# Patient Record
Sex: MALE | Race: WHITE | NOT HISPANIC OR LATINO | URBAN - METROPOLITAN AREA
[De-identification: names, ages, dates, MRNs, and addresses within clinical notes are randomized per-mention and may not be internally consistent; named-entity substitution may affect disease eponyms.]

---

## 2020-12-01 ENCOUNTER — INPATIENT (INPATIENT)
Facility: HOSPITAL | Age: 70
LOS: 7 days | Discharge: HOME | DRG: 189 | End: 2020-12-09
Attending: STUDENT IN AN ORGANIZED HEALTH CARE EDUCATION/TRAINING PROGRAM | Admitting: INTERNAL MEDICINE
Payer: MEDICARE

## 2020-12-01 VITALS
OXYGEN SATURATION: 100 % | RESPIRATION RATE: 20 BRPM | TEMPERATURE: 98 F | WEIGHT: 160.06 LBS | HEIGHT: 70 IN | SYSTOLIC BLOOD PRESSURE: 165 MMHG | DIASTOLIC BLOOD PRESSURE: 91 MMHG | HEART RATE: 105 BPM

## 2020-12-01 DIAGNOSIS — K46.9 UNSPECIFIED ABDOMINAL HERNIA WITHOUT OBSTRUCTION OR GANGRENE: Chronic | ICD-10-CM

## 2020-12-01 DIAGNOSIS — J44.1 CHRONIC OBSTRUCTIVE PULMONARY DISEASE WITH (ACUTE) EXACERBATION: ICD-10-CM

## 2020-12-01 DIAGNOSIS — Z98.89 OTHER SPECIFIED POSTPROCEDURAL STATES: Chronic | ICD-10-CM

## 2020-12-01 LAB
ALBUMIN SERPL ELPH-MCNC: 3.3 G/DL — SIGNIFICANT CHANGE UP (ref 3.3–5.2)
ALP SERPL-CCNC: 143 U/L — HIGH (ref 40–120)
ALT FLD-CCNC: 12 U/L — SIGNIFICANT CHANGE UP
ANION GAP SERPL CALC-SCNC: 8 MMOL/L — SIGNIFICANT CHANGE UP (ref 5–17)
AST SERPL-CCNC: 15 U/L — SIGNIFICANT CHANGE UP
BASE EXCESS BLDA CALC-SCNC: 14.7 MMOL/L — HIGH (ref -2–2)
BASE EXCESS BLDV CALC-SCNC: 17.6 MMOL/L — HIGH (ref -2–2)
BASOPHILS # BLD AUTO: 0.06 K/UL — SIGNIFICANT CHANGE UP (ref 0–0.2)
BASOPHILS NFR BLD AUTO: 0.6 % — SIGNIFICANT CHANGE UP (ref 0–2)
BILIRUB SERPL-MCNC: 0.3 MG/DL — LOW (ref 0.4–2)
BLOOD GAS COMMENTS ARTERIAL: SIGNIFICANT CHANGE UP
BUN SERPL-MCNC: 8 MG/DL — SIGNIFICANT CHANGE UP (ref 8–20)
CA-I SERPL-SCNC: 1.19 MMOL/L — SIGNIFICANT CHANGE UP (ref 1.15–1.33)
CALCIUM SERPL-MCNC: 9.2 MG/DL — SIGNIFICANT CHANGE UP (ref 8.6–10.2)
CHLORIDE BLDV-SCNC: 91 MMOL/L — LOW (ref 98–107)
CHLORIDE SERPL-SCNC: 93 MMOL/L — LOW (ref 98–107)
CO2 SERPL-SCNC: 43 MMOL/L — HIGH (ref 22–29)
CREAT SERPL-MCNC: 0.55 MG/DL — SIGNIFICANT CHANGE UP (ref 0.5–1.3)
EOSINOPHIL # BLD AUTO: 0.27 K/UL — SIGNIFICANT CHANGE UP (ref 0–0.5)
EOSINOPHIL NFR BLD AUTO: 2.7 % — SIGNIFICANT CHANGE UP (ref 0–6)
GAS PNL BLDA: SIGNIFICANT CHANGE UP
GAS PNL BLDV: 145 MMOL/L — SIGNIFICANT CHANGE UP (ref 135–145)
GAS PNL BLDV: SIGNIFICANT CHANGE UP
GAS PNL BLDV: SIGNIFICANT CHANGE UP
GLUCOSE BLDV-MCNC: 120 MG/DL — HIGH (ref 70–99)
GLUCOSE SERPL-MCNC: 123 MG/DL — HIGH (ref 70–99)
HCO3 BLDA-SCNC: 38 MMOL/L — HIGH (ref 20–26)
HCO3 BLDV-SCNC: 42 MMOL/L — HIGH (ref 21–29)
HCT VFR BLD CALC: 44.1 % — SIGNIFICANT CHANGE UP (ref 39–50)
HCT VFR BLDA CALC: 42 — SIGNIFICANT CHANGE UP (ref 39–50)
HGB BLD CALC-MCNC: 13.6 G/DL — SIGNIFICANT CHANGE UP (ref 13–17)
HGB BLD-MCNC: 13.2 G/DL — SIGNIFICANT CHANGE UP (ref 13–17)
HOROWITZ INDEX BLDA+IHG-RTO: 0.4 — SIGNIFICANT CHANGE UP
IMM GRANULOCYTES NFR BLD AUTO: 0.7 % — SIGNIFICANT CHANGE UP (ref 0–1.5)
INR BLD: 1.43 RATIO — HIGH (ref 0.88–1.16)
LACTATE BLDV-MCNC: 0.4 MMOL/L — LOW (ref 0.5–2)
LYMPHOCYTES # BLD AUTO: 1.33 K/UL — SIGNIFICANT CHANGE UP (ref 1–3.3)
LYMPHOCYTES # BLD AUTO: 13.2 % — SIGNIFICANT CHANGE UP (ref 13–44)
MCHC RBC-ENTMCNC: 27 PG — SIGNIFICANT CHANGE UP (ref 27–34)
MCHC RBC-ENTMCNC: 29.9 GM/DL — LOW (ref 32–36)
MCV RBC AUTO: 90.4 FL — SIGNIFICANT CHANGE UP (ref 80–100)
MONOCYTES # BLD AUTO: 1.17 K/UL — HIGH (ref 0–0.9)
MONOCYTES NFR BLD AUTO: 11.6 % — SIGNIFICANT CHANGE UP (ref 2–14)
NEUTROPHILS # BLD AUTO: 7.17 K/UL — SIGNIFICANT CHANGE UP (ref 1.8–7.4)
NEUTROPHILS NFR BLD AUTO: 71.2 % — SIGNIFICANT CHANGE UP (ref 43–77)
OTHER CELLS CSF MANUAL: 17 ML/DL — LOW (ref 18–22)
PCO2 BLDA: 96 MMHG — CRITICAL HIGH (ref 35–45)
PCO2 BLDV: 92 MMHG — HIGH (ref 35–50)
PH BLDA: 7.29 — LOW (ref 7.35–7.45)
PH BLDV: 7.24 — LOW (ref 7.32–7.43)
PLATELET # BLD AUTO: 307 K/UL — SIGNIFICANT CHANGE UP (ref 150–400)
PO2 BLDA: 68 MMHG — LOW (ref 83–108)
PO2 BLDV: 69 MMHG — HIGH (ref 25–45)
POTASSIUM BLDV-SCNC: 4.3 MMOL/L — SIGNIFICANT CHANGE UP (ref 3.4–4.5)
POTASSIUM SERPL-MCNC: 4.5 MMOL/L — SIGNIFICANT CHANGE UP (ref 3.5–5.3)
POTASSIUM SERPL-SCNC: 4.5 MMOL/L — SIGNIFICANT CHANGE UP (ref 3.5–5.3)
PROT SERPL-MCNC: 6.8 G/DL — SIGNIFICANT CHANGE UP (ref 6.6–8.7)
PROTHROM AB SERPL-ACNC: 16.3 SEC — HIGH (ref 10.6–13.6)
RBC # BLD: 4.88 M/UL — SIGNIFICANT CHANGE UP (ref 4.2–5.8)
RBC # FLD: 13.4 % — SIGNIFICANT CHANGE UP (ref 10.3–14.5)
SAO2 % BLDA: 93 % — LOW (ref 95–99)
SAO2 % BLDV: 93 % — SIGNIFICANT CHANGE UP
SARS-COV-2 RNA SPEC QL NAA+PROBE: SIGNIFICANT CHANGE UP
SODIUM SERPL-SCNC: 144 MMOL/L — SIGNIFICANT CHANGE UP (ref 135–145)
TROPONIN T SERPL-MCNC: <0.01 NG/ML — SIGNIFICANT CHANGE UP (ref 0–0.06)
WBC # BLD: 10.07 K/UL — SIGNIFICANT CHANGE UP (ref 3.8–10.5)
WBC # FLD AUTO: 10.07 K/UL — SIGNIFICANT CHANGE UP (ref 3.8–10.5)

## 2020-12-01 PROCEDURE — 99291 CRITICAL CARE FIRST HOUR: CPT

## 2020-12-01 PROCEDURE — 93306 TTE W/DOPPLER COMPLETE: CPT | Mod: 26

## 2020-12-01 PROCEDURE — 93010 ELECTROCARDIOGRAM REPORT: CPT

## 2020-12-01 PROCEDURE — 71045 X-RAY EXAM CHEST 1 VIEW: CPT | Mod: 26

## 2020-12-01 RX ORDER — CEFTRIAXONE 500 MG/1
1000 INJECTION, POWDER, FOR SOLUTION INTRAMUSCULAR; INTRAVENOUS EVERY 24 HOURS
Refills: 0 | Status: DISCONTINUED | OUTPATIENT
Start: 2020-12-01 | End: 2020-12-04

## 2020-12-01 RX ORDER — INFLUENZA VIRUS VACCINE 15; 15; 15; 15 UG/.5ML; UG/.5ML; UG/.5ML; UG/.5ML
0.5 SUSPENSION INTRAMUSCULAR ONCE
Refills: 0 | Status: DISCONTINUED | OUTPATIENT
Start: 2020-12-01 | End: 2020-12-09

## 2020-12-01 RX ORDER — AZITHROMYCIN 500 MG/1
500 TABLET, FILM COATED ORAL EVERY 24 HOURS
Refills: 0 | Status: DISCONTINUED | OUTPATIENT
Start: 2020-12-02 | End: 2020-12-04

## 2020-12-01 RX ORDER — CHLORHEXIDINE GLUCONATE 213 G/1000ML
1 SOLUTION TOPICAL
Refills: 0 | Status: DISCONTINUED | OUTPATIENT
Start: 2020-12-01 | End: 2020-12-02

## 2020-12-01 RX ORDER — DILTIAZEM HCL 120 MG
60 CAPSULE, EXT RELEASE 24 HR ORAL THREE TIMES A DAY
Refills: 0 | Status: DISCONTINUED | OUTPATIENT
Start: 2020-12-01 | End: 2020-12-05

## 2020-12-01 RX ORDER — AZITHROMYCIN 500 MG/1
TABLET, FILM COATED ORAL
Refills: 0 | Status: DISCONTINUED | OUTPATIENT
Start: 2020-12-01 | End: 2020-12-04

## 2020-12-01 RX ORDER — BUDESONIDE AND FORMOTEROL FUMARATE DIHYDRATE 160; 4.5 UG/1; UG/1
2 AEROSOL RESPIRATORY (INHALATION)
Refills: 0 | Status: DISCONTINUED | OUTPATIENT
Start: 2020-12-01 | End: 2020-12-09

## 2020-12-01 RX ORDER — AZITHROMYCIN 500 MG/1
500 TABLET, FILM COATED ORAL ONCE
Refills: 0 | Status: COMPLETED | OUTPATIENT
Start: 2020-12-01 | End: 2020-12-01

## 2020-12-01 RX ORDER — ENOXAPARIN SODIUM 100 MG/ML
40 INJECTION SUBCUTANEOUS DAILY
Refills: 0 | Status: DISCONTINUED | OUTPATIENT
Start: 2020-12-01 | End: 2020-12-02

## 2020-12-01 RX ORDER — CEFTRIAXONE 500 MG/1
1000 INJECTION, POWDER, FOR SOLUTION INTRAMUSCULAR; INTRAVENOUS ONCE
Refills: 0 | Status: COMPLETED | OUTPATIENT
Start: 2020-12-01 | End: 2020-12-01

## 2020-12-01 RX ORDER — ALBUTEROL 90 UG/1
1 AEROSOL, METERED ORAL EVERY 4 HOURS
Refills: 0 | Status: DISCONTINUED | OUTPATIENT
Start: 2020-12-01 | End: 2020-12-02

## 2020-12-01 RX ORDER — LISINOPRIL 2.5 MG/1
40 TABLET ORAL DAILY
Refills: 0 | Status: DISCONTINUED | OUTPATIENT
Start: 2020-12-01 | End: 2020-12-02

## 2020-12-01 RX ORDER — ALBUTEROL 90 UG/1
1 AEROSOL, METERED ORAL ONCE
Refills: 0 | Status: COMPLETED | OUTPATIENT
Start: 2020-12-01 | End: 2020-12-01

## 2020-12-01 RX ORDER — LEVETIRACETAM 250 MG/1
500 TABLET, FILM COATED ORAL
Refills: 0 | Status: DISCONTINUED | OUTPATIENT
Start: 2020-12-01 | End: 2020-12-09

## 2020-12-01 RX ADMIN — ALBUTEROL 1 PUFF(S): 90 AEROSOL, METERED ORAL at 12:00

## 2020-12-01 RX ADMIN — ENOXAPARIN SODIUM 40 MILLIGRAM(S): 100 INJECTION SUBCUTANEOUS at 21:53

## 2020-12-01 RX ADMIN — AZITHROMYCIN 255 MILLIGRAM(S): 500 TABLET, FILM COATED ORAL at 16:20

## 2020-12-01 RX ADMIN — Medication 60 MILLIGRAM(S): at 21:50

## 2020-12-01 RX ADMIN — Medication 40 MILLIGRAM(S): at 21:50

## 2020-12-01 RX ADMIN — CEFTRIAXONE 1000 MILLIGRAM(S): 500 INJECTION, POWDER, FOR SOLUTION INTRAMUSCULAR; INTRAVENOUS at 14:30

## 2020-12-01 RX ADMIN — Medication 125 MILLIGRAM(S): at 12:00

## 2020-12-01 RX ADMIN — CEFTRIAXONE 100 MILLIGRAM(S): 500 INJECTION, POWDER, FOR SOLUTION INTRAMUSCULAR; INTRAVENOUS at 21:50

## 2020-12-01 RX ADMIN — CEFTRIAXONE 100 MILLIGRAM(S): 500 INJECTION, POWDER, FOR SOLUTION INTRAMUSCULAR; INTRAVENOUS at 13:56

## 2020-12-01 NOTE — H&P ADULT - NSICDXPASTMEDICALHX_GEN_ALL_CORE_FT
PAST MEDICAL HISTORY:  Afib     COPD (chronic obstructive pulmonary disease)     Depression, unspecified depression type     Diabetes     Hyperlipidemia     Hypertension     Seizure

## 2020-12-01 NOTE — ED PROVIDER NOTE - ATTENDING CONTRIBUTION TO CARE
The patient seen and examined    COPD exacerbation    I, Dread Benitez, performed the initial face to face bedside interview with this patient regarding history of present illness, review of symptoms and relevant past medical, social and family history.  I completed an independent physical examination.  I was the initial provider who evaluated this patient. I have signed out the follow up of any pending tests (i.e. labs, radiological studies) to the resident.  I have communicated the patient’s plan of care and disposition with the resident.

## 2020-12-01 NOTE — ED ADULT TRIAGE NOTE - CHIEF COMPLAINT QUOTE
"my copd is acting up" "crystal been isolating for months" c/o difficulty breathing not improving with home neb tx. Hx COPD and emphysema. Arrives a&ox3, RR20 with 6L/min via NRB. Per EMS pt 88% on RA PTA. Denies cp denies covid contacts denies fevers/chills

## 2020-12-01 NOTE — ED PROVIDER NOTE - OBJECTIVE STATEMENT
Pt is a 69 y/o M w/PMHx DM, COPD with hx acute respiratory failure requiring intubation, depression, afib on coumadin, HTN, seizures, HLD presents c/o shortness of breath.  Pt states that last night he started to feel worsening shortness of breath, is normally on O2 at home, but is unsure how much.  Pt denies fever, headache, chest pain, abdominal pain, nausea, vomiting, constipation, diarrhea.

## 2020-12-01 NOTE — ED PROVIDER NOTE - CLINICAL SUMMARY MEDICAL DECISION MAKING FREE TEXT BOX
Pt is a 69 y/o M c/o shortness of breath, likely COPD exacerbation, will get labs, cxr, vbg, solumedrol, nebs

## 2020-12-01 NOTE — H&P ADULT - ASSESSMENT
70M w/ PMHx DM, HTN, COPD on home O2, AF on coumadin, seizures was brought in by family for worsening SOB    Acute on chronic hypercapnic respiratory failure    Patient seen and examined     Neuro: Mentation at baseline. Avoid sedation   Cardiovascular: Aim for MAP target 65. Continue rate control meds and AC w/ coumadin. Check TTE/ EKG  Resp/Chest: Start IV steroids, empiric ABx and nebs. Check COVID. Maintain SpO2 > 88%. Continue BiPAP. Check ABG and pulmonary consult  GI/Nutrition: Diet: Keep NPO for now IV PPI. Bowel regimen as needed  ID: Empiric Abx and check blood/sputum cultures. UA and COVID. Trend LA  Renal: Avoid nephrotoxic agents. Strict I&O  Endocrinology: Check A1c and lipid panel. Maintain Blood sugar < 180. ISS as per protocol. Hold metformin  Haem/Oncology:  DVT ppx w/ Coumadin  Code status: Full  Line/tubes/ drains: None    Critical Care time: 35 minutes assessing presenting problems of acute illness that poses high probability of life threatening deterioration or end organ damage/dysfunction.  Medical decision making including Initiating plan of care, reviewing data, reviewing radiology, discussing with multidisciplinary team, non inclusive of procedures

## 2020-12-01 NOTE — H&P ADULT - NSHPPHYSICALEXAM_GEN_ALL_CORE
General: No acute distress.    Neuro: Awake and moving all extremities  HEENT: Pupils equal, reactive to light, Moist oral mucosa  PULM: Decreased air entry bilaterally  CVS: Regular rhythm and controlled rate, no murmurs, rubs, or gallops  ABD: Soft, nondistended, nontender, normoactive bowel sounds, no CVA tenderness  EXT: No b/l LE edema, nontender with pedal pulse palpable   SKIN: Warm and well perfused, no acute rashes

## 2020-12-01 NOTE — ED PROVIDER NOTE - NS ED ROS FT
CONSTITUTIONAL: No fevers, no chills  Eyes: No vision changes  Cardiovascular: No Chest pain  Respiratory: +SOB  Gastrointestinal: No n/v/c/d, no abd pain  Genitourinary: no dysuria, no hematuria  SKIN: no rashes.  MSK: no weakness, no myalgias, no arthralgias  NEURO: no headache, no weakness, no numbness  PSYCHIATRIC: no SI/HI

## 2020-12-01 NOTE — H&P ADULT - ATTENDING COMMENTS
Patient seen by Telehospitalist via video conference on camera (assigned by Dr Riley), difficult to understand on BIPAP, states he has been worsening with his SOB since his steroid finished three days ago, was admitted a month ago with same problem.

## 2020-12-01 NOTE — ED PROVIDER NOTE - PHYSICAL EXAMINATION
General: well appearing, interactive, well nourished, NAD  HEENT: pupils equal and reactive, normal external ears bilaterally   Cardiac: RRR, no MRG appreciated  Resp: belly breathing, tachypneic, lungs clear to auscultation bilaterally, symmetric chest wall rise  Abd: soft, nontender, nondistended,   : no CVA tenderness  Neuro: Moving all extremities  Skin:  normal color for race

## 2020-12-01 NOTE — ED ADULT NURSE NOTE - PMH
Afib    COPD (chronic obstructive pulmonary disease)    Depression, unspecified depression type    Diabetes    Hyperlipidemia    Hypertension    Seizure

## 2020-12-01 NOTE — H&P ADULT - NSHPREVIEWOFSYSTEMS_GEN_ALL_CORE
CONSTITUTIONAL: No fever, chills, or fatigue  EYES: No eye pain, visual disturbances, or discharge  ENMT:  No difficulty hearing, tinnitus, vertigo; No sinus or throat pain  NECK: No pain or stiffness  RESPIRATORY: No cough, wheezing, chills or hemoptysis; + shortness of breath  CARDIOVASCULAR: No palpitations, dizziness, or leg swelling. +ve CP  GASTROINTESTINAL: No abdominal or epigastric pain. No nausea, vomiting, or hematemesis; No diarrhea or constipation. No melena or hematochezia.  GENITOURINARY: No dysuria, frequency, hematuria, or incontinence  NEUROLOGICAL: No headaches, memory loss, loss of strength, numbness, or tremors  SKIN: No itching, burning, rashes, or lesions   MUSCULOSKELETAL: No joint pain or swelling; No muscle, back, or extremity pain  PSYCHIATRIC: No depression, anxiety, mood swings, or difficulty sleeping

## 2020-12-01 NOTE — CONSULT NOTE ADULT - SUBJECTIVE AND OBJECTIVE BOX
Patient is a 70y old  Male who presents with a chief complaint of     BRIEF HOSPITAL COURSE:     70M w/ PMHx DM, HTN, COPD on home O2, AF on coumadin, seizures was brought in by family for worsening SOB. Pt appears quite comfortable w/ no use of accessory muscles. Admits to have mild SOB for the past few days and occasional chest discomfort. No fever, cough, N/V/D  or pain abdomen. His sister in law was diagnosed w/ COVID recently but he has not had contact w/ her in a while. He is non complaint on home BiPAP and recently completed a steroid taper. ICU consulted for new BIPAP    PAST MEDICAL & SURGICAL HISTORY:  Seizure    Depression, unspecified depression type    Diabetes    Hypertension    Hyperlipidemia    Afib    COPD (chronic obstructive pulmonary disease)    Abdominal hernia    H/O carotid endarterectomy      Review of Systems:  CONSTITUTIONAL: No fever, chills, or fatigue  EYES: No eye pain, visual disturbances, or discharge  ENMT:  No difficulty hearing, tinnitus, vertigo; No sinus or throat pain  NECK: No pain or stiffness  RESPIRATORY: No cough, wheezing, chills or hemoptysis; + shortness of breath  CARDIOVASCULAR: No palpitations, dizziness, or leg swelling. +ve CP  GASTROINTESTINAL: No abdominal or epigastric pain. No nausea, vomiting, or hematemesis; No diarrhea or constipation. No melena or hematochezia.  GENITOURINARY: No dysuria, frequency, hematuria, or incontinence  NEUROLOGICAL: No headaches, memory loss, loss of strength, numbness, or tremors  SKIN: No itching, burning, rashes, or lesions   MUSCULOSKELETAL: No joint pain or swelling; No muscle, back, or extremity pain  PSYCHIATRIC: No depression, anxiety, mood swings, or difficulty sleeping      Physical Examination:    ICU Vital Signs Last 24 Hrs  T(C): 36.8 (01 Dec 2020 10:51), Max: 36.8 (01 Dec 2020 10:51)  T(F): 98.3 (01 Dec 2020 10:51), Max: 98.3 (01 Dec 2020 10:51)  HR: 96 (01 Dec 2020 13:23) (96 - 105)  BP: 165/91 (01 Dec 2020 10:51) (165/91 - 165/91)  BP(mean): --  ABP: --  ABP(mean): --  RR: 24 (01 Dec 2020 11:07) (20 - 24)  SpO2: 93% (01 Dec 2020 13:23) (88% - 100%)    General: No acute distress.    Neuro: Awake and moving all extremities  HEENT: Pupils equal, reactive to light, Moist oral mucosa  PULM: Decreased air entry bilaterally  CVS: Regular rhythm and controlled rate, no murmurs, rubs, or gallops  ABD: Soft, nondistended, nontender, normoactive bowel sounds, no CVA tenderness  EXT: No b/l LE edema, nontender with pedal pulse palpable   SKIN: Warm and well perfused, no acute rashes       Medications:                                  I&O's Detail        RADIOLOGY/ Microbiology/ Labs: reviewed

## 2020-12-01 NOTE — ED ADULT NURSE NOTE - OBJECTIVE STATEMENT
69 y/o m a&ox3 hx. of COPD comes to ED c/o worsening SOB. pt. has home bipap that he does not really use. pt. belly breathing.

## 2020-12-01 NOTE — H&P ADULT - HISTORY OF PRESENT ILLNESS
70M w/ PMHx DM, HTN, COPD on home O2, AF on coumadin, seizures was brought in by family for worsening SOB. Pt appears quite comfortable w/ no use of accessory muscles. Admits to have mild SOB for the past few days and occasional chest discomfort. No fever, cough, N/V/D  or pain abdomen. His sister in law was diagnosed w/ COVID recently but he has not had contact w/ her in a while. He is non complaint on home BiPAP and recently completed a steroid taper. ICU consulted for new BIPAP

## 2020-12-02 LAB
A1C WITH ESTIMATED AVERAGE GLUCOSE RESULT: 8.7 % — HIGH (ref 4–5.6)
ALBUMIN SERPL ELPH-MCNC: 3.2 G/DL — LOW (ref 3.3–5.2)
ALP SERPL-CCNC: 128 U/L — HIGH (ref 40–120)
ALT FLD-CCNC: 11 U/L — SIGNIFICANT CHANGE UP
ANION GAP SERPL CALC-SCNC: 6 MMOL/L — SIGNIFICANT CHANGE UP (ref 5–17)
ANION GAP SERPL CALC-SCNC: 6 MMOL/L — SIGNIFICANT CHANGE UP (ref 5–17)
APTT BLD: 36.7 SEC — HIGH (ref 27.5–35.5)
AST SERPL-CCNC: 12 U/L — SIGNIFICANT CHANGE UP
BASE EXCESS BLDA CALC-SCNC: 18 MMOL/L — HIGH (ref -3–3)
BILIRUB SERPL-MCNC: 0.2 MG/DL — LOW (ref 0.4–2)
BLOOD GAS COMMENTS ARTERIAL: SIGNIFICANT CHANGE UP
BUN SERPL-MCNC: 22 MG/DL — HIGH (ref 8–20)
BUN SERPL-MCNC: 33 MG/DL — HIGH (ref 8–20)
CALCIUM SERPL-MCNC: 8.9 MG/DL — SIGNIFICANT CHANGE UP (ref 8.6–10.2)
CALCIUM SERPL-MCNC: 9.4 MG/DL — SIGNIFICANT CHANGE UP (ref 8.6–10.2)
CHLORIDE SERPL-SCNC: 89 MMOL/L — LOW (ref 98–107)
CHLORIDE SERPL-SCNC: 90 MMOL/L — LOW (ref 98–107)
CHOLEST SERPL-MCNC: 129 MG/DL — SIGNIFICANT CHANGE UP
CO2 SERPL-SCNC: 42 MMOL/L — HIGH (ref 22–29)
CO2 SERPL-SCNC: 43 MMOL/L — HIGH (ref 22–29)
CREAT SERPL-MCNC: 0.78 MG/DL — SIGNIFICANT CHANGE UP (ref 0.5–1.3)
CREAT SERPL-MCNC: 0.94 MG/DL — SIGNIFICANT CHANGE UP (ref 0.5–1.3)
ESTIMATED AVERAGE GLUCOSE: 203 MG/DL — HIGH (ref 68–114)
GAS PNL BLDA: SIGNIFICANT CHANGE UP
GLUCOSE BLDC GLUCOMTR-MCNC: 146 MG/DL — HIGH (ref 70–99)
GLUCOSE BLDC GLUCOMTR-MCNC: 166 MG/DL — HIGH (ref 70–99)
GLUCOSE BLDC GLUCOMTR-MCNC: 168 MG/DL — HIGH (ref 70–99)
GLUCOSE BLDC GLUCOMTR-MCNC: 199 MG/DL — HIGH (ref 70–99)
GLUCOSE BLDC GLUCOMTR-MCNC: 224 MG/DL — HIGH (ref 70–99)
GLUCOSE BLDC GLUCOMTR-MCNC: 233 MG/DL — HIGH (ref 70–99)
GLUCOSE BLDC GLUCOMTR-MCNC: 265 MG/DL — HIGH (ref 70–99)
GLUCOSE BLDC GLUCOMTR-MCNC: 280 MG/DL — HIGH (ref 70–99)
GLUCOSE BLDC GLUCOMTR-MCNC: 290 MG/DL — HIGH (ref 70–99)
GLUCOSE BLDC GLUCOMTR-MCNC: 320 MG/DL — HIGH (ref 70–99)
GLUCOSE BLDC GLUCOMTR-MCNC: 366 MG/DL — HIGH (ref 70–99)
GLUCOSE BLDC GLUCOMTR-MCNC: 372 MG/DL — HIGH (ref 70–99)
GLUCOSE BLDC GLUCOMTR-MCNC: 422 MG/DL — HIGH (ref 70–99)
GLUCOSE BLDC GLUCOMTR-MCNC: 440 MG/DL — HIGH (ref 70–99)
GLUCOSE SERPL-MCNC: 170 MG/DL — HIGH (ref 70–99)
GLUCOSE SERPL-MCNC: 452 MG/DL — HIGH (ref 70–99)
HCO3 BLDA-SCNC: 42 MMOL/L — HIGH (ref 20–26)
HCT VFR BLD CALC: 37.1 % — LOW (ref 39–50)
HCV AB S/CO SERPL IA: 0.06 S/CO — SIGNIFICANT CHANGE UP (ref 0–0.99)
HCV AB SERPL-IMP: SIGNIFICANT CHANGE UP
HDLC SERPL-MCNC: 46 MG/DL — SIGNIFICANT CHANGE UP
HGB BLD-MCNC: 11.5 G/DL — LOW (ref 13–17)
HOROWITZ INDEX BLDA+IHG-RTO: SIGNIFICANT CHANGE UP
INR BLD: 1.58 RATIO — HIGH (ref 0.88–1.16)
LACTATE SERPL-SCNC: 0.9 MMOL/L — SIGNIFICANT CHANGE UP (ref 0.5–2)
LIPID PNL WITH DIRECT LDL SERPL: 68 MG/DL — SIGNIFICANT CHANGE UP
MAGNESIUM SERPL-MCNC: 1.8 MG/DL — SIGNIFICANT CHANGE UP (ref 1.6–2.6)
MAGNESIUM SERPL-MCNC: 1.8 MG/DL — SIGNIFICANT CHANGE UP (ref 1.8–2.6)
MCHC RBC-ENTMCNC: 27.4 PG — SIGNIFICANT CHANGE UP (ref 27–34)
MCHC RBC-ENTMCNC: 31 GM/DL — LOW (ref 32–36)
MCV RBC AUTO: 88.3 FL — SIGNIFICANT CHANGE UP (ref 80–100)
NON HDL CHOLESTEROL: 83 MG/DL — SIGNIFICANT CHANGE UP
PCO2 BLDA: 94 MMHG — CRITICAL HIGH (ref 35–45)
PH BLDA: 7.32 — LOW (ref 7.35–7.45)
PHOSPHATE SERPL-MCNC: 2.6 MG/DL — SIGNIFICANT CHANGE UP (ref 2.4–4.7)
PHOSPHATE SERPL-MCNC: 3.3 MG/DL — SIGNIFICANT CHANGE UP (ref 2.4–4.7)
PLATELET # BLD AUTO: 293 K/UL — SIGNIFICANT CHANGE UP (ref 150–400)
PO2 BLDA: 76 MMHG — LOW (ref 83–108)
POTASSIUM SERPL-MCNC: 5.2 MMOL/L — SIGNIFICANT CHANGE UP (ref 3.5–5.3)
POTASSIUM SERPL-MCNC: 5.6 MMOL/L — HIGH (ref 3.5–5.3)
POTASSIUM SERPL-SCNC: 5.2 MMOL/L — SIGNIFICANT CHANGE UP (ref 3.5–5.3)
POTASSIUM SERPL-SCNC: 5.6 MMOL/L — HIGH (ref 3.5–5.3)
PROT SERPL-MCNC: 6.4 G/DL — LOW (ref 6.6–8.7)
PROTHROM AB SERPL-ACNC: 17.9 SEC — HIGH (ref 10.6–13.6)
RBC # BLD: 4.2 M/UL — SIGNIFICANT CHANGE UP (ref 4.2–5.8)
RBC # FLD: 13.3 % — SIGNIFICANT CHANGE UP (ref 10.3–14.5)
SAO2 % BLDA: 96 % — SIGNIFICANT CHANGE UP (ref 95–99)
SARS-COV-2 IGG SERPL QL IA: NEGATIVE — SIGNIFICANT CHANGE UP
SARS-COV-2 IGM SERPL IA-ACNC: <0.1 INDEX — SIGNIFICANT CHANGE UP
SODIUM SERPL-SCNC: 137 MMOL/L — SIGNIFICANT CHANGE UP (ref 135–145)
SODIUM SERPL-SCNC: 138 MMOL/L — SIGNIFICANT CHANGE UP (ref 135–145)
TRIGL SERPL-MCNC: 75 MG/DL — SIGNIFICANT CHANGE UP
TROPONIN T SERPL-MCNC: <0.01 NG/ML — SIGNIFICANT CHANGE UP (ref 0–0.06)
TSH SERPL-MCNC: 0.38 UIU/ML — SIGNIFICANT CHANGE UP (ref 0.27–4.2)
WBC # BLD: 5.97 K/UL — SIGNIFICANT CHANGE UP (ref 3.8–10.5)
WBC # FLD AUTO: 5.97 K/UL — SIGNIFICANT CHANGE UP (ref 3.8–10.5)

## 2020-12-02 PROCEDURE — 99233 SBSQ HOSP IP/OBS HIGH 50: CPT

## 2020-12-02 PROCEDURE — 99232 SBSQ HOSP IP/OBS MODERATE 35: CPT

## 2020-12-02 RX ORDER — INSULIN GLARGINE 100 [IU]/ML
25 INJECTION, SOLUTION SUBCUTANEOUS EVERY MORNING
Refills: 0 | Status: DISCONTINUED | OUTPATIENT
Start: 2020-12-03 | End: 2020-12-09

## 2020-12-02 RX ORDER — DEXTROSE 50 % IN WATER 50 %
25 SYRINGE (ML) INTRAVENOUS ONCE
Refills: 0 | Status: DISCONTINUED | OUTPATIENT
Start: 2020-12-02 | End: 2020-12-02

## 2020-12-02 RX ORDER — LACTOBACILLUS ACIDOPHILUS 100MM CELL
1 CAPSULE ORAL
Refills: 0 | Status: DISCONTINUED | OUTPATIENT
Start: 2020-12-02 | End: 2020-12-09

## 2020-12-02 RX ORDER — INSULIN GLARGINE 100 [IU]/ML
25 INJECTION, SOLUTION SUBCUTANEOUS AT BEDTIME
Refills: 0 | Status: DISCONTINUED | OUTPATIENT
Start: 2020-12-02 | End: 2020-12-08

## 2020-12-02 RX ORDER — WARFARIN SODIUM 2.5 MG/1
6 TABLET ORAL ONCE
Refills: 0 | Status: COMPLETED | OUTPATIENT
Start: 2020-12-02 | End: 2020-12-02

## 2020-12-02 RX ORDER — DEXTROSE 50 % IN WATER 50 %
15 SYRINGE (ML) INTRAVENOUS ONCE
Refills: 0 | Status: DISCONTINUED | OUTPATIENT
Start: 2020-12-02 | End: 2020-12-09

## 2020-12-02 RX ORDER — ATORVASTATIN CALCIUM 80 MG/1
10 TABLET, FILM COATED ORAL AT BEDTIME
Refills: 0 | Status: DISCONTINUED | OUTPATIENT
Start: 2020-12-02 | End: 2020-12-09

## 2020-12-02 RX ORDER — CITALOPRAM 10 MG/1
40 TABLET, FILM COATED ORAL DAILY
Refills: 0 | Status: DISCONTINUED | OUTPATIENT
Start: 2020-12-02 | End: 2020-12-09

## 2020-12-02 RX ORDER — DEXTROSE 50 % IN WATER 50 %
25 SYRINGE (ML) INTRAVENOUS ONCE
Refills: 0 | Status: DISCONTINUED | OUTPATIENT
Start: 2020-12-02 | End: 2020-12-09

## 2020-12-02 RX ORDER — ALBUTEROL 90 UG/1
2.5 AEROSOL, METERED ORAL EVERY 6 HOURS
Refills: 0 | Status: DISCONTINUED | OUTPATIENT
Start: 2020-12-02 | End: 2020-12-04

## 2020-12-02 RX ORDER — LISINOPRIL 2.5 MG/1
40 TABLET ORAL DAILY
Refills: 0 | Status: DISCONTINUED | OUTPATIENT
Start: 2020-12-02 | End: 2020-12-09

## 2020-12-02 RX ORDER — DEXTROSE 50 % IN WATER 50 %
12.5 SYRINGE (ML) INTRAVENOUS ONCE
Refills: 0 | Status: DISCONTINUED | OUTPATIENT
Start: 2020-12-02 | End: 2020-12-09

## 2020-12-02 RX ORDER — MAGNESIUM OXIDE 400 MG ORAL TABLET 241.3 MG
400 TABLET ORAL ONCE
Refills: 0 | Status: COMPLETED | OUTPATIENT
Start: 2020-12-02 | End: 2020-12-02

## 2020-12-02 RX ORDER — SODIUM CHLORIDE 9 MG/ML
1000 INJECTION, SOLUTION INTRAVENOUS
Refills: 0 | Status: DISCONTINUED | OUTPATIENT
Start: 2020-12-02 | End: 2020-12-09

## 2020-12-02 RX ORDER — MAGNESIUM SULFATE 500 MG/ML
2 VIAL (ML) INJECTION ONCE
Refills: 0 | Status: DISCONTINUED | OUTPATIENT
Start: 2020-12-02 | End: 2020-12-02

## 2020-12-02 RX ORDER — INSULIN LISPRO 100/ML
VIAL (ML) SUBCUTANEOUS
Refills: 0 | Status: DISCONTINUED | OUTPATIENT
Start: 2020-12-02 | End: 2020-12-09

## 2020-12-02 RX ORDER — ENOXAPARIN SODIUM 100 MG/ML
30 INJECTION SUBCUTANEOUS ONCE
Refills: 0 | Status: COMPLETED | OUTPATIENT
Start: 2020-12-02 | End: 2020-12-02

## 2020-12-02 RX ORDER — INSULIN HUMAN 100 [IU]/ML
5 INJECTION, SOLUTION SUBCUTANEOUS
Qty: 50 | Refills: 0 | Status: DISCONTINUED | OUTPATIENT
Start: 2020-12-02 | End: 2020-12-02

## 2020-12-02 RX ORDER — GLUCAGON INJECTION, SOLUTION 0.5 MG/.1ML
1 INJECTION, SOLUTION SUBCUTANEOUS ONCE
Refills: 0 | Status: DISCONTINUED | OUTPATIENT
Start: 2020-12-02 | End: 2020-12-09

## 2020-12-02 RX ADMIN — Medication 60 MILLIGRAM(S): at 06:07

## 2020-12-02 RX ADMIN — AZITHROMYCIN 255 MILLIGRAM(S): 500 TABLET, FILM COATED ORAL at 13:38

## 2020-12-02 RX ADMIN — Medication 40 MILLIGRAM(S): at 06:07

## 2020-12-02 RX ADMIN — INSULIN HUMAN 5 UNIT(S)/HR: 100 INJECTION, SOLUTION SUBCUTANEOUS at 19:52

## 2020-12-02 RX ADMIN — CITALOPRAM 40 MILLIGRAM(S): 10 TABLET, FILM COATED ORAL at 22:00

## 2020-12-02 RX ADMIN — Medication 60 MILLIGRAM(S): at 13:38

## 2020-12-02 RX ADMIN — ALBUTEROL 2.5 MILLIGRAM(S): 90 AEROSOL, METERED ORAL at 20:05

## 2020-12-02 RX ADMIN — BUDESONIDE AND FORMOTEROL FUMARATE DIHYDRATE 2 PUFF(S): 160; 4.5 AEROSOL RESPIRATORY (INHALATION) at 20:26

## 2020-12-02 RX ADMIN — Medication 40 MILLIGRAM(S): at 22:00

## 2020-12-02 RX ADMIN — LEVETIRACETAM 500 MILLIGRAM(S): 250 TABLET, FILM COATED ORAL at 06:08

## 2020-12-02 RX ADMIN — Medication 60 MILLIGRAM(S): at 21:21

## 2020-12-02 RX ADMIN — CHLORHEXIDINE GLUCONATE 1 APPLICATION(S): 213 SOLUTION TOPICAL at 06:09

## 2020-12-02 RX ADMIN — INSULIN GLARGINE 25 UNIT(S): 100 INJECTION, SOLUTION SUBCUTANEOUS at 20:20

## 2020-12-02 RX ADMIN — MAGNESIUM OXIDE 400 MG ORAL TABLET 400 MILLIGRAM(S): 241.3 TABLET ORAL at 17:27

## 2020-12-02 RX ADMIN — Medication 40 MILLIGRAM(S): at 13:38

## 2020-12-02 RX ADMIN — ALBUTEROL 2.5 MILLIGRAM(S): 90 AEROSOL, METERED ORAL at 15:25

## 2020-12-02 RX ADMIN — WARFARIN SODIUM 6 MILLIGRAM(S): 2.5 TABLET ORAL at 22:39

## 2020-12-02 RX ADMIN — CEFTRIAXONE 100 MILLIGRAM(S): 500 INJECTION, POWDER, FOR SOLUTION INTRAMUSCULAR; INTRAVENOUS at 21:20

## 2020-12-02 RX ADMIN — ATORVASTATIN CALCIUM 10 MILLIGRAM(S): 80 TABLET, FILM COATED ORAL at 22:00

## 2020-12-02 RX ADMIN — ENOXAPARIN SODIUM 40 MILLIGRAM(S): 100 INJECTION SUBCUTANEOUS at 13:38

## 2020-12-02 RX ADMIN — ENOXAPARIN SODIUM 30 MILLIGRAM(S): 100 INJECTION SUBCUTANEOUS at 22:38

## 2020-12-02 RX ADMIN — LISINOPRIL 40 MILLIGRAM(S): 2.5 TABLET ORAL at 06:08

## 2020-12-02 RX ADMIN — LEVETIRACETAM 500 MILLIGRAM(S): 250 TABLET, FILM COATED ORAL at 17:07

## 2020-12-02 NOTE — PROGRESS NOTE ADULT - SUBJECTIVE AND OBJECTIVE BOX
Patient is a 70y old  Male who presents with a chief complaint of SOB (01 Dec 2020 15:01)      BRIEF HOSPITAL COURSE: 70M w/ PMHx DM, HTN, COPD on home O2, AF on coumadin, seizures was brought in by family for worsening SOB. Pt appears quite comfortable w/ no use of accessory muscles. Admits to having mild SOB for the past few days and occasional chest discomfort. No fever, cough, N/V/D  or pain abdomen. His sister in law was diagnosed w/ COVID recently but he has not had contact w/ her in a while. He is non complaint on home BiPAP and recently completed a steroid taper. MICU consulted for new BIPAP and admitted for acute on chronic hypercapnic respiratory failure.      Events last 24 hours: No acute events. Remained on BiPAP overnight. This AM hyperglycemic in the 400s. Started on insulin gtt.      PAST MEDICAL & SURGICAL HISTORY:  Seizure  Depression, unspecified depression type  Diabetes  Hypertension  Hyperlipidemia  Afib  COPD (chronic obstructive pulmonary disease)  Abdominal hernia  H/O carotid endarterectomy        Medications:  azithromycin  IVPB 500 milliGRAM(s) IV Intermittent every 24 hours  azithromycin  IVPB      cefTRIAXone   IVPB 1000 milliGRAM(s) IV Intermittent every 24 hours  diltiazem    Tablet 60 milliGRAM(s) Oral three times a day  lisinopril 40 milliGRAM(s) Oral daily  ALBUTerol    0.083% 2.5 milliGRAM(s) Nebulizer every 6 hours  budesonide 160 MICROgram(s)/formoterol 4.5 MICROgram(s) Inhaler 2 Puff(s) Inhalation two times a day  levETIRAcetam 500 milliGRAM(s) Oral two times a day  enoxaparin Injectable 40 milliGRAM(s) SubCutaneous daily  insulin regular Infusion 5 Unit(s)/Hr IV Continuous <Continuous>  methylPREDNISolone sodium succinate Injectable 40 milliGRAM(s) IV Push every 8 hours  influenza   Vaccine 0.5 milliLiter(s) IntraMuscular once  chlorhexidine 4% Liquid 1 Application(s) Topical <User Schedule>            ICU Vital Signs Last 24 Hrs  T(C): 36.4 (02 Dec 2020 04:25), Max: 36.8 (01 Dec 2020 10:51)  T(F): 97.6 (02 Dec 2020 04:25), Max: 98.3 (01 Dec 2020 10:51)  HR: 71 (02 Dec 2020 08:29) (70 - 105)  BP: 127/66 (02 Dec 2020 08:00) (100/44 - 165/91)  BP(mean): 85 (02 Dec 2020 08:00) (60 - 93)  RR: 21 (02 Dec 2020 08:00) (16 - 29)  SpO2: 98% (02 Dec 2020 08:29) (88% - 100%)      ABG - ( 02 Dec 2020 04:38 )  pH, Arterial: 7.32  pH, Blood: x     /  pCO2: 94    /  pO2: 76    / HCO3: 42    / Base Excess: 18.0  /  SaO2: 96                  I&O's Detail    01 Dec 2020 07:01  -  02 Dec 2020 07:00  --------------------------------------------------------  IN:    IV PiggyBack: 50 mL  Total IN: 50 mL    OUT:    Voided (mL): 500 mL  Total OUT: 500 mL    Total NET: -450 mL            LABS:                        11.5   5.97  )-----------( 293      ( 02 Dec 2020 04:55 )             37.1     12-02    137  |  89<L>  |  22.0<H>  ----------------------------<  452<H>  5.6<H>   |  43.0<H>  |  0.78    Ca    8.9      02 Dec 2020 04:55  Phos  3.3     12-02  Mg     1.8     12-02    TPro  6.4<L>  /  Alb  3.2<L>  /  TBili  0.2<L>  /  DBili  x   /  AST  12  /  ALT  11  /  AlkPhos  128<H>  12-02      CARDIAC MARKERS ( 02 Dec 2020 04:55 )  x     / <0.01 ng/mL / x     / x     / x      CARDIAC MARKERS ( 01 Dec 2020 11:56 )  x     / <0.01 ng/mL / x     / x     / x          CAPILLARY BLOOD GLUCOSE      POCT Blood Glucose.: 440 mg/dL (02 Dec 2020 06:26)    PT/INR - ( 01 Dec 2020 15:33 )   PT: 16.3 sec;   INR: 1.43 ratio             CULTURES:      Physical Examination:    General: No acute distress.      HEENT: Normocephalic. Atraumatic. Pupils equal, reactive to light. EOMi. Symmetric.    PULM: Unlabored respirations. No accessory muscle use. Clear to auscultation bilaterally, no significant sputum production.    NECK: Supple, no lymphadenopathy, trachea midline    CVS: Regular rate and rhythm, no murmurs, rubs, or gallops    GI: Soft, nondistended, nontender, normoactive bowel sounds, no masses    EXT: No edema, nontender    SKIN: Warm and well perfused, no rashes noted.    NEURO: Alert, oriented, interactive, nonfocal           RADIOLOGY:   < from: Xray Chest 1 View-PORTABLE IMMEDIATE (12.01.20 @ 11:32) >  INTERPRETATION:  AP chest on December 1, 2020 at 11:17 AM. Patient is short of breath.    Heart is magnified by technique.    The lung fields and pleural surfaces are unremarkable.    Chest is similar to May 23, 2016.    IMPRESSION: No acute finding or change.    < end of copied text >      < from: TTE Echo Complete w/ Contrast w/ Doppler (12.01.20 @ 21:12) >  Summary:   1. Technically difficult study. Measurements may not be accurate due to off axis views.   2. Endocardial visualization was enhanced with intravenous echo contrast.   3. Normal global left ventricular systolic function.   4. Left ventricular ejection fraction, by visual estimation, is 65 to 70%.   5. Moderately increased LV wall thickness.   6. Spectral Doppler shows impaired relaxation pattern of left ventricular myocardial filling (Grade I diastolic dysfunction).   7. Mild mitral annular calcification.   8. Mild thickening of the anterior and posterior mitral valve leaflets.   9. Trace mitral valve regurgitation.    MD Jamila Electronically signed on 12/2/2020 at 8:13:55 AM    < end of copied text >        CRITICAL CARE TIME SPENT: 30 minutes assessing presenting problems of acute illness that poses high probability of life threatening deterioration or end organ damage/dysfunction.  Medical decision making including Initiating plan of care, reviewing data, reviewing radiology, discussing with multidisciplinary team, non inclusive of procedures   Patient is a 70y old  Male who presents with a chief complaint of SOB (01 Dec 2020 15:01)      BRIEF HOSPITAL COURSE:   70M w/ PMHx DM, HTN, COPD on home O2 noncompliant with home BIPAP., AF on coumadin, seizures was brought in by family for worsening SOB. Admitted to MICU with hypercapnic respiratory failure in setting of presumed COPD exacerbation.      Events last 24 hours:   No acute events. Remained on BiPAP overnight. This AM hyperglycemic in the 400s. Started on insulin gtt.      PAST MEDICAL & SURGICAL HISTORY:  Seizure  Depression, unspecified depression type  Diabetes  Hypertension  Hyperlipidemia  Afib  COPD (chronic obstructive pulmonary disease)  Abdominal hernia  H/O carotid endarterectomy      Medications:  azithromycin  IVPB 500 milliGRAM(s) IV Intermittent every 24 hours  azithromycin  IVPB      cefTRIAXone   IVPB 1000 milliGRAM(s) IV Intermittent every 24 hours  diltiazem    Tablet 60 milliGRAM(s) Oral three times a day  lisinopril 40 milliGRAM(s) Oral daily  ALBUTerol    0.083% 2.5 milliGRAM(s) Nebulizer every 6 hours  budesonide 160 MICROgram(s)/formoterol 4.5 MICROgram(s) Inhaler 2 Puff(s) Inhalation two times a day  levETIRAcetam 500 milliGRAM(s) Oral two times a day  enoxaparin Injectable 40 milliGRAM(s) SubCutaneous daily  insulin regular Infusion 5 Unit(s)/Hr IV Continuous <Continuous>  methylPREDNISolone sodium succinate Injectable 40 milliGRAM(s) IV Push every 8 hours  influenza   Vaccine 0.5 milliLiter(s) IntraMuscular once  chlorhexidine 4% Liquid 1 Application(s) Topical <User Schedule>      ICU Vital Signs Last 24 Hrs  T(C): 36.4 (02 Dec 2020 04:25), Max: 36.8 (01 Dec 2020 10:51)  T(F): 97.6 (02 Dec 2020 04:25), Max: 98.3 (01 Dec 2020 10:51)  HR: 71 (02 Dec 2020 08:29) (70 - 105)  BP: 127/66 (02 Dec 2020 08:00) (100/44 - 165/91)  BP(mean): 85 (02 Dec 2020 08:00) (60 - 93)  RR: 21 (02 Dec 2020 08:00) (16 - 29)  SpO2: 98% (02 Dec 2020 08:29) (88% - 100%)      ABG - ( 02 Dec 2020 04:38 )  pH, Arterial: 7.32  pH, Blood: x     /  pCO2: 94    /  pO2: 76    / HCO3: 42    / Base Excess: 18.0  /  SaO2: 96          I&O's Detail    01 Dec 2020 07:01  -  02 Dec 2020 07:00  --------------------------------------------------------  IN:    IV PiggyBack: 50 mL  Total IN: 50 mL    OUT:    Voided (mL): 500 mL  Total OUT: 500 mL  Total NET: -450 mL      LABS:                        11.5   5.97  )-----------( 293      ( 02 Dec 2020 04:55 )             37.1     12-02    137  |  89<L>  |  22.0<H>  ----------------------------<  452<H>  5.6<H>   |  43.0<H>  |  0.78    Ca    8.9      02 Dec 2020 04:55  Phos  3.3     12-02  Mg     1.8     12-02    TPro  6.4<L>  /  Alb  3.2<L>  /  TBili  0.2<L>  /  DBili  x   /  AST  12  /  ALT  11  /  AlkPhos  128<H>  12-02      CARDIAC MARKERS ( 02 Dec 2020 04:55 )  x     / <0.01 ng/mL / x     / x     / x      CARDIAC MARKERS ( 01 Dec 2020 11:56 )  x     / <0.01 ng/mL / x     / x     / x          CAPILLARY BLOOD GLUCOSE  POCT Blood Glucose.: 440 mg/dL (02 Dec 2020 06:26)      PT/INR - ( 01 Dec 2020 15:33 )   PT: 16.3 sec;   INR: 1.43 ratio        CULTURES:  Pending      Physical Examination:    General: No acute distress.      HEENT: Normocephalic. Atraumatic. Pupils equal, reactive to light. EOMi. Symmetric.    PULM: Unlabored respirations. No accessory muscle use. Clear to auscultation bilaterally, no significant sputum production.    NECK: Supple, no lymphadenopathy, trachea midline    CVS: Regular rate and rhythm, no murmurs, rubs, or gallops    GI: Soft, nondistended, nontender, normoactive bowel sounds, no masses    EXT: No edema, nontender    SKIN: Warm and well perfused, no rashes noted.    NEURO: Alert, oriented, interactive, nonfocal      RADIOLOGY:   < from: Xray Chest 1 View-PORTABLE IMMEDIATE (12.01.20 @ 11:32) >  INTERPRETATION:  AP chest on December 1, 2020 at 11:17 AM. Patient is short of breath.    Heart is magnified by technique.    The lung fields and pleural surfaces are unremarkable.    Chest is similar to May 23, 2016.    IMPRESSION: No acute finding or change.    < end of copied text >      < from: TTE Echo Complete w/ Contrast w/ Doppler (12.01.20 @ 21:12) >  Summary:   1. Technically difficult study. Measurements may not be accurate due to off axis views.   2. Endocardial visualization was enhanced with intravenous echo contrast.   3. Normal global left ventricular systolic function.   4. Left ventricular ejection fraction, by visual estimation, is 65 to 70%.   5. Moderately increased LV wall thickness.   6. Spectral Doppler shows impaired relaxation pattern of left ventricular myocardial filling (Grade I diastolic dysfunction).   7. Mild mitral annular calcification.   8. Mild thickening of the anterior and posterior mitral valve leaflets.   9. Trace mitral valve regurgitation.    MD Jamila Electronically signed on 12/2/2020 at 8:13:55 AM    < end of copied text >       Patient is a 70y old  Male who presents with a chief complaint of SOB (01 Dec 2020 15:01)      BRIEF HOSPITAL COURSE:   70M w/ PMHx DM, HTN, COPD on home O2 noncompliant with home BIPAP., AF on coumadin, seizures was brought in by family for worsening SOB. Admitted to MICU with hypercapnic respiratory failure in setting of presumed COPD exacerbation.      Events last 24 hours:   No acute events. Remained on BiPAP overnight. This AM hyperglycemic in the 400s. Started on insulin gtt.      PAST MEDICAL & SURGICAL HISTORY:  Seizure  Depression, unspecified depression type  Diabetes  Hypertension  Hyperlipidemia  Afib  COPD (chronic obstructive pulmonary disease)  Abdominal hernia  H/O carotid endarterectomy      Medications:  azithromycin  IVPB 500 milliGRAM(s) IV Intermittent every 24 hours  azithromycin  IVPB      cefTRIAXone   IVPB 1000 milliGRAM(s) IV Intermittent every 24 hours  diltiazem    Tablet 60 milliGRAM(s) Oral three times a day  lisinopril 40 milliGRAM(s) Oral daily  ALBUTerol    0.083% 2.5 milliGRAM(s) Nebulizer every 6 hours  budesonide 160 MICROgram(s)/formoterol 4.5 MICROgram(s) Inhaler 2 Puff(s) Inhalation two times a day  levETIRAcetam 500 milliGRAM(s) Oral two times a day  enoxaparin Injectable 40 milliGRAM(s) SubCutaneous daily  insulin regular Infusion 5 Unit(s)/Hr IV Continuous <Continuous>  methylPREDNISolone sodium succinate Injectable 40 milliGRAM(s) IV Push every 8 hours  influenza   Vaccine 0.5 milliLiter(s) IntraMuscular once  chlorhexidine 4% Liquid 1 Application(s) Topical <User Schedule>      ICU Vital Signs Last 24 Hrs  T(C): 36.4 (02 Dec 2020 04:25), Max: 36.8 (01 Dec 2020 10:51)  T(F): 97.6 (02 Dec 2020 04:25), Max: 98.3 (01 Dec 2020 10:51)  HR: 71 (02 Dec 2020 08:29) (70 - 105)  BP: 127/66 (02 Dec 2020 08:00) (100/44 - 165/91)  BP(mean): 85 (02 Dec 2020 08:00) (60 - 93)  RR: 21 (02 Dec 2020 08:00) (16 - 29)  SpO2: 98% (02 Dec 2020 08:29) (88% - 100%)      ABG - ( 02 Dec 2020 04:38 )  pH, Arterial: 7.32  pH, Blood: x     /  pCO2: 94    /  pO2: 76    / HCO3: 42    / Base Excess: 18.0  /  SaO2: 96          I&O's Detail    01 Dec 2020 07:01  -  02 Dec 2020 07:00  --------------------------------------------------------  IN:    IV PiggyBack: 50 mL  Total IN: 50 mL    OUT:    Voided (mL): 500 mL  Total OUT: 500 mL  Total NET: -450 mL      LABS:                        11.5   5.97  )-----------( 293      ( 02 Dec 2020 04:55 )             37.1     12-02    137  |  89<L>  |  22.0<H>  ----------------------------<  452<H>  5.6<H>   |  43.0<H>  |  0.78    Ca    8.9      02 Dec 2020 04:55  Phos  3.3     12-02  Mg     1.8     12-02    TPro  6.4<L>  /  Alb  3.2<L>  /  TBili  0.2<L>  /  DBili  x   /  AST  12  /  ALT  11  /  AlkPhos  128<H>  12-02      CARDIAC MARKERS ( 02 Dec 2020 04:55 )  x     / <0.01 ng/mL / x     / x     / x      CARDIAC MARKERS ( 01 Dec 2020 11:56 )  x     / <0.01 ng/mL / x     / x     / x          CAPILLARY BLOOD GLUCOSE  POCT Blood Glucose.: 440 mg/dL (02 Dec 2020 06:26)      PT/INR - ( 01 Dec 2020 15:33 )   PT: 16.3 sec;   INR: 1.43 ratio        CULTURES:  Pending      Physical Examination:    General: No acute distress.      HEENT: Normocephalic. Atraumatic. Pupils equal, reactive to light. EOMi. Symmetric BIPAP in place.     PULM: Unlabored respirations. No accessory muscle use. Clear to auscultation bilaterally, no significant sputum production.    NECK: Supple, no lymphadenopathy, trachea midline    CVS: Regular rate and rhythm, no murmurs, rubs, or gallops    GI: Soft, nondistended, nontender, normoactive bowel sounds, no masses    EXT: No edema, nontender    SKIN: Warm and well perfused, no rashes noted.    NEURO: Alert, oriented, interactive, nonfocal      RADIOLOGY:   < from: Xray Chest 1 View-PORTABLE IMMEDIATE (12.01.20 @ 11:32) >  INTERPRETATION:  AP chest on December 1, 2020 at 11:17 AM. Patient is short of breath.    Heart is magnified by technique.    The lung fields and pleural surfaces are unremarkable.    Chest is similar to May 23, 2016.    IMPRESSION: No acute finding or change.    < end of copied text >      < from: TTE Echo Complete w/ Contrast w/ Doppler (12.01.20 @ 21:12) >  Summary:   1. Technically difficult study. Measurements may not be accurate due to off axis views.   2. Endocardial visualization was enhanced with intravenous echo contrast.   3. Normal global left ventricular systolic function.   4. Left ventricular ejection fraction, by visual estimation, is 65 to 70%.   5. Moderately increased LV wall thickness.   6. Spectral Doppler shows impaired relaxation pattern of left ventricular myocardial filling (Grade I diastolic dysfunction).   7. Mild mitral annular calcification.   8. Mild thickening of the anterior and posterior mitral valve leaflets.   9. Trace mitral valve regurgitation.    MD Jamila Electronically signed on 12/2/2020 at 8:13:55 AM    < end of copied text >

## 2020-12-02 NOTE — PROGRESS NOTE ADULT - ASSESSMENT
70M w/ PMHx DM, HTN, COPD on home O2, AF on coumadin, seizures was brought in by family for worsening SOB and intermittent mild chest discomfort for the past few days. Admits to non-compliance with BiPAP at home. MICU consulted for new BiPAP and admitted for acute on chronic hypercapnic respiratory failure.      Plan:  Neuro: A&Ox3. Avoiding sedation. Continue keppra for hx seizures.  Cardio: Hemodynamically stable in NSR with MAPs in 80s. Continue rate control.   Respiratory: On BiPAP 15/5 40%. Continue bronchodilators and steroids. SpO2 goal >88%.  GI:   : Monitor UOP and electrolytes.  HEME: DVT ppx. Holding coumadin.  ENDO:  this AM. Started on Insulin gtt 5U. Check FS q  ID: Afebrile. WBC WNL. On empiric Azithromycin and Ceftriaxone.    Patient can possibly be downgraded today after glucose is controlled. 70M w/ PMHx DM, HTN, COPD on home O2, AF on coumadin, seizures was brought in by family for worsening SOB and intermittent mild chest discomfort for the past few days. Admits to non-compliance with BiPAP at home. MICU consulted for new BiPAP and admitted for acute on chronic hypercapnic respiratory failure.      Neuro: A&Ox3. Avoiding sedation. Continue keppra for hx seizures.  Cardio: Hemodynamically stable in NSR. HTN/AFib on cardizem.  Respiratory: On BiPAP 15/5 40%. Continue bronchodilators and steroids. SpO2 goal >88%.  GI: NPO until off insulin gtt.   RENAL: Monitor UOP and electrolytes.  HEME: DVT ppx. Holding coumadin.  ENDO:  this AM. Started on Insulin gtt 5U. Check FS q1hr  ID: Afebrile. WBC WNL. On empiric Azithromycin and Ceftriaxone.  DISPO: Full code.  Daughter will be in this afternoon and will bring his home medications.     70M w/ PMHx DM, HTN, COPD on home O2, AF on coumadin, seizures was brought in by family for worsening SOB and intermittent mild chest discomfort for the past few days. Admits to non-compliance with BiPAP at home. MICU consulted for new BiPAP and admitted for acute on chronic hypercapnic respiratory failure.    Neuro: A&Ox3. Avoiding sedation. Continue keppra for hx seizures.  Cardio: Hemodynamically stable in NSR. HTN/AFib on cardizem.  Respiratory: Hypercapnic respiratory failure, chronic co2 retainer. On BiPAP 15/5 40%. Continue bronchodilators and steroids. SpO2 goal >88%.   GI: NPO until off insulin gtt.   RENAL: Monitor UOP and electrolytes.  HEME: DVT ppx. Holding coumadin.  ENDO:  this AM. Started on Insulin gtt 5U. Check FS q1hr  ID: Afebrile. WBC WNL. On Azithromycin and Ceftriaxone for COPD exacerbation.   DISPO: Full code.  Daughter will be in this afternoon and will bring his home medications.

## 2020-12-02 NOTE — PROGRESS NOTE ADULT - SUBJECTIVE AND OBJECTIVE BOX
69 y/o male with h/o copd, htn, dm, afib on coumadin, seizure disorder was admitted yesterday to icu for acute on chronic hypercapnic , hypoxic respirator failure and copd exacerbation, Pt. stated that he did not use his bipap for 2 days before he got sob and came to the ED. now feels better , stated that breathing is better and  if he can go home tomorrow. no fever, no cp, no n/v /d. no abd. pain. pt's compliance to medical treatment appears questionable.     P/E  Vital Signs Last 24 Hrs  T(C): 36.9 (02 Dec 2020 19:44), Max: 36.9 (02 Dec 2020 19:44)  T(F): 98.4 (02 Dec 2020 19:44), Max: 98.4 (02 Dec 2020 19:44)  HR: 95 (02 Dec 2020 21:00) (70 - 108)  BP: 113/68 (02 Dec 2020 21:00) (100/44 - 139/105)  BP(mean): 80 (02 Dec 2020 21:00) (60 - 116)  RR: 23 (02 Dec 2020 21:00) (13 - 29)  SpO2: 97% (02 Dec 2020 21:00) (81% - 100%)    General: Well developed. well nourished. not in distress  HEENT: AT, NC. PERRL. intact EOM. no throat erythema or exudate.   Neck: supple. no JVD. no palpable lymph nodes  Chest: CTA bilaterally  Heart: normal S1,S2. RRR. no heart murmur.  Abdomen: soft. non-tender. non-distended. + BS. no hernia or palpable masses.  Genital: not examined  Ext: no C/C/E. no calf tenderness.  Neuro: AAO x3. no focal weakness. no speech disorder  Skin: no rash    MEDICATIONS  (STANDING):  ALBUTerol    0.083% 2.5 milliGRAM(s) Nebulizer every 6 hours  atorvastatin 10 milliGRAM(s) Oral at bedtime  azithromycin  IVPB      azithromycin  IVPB 500 milliGRAM(s) IV Intermittent every 24 hours  budesonide 160 MICROgram(s)/formoterol 4.5 MICROgram(s) Inhaler 2 Puff(s) Inhalation two times a day  cefTRIAXone   IVPB 1000 milliGRAM(s) IV Intermittent every 24 hours  chlorhexidine 4% Liquid 1 Application(s) Topical <User Schedule>  citalopram 40 milliGRAM(s) Oral daily  dextrose 40% Gel 15 Gram(s) Oral once  dextrose 5%. 1000 milliLiter(s) (50 mL/Hr) IV Continuous <Continuous>  dextrose 5%. 1000 milliLiter(s) (100 mL/Hr) IV Continuous <Continuous>  dextrose 50% Injectable 25 Gram(s) IV Push once  dextrose 50% Injectable 12.5 Gram(s) IV Push once  diltiazem    Tablet 60 milliGRAM(s) Oral three times a day  enoxaparin Injectable 40 milliGRAM(s) SubCutaneous daily  glucagon  Injectable 1 milliGRAM(s) IntraMuscular once  influenza   Vaccine 0.5 milliLiter(s) IntraMuscular once  insulin glargine Injectable (LANTUS) 25 Unit(s) SubCutaneous at bedtime  insulin lispro (ADMELOG) corrective regimen sliding scale   SubCutaneous Before meals and at bedtime  levETIRAcetam 500 milliGRAM(s) Oral two times a day  lisinopril 40 milliGRAM(s) Oral daily  methylPREDNISolone sodium succinate Injectable 40 milliGRAM(s) IV Push every 12 hours    MEDICATIONS  (PRN):    LABS :                            11.5   5.97  )-----------( 293      ( 02 Dec 2020 04:55 )             37.1     12-02    138  |  90<L>  |  33.0<H>  ----------------------------<  170<H>  5.2   |  42.0<H>  |  0.94    Ca    9.4      02 Dec 2020 16:25  Phos  2.6     12-02  Mg     1.8     12-02    TPro  6.4<L>  /  Alb  3.2<L>  /  TBili  0.2<L>  /  DBili  x   /  AST  12  /  ALT  11  /  AlkPhos  128<H>  12-02    CARDIAC MARKERS ( 02 Dec 2020 04:55 )  x     / <0.01 ng/mL / x     / x     / x      CARDIAC MARKERS ( 01 Dec 2020 11:56 )  x     / <0.01 ng/mL / x     / x     / x              PT/INR - ( 02 Dec 2020 21:47 )   PT: 17.9 sec;   INR: 1.58 ratio         PTT - ( 02 Dec 2020 21:47 )  PTT:36.7 sec    A/P     In summary 69 y/o male with h/o copd, htn, dm, afib on coumadin, seizure disorder was admitted yesterday to icu for acute on chronic hypercapnic , hypoxic respirator failure and copd exacerbation, clinically improving.    - Acute on chronic respiratory failure with hypercapnia and hypoxia, off the bipap now.     - Copd exacerbation., possible bronchitis, will keep on iv solumedrol 40 q 12 hrs for now as still air entry is still diminished, but not in acute resp. distress. will request for pulmonology f/u.     - DM type 2 , without long term insulin use with hyperglycemia. on lantus and humalog scale.    - Essential htn, on lisinopril and cardizem. bp controlled.    - H/o afib, as per pt. uses couamdin at home  4mg daily, compliance ? based on INR will give coumadin, if subtherapeutic will need lovenox bridge.     69 y/o male with h/o copd, htn, dm, afib on coumadin, seizure disorder was admitted yesterday to icu for acute on chronic hypercapnic , hypoxic respirator failure and copd exacerbation, Pt. stated that he did not use his bipap for 2 days before he got sob and came to the ED. now feels better , stated that breathing is better and  if he can go home tomorrow. no fever, no cp, no n/v /d. no abd. pain. pt's compliance to medical treatment appears questionable.     P/E  Vital Signs Last 24 Hrs  T(C): 36.9 (02 Dec 2020 19:44), Max: 36.9 (02 Dec 2020 19:44)  T(F): 98.4 (02 Dec 2020 19:44), Max: 98.4 (02 Dec 2020 19:44)  HR: 95 (02 Dec 2020 21:00) (70 - 108)  BP: 113/68 (02 Dec 2020 21:00) (100/44 - 139/105)  BP(mean): 80 (02 Dec 2020 21:00) (60 - 116)  RR: 23 (02 Dec 2020 21:00) (13 - 29)  SpO2: 97% (02 Dec 2020 21:00) (81% - 100%)    General: Obese male lying in bed not in distress  HEENT: AT, NC. PERRL. intact EOM. no throat erythema or exudate.   Neck: supple. no JVD.   Chest: air entry diminished bilaterally. no inter costal retractions.  Heart: S1,S2. RRR. no heart murmur. no sig. edema of lower ext.   Abdomen: soft. non-tender. obese, + BS.   Ext: no calf tenderness. ROM of all ext. intact. distal pulses intact.  Neuro: AAO x3. no focal weakness. no speech disorder, Cns ii to xii intact. motor 5/5, reflexes 2 +, sensory intact.   Skin: no rash noted, no pallor, no diaphoresis.   Psych : mood ok, no si/hi.     MEDICATIONS  (STANDING):  ALBUTerol    0.083% 2.5 milliGRAM(s) Nebulizer every 6 hours  atorvastatin 10 milliGRAM(s) Oral at bedtime  azithromycin  IVPB      azithromycin  IVPB 500 milliGRAM(s) IV Intermittent every 24 hours  budesonide 160 MICROgram(s)/formoterol 4.5 MICROgram(s) Inhaler 2 Puff(s) Inhalation two times a day  cefTRIAXone   IVPB 1000 milliGRAM(s) IV Intermittent every 24 hours  chlorhexidine 4% Liquid 1 Application(s) Topical <User Schedule>  citalopram 40 milliGRAM(s) Oral daily  dextrose 40% Gel 15 Gram(s) Oral once  dextrose 5%. 1000 milliLiter(s) (50 mL/Hr) IV Continuous <Continuous>  dextrose 5%. 1000 milliLiter(s) (100 mL/Hr) IV Continuous <Continuous>  dextrose 50% Injectable 25 Gram(s) IV Push once  dextrose 50% Injectable 12.5 Gram(s) IV Push once  diltiazem    Tablet 60 milliGRAM(s) Oral three times a day  enoxaparin Injectable 40 milliGRAM(s) SubCutaneous daily  glucagon  Injectable 1 milliGRAM(s) IntraMuscular once  influenza   Vaccine 0.5 milliLiter(s) IntraMuscular once  insulin glargine Injectable (LANTUS) 25 Unit(s) SubCutaneous at bedtime  insulin lispro (ADMELOG) corrective regimen sliding scale   SubCutaneous Before meals and at bedtime  levETIRAcetam 500 milliGRAM(s) Oral two times a day  lisinopril 40 milliGRAM(s) Oral daily  methylPREDNISolone sodium succinate Injectable 40 milliGRAM(s) IV Push every 12 hours    MEDICATIONS  (PRN):    LABS :                            11.5   5.97  )-----------( 293      ( 02 Dec 2020 04:55 )             37.1     12-02    138  |  90<L>  |  33.0<H>  ----------------------------<  170<H>  5.2   |  42.0<H>  |  0.94    Ca    9.4      02 Dec 2020 16:25  Phos  2.6     12-02  Mg     1.8     12-02    TPro  6.4<L>  /  Alb  3.2<L>  /  TBili  0.2<L>  /  DBili  x   /  AST  12  /  ALT  11  /  AlkPhos  128<H>  12-02    CARDIAC MARKERS ( 02 Dec 2020 04:55 )  x     / <0.01 ng/mL / x     / x     / x      CARDIAC MARKERS ( 01 Dec 2020 11:56 )  x     / <0.01 ng/mL / x     / x     / x              PT/INR - ( 02 Dec 2020 21:47 )   PT: 17.9 sec;   INR: 1.58 ratio         PTT - ( 02 Dec 2020 21:47 )  PTT:36.7 sec    A/P     In summary 69 y/o male with h/o copd, htn, dm, afib on coumadin, seizure disorder was admitted yesterday to icu for acute on chronic hypercapnic , hypoxic respirator failure and copd exacerbation, clinically improving.    - Acute on chronic respiratory failure with hypercapnia and hypoxia, off the bipap now.     - Copd exacerbation., possible bronchitis, will keep on iv solumedrol 40 q 12 hrs for now as still air entry is still diminished, but not in acute resp. distress. will request for pulmonology f/u.     - DM type 2 , without long term insulin use with hyperglycemia. on lantus and humalog scale.    - Essential htn, on lisinopril and cardizem. bp controlled.    - H/o afib, as per pt. uses couamdin at home  4mg daily, compliance ? based on INR will give coumadin, if subtherapeutic will need lovenox bridge.-    - Compliance to medical therapy ?    - obesity, will benefit from loosing wt.    69 y/o male with h/o copd, htn, dm, afib on coumadin, seizure disorder was admitted yesterday to icu for acute on chronic hypercapnic , hypoxic respirator failure and copd exacerbation, Pt. stated that he did not use his bipap for 2 days before he got sob and came to the ED. now feels better , stated that breathing is better and  if he can go home tomorrow. no fever, no cp, no n/v /d. no abd. pain. pt's compliance to medical treatment appears questionable.     P/E  Vital Signs Last 24 Hrs  T(C): 36.9 (02 Dec 2020 19:44), Max: 36.9 (02 Dec 2020 19:44)  T(F): 98.4 (02 Dec 2020 19:44), Max: 98.4 (02 Dec 2020 19:44)  HR: 95 (02 Dec 2020 21:00) (70 - 108)  BP: 113/68 (02 Dec 2020 21:00) (100/44 - 139/105)  BP(mean): 80 (02 Dec 2020 21:00) (60 - 116)  RR: 23 (02 Dec 2020 21:00) (13 - 29)  SpO2: 97% (02 Dec 2020 21:00) (81% - 100%)    General: Obese male lying in bed not in distress  HEENT: AT, NC. PERRL. intact EOM. no throat erythema or exudate.   Neck: supple. no JVD.   Chest: air entry diminished bilaterally. no inter costal retractions.  Heart: S1,S2. RRR. no heart murmur. no sig. edema of lower ext.   Abdomen: soft. non-tender. obese, + BS.   Ext: no calf tenderness. ROM of all ext. intact. distal pulses intact.  Neuro: AAO x3. no focal weakness. no speech disorder, Cns ii to xii intact. motor 5/5, reflexes 2 +, sensory intact.   Skin: no rash noted, no pallor, no diaphoresis.   Psych : mood ok, no si/hi.     MEDICATIONS  (STANDING):  ALBUTerol    0.083% 2.5 milliGRAM(s) Nebulizer every 6 hours  atorvastatin 10 milliGRAM(s) Oral at bedtime  azithromycin  IVPB      azithromycin  IVPB 500 milliGRAM(s) IV Intermittent every 24 hours  budesonide 160 MICROgram(s)/formoterol 4.5 MICROgram(s) Inhaler 2 Puff(s) Inhalation two times a day  cefTRIAXone   IVPB 1000 milliGRAM(s) IV Intermittent every 24 hours  chlorhexidine 4% Liquid 1 Application(s) Topical <User Schedule>  citalopram 40 milliGRAM(s) Oral daily  dextrose 40% Gel 15 Gram(s) Oral once  dextrose 5%. 1000 milliLiter(s) (50 mL/Hr) IV Continuous <Continuous>  dextrose 5%. 1000 milliLiter(s) (100 mL/Hr) IV Continuous <Continuous>  dextrose 50% Injectable 25 Gram(s) IV Push once  dextrose 50% Injectable 12.5 Gram(s) IV Push once  diltiazem    Tablet 60 milliGRAM(s) Oral three times a day  enoxaparin Injectable 40 milliGRAM(s) SubCutaneous daily  glucagon  Injectable 1 milliGRAM(s) IntraMuscular once  influenza   Vaccine 0.5 milliLiter(s) IntraMuscular once  insulin glargine Injectable (LANTUS) 25 Unit(s) SubCutaneous at bedtime  insulin lispro (ADMELOG) corrective regimen sliding scale   SubCutaneous Before meals and at bedtime  levETIRAcetam 500 milliGRAM(s) Oral two times a day  lisinopril 40 milliGRAM(s) Oral daily  methylPREDNISolone sodium succinate Injectable 40 milliGRAM(s) IV Push every 12 hours    MEDICATIONS  (PRN):    LABS :                            11.5   5.97  )-----------( 293      ( 02 Dec 2020 04:55 )             37.1     12-02    138  |  90<L>  |  33.0<H>  ----------------------------<  170<H>  5.2   |  42.0<H>  |  0.94    Ca    9.4      02 Dec 2020 16:25  Phos  2.6     12-02  Mg     1.8     12-02    TPro  6.4<L>  /  Alb  3.2<L>  /  TBili  0.2<L>  /  DBili  x   /  AST  12  /  ALT  11  /  AlkPhos  128<H>  12-02    CARDIAC MARKERS ( 02 Dec 2020 04:55 )  x     / <0.01 ng/mL / x     / x     / x      CARDIAC MARKERS ( 01 Dec 2020 11:56 )  x     / <0.01 ng/mL / x     / x     / x              PT/INR - ( 02 Dec 2020 21:47 )   PT: 17.9 sec;   INR: 1.58 ratio         PTT - ( 02 Dec 2020 21:47 )  PTT:36.7 sec    A/P     In summary 69 y/o male with h/o copd, htn, dm, afib on coumadin, seizure disorder was admitted yesterday to icu for acute on chronic hypercapnic , hypoxic respirator failure and copd exacerbation, clinically improving.    - Acute on chronic respiratory failure with hypercapnia and hypoxia, off the bipap now.     - Copd exacerbation., possible bronchitis, will keep on iv solumedrol 40 q 12 hrs for now as still air entry is still diminished, but not in acute resp. distress. will request for pulmonology f/u.     - DM type 2 , without long term insulin use with hyperglycemia. on lantus and humalog scale.    - Essential htn, on lisinopril and cardizem. bp controlled.    - H/o afib, as per pt. uses couamdin at home  4mg daily, compliance ? based on INR will give coumadin, if subtherapeutic will need lovenox bridge. Pt's INR came back 1.58, pt. got 40 mg lovenox subcut before, will give additional 30 mg of lovenox to make 70 mg of lovenox ,   1 mg /kg dose and will give coumadin 6 mg po x tonight,  will request day team to follow am INR and coumadin dose as per INR and also need for lovenox based on INR in am.     - Compliance to medical therapy ?    - obesity, will benefit from loosing wt.    71 y/o male with h/o copd, htn, dm, afib on coumadin, seizure disorder was admitted yesterday to icu for acute on chronic hypercapnic , hypoxic respirator failure and copd exacerbation, Pt. stated that he did not use his bipap for 2 days before he got sob and came to the ED. now feels better , stated that breathing is better and  if he can go home tomorrow. no fever, no cp, no n/v /d. no abd. pain. pt's compliance to medical treatment appears questionable.     P/E  Vital Signs Last 24 Hrs  T(C): 36.9 (02 Dec 2020 19:44), Max: 36.9 (02 Dec 2020 19:44)  T(F): 98.4 (02 Dec 2020 19:44), Max: 98.4 (02 Dec 2020 19:44)  HR: 95 (02 Dec 2020 21:00) (70 - 108)  BP: 113/68 (02 Dec 2020 21:00) (100/44 - 139/105)  BP(mean): 80 (02 Dec 2020 21:00) (60 - 116)  RR: 23 (02 Dec 2020 21:00) (13 - 29)  SpO2: 97% (02 Dec 2020 21:00) (81% - 100%)    General: Obese male lying in bed not in distress  HEENT: AT, NC. PERRL. intact EOM. no throat erythema or exudate.   Neck: supple. no JVD.   Chest: air entry diminished bilaterally. no inter costal retractions.  Heart: S1,S2. RRR. no heart murmur. no sig. edema of lower ext.   Abdomen: soft. non-tender. obese, + BS.   Ext: no calf tenderness. ROM of all ext. intact. distal pulses intact.  Neuro: AAO x3. no focal weakness. no speech disorder, Cns ii to xii intact. motor 5/5, reflexes 2 +, sensory intact.   Skin: no rash noted, no pallor, no diaphoresis.   Psych : mood ok, no si/hi.     MEDICATIONS  (STANDING):  ALBUTerol    0.083% 2.5 milliGRAM(s) Nebulizer every 6 hours  atorvastatin 10 milliGRAM(s) Oral at bedtime  azithromycin  IVPB      azithromycin  IVPB 500 milliGRAM(s) IV Intermittent every 24 hours  budesonide 160 MICROgram(s)/formoterol 4.5 MICROgram(s) Inhaler 2 Puff(s) Inhalation two times a day  cefTRIAXone   IVPB 1000 milliGRAM(s) IV Intermittent every 24 hours  chlorhexidine 4% Liquid 1 Application(s) Topical <User Schedule>  citalopram 40 milliGRAM(s) Oral daily  dextrose 40% Gel 15 Gram(s) Oral once  dextrose 5%. 1000 milliLiter(s) (50 mL/Hr) IV Continuous <Continuous>  dextrose 5%. 1000 milliLiter(s) (100 mL/Hr) IV Continuous <Continuous>  dextrose 50% Injectable 25 Gram(s) IV Push once  dextrose 50% Injectable 12.5 Gram(s) IV Push once  diltiazem    Tablet 60 milliGRAM(s) Oral three times a day  enoxaparin Injectable 40 milliGRAM(s) SubCutaneous daily  glucagon  Injectable 1 milliGRAM(s) IntraMuscular once  influenza   Vaccine 0.5 milliLiter(s) IntraMuscular once  insulin glargine Injectable (LANTUS) 25 Unit(s) SubCutaneous at bedtime  insulin lispro (ADMELOG) corrective regimen sliding scale   SubCutaneous Before meals and at bedtime  levETIRAcetam 500 milliGRAM(s) Oral two times a day  lisinopril 40 milliGRAM(s) Oral daily  methylPREDNISolone sodium succinate Injectable 40 milliGRAM(s) IV Push every 12 hours    MEDICATIONS  (PRN):    LABS :                            11.5   5.97  )-----------( 293      ( 02 Dec 2020 04:55 )             37.1     12-02    138  |  90<L>  |  33.0<H>  ----------------------------<  170<H>  5.2   |  42.0<H>  |  0.94    Ca    9.4      02 Dec 2020 16:25  Phos  2.6     12-02  Mg     1.8     12-02    TPro  6.4<L>  /  Alb  3.2<L>  /  TBili  0.2<L>  /  DBili  x   /  AST  12  /  ALT  11  /  AlkPhos  128<H>  12-02    CARDIAC MARKERS ( 02 Dec 2020 04:55 )  x     / <0.01 ng/mL / x     / x     / x      CARDIAC MARKERS ( 01 Dec 2020 11:56 )  x     / <0.01 ng/mL / x     / x     / x              PT/INR - ( 02 Dec 2020 21:47 )   PT: 17.9 sec;   INR: 1.58 ratio         PTT - ( 02 Dec 2020 21:47 )  PTT:36.7 sec    A/P     In summary 71 y/o male with h/o copd, htn, dm, afib on coumadin, seizure disorder was admitted yesterday to icu for acute on chronic hypercapnic , hypoxic respirator failure and copd exacerbation, clinically improving.    - Acute on chronic respiratory failure with hypercapnia and hypoxia, off the bipap now.     - Copd exacerbation., possible bronchitis, will keep on iv solumedrol 40 q 12 hrs for now as still air entry is still diminished, but not in acute resp. distress. will request for pulmonology f/u.     - DM type 2 , without long term insulin use with hyperglycemia. on lantus and humalog scale.    - Essential htn, on lisinopril and cardizem. bp controlled.    - H/o afib, as per pt. uses couamdin at home  4mg daily, compliance ? based on INR will give coumadin, if subtherapeutic will need lovenox bridge. Pt's INR came back 1.58, pt. got 40 mg lovenox subcut before, will give additional 30 mg of lovenox to make 70 mg of lovenox ,   1 mg /kg dose and will give coumadin 6 mg po x tonight,  will request day team to follow am INR and coumadin dose as per INR and also need for lovenox based on INR in am.     - seizure disorder, on keppra 500 mg bid , seizure precautions.     - Compliance to medical therapy ?    - obesity, will benefit from loosing wt.    71 y/o male with h/o copd, htn, dm, afib on coumadin, seizure disorder was admitted yesterday to icu for acute on chronic hypercapnic , hypoxic respirator failure and copd exacerbation, Pt. stated that he did not use his bipap for 2 days before he got sob and came to the ED. now feels better , stated that breathing is better and  if he can go home tomorrow. no fever, no cp, no n/v /d. no abd. pain. pt's compliance to medical treatment appears questionable.     P/E  Vital Signs Last 24 Hrs  T(C): 36.9 (02 Dec 2020 19:44), Max: 36.9 (02 Dec 2020 19:44)  T(F): 98.4 (02 Dec 2020 19:44), Max: 98.4 (02 Dec 2020 19:44)  HR: 95 (02 Dec 2020 21:00) (70 - 108)  BP: 113/68 (02 Dec 2020 21:00) (100/44 - 139/105)  BP(mean): 80 (02 Dec 2020 21:00) (60 - 116)  RR: 23 (02 Dec 2020 21:00) (13 - 29)  SpO2: 97% (02 Dec 2020 21:00) (81% - 100%)    General: Obese male lying in bed not in distress  HEENT: AT, NC. PERRL. intact EOM. no throat erythema or exudate.   Neck: supple. no JVD.   Chest: air entry diminished bilaterally. no inter costal retractions.  Heart: S1,S2. RRR. no heart murmur. no sig. edema of lower ext.   Abdomen: soft. non-tender. obese, + BS.   Ext: no calf tenderness. ROM of all ext. intact. distal pulses intact.  Neuro: AAO x3. no focal weakness. no speech disorder, Cns ii to xii intact. motor 5/5, reflexes 2 +, sensory intact.   Skin: no rash noted, no pallor, no diaphoresis.   Psych : mood ok, no si/hi.     MEDICATIONS  (STANDING):  ALBUTerol    0.083% 2.5 milliGRAM(s) Nebulizer every 6 hours  atorvastatin 10 milliGRAM(s) Oral at bedtime  azithromycin  IVPB      azithromycin  IVPB 500 milliGRAM(s) IV Intermittent every 24 hours  budesonide 160 MICROgram(s)/formoterol 4.5 MICROgram(s) Inhaler 2 Puff(s) Inhalation two times a day  cefTRIAXone   IVPB 1000 milliGRAM(s) IV Intermittent every 24 hours  chlorhexidine 4% Liquid 1 Application(s) Topical <User Schedule>  citalopram 40 milliGRAM(s) Oral daily  dextrose 40% Gel 15 Gram(s) Oral once  dextrose 5%. 1000 milliLiter(s) (50 mL/Hr) IV Continuous <Continuous>  dextrose 5%. 1000 milliLiter(s) (100 mL/Hr) IV Continuous <Continuous>  dextrose 50% Injectable 25 Gram(s) IV Push once  dextrose 50% Injectable 12.5 Gram(s) IV Push once  diltiazem    Tablet 60 milliGRAM(s) Oral three times a day  enoxaparin Injectable 40 milliGRAM(s) SubCutaneous daily  glucagon  Injectable 1 milliGRAM(s) IntraMuscular once  influenza   Vaccine 0.5 milliLiter(s) IntraMuscular once  insulin glargine Injectable (LANTUS) 25 Unit(s) SubCutaneous at bedtime  insulin lispro (ADMELOG) corrective regimen sliding scale   SubCutaneous Before meals and at bedtime  levETIRAcetam 500 milliGRAM(s) Oral two times a day  lisinopril 40 milliGRAM(s) Oral daily  methylPREDNISolone sodium succinate Injectable 40 milliGRAM(s) IV Push every 12 hours    MEDICATIONS  (PRN):    LABS :                            11.5   5.97  )-----------( 293      ( 02 Dec 2020 04:55 )             37.1     12-02    138  |  90<L>  |  33.0<H>  ----------------------------<  170<H>  5.2   |  42.0<H>  |  0.94    Ca    9.4      02 Dec 2020 16:25  Phos  2.6     12-02  Mg     1.8     12-02    TPro  6.4<L>  /  Alb  3.2<L>  /  TBili  0.2<L>  /  DBili  x   /  AST  12  /  ALT  11  /  AlkPhos  128<H>  12-02    CARDIAC MARKERS ( 02 Dec 2020 04:55 )  x     / <0.01 ng/mL / x     / x     / x      CARDIAC MARKERS ( 01 Dec 2020 11:56 )  x     / <0.01 ng/mL / x     / x     / x              PT/INR - ( 02 Dec 2020 21:47 )   PT: 17.9 sec;   INR: 1.58 ratio         PTT - ( 02 Dec 2020 21:47 )  PTT:36.7 sec    A/P     In summary 71 y/o male with h/o copd, htn, dm, afib on coumadin, seizure disorder was admitted yesterday to icu for acute on chronic hypercapnic , hypoxic respirator failure and copd exacerbation, clinically improving.    - Acute on chronic respiratory failure with hypercapnia and hypoxia, nocturnal and prn bipap. will request for pulmonology f/u.     - Copd exacerbation., possible bronchitis, empiric antibiotics, will keep on iv solumedrol 40 q 12 hrs for now as  air entry is still diminished, but not in acute resp. distress.     - DM type 2 , without long term insulin use with hyperglycemia. on lantus and humalog scale.    - Essential htn, on lisinopril and cardizem. bp controlled.    - H/o afib, as per pt. uses couamdin at home  4mg daily, compliance ? based on INR will give coumadin, if subtherapeutic will need lovenox bridge. Pt's INR came back 1.58, pt. got 40 mg lovenox subcut before, will give additional 30 mg of lovenox to make 70 mg of lovenox ,   1 mg /kg dose and will give coumadin 6 mg po x tonight,  will request day team to follow am INR and coumadin dose as per INR and also need for lovenox based on INR in am.     - seizure disorder, on keppra 500 mg bid , seizure precautions.     - Compliance to medical therapy ?    - obesity, will benefit from loosing wt.

## 2020-12-03 LAB
ANION GAP SERPL CALC-SCNC: 10 MMOL/L — SIGNIFICANT CHANGE UP (ref 5–17)
APTT BLD: 31.3 SEC — SIGNIFICANT CHANGE UP (ref 27.5–35.5)
BASE EXCESS BLDA CALC-SCNC: 18.3 MMOL/L — HIGH (ref -2–2)
BLOOD GAS COMMENTS ARTERIAL: SIGNIFICANT CHANGE UP
BUN SERPL-MCNC: 41 MG/DL — HIGH (ref 8–20)
CALCIUM SERPL-MCNC: 8.8 MG/DL — SIGNIFICANT CHANGE UP (ref 8.6–10.2)
CHLORIDE SERPL-SCNC: 88 MMOL/L — LOW (ref 98–107)
CO2 SERPL-SCNC: 40 MMOL/L — HIGH (ref 22–29)
CREAT SERPL-MCNC: 0.76 MG/DL — SIGNIFICANT CHANGE UP (ref 0.5–1.3)
GAS PNL BLDA: SIGNIFICANT CHANGE UP
GLUCOSE BLDC GLUCOMTR-MCNC: 272 MG/DL — HIGH (ref 70–99)
GLUCOSE BLDC GLUCOMTR-MCNC: 331 MG/DL — HIGH (ref 70–99)
GLUCOSE BLDC GLUCOMTR-MCNC: 400 MG/DL — HIGH (ref 70–99)
GLUCOSE BLDC GLUCOMTR-MCNC: 486 MG/DL — CRITICAL HIGH (ref 70–99)
GLUCOSE SERPL-MCNC: 329 MG/DL — HIGH (ref 70–99)
HCO3 BLDA-SCNC: 42 MMOL/L — HIGH (ref 20–26)
HCT VFR BLD CALC: 35.4 % — LOW (ref 39–50)
HGB BLD-MCNC: 11.5 G/DL — LOW (ref 13–17)
HOROWITZ INDEX BLDA+IHG-RTO: SIGNIFICANT CHANGE UP
INR BLD: 1.41 RATIO — HIGH (ref 0.88–1.16)
MAGNESIUM SERPL-MCNC: 2.1 MG/DL — SIGNIFICANT CHANGE UP (ref 1.6–2.6)
MCHC RBC-ENTMCNC: 28.3 PG — SIGNIFICANT CHANGE UP (ref 27–34)
MCHC RBC-ENTMCNC: 32.5 GM/DL — SIGNIFICANT CHANGE UP (ref 32–36)
MCV RBC AUTO: 87.2 FL — SIGNIFICANT CHANGE UP (ref 80–100)
PCO2 BLDA: 70 MMHG — CRITICAL HIGH (ref 35–45)
PH BLDA: 7.42 — SIGNIFICANT CHANGE UP (ref 7.35–7.45)
PHOSPHATE SERPL-MCNC: 1.9 MG/DL — LOW (ref 2.4–4.7)
PLATELET # BLD AUTO: 320 K/UL — SIGNIFICANT CHANGE UP (ref 150–400)
PO2 BLDA: 76 MMHG — LOW (ref 83–108)
POTASSIUM SERPL-MCNC: 4.8 MMOL/L — SIGNIFICANT CHANGE UP (ref 3.5–5.3)
POTASSIUM SERPL-SCNC: 4.8 MMOL/L — SIGNIFICANT CHANGE UP (ref 3.5–5.3)
PROTHROM AB SERPL-ACNC: 16.1 SEC — HIGH (ref 10.6–13.6)
RBC # BLD: 4.06 M/UL — LOW (ref 4.2–5.8)
RBC # FLD: 13.6 % — SIGNIFICANT CHANGE UP (ref 10.3–14.5)
SAO2 % BLDA: 96 % — SIGNIFICANT CHANGE UP (ref 95–99)
SODIUM SERPL-SCNC: 138 MMOL/L — SIGNIFICANT CHANGE UP (ref 135–145)
WBC # BLD: 19.57 K/UL — HIGH (ref 3.8–10.5)
WBC # FLD AUTO: 19.57 K/UL — HIGH (ref 3.8–10.5)

## 2020-12-03 PROCEDURE — 99232 SBSQ HOSP IP/OBS MODERATE 35: CPT

## 2020-12-03 PROCEDURE — 99223 1ST HOSP IP/OBS HIGH 75: CPT

## 2020-12-03 PROCEDURE — 93010 ELECTROCARDIOGRAM REPORT: CPT

## 2020-12-03 RX ORDER — SODIUM CHLORIDE 9 MG/ML
1000 INJECTION INTRAMUSCULAR; INTRAVENOUS; SUBCUTANEOUS ONCE
Refills: 0 | Status: COMPLETED | OUTPATIENT
Start: 2020-12-03 | End: 2020-12-03

## 2020-12-03 RX ORDER — WARFARIN SODIUM 2.5 MG/1
4 TABLET ORAL ONCE
Refills: 0 | Status: COMPLETED | OUTPATIENT
Start: 2020-12-03 | End: 2020-12-03

## 2020-12-03 RX ORDER — DILTIAZEM HCL 120 MG
10 CAPSULE, EXT RELEASE 24 HR ORAL ONCE
Refills: 0 | Status: COMPLETED | OUTPATIENT
Start: 2020-12-03 | End: 2020-12-03

## 2020-12-03 RX ORDER — INSULIN LISPRO 100/ML
5 VIAL (ML) SUBCUTANEOUS
Refills: 0 | Status: DISCONTINUED | OUTPATIENT
Start: 2020-12-03 | End: 2020-12-09

## 2020-12-03 RX ADMIN — Medication 40 MILLIGRAM(S): at 07:20

## 2020-12-03 RX ADMIN — Medication 60 MILLIGRAM(S): at 12:32

## 2020-12-03 RX ADMIN — Medication 6: at 21:58

## 2020-12-03 RX ADMIN — Medication 1 TABLET(S): at 08:42

## 2020-12-03 RX ADMIN — Medication 10 MILLIGRAM(S): at 23:35

## 2020-12-03 RX ADMIN — ALBUTEROL 2.5 MILLIGRAM(S): 90 AEROSOL, METERED ORAL at 15:37

## 2020-12-03 RX ADMIN — INSULIN GLARGINE 25 UNIT(S): 100 INJECTION, SOLUTION SUBCUTANEOUS at 21:58

## 2020-12-03 RX ADMIN — CEFTRIAXONE 100 MILLIGRAM(S): 500 INJECTION, POWDER, FOR SOLUTION INTRAMUSCULAR; INTRAVENOUS at 21:58

## 2020-12-03 RX ADMIN — ALBUTEROL 2.5 MILLIGRAM(S): 90 AEROSOL, METERED ORAL at 20:43

## 2020-12-03 RX ADMIN — Medication 12: at 12:29

## 2020-12-03 RX ADMIN — LEVETIRACETAM 500 MILLIGRAM(S): 250 TABLET, FILM COATED ORAL at 07:20

## 2020-12-03 RX ADMIN — ATORVASTATIN CALCIUM 10 MILLIGRAM(S): 80 TABLET, FILM COATED ORAL at 21:57

## 2020-12-03 RX ADMIN — AZITHROMYCIN 255 MILLIGRAM(S): 500 TABLET, FILM COATED ORAL at 16:15

## 2020-12-03 RX ADMIN — INSULIN GLARGINE 25 UNIT(S): 100 INJECTION, SOLUTION SUBCUTANEOUS at 08:42

## 2020-12-03 RX ADMIN — Medication 40 MILLIGRAM(S): at 17:04

## 2020-12-03 RX ADMIN — LEVETIRACETAM 500 MILLIGRAM(S): 250 TABLET, FILM COATED ORAL at 17:01

## 2020-12-03 RX ADMIN — ALBUTEROL 2.5 MILLIGRAM(S): 90 AEROSOL, METERED ORAL at 09:01

## 2020-12-03 RX ADMIN — Medication 60 MILLIGRAM(S): at 21:58

## 2020-12-03 RX ADMIN — WARFARIN SODIUM 4 MILLIGRAM(S): 2.5 TABLET ORAL at 21:59

## 2020-12-03 RX ADMIN — BUDESONIDE AND FORMOTEROL FUMARATE DIHYDRATE 2 PUFF(S): 160; 4.5 AEROSOL RESPIRATORY (INHALATION) at 09:03

## 2020-12-03 RX ADMIN — Medication 1 TABLET(S): at 17:01

## 2020-12-03 RX ADMIN — BUDESONIDE AND FORMOTEROL FUMARATE DIHYDRATE 2 PUFF(S): 160; 4.5 AEROSOL RESPIRATORY (INHALATION) at 20:45

## 2020-12-03 RX ADMIN — CITALOPRAM 40 MILLIGRAM(S): 10 TABLET, FILM COATED ORAL at 08:48

## 2020-12-03 RX ADMIN — Medication 5 UNIT(S): at 16:58

## 2020-12-03 RX ADMIN — LISINOPRIL 40 MILLIGRAM(S): 2.5 TABLET ORAL at 07:20

## 2020-12-03 RX ADMIN — Medication 10: at 16:58

## 2020-12-03 RX ADMIN — Medication 8: at 08:44

## 2020-12-03 RX ADMIN — Medication 60 MILLIGRAM(S): at 07:20

## 2020-12-03 RX ADMIN — ALBUTEROL 2.5 MILLIGRAM(S): 90 AEROSOL, METERED ORAL at 03:13

## 2020-12-03 NOTE — PROGRESS NOTE ADULT - SUBJECTIVE AND OBJECTIVE BOX
Chief complaint: Shortness of breath    Patient seen and examined at bedside. No acute overnight events reported. The patient states he is doing well and has no current complaints. Denies headache, fever, chills, cough, chest pain, nausea, vomiting, abdominal pain, diarrhea or constipation.     Vital Signs Last 24 Hrs  T(F): 97.7 (03 Dec 2020 09:55), Max: 98.4 (02 Dec 2020 19:44)  HR: 94 (03 Dec 2020 09:55) (75 - 108)  BP: 118/70 (03 Dec 2020 09:55) (106/67 - 139/105)  RR: 15 (03 Dec 2020 09:55) (13 - 27)  SpO2: 95% (03 Dec 2020 09:55) (63% - 100%)    Physical Exam:  Constitutional: alert and oriented, in no acute distress   Neck: Soft and supple  Respiratory: Clear to auscultation bilaterally, no wheezes or crackles  Cardiovascular: Regular rate and rhythm no murmurs, gallops, rubs  Gastrointestinal: Soft, non-tender to palpation, +bs  Vascular: 2+ peripheral pulses  Neurological: A/O x 3, no focal neurological deficits  Musculoskeletal: No lower extremity edema bilaterally    Labs:                        11.5   19.57 )-----------( 320      ( 03 Dec 2020 06:38 )             35.4   12-03    138  |  88<L>  |  41.0<H>  ----------------------------<  329<H>  4.8   |  40.0<H>  |  0.76    Ca    8.8      03 Dec 2020 06:38  Phos  1.9     12-03  Mg     2.1     12-03    TPro  6.4<L>  /  Alb  3.2<L>  /  TBili  0.2<L>  /  DBili  x   /  AST  12  /  ALT  11  /  AlkPhos  128<H>  12-02

## 2020-12-03 NOTE — CONSULT NOTE ADULT - SUBJECTIVE AND OBJECTIVE BOX
PULMONARY CONSULT NOTE      CLAUDIA MABRYN-[id number]    Patient is a 70y old  Male who presents with a chief complaint of SOB (03 Dec 2020 11:07)      HISTORY OF PRESENT ILLNESS: 70M w/ PMHx DM, HTN, COPD on home O2, AF on coumadin, seizures was brought in by family for worsening SOB. Pt appears quite comfortable w/ no use of accessory muscles. Admits to have mild SOB for the past few days and occasional chest discomfort. No fever, cough, N/V/D  or pain abdomen. His sister in law was diagnosed w/ COVID recently but he has not had contact w/ her in a while. He is non complaint on home BiPAP and recently completed a steroid taper. Was seen and followed by ICU team on BPAP. He is awake and alert. Says he feels well.    [facility]; he is not sure if he has seen a pulmonologist.       MEDICATIONS  (STANDING):  ALBUTerol    0.083% 2.5 milliGRAM(s) Nebulizer every 6 hours  atorvastatin 10 milliGRAM(s) Oral at bedtime  azithromycin  IVPB      azithromycin  IVPB 500 milliGRAM(s) IV Intermittent every 24 hours  budesonide 160 MICROgram(s)/formoterol 4.5 MICROgram(s) Inhaler 2 Puff(s) Inhalation two times a day  cefTRIAXone   IVPB 1000 milliGRAM(s) IV Intermittent every 24 hours  citalopram 40 milliGRAM(s) Oral daily  dextrose 40% Gel 15 Gram(s) Oral once  dextrose 5%. 1000 milliLiter(s) (50 mL/Hr) IV Continuous <Continuous>  dextrose 5%. 1000 milliLiter(s) (100 mL/Hr) IV Continuous <Continuous>  dextrose 50% Injectable 25 Gram(s) IV Push once  dextrose 50% Injectable 12.5 Gram(s) IV Push once  diltiazem    Tablet 60 milliGRAM(s) Oral three times a day  glucagon  Injectable 1 milliGRAM(s) IntraMuscular once  influenza   Vaccine 0.5 milliLiter(s) IntraMuscular once  insulin glargine Injectable (LANTUS) 25 Unit(s) SubCutaneous every morning  insulin glargine Injectable (LANTUS) 25 Unit(s) SubCutaneous at bedtime  insulin lispro (ADMELOG) corrective regimen sliding scale   SubCutaneous Before meals and at bedtime  lactobacillus acidophilus 1 Tablet(s) Oral two times a day with meals  levETIRAcetam 500 milliGRAM(s) Oral two times a day  lisinopril 40 milliGRAM(s) Oral daily  methylPREDNISolone sodium succinate Injectable 40 milliGRAM(s) IV Push every 12 hours  warfarin 4 milliGRAM(s) Oral once      MEDICATIONS  (PRN):      Allergies    No Known Allergies    Intolerances        PAST MEDICAL & SURGICAL HISTORY:  Seizure    Depression, unspecified depression type    Diabetes    Hypertension    Hyperlipidemia    Afib    COPD (chronic obstructive pulmonary disease)    Abdominal hernia    H/O carotid endarterectomy        FAMILY HISTORY:  No pertinent family history in first degree relatives        SOCIAL HISTORY  Smoking History: quit 12/2019    REVIEW OF SYSTEMS:    CONSTITUTIONAL:  No fevers, chills, sweats    HEENT:  Eyes:  No diplopia or blurred vision. ENT:  No earache, sore throat or runny nose.    CARDIOVASCULAR:  No pressure, squeezing, tightness, or heaviness about the chest; no palpitations.    RESPIRATORY: per HPI      GASTROINTESTINAL:  No abdominal pain, nausea, vomiting or diarrhea.    GENITOURINARY:  No dysuria, frequency or urgency.    NEUROLOGIC:  No paresthesias, fasciculations, seizures or weakness.    PSYCHIATRIC:  No disorder of thought or mood.    Vital Signs Last 24 Hrs  T(C): 36.5 (03 Dec 2020 09:55), Max: 36.9 (02 Dec 2020 19:44)  T(F): 97.7 (03 Dec 2020 09:55), Max: 98.4 (02 Dec 2020 19:44)  HR: 94 (03 Dec 2020 09:55) (81 - 108)  BP: 118/70 (03 Dec 2020 09:55) (106/67 - 139/105)  BP(mean): 79 (02 Dec 2020 22:00) (77 - 116)  RR: 15 (03 Dec 2020 09:55) (13 - 27)  SpO2: 95% (03 Dec 2020 09:55) (63% - 100%)    PHYSICAL EXAMINATION:    GENERAL: The patient is in no apparent distress.     HEENT: Head is normocephalic and atraumatic. . Mucous membranes are moist.     NECK: Supple.     LUNGS: Clear to auscultation without wheezing, rales, or rhonchi. Respirations unlabored    HEART: Regular rate and rhythm      ABDOMEN: Soft, nontender, and nondistended.      EXTREMITIES: Without any cyanosis, clubbing, rash, lesions or edema.    NEUROLOGIC: Grossly intact.      LABS:                        11.5   19.57 )-----------( 320      ( 03 Dec 2020 06:38 )             35.4     12-03    138  |  88<L>  |  41.0<H>  ----------------------------<  329<H>  4.8   |  40.0<H>  |  0.76    Ca    8.8      03 Dec 2020 06:38  Phos  1.9     12-03  Mg     2.1     12-03    TPro  6.4<L>  /  Alb  3.2<L>  /  TBili  0.2<L>  /  DBili  x   /  AST  12  /  ALT  11  /  AlkPhos  128<H>  12-02    PT/INR - ( 03 Dec 2020 06:38 )   PT: 16.1 sec;   INR: 1.41 ratio         PTT - ( 03 Dec 2020 06:38 )  PTT:31.3 sec    ABG - ( 03 Dec 2020 05:31 )  pH, Arterial: 7.42  pH, Blood: x     /  pCO2: 70    /  pO2: 76    / HCO3: 42    / Base Excess: 18.3  /  SaO2: 96             CARDIAC MARKERS ( 02 Dec 2020 04:55 )  x     / <0.01 ng/mL / x     / x     / x               MICROBIOLOGY:  COVID-19 PCR: NotDetec: Testing is performed using polymerase chain reaction (PCR) or  transcription mediated amplification (TMA). This COVID-19 (SARS-CoV-2)  nucleic acid amplification test was validated by [facility] and is  in use under the FDA Emergency Use Authorization (EUA) for clinical labs  CLIA-certified to perform high complexity testing. Test results should be  correlated with clinical presentation, patient history, and epidemiology. (12.01.20 @ 13:13)        RADIOLOGY & ADDITIONAL STUDIES:  < from: Xray Chest 1 View-PORTABLE IMMEDIATE (12.01.20 @ 11:32) >   EXAM:  XR CHEST PORTABLE IMMED 1V                          PROCEDURE DATE:  12/01/2020          INTERPRETATION:  AP chest on December 1, 2020 at 11:17 AM. Patient is short of breath.    Heart is magnified by technique.    The lung fields and pleural surfaces are unremarkable.    Chest is similar to May 23, 2016.    IMPRESSION: No acute finding or change.            OLMAN PUCKETT MD; Attending Radiologist  This document has been electronically signed. Dec  1 2020 11:35AM    < end of copied text >

## 2020-12-03 NOTE — PROGRESS NOTE ADULT - ASSESSMENT
69 y/o male with h/o copd, htn, dm, afib on coumadin, seizure disorder was admitted yesterday to icu for acute on chronic hypercapnic , hypoxic respirator failure and copd exacerbation, clinically improving.    Acute on chronic respiratory failure with hypercapnia and hypoxia   - c/w Azithromycin   - c/w Ceftriaxone  - c/w IV Solumedrol 40mg q12h  - c/w albuterol PRN  - c/w Symbicort  - Bipap PRN  - 4L nasal cannula    DM  - FS with KENYA  - c/w Lantus 25 units nightly    HTN  - c/w Lisinopril 40mg daily  - c/w Cardizem 60mg daily    Afib  - compliance is questionable  - reports using Coumadin 4mg daily   - INR today 1.41    Seizure Disorder  - c/w Keppra 500mg BID  - seizure precautions    DVT ppx  - Coumadin

## 2020-12-03 NOTE — CONSULT NOTE ADULT - ASSESSMENT
-COPD with A/E  -Chronic hypoxic resp failure  -Chronic hypercarbic resp failure  -Diastolic CHF  -Afib; on a/c at home but questionable compliance    RECC:  Steroids. Nebs. Abx. O2; titrate. Nocturnal and prn BPAP. Cardiology f/u. Continue anticoagulation; on warfarin. Should f/u in our office after d/c.

## 2020-12-03 NOTE — CHART NOTE - NSCHARTNOTEFT_GEN_A_CORE
Called by bedside nurse co pt confuse and sat 91% on BiPAP                                                                  REVIEW OF SYMPTOMS:   Cardiovascular:  No chest pain,  No dyspnea,  No fatigue, No syncope,  No palpitations, No dizziness,   Respiratory:  No Dyspnea, No cough.  Neurological: No headache, no dizziness, no slurred speech.  Psychiatric: + agitation, no anxiety.    ALL OTHER REVIEW OF SYSTEMS ARE NEGATIVE.    Vital Signs Last 24 Hrs  T(C): 36.6 (03 Dec 2020 05:44), Max: 36.9 (02 Dec 2020 19:44)  T(F): 97.9 (03 Dec 2020 05:44), Max: 98.4 (02 Dec 2020 19:44)  HR: 83 (03 Dec 2020 05:44) (71 - 108)  BP: 117/70 (03 Dec 2020 05:44) (106/67 - 139/105)  BP(mean): 79 (02 Dec 2020 22:00) (77 - 116)  RR: 18 (03 Dec 2020 05:44) (13 - 27)  SpO2: 95% (03 Dec 2020 05:44) (63% - 100%)    PHYSICAL EXAM:  Constitutional: Comfortable . No acute distress.   HEENT: Atraumatic and normcephalic , neck is supple . no JVD.  PEERL   CNS: A&O x3. No focal neurologic deficits.   Respiratory: CTAB. No wheezing, rhonchi or crackles   Cardiovascular: S1S2 RRR. No murmuror rubs  Gastrointestinal: Soft non-tender and non distended . +Bowel sounds.   Extremities: No pitting  edema x 4 extremities  Psychiatric: + agitation.       A&P  71 y/o male was admitted to icu for acute on chronic hypercapnic , hypoxic respirator failure and copd exacerbation,     Pt stated he was upset because he wants to go home    Dx: desaturation on monitor    Plan    Bipap was removed and venturi mask was placed at 50%. Deep breathing exercise were performed   Pt saturation increase sat  to 97%  Pt asymptomatic. More calm  Hemodynamically stable   Continue Monitor. Nurse aware

## 2020-12-04 LAB
ANION GAP SERPL CALC-SCNC: 11 MMOL/L — SIGNIFICANT CHANGE UP (ref 5–17)
BUN SERPL-MCNC: 41 MG/DL — HIGH (ref 8–20)
CALCIUM SERPL-MCNC: 8.7 MG/DL — SIGNIFICANT CHANGE UP (ref 8.6–10.2)
CHLORIDE SERPL-SCNC: 90 MMOL/L — LOW (ref 98–107)
CO2 SERPL-SCNC: 40 MMOL/L — HIGH (ref 22–29)
CREAT SERPL-MCNC: 0.77 MG/DL — SIGNIFICANT CHANGE UP (ref 0.5–1.3)
GLUCOSE BLDC GLUCOMTR-MCNC: 322 MG/DL — HIGH (ref 70–99)
GLUCOSE BLDC GLUCOMTR-MCNC: 335 MG/DL — HIGH (ref 70–99)
GLUCOSE BLDC GLUCOMTR-MCNC: 355 MG/DL — HIGH (ref 70–99)
GLUCOSE BLDC GLUCOMTR-MCNC: 387 MG/DL — HIGH (ref 70–99)
GLUCOSE SERPL-MCNC: 377 MG/DL — HIGH (ref 70–99)
HCT VFR BLD CALC: 38.7 % — LOW (ref 39–50)
HGB BLD-MCNC: 11.9 G/DL — LOW (ref 13–17)
INR BLD: 1.58 RATIO — HIGH (ref 0.88–1.16)
MCHC RBC-ENTMCNC: 27.2 PG — SIGNIFICANT CHANGE UP (ref 27–34)
MCHC RBC-ENTMCNC: 30.7 GM/DL — LOW (ref 32–36)
MCV RBC AUTO: 88.4 FL — SIGNIFICANT CHANGE UP (ref 80–100)
PLATELET # BLD AUTO: 319 K/UL — SIGNIFICANT CHANGE UP (ref 150–400)
POTASSIUM SERPL-MCNC: 5 MMOL/L — SIGNIFICANT CHANGE UP (ref 3.5–5.3)
POTASSIUM SERPL-SCNC: 5 MMOL/L — SIGNIFICANT CHANGE UP (ref 3.5–5.3)
PROTHROM AB SERPL-ACNC: 17.9 SEC — HIGH (ref 10.6–13.6)
RBC # BLD: 4.38 M/UL — SIGNIFICANT CHANGE UP (ref 4.2–5.8)
RBC # FLD: 13.8 % — SIGNIFICANT CHANGE UP (ref 10.3–14.5)
SODIUM SERPL-SCNC: 140 MMOL/L — SIGNIFICANT CHANGE UP (ref 135–145)
WBC # BLD: 18.77 K/UL — HIGH (ref 3.8–10.5)
WBC # FLD AUTO: 18.77 K/UL — HIGH (ref 3.8–10.5)

## 2020-12-04 PROCEDURE — 99233 SBSQ HOSP IP/OBS HIGH 50: CPT

## 2020-12-04 PROCEDURE — 99232 SBSQ HOSP IP/OBS MODERATE 35: CPT

## 2020-12-04 RX ORDER — DILTIAZEM HCL 120 MG
10 CAPSULE, EXT RELEASE 24 HR ORAL ONCE
Refills: 0 | Status: COMPLETED | OUTPATIENT
Start: 2020-12-04 | End: 2020-12-04

## 2020-12-04 RX ORDER — LEVALBUTEROL 1.25 MG/.5ML
0.63 SOLUTION, CONCENTRATE RESPIRATORY (INHALATION) EVERY 6 HOURS
Refills: 0 | Status: DISCONTINUED | OUTPATIENT
Start: 2020-12-04 | End: 2020-12-09

## 2020-12-04 RX ORDER — IPRATROPIUM BROMIDE 0.2 MG/ML
500 SOLUTION, NON-ORAL INHALATION EVERY 6 HOURS
Refills: 0 | Status: DISCONTINUED | OUTPATIENT
Start: 2020-12-04 | End: 2020-12-09

## 2020-12-04 RX ORDER — METOPROLOL TARTRATE 50 MG
5 TABLET ORAL ONCE
Refills: 0 | Status: COMPLETED | OUTPATIENT
Start: 2020-12-04 | End: 2020-12-04

## 2020-12-04 RX ORDER — WARFARIN SODIUM 2.5 MG/1
4 TABLET ORAL ONCE
Refills: 0 | Status: COMPLETED | OUTPATIENT
Start: 2020-12-04 | End: 2020-12-04

## 2020-12-04 RX ORDER — AZITHROMYCIN 500 MG/1
500 TABLET, FILM COATED ORAL DAILY
Refills: 0 | Status: DISCONTINUED | OUTPATIENT
Start: 2020-12-04 | End: 2020-12-06

## 2020-12-04 RX ORDER — DILTIAZEM HCL 120 MG
10 CAPSULE, EXT RELEASE 24 HR ORAL EVERY 4 HOURS
Refills: 0 | Status: DISCONTINUED | OUTPATIENT
Start: 2020-12-04 | End: 2020-12-08

## 2020-12-04 RX ADMIN — Medication 60 MILLIGRAM(S): at 13:55

## 2020-12-04 RX ADMIN — Medication 5 MILLIGRAM(S): at 17:53

## 2020-12-04 RX ADMIN — ALBUTEROL 2.5 MILLIGRAM(S): 90 AEROSOL, METERED ORAL at 03:45

## 2020-12-04 RX ADMIN — LEVALBUTEROL 0.63 MILLIGRAM(S): 1.25 SOLUTION, CONCENTRATE RESPIRATORY (INHALATION) at 14:22

## 2020-12-04 RX ADMIN — INSULIN GLARGINE 25 UNIT(S): 100 INJECTION, SOLUTION SUBCUTANEOUS at 21:24

## 2020-12-04 RX ADMIN — Medication 10 MILLIGRAM(S): at 11:53

## 2020-12-04 RX ADMIN — AZITHROMYCIN 500 MILLIGRAM(S): 500 TABLET, FILM COATED ORAL at 12:23

## 2020-12-04 RX ADMIN — Medication 10 MILLIGRAM(S): at 22:00

## 2020-12-04 RX ADMIN — WARFARIN SODIUM 4 MILLIGRAM(S): 2.5 TABLET ORAL at 21:24

## 2020-12-04 RX ADMIN — Medication 5 UNIT(S): at 12:23

## 2020-12-04 RX ADMIN — LEVALBUTEROL 0.63 MILLIGRAM(S): 1.25 SOLUTION, CONCENTRATE RESPIRATORY (INHALATION) at 19:43

## 2020-12-04 RX ADMIN — CITALOPRAM 40 MILLIGRAM(S): 10 TABLET, FILM COATED ORAL at 12:23

## 2020-12-04 RX ADMIN — Medication 1 TABLET(S): at 17:53

## 2020-12-04 RX ADMIN — Medication 10: at 12:22

## 2020-12-04 RX ADMIN — BUDESONIDE AND FORMOTEROL FUMARATE DIHYDRATE 2 PUFF(S): 160; 4.5 AEROSOL RESPIRATORY (INHALATION) at 19:47

## 2020-12-04 RX ADMIN — ALBUTEROL 2.5 MILLIGRAM(S): 90 AEROSOL, METERED ORAL at 08:15

## 2020-12-04 RX ADMIN — ATORVASTATIN CALCIUM 10 MILLIGRAM(S): 80 TABLET, FILM COATED ORAL at 21:24

## 2020-12-04 RX ADMIN — Medication 40 MILLIGRAM(S): at 06:41

## 2020-12-04 RX ADMIN — LISINOPRIL 40 MILLIGRAM(S): 2.5 TABLET ORAL at 06:41

## 2020-12-04 RX ADMIN — Medication 10: at 21:24

## 2020-12-04 RX ADMIN — Medication 5 UNIT(S): at 17:54

## 2020-12-04 RX ADMIN — Medication 60 MILLIGRAM(S): at 21:24

## 2020-12-04 RX ADMIN — Medication 60 MILLIGRAM(S): at 06:41

## 2020-12-04 RX ADMIN — Medication 10 MILLIGRAM(S): at 09:18

## 2020-12-04 RX ADMIN — Medication 500 MICROGRAM(S): at 19:43

## 2020-12-04 RX ADMIN — Medication 1 TABLET(S): at 08:27

## 2020-12-04 RX ADMIN — INSULIN GLARGINE 25 UNIT(S): 100 INJECTION, SOLUTION SUBCUTANEOUS at 08:27

## 2020-12-04 RX ADMIN — LEVETIRACETAM 500 MILLIGRAM(S): 250 TABLET, FILM COATED ORAL at 06:41

## 2020-12-04 RX ADMIN — Medication 8: at 08:25

## 2020-12-04 RX ADMIN — BUDESONIDE AND FORMOTEROL FUMARATE DIHYDRATE 2 PUFF(S): 160; 4.5 AEROSOL RESPIRATORY (INHALATION) at 08:16

## 2020-12-04 RX ADMIN — Medication 5 UNIT(S): at 08:25

## 2020-12-04 RX ADMIN — Medication 500 MICROGRAM(S): at 14:22

## 2020-12-04 RX ADMIN — Medication 8: at 17:53

## 2020-12-04 RX ADMIN — Medication 40 MILLIGRAM(S): at 17:53

## 2020-12-04 RX ADMIN — LEVETIRACETAM 500 MILLIGRAM(S): 250 TABLET, FILM COATED ORAL at 17:53

## 2020-12-04 NOTE — PROGRESS NOTE ADULT - ASSESSMENT
-AECOPD - improved  -Chronic hypoxic resp failure  -Chronic hypercarbic resp failure  -Diastolic CHF  -Afib; on a/c at home but questionable compliance    Recommendations:  Wean to Prednisone 60mg QDay (from 40BID) starting tomorrow. Nebs. Azithromycin. O2; titrate. Nocturnal and PRN BPAP. Patient would benefit from Trilogy at home given refractory hypercapneic respiratory failure in the setting of COPD. Cardiology f/u. Continue anticoagulation; on warfarin. OOBTC, ambulate. Should f/u in our office after D/C within 1-2 weeks of discharge.     Moreno Cantu M.D.  Pulmonary & Critical Care Medicine  [facility]  Pulmonary Medicine at [place]  [address]  [place] [address]  T: [phone]  F: [phone]

## 2020-12-04 NOTE — PROGRESS NOTE ADULT - SUBJECTIVE AND OBJECTIVE BOX
PULMONARY PROGRESS NOTE      CLAUDIA MABRY  MRN-[id number]    Patient is a 70y old  Male who presents with a chief complaint of SOB (04 Dec 2020 11:31)        INTERVAL HPI/OVERNIGHT EVENTS:  Pt seen at the bedside. He reports feeling better overall but about the same since yesterday.     MEDICATIONS  (STANDING):  atorvastatin 10 milliGRAM(s) Oral at bedtime  azithromycin   Tablet 500 milliGRAM(s) Oral daily  budesonide 160 MICROgram(s)/formoterol 4.5 MICROgram(s) Inhaler 2 Puff(s) Inhalation two times a day  citalopram 40 milliGRAM(s) Oral daily  dextrose 40% Gel 15 Gram(s) Oral once  dextrose 5%. 1000 milliLiter(s) (50 mL/Hr) IV Continuous <Continuous>  dextrose 5%. 1000 milliLiter(s) (100 mL/Hr) IV Continuous <Continuous>  dextrose 50% Injectable 25 Gram(s) IV Push once  dextrose 50% Injectable 12.5 Gram(s) IV Push once  diltiazem    Tablet 60 milliGRAM(s) Oral three times a day  glucagon  Injectable 1 milliGRAM(s) IntraMuscular once  influenza   Vaccine 0.5 milliLiter(s) IntraMuscular once  insulin glargine Injectable (LANTUS) 25 Unit(s) SubCutaneous every morning  insulin glargine Injectable (LANTUS) 25 Unit(s) SubCutaneous at bedtime  insulin lispro (ADMELOG) corrective regimen sliding scale   SubCutaneous Before meals and at bedtime  insulin lispro Injectable (ADMELOG) 5 Unit(s) SubCutaneous three times a day before meals  ipratropium    for Nebulization 500 MICROGram(s) Nebulizer every 6 hours  lactobacillus acidophilus 1 Tablet(s) Oral two times a day with meals  levalbuterol Inhalation 0.63 milliGRAM(s) Inhalation every 6 hours  levETIRAcetam 500 milliGRAM(s) Oral two times a day  lisinopril 40 milliGRAM(s) Oral daily  metoprolol tartrate Injectable 5 milliGRAM(s) IV Push once  predniSONE   Tablet 40 milliGRAM(s) Oral two times a day  warfarin 4 milliGRAM(s) Oral once    MEDICATIONS  (PRN):  diltiazem Injectable 10 milliGRAM(s) IV Push every 4 hours PRN heart rate > 140    Allergies    No Known Allergies    Intolerances      PAST MEDICAL & SURGICAL HISTORY:  Seizure    Depression, unspecified depression type    Diabetes    Hypertension    Hyperlipidemia    Afib    COPD (chronic obstructive pulmonary disease)    Abdominal hernia    H/O carotid endarterectomy          REVIEW OF SYSTEMS:  Negative except as noted in HPI      Vital Signs Last 24 Hrs  T(C): 37.3 (04 Dec 2020 09:00), Max: 37.3 (04 Dec 2020 09:00)  T(F): 99.2 (04 Dec 2020 09:00), Max: 99.2 (04 Dec 2020 09:00)  HR: 150 (04 Dec 2020 09:00) (87 - 150)  BP: 114/83 (04 Dec 2020 09:00) (108/72 - 149/75)  BP(mean): --  RR: 20 (04 Dec 2020 09:00) (17 - 22)  SpO2: 96% (04 Dec 2020 09:00) (77% - 99%)      PHYSICAL EXAMINATION:  GEN: In no apparent distress, sitting at side of bed  HEENT: NC/AT  NECK: Supple  CV: +S1, S2  RESPIRATORY: Diminished breath sounds but now wheezing noted. Non-labored breathing.  ABDOMEN: Soft, non-tender  EXTREMITIES: Chronic extremity skin changes  NEURO: Grossly non-focal      LABS:                        11.9   18.77 )-----------( 319      ( 04 Dec 2020 08:11 )             38.7     12-04    140  |  90<L>  |  41.0<H>  ----------------------------<  377<H>  5.0   |  40.0<H>  |  0.77    Ca    8.7      04 Dec 2020 08:11  Phos  1.9     12-03  Mg     2.1     12-03      PT/INR - ( 04 Dec 2020 08:11 )   PT: 17.9 sec;   INR: 1.58 ratio         PTT - ( 03 Dec 2020 06:38 )  PTT:31.3 sec    ABG - ( 03 Dec 2020 05:31 )  pH, Arterial: 7.42  pH, Blood: x     /  pCO2: 70    /  pO2: 76    / HCO3: 42    / Base Excess: 18.3  /  SaO2: 96                              MICROBIOLOGY:  COVID-19 PCR . (12.01.20 @ 13:13)    COVID-19 PCR: NotDetec: Testing is performed using polymerase chain reaction (PCR) or  transcription mediated amplification (TMA). This COVID-19 (SARS-CoV-2)  nucleic acid amplification test was validated by [employer] and is  in use under the FDA Emergency Use Authorization (EUA) for clinical labs  CLIA-certified to perform high complexity testing. Test results should be  correlated with clinical presentation, patient history, and epidemiology.        RADIOLOGY & ADDITIONAL STUDIES:  < from: Xray Chest 1 View-PORTABLE IMMEDIATE (12.01.20 @ 11:32) >  INTERPRETATION:  AP chest on December 1, 2020 at 11:17 AM. Patient is short of breath.    Heart is magnified by technique.    The lung fields and pleural surfaces are unremarkable.    Chest is similar to May 23, 2016.    IMPRESSION: No acute finding or change.    < end of copied text >      ECHO:  < from: TTE Echo Complete w/ Contrast w/ Doppler (12.01.20 @ 21:12) >  Summary:   1. Technically difficult study. Measurements may not be accurate due to off axis views.   2. Endocardial visualization was enhanced with intravenous echo contrast.   3. Normal global left ventricular systolic function.   4. Left ventricular ejection fraction, by visual estimation, is 65 to 70%.   5. Moderately increased LV wall thickness.   6. Spectral Doppler shows impaired relaxation pattern of left ventricular myocardial filling (Grade I diastolic dysfunction).   7. Mild mitral annular calcification.   8. Mild thickening of the anterior and posterior mitral valve leaflets.   9. Trace mitral valve regurgitation.    < end of copied text >

## 2020-12-04 NOTE — PROGRESS NOTE ADULT - ASSESSMENT
71 y/o male with h/o copd, htn, dm, afib on coumadin, seizure disorder was admitted yesterday to icu for acute on chronic hypercapnic , hypoxic respirator failure and copd exacerbation, clinically improving.    Acute on chronic respiratory failure with hypercapnia and hypoxia   - c/w Azithromycin 500mg daily  - d/c Ceftriaxone  - d/c Solumedrol  - start Prednisone 40mg BID  - c/w albuterol PRN  - c/w Symbicort  - Bipap nightly  - 4L nasal cannula    DM  - FS with KENYA  - c/w Lantus 25 units nightly  - Lispro 5 units TID with meals    HTN  - c/w Lisinopril 40mg daily  - c/w Cardizem 60mg daily    Afib  - compliance is questionable  - reports using Coumadin 4mg daily   - INR today 1.58    Seizure Disorder  - c/w Keppra 500mg BID  - seizure precautions    DVT ppx  - Coumadin    Dispo: 1-2 days

## 2020-12-04 NOTE — PROGRESS NOTE ADULT - SUBJECTIVE AND OBJECTIVE BOX
Called to evaluate for elevated rapid A-fib rates upto 150's.  Patient A and O x 3, skin warm and dry, rr 16 regular, bp 114/78, no pain or distress noted.  Patient states "this happened last time when in the hospital from the treatment.  D.C albuterol and started on Xopenex and Atrovent neb treatments.  Also gave Apresoline 10mg IVP earlier with good results  D.W with MD Brandon.   Denies chest pain, palpitations sob, cough, or any other discomfort    Rapid afib  Continue with po cardizem  Add IVP cardizem for heart rate > 140 q 4 hours  D.C albuterol and started on Xopenx  D.W with Aleksandr MASON and will continue with PRN treatments due to albuterol useage, if rapid afib continues will call a cardiac consult if needed.  Continue tele  RN instructed to call MD with continued elevated hr after IV cardizem

## 2020-12-04 NOTE — PROGRESS NOTE ADULT - SUBJECTIVE AND OBJECTIVE BOX
Chief complaint: SOB    Patient seen and examined at bedside. No acute overnight events reported. The patient states he is doing well and would like to go home. Denies headache, fever, chills, cough, chest pain, nausea, vomiting, abdominal pain, diarrhea or constipation.     Vital Signs Last 24 Hrs  T(F): 99.2 (04 Dec 2020 09:00), Max: 99.2 (04 Dec 2020 09:00)  HR: 150 (04 Dec 2020 09:00) (87 - 150)  BP: 114/83 (04 Dec 2020 09:00) (108/72 - 149/75)  RR: 20 (04 Dec 2020 09:00) (17 - 22)  SpO2: 96% (04 Dec 2020 09:00) (77% - 99%)    Physical Exam:  Constitutional: alert and oriented, in no acute distress, saturating well on nasal cannula  Neck: Soft and supple  Respiratory: Good air entry bilaterally  Cardiovascular: Regular rate and rhythm no murmurs, gallops, rubs  Gastrointestinal: Soft, non-tender to palpation, +bs  Vascular: 2+ peripheral pulses  Neurological: A/O x 3, no focal neurological deficits  Musculoskeletal: No lower extremity edema bilaterally    Labs:                        11.9   18.77 )-----------( 319      ( 04 Dec 2020 08:11 )             38.7   12-04    140  |  90<L>  |  41.0<H>  ----------------------------<  377<H>  5.0   |  40.0<H>  |  0.77    Ca    8.7      04 Dec 2020 08:11  Phos  1.9     12-03  Mg     2.1     12-03

## 2020-12-05 LAB
GLUCOSE BLDC GLUCOMTR-MCNC: 175 MG/DL — HIGH (ref 70–99)
GLUCOSE BLDC GLUCOMTR-MCNC: 199 MG/DL — HIGH (ref 70–99)
GLUCOSE BLDC GLUCOMTR-MCNC: 222 MG/DL — HIGH (ref 70–99)
GLUCOSE BLDC GLUCOMTR-MCNC: 408 MG/DL — HIGH (ref 70–99)
INR BLD: 1.67 RATIO — HIGH (ref 0.88–1.16)
PROTHROM AB SERPL-ACNC: 18.9 SEC — HIGH (ref 10.6–13.6)

## 2020-12-05 PROCEDURE — 99232 SBSQ HOSP IP/OBS MODERATE 35: CPT

## 2020-12-05 PROCEDURE — 99233 SBSQ HOSP IP/OBS HIGH 50: CPT

## 2020-12-05 PROCEDURE — 71045 X-RAY EXAM CHEST 1 VIEW: CPT | Mod: 26

## 2020-12-05 RX ORDER — DILTIAZEM HCL 120 MG
30 CAPSULE, EXT RELEASE 24 HR ORAL ONCE
Refills: 0 | Status: COMPLETED | OUTPATIENT
Start: 2020-12-05 | End: 2020-12-05

## 2020-12-05 RX ORDER — DILTIAZEM HCL 120 MG
60 CAPSULE, EXT RELEASE 24 HR ORAL THREE TIMES A DAY
Refills: 0 | Status: DISCONTINUED | OUTPATIENT
Start: 2020-12-05 | End: 2020-12-05

## 2020-12-05 RX ORDER — DIGOXIN 250 MCG
0.12 TABLET ORAL DAILY
Refills: 0 | Status: DISCONTINUED | OUTPATIENT
Start: 2020-12-07 | End: 2020-12-07

## 2020-12-05 RX ORDER — DIGOXIN 250 MCG
0.25 TABLET ORAL EVERY 6 HOURS
Refills: 0 | Status: COMPLETED | OUTPATIENT
Start: 2020-12-05 | End: 2020-12-06

## 2020-12-05 RX ORDER — METOPROLOL TARTRATE 50 MG
5 TABLET ORAL ONCE
Refills: 0 | Status: COMPLETED | OUTPATIENT
Start: 2020-12-05 | End: 2020-12-05

## 2020-12-05 RX ORDER — DILTIAZEM HCL 120 MG
5 CAPSULE, EXT RELEASE 24 HR ORAL ONCE
Refills: 0 | Status: COMPLETED | OUTPATIENT
Start: 2020-12-05 | End: 2020-12-05

## 2020-12-05 RX ORDER — DILTIAZEM HCL 120 MG
90 CAPSULE, EXT RELEASE 24 HR ORAL THREE TIMES A DAY
Refills: 0 | Status: DISCONTINUED | OUTPATIENT
Start: 2020-12-05 | End: 2020-12-05

## 2020-12-05 RX ORDER — DILTIAZEM HCL 120 MG
90 CAPSULE, EXT RELEASE 24 HR ORAL THREE TIMES A DAY
Refills: 0 | Status: DISCONTINUED | OUTPATIENT
Start: 2020-12-05 | End: 2020-12-06

## 2020-12-05 RX ORDER — DILTIAZEM HCL 120 MG
30 CAPSULE, EXT RELEASE 24 HR ORAL ONCE
Refills: 0 | Status: DISCONTINUED | OUTPATIENT
Start: 2020-12-05 | End: 2020-12-05

## 2020-12-05 RX ADMIN — BUDESONIDE AND FORMOTEROL FUMARATE DIHYDRATE 2 PUFF(S): 160; 4.5 AEROSOL RESPIRATORY (INHALATION) at 10:14

## 2020-12-05 RX ADMIN — INSULIN GLARGINE 25 UNIT(S): 100 INJECTION, SOLUTION SUBCUTANEOUS at 08:38

## 2020-12-05 RX ADMIN — Medication 5 UNIT(S): at 17:47

## 2020-12-05 RX ADMIN — Medication 500 MICROGRAM(S): at 10:14

## 2020-12-05 RX ADMIN — Medication 12: at 21:44

## 2020-12-05 RX ADMIN — LISINOPRIL 40 MILLIGRAM(S): 2.5 TABLET ORAL at 05:28

## 2020-12-05 RX ADMIN — CITALOPRAM 40 MILLIGRAM(S): 10 TABLET, FILM COATED ORAL at 11:15

## 2020-12-05 RX ADMIN — AZITHROMYCIN 500 MILLIGRAM(S): 500 TABLET, FILM COATED ORAL at 11:15

## 2020-12-05 RX ADMIN — Medication 5 MILLIGRAM(S): at 21:58

## 2020-12-05 RX ADMIN — Medication 10 MILLIGRAM(S): at 17:34

## 2020-12-05 RX ADMIN — Medication 90 MILLIGRAM(S): at 13:01

## 2020-12-05 RX ADMIN — Medication 500 MICROGRAM(S): at 15:39

## 2020-12-05 RX ADMIN — Medication 1 TABLET(S): at 17:48

## 2020-12-05 RX ADMIN — LEVALBUTEROL 0.63 MILLIGRAM(S): 1.25 SOLUTION, CONCENTRATE RESPIRATORY (INHALATION) at 21:01

## 2020-12-05 RX ADMIN — Medication 5 UNIT(S): at 13:01

## 2020-12-05 RX ADMIN — LEVALBUTEROL 0.63 MILLIGRAM(S): 1.25 SOLUTION, CONCENTRATE RESPIRATORY (INHALATION) at 10:14

## 2020-12-05 RX ADMIN — ATORVASTATIN CALCIUM 10 MILLIGRAM(S): 80 TABLET, FILM COATED ORAL at 21:44

## 2020-12-05 RX ADMIN — Medication 10 MILLIGRAM(S): at 11:36

## 2020-12-05 RX ADMIN — Medication 40 MILLIGRAM(S): at 05:28

## 2020-12-05 RX ADMIN — LEVETIRACETAM 500 MILLIGRAM(S): 250 TABLET, FILM COATED ORAL at 17:48

## 2020-12-05 RX ADMIN — Medication 500 MICROGRAM(S): at 03:10

## 2020-12-05 RX ADMIN — LEVALBUTEROL 0.63 MILLIGRAM(S): 1.25 SOLUTION, CONCENTRATE RESPIRATORY (INHALATION) at 15:39

## 2020-12-05 RX ADMIN — Medication 2: at 08:37

## 2020-12-05 RX ADMIN — Medication 5 UNIT(S): at 08:38

## 2020-12-05 RX ADMIN — Medication 0.25 MILLIGRAM(S): at 15:33

## 2020-12-05 RX ADMIN — Medication 5 MILLIGRAM(S): at 09:11

## 2020-12-05 RX ADMIN — Medication 1 TABLET(S): at 08:38

## 2020-12-05 RX ADMIN — INSULIN GLARGINE 25 UNIT(S): 100 INJECTION, SOLUTION SUBCUTANEOUS at 21:44

## 2020-12-05 RX ADMIN — Medication 4: at 17:47

## 2020-12-05 RX ADMIN — Medication 90 MILLIGRAM(S): at 21:44

## 2020-12-05 RX ADMIN — Medication 10 MILLIGRAM(S): at 06:03

## 2020-12-05 RX ADMIN — LEVETIRACETAM 500 MILLIGRAM(S): 250 TABLET, FILM COATED ORAL at 05:28

## 2020-12-05 RX ADMIN — Medication 60 MILLIGRAM(S): at 05:28

## 2020-12-05 RX ADMIN — BUDESONIDE AND FORMOTEROL FUMARATE DIHYDRATE 2 PUFF(S): 160; 4.5 AEROSOL RESPIRATORY (INHALATION) at 21:06

## 2020-12-05 RX ADMIN — Medication 2: at 13:01

## 2020-12-05 RX ADMIN — Medication 30 MILLIGRAM(S): at 19:00

## 2020-12-05 RX ADMIN — LEVALBUTEROL 0.63 MILLIGRAM(S): 1.25 SOLUTION, CONCENTRATE RESPIRATORY (INHALATION) at 03:10

## 2020-12-05 RX ADMIN — Medication 500 MICROGRAM(S): at 21:01

## 2020-12-05 NOTE — PROGRESS NOTE ADULT - SUBJECTIVE AND OBJECTIVE BOX
Called to evaluate for elevated heart rate, seen by EP today.  Patient at rest heart rate hovers around 110's afib, but increases up to 150's when oob.  Patient asymptotic but blood pressure decreased to 70's.  Denies chest pain, fever, sob, cough, pain palpitations fatigue, dizziness or any discomfort.     Rapid a-fib  put back to bed, recently increased Cardizem  d/w with MD Santizo and will add dig load for now  Continue to increase Cardizem for elevated heart rates.  dig load ordered for continued elevated heart rate and dig level on Monday  continue telemetry

## 2020-12-05 NOTE — PROGRESS NOTE ADULT - SUBJECTIVE AND OBJECTIVE BOX
Chief complaint: Shortness of breath    Patient seen and examined at bedside. Overnight patient remained in Afib with RVR after recieving Cardizem PRN pushes. The patient states he is doing well, denies any complaints. Denies headache, fever, chills, cough, chest pain, shortness of breath, nausea, vomiting, abdominal pain, diarrhea or constipation.     Vital Signs Last 24 Hrs  T(F): 97.6 (05 Dec 2020 05:19), Max: 98.5 (04 Dec 2020 20:55)  HR: 155 (05 Dec 2020 08:55) (102 - 155)  BP: 110/76 (05 Dec 2020 08:47) (100/65 - 135/70)  RR: 18 (05 Dec 2020 05:19) (18 - 20)  SpO2: 100% (05 Dec 2020 05:19) (93% - 100%)    Physical Exam:  Constitutional: alert and oriented, in no acute distress   Neck: Soft and supple, No LAD, No JVD  Respiratory: Clear to auscultation bilaterally, no wheezes or crackles  Cardiovascular: Irregular rhythm, tachycardic, no murmurs  Gastrointestinal: Soft, non-tender to palpation, +bs  Vascular: 2+ peripheral pulses  Neurological: A/O x 3, no focal neurological deficits  Musculoskeletal: no lower extremity edema bilaterally    Labs:                        11.9   18.77 )-----------( 319      ( 04 Dec 2020 08:11 )             38.7   12-04    140  |  90<L>  |  41.0<H>  ----------------------------<  377<H>  5.0   |  40.0<H>  |  0.77    Ca    8.7      04 Dec 2020 08:11

## 2020-12-05 NOTE — CONSULT NOTE ADULT - SUBJECTIVE AND OBJECTIVE BOX
[place] [facility], THE HEART CENTER                                   [address] [place] [address]                                                      PHONE: [phone]                                                         FAX: [phone]  [email or web address]  ---------------------------------------------------------------------------------------------------------------------------------    Reason for Consult: AF/RVR    HPI:  CLAUDIA MABRY is an 70y Male h/o COPD, HTN, DM, permanent AF on coumadin, seizure d/o admitted initially to ICU 2 days ago with acute on chronic hypercapnic / hypoxic respiratory failure and COPD exacerbation clinically improving but with poorly rate controlled AF. He is not followed by a cardiologist as an outpatient. He denies palpitations or SOB at rest. He does have SILVEIRA.    PAST MEDICAL & SURGICAL HISTORY:  Seizure  Depression, unspecified depression type  Diabetes  Hypertension  Hyperlipidemia  Afib  COPD (chronic obstructive pulmonary disease)  Abdominal hernia  H/O carotid endarterectomy        No Known Allergies      MEDICATIONS  (STANDING):  atorvastatin 10 milliGRAM(s) Oral at bedtime  azithromycin   Tablet 500 milliGRAM(s) Oral daily  budesonide 160 MICROgram(s)/formoterol 4.5 MICROgram(s) Inhaler 2 Puff(s) Inhalation two times a day  citalopram 40 milliGRAM(s) Oral daily  dextrose 40% Gel 15 Gram(s) Oral once  dextrose 5%. 1000 milliLiter(s) (50 mL/Hr) IV Continuous <Continuous>  dextrose 5%. 1000 milliLiter(s) (100 mL/Hr) IV Continuous <Continuous>  dextrose 50% Injectable 25 Gram(s) IV Push once  dextrose 50% Injectable 12.5 Gram(s) IV Push once  diltiazem    Tablet 60 milliGRAM(s) Oral three times a day  glucagon  Injectable 1 milliGRAM(s) IntraMuscular once  influenza   Vaccine 0.5 milliLiter(s) IntraMuscular once  insulin glargine Injectable (LANTUS) 25 Unit(s) SubCutaneous every morning  insulin glargine Injectable (LANTUS) 25 Unit(s) SubCutaneous at bedtime  insulin lispro (ADMELOG) corrective regimen sliding scale   SubCutaneous Before meals and at bedtime  insulin lispro Injectable (ADMELOG) 5 Unit(s) SubCutaneous three times a day before meals  ipratropium    for Nebulization 500 MICROGram(s) Nebulizer every 6 hours  lactobacillus acidophilus 1 Tablet(s) Oral two times a day with meals  levalbuterol Inhalation 0.63 milliGRAM(s) Inhalation every 6 hours  levETIRAcetam 500 milliGRAM(s) Oral two times a day  lisinopril 40 milliGRAM(s) Oral daily  predniSONE   Tablet 60 milliGRAM(s) Oral daily    MEDICATIONS  (PRN):  diltiazem Injectable 10 milliGRAM(s) IV Push every 4 hours PRN heart rate > 140      Social History:  Cigarettes:      no              Alchohol:      no           Illicit Drug Abuse:  no    ROS: Negative other than as mentioned in HPI.    Vital Signs Last 24 Hrs  T(C): 36.4 (05 Dec 2020 05:19), Max: 36.9 (04 Dec 2020 20:55)  T(F): 97.6 (05 Dec 2020 05:19), Max: 98.5 (04 Dec 2020 20:55)  HR: 155 (05 Dec 2020 08:55) (102 - 155)  BP: 110/76 (05 Dec 2020 08:47) (100/65 - 135/70)  BP(mean): --  RR: 18 (05 Dec 2020 05:19) (18 - 20)  SpO2: 100% (05 Dec 2020 05:19) (93% - 100%)  ICU Vital Signs Last 24 Hrs  CLAUDIA MABRY  I&O's Detail    04 Dec 2020 07:01  -  05 Dec 2020 07:00  --------------------------------------------------------  IN:    Oral Fluid: 820 mL  Total IN: 820 mL    OUT:    Voided (mL): 1250 mL  Total OUT: 1250 mL    Total NET: -430 mL        I&O's Summary    04 Dec 2020 07:01  -  05 Dec 2020 07:00  --------------------------------------------------------  IN: 820 mL / OUT: 1250 mL / NET: -430 mL      Drug Dosing Weight  CLAUDIA MABRY      PHYSICAL EXAM:  General: NAD.  HEENT: Head; normocephalic.  Eyes: Pupils reactive.  Neck: Supple.  CARDIOVASCULAR: tachy, irregular.  LUNGS: Normal breath sounds bilaterally.  ABDOMEN: Soft.  EXTREMITIES: No clubbing, cyanosis or edema.   SKIN: warm.  NEURO: Alert/oriented x 3.    PSYCH: normal affect.        LABS:                        11.9   18.77 )-----------( 319      ( 04 Dec 2020 08:11 )             38.7     12-04    140  |  90<L>  |  41.0<H>  ----------------------------<  377<H>  5.0   |  40.0<H>  |  0.77    Ca    8.7      04 Dec 2020 08:11      CLAUDIA MABRY      PT/INR - ( 05 Dec 2020 05:49 )   PT: 18.9 sec;   INR: 1.67 ratio         RADIOLOGY & ADDITIONAL STUDIES:    INTERPRETATION OF TELEMETRY (personally reviewed): Permanent AF with HR's >100 and up to 150's.    ECHO:   1. Technically difficult study. Measurements may not be accurate due to off axis views.   2. Endocardial visualization was enhanced with intravenous echo contrast.   3. Normal global left ventricular systolic function.   4. Left ventricular ejection fraction, by visual estimation, is 65 to 70%.   5. Moderately increased LV wall thickness.   6. Spectral Doppler shows impaired relaxation pattern of left ventricular myocardial filling (Grade I diastolic dysfunction).   7. Mild mitral annular calcification.   8. Mild thickening of the anterior and posterior mitral valve leaflets.   9. Trace mitral valve regurgitation.    MD Jamila Electronically signed on 12/2/2020 at 8:13:55 AM      Assessment and Plan:  70y Male h/o COPD, HTN, DM, permanent AF on coumadin, seizure d/o admitted initially to ICU 2 days ago with acute on chronic hypercapnic / hypoxic respiratory failure and COPD exacerbation clinically improving but with poorly rate controlled AF. He is not followed by a cardiologist as an outpatient. He denies palpitations or SOB at rest. He does have SILVEIRA. His poorly rate controlled AF may contribute to his sx's. Albuterol nebs changed to Xopenex / atrovent.    1. Optimize rate control of AF. Increase diltiazem to 90mg TID. Transition to long acting CCB once rate controlled achieved. May need to add additional rate control agent such as digoxin pending above. Avoid Bblocker due to RAD.  2. Goal INR 2-3 for stroke risk reduction. CHADS VASC 3.    3. Discussed with Dr. Brandon.

## 2020-12-05 NOTE — PROGRESS NOTE ADULT - ASSESSMENT
69 y/o male with h/o copd, htn, dm, afib on coumadin, seizure disorder was admitted yesterday to icu for acute on chronic hypercapnic , hypoxic respirator failure and copd exacerbation, clinically improving.    Acute on chronic respiratory failure with hypercapnia and hypoxia   - c/w Azithromycin 500mg daily  - c/w Prednisone 60mg daily  - c/w albuterol PRN  - c/w Symbicort  - Bipap nightly  - 4L nasal cannula    DM  - FS with KENYA  - c/w Lantus 25 units nightly  - Lispro 5 units TID with meals    HTN  - c/w Lisinopril 40mg daily  - c/w Cardizem 60mg TID    Afib with RVR  - compliance is questionable  - reports using Coumadin 4mg daily   - INR today 1.67  - Cardiology consulted, recommendations appreciated    Seizure Disorder  - c/w Keppra 500mg BID  - seizure precautions    DVT ppx  - Coumadin

## 2020-12-05 NOTE — PROGRESS NOTE ADULT - ASSESSMENT
-AECOPD - improved  -Chronic hypoxic resp failure  -Chronic hypercarbic resp failure  -Diastolic CHF  -Afib; on a/c at home but questionable compliance    Recommendations:  Prednisone 40mg and taper by 10mg every 3 days. Nebs. Azithromycin. O2; titrate. Nocturnal and PRN BPAP. Patient would benefit from Trilogy at home given refractory hypercapneic respiratory failure in the setting of COPD. Cardiology f/u. Is on warfarin. OOBTC, ambulate. Should f/u in our office after D/C within 1-2 weeks of discharge.     Dispo pending control of AFib. Stable from a pulmonary standpoint.    D/w Dr. Nava Brandon, hospitalist    Moreno Cantu M.D.  Pulmonary & Critical Care Medicine  [facility]  Pulmonary Medicine at [place]  [address]  [place] [address]  T: [phone]  F: [phone]

## 2020-12-05 NOTE — PROGRESS NOTE ADULT - SUBJECTIVE AND OBJECTIVE BOX
PULMONARY PROGRESS NOTE      CLAUDIA MABRY  MRN-[id number]    Patient is a 70y old  Male who presents with a chief complaint of SOB (05 Dec 2020 10:20)        INTERVAL HPI/OVERNIGHT EVENTS:  Pt seen and examined at the bedside.    MEDICATIONS  (STANDING):  atorvastatin 10 milliGRAM(s) Oral at bedtime  azithromycin   Tablet 500 milliGRAM(s) Oral daily  budesonide 160 MICROgram(s)/formoterol 4.5 MICROgram(s) Inhaler 2 Puff(s) Inhalation two times a day  citalopram 40 milliGRAM(s) Oral daily  dextrose 40% Gel 15 Gram(s) Oral once  dextrose 5%. 1000 milliLiter(s) (50 mL/Hr) IV Continuous <Continuous>  dextrose 5%. 1000 milliLiter(s) (100 mL/Hr) IV Continuous <Continuous>  dextrose 50% Injectable 25 Gram(s) IV Push once  dextrose 50% Injectable 12.5 Gram(s) IV Push once  diltiazem    Tablet 90 milliGRAM(s) Oral three times a day  glucagon  Injectable 1 milliGRAM(s) IntraMuscular once  influenza   Vaccine 0.5 milliLiter(s) IntraMuscular once  insulin glargine Injectable (LANTUS) 25 Unit(s) SubCutaneous every morning  insulin glargine Injectable (LANTUS) 25 Unit(s) SubCutaneous at bedtime  insulin lispro (ADMELOG) corrective regimen sliding scale   SubCutaneous Before meals and at bedtime  insulin lispro Injectable (ADMELOG) 5 Unit(s) SubCutaneous three times a day before meals  ipratropium    for Nebulization 500 MICROGram(s) Nebulizer every 6 hours  lactobacillus acidophilus 1 Tablet(s) Oral two times a day with meals  levalbuterol Inhalation 0.63 milliGRAM(s) Inhalation every 6 hours  levETIRAcetam 500 milliGRAM(s) Oral two times a day  lisinopril 40 milliGRAM(s) Oral daily  predniSONE   Tablet 60 milliGRAM(s) Oral daily    MEDICATIONS  (PRN):  diltiazem Injectable 10 milliGRAM(s) IV Push every 4 hours PRN heart rate > 140    Allergies    No Known Allergies    Intolerances      PAST MEDICAL & SURGICAL HISTORY:  Seizure    Depression, unspecified depression type    Diabetes    Hypertension    Hyperlipidemia    Afib    COPD (chronic obstructive pulmonary disease)    Abdominal hernia    H/O carotid endarterectomy          REVIEW OF SYSTEMS:  Negative except as noted in HPI      Vital Signs Last 24 Hrs  T(C): 36.4 (05 Dec 2020 05:19), Max: 36.9 (04 Dec 2020 20:55)  T(F): 97.6 (05 Dec 2020 05:19), Max: 98.5 (04 Dec 2020 20:55)  HR: 133 (05 Dec 2020 11:50) (102 - 162)  BP: 97/61 (05 Dec 2020 11:50) (83/56 - 135/70)  BP(mean): --  RR: 18 (05 Dec 2020 05:19) (18 - 20)  SpO2: 100% (05 Dec 2020 10:22) (93% - 100%)      PHYSICAL EXAMINATION:  GEN: In no apparent distress  HEENT: NC/AT  NECK: Supple, non-tender  CV: +S1, S2  RESPIRATORY: Diminished breath sounds, good inspiratory effort, non-labored breathing  ABDOMEN: Soft, NT/ND  EXTREMITIES: No rashes, lesions, or pitting edema noted  NEURO: Grossly non-focal  PSYCH: Normal affect      LABS:                        11.9   18.77 )-----------( 319      ( 04 Dec 2020 08:11 )             38.7     12-04    140  |  90<L>  |  41.0<H>  ----------------------------<  377<H>  5.0   |  40.0<H>  |  0.77    Ca    8.7      04 Dec 2020 08:11      PT/INR - ( 05 Dec 2020 05:49 )   PT: 18.9 sec;   INR: 1.67 ratio                             MICROBIOLOGY:  COVID-19 PCR . (12.01.20 @ 13:13)    COVID-19 PCR: NotDetec: Testing is performed using polymerase chain reaction (PCR) or  transcription mediated amplification (TMA). This COVID-19 (SARS-CoV-2)  nucleic acid amplification test was validated by [employer] and is  in use under the FDA Emergency Use Authorization (EUA) for clinical labs  CLIA-certified to perform high complexity testing. Test results should be  correlated with clinical presentation, patient history, and epidemiology.        RADIOLOGY & ADDITIONAL STUDIES:  < from: Xray Chest 1 View-PORTABLE IMMEDIATE (12.01.20 @ 11:32) >  INTERPRETATION:  AP chest on December 1, 2020 at 11:17 AM. Patient is short of breath.    Heart is magnified by technique.    The lung fields and pleural surfaces are unremarkable.    Chest is similar to May 23, 2016.    IMPRESSION: No acute finding or change.    < end of copied text >      ECHO:  < from: TTE Echo Complete w/ Contrast w/ Doppler (12.01.20 @ 21:12) >  Summary:   1. Technically difficult study. Measurements may not be accurate due to off axis views.   2. Endocardial visualization was enhanced with intravenous echo contrast.   3. Normal global left ventricular systolic function.   4. Left ventricular ejection fraction, by visual estimation, is 65 to 70%.   5. Moderately increased LV wall thickness.   6. Spectral Doppler shows impaired relaxation pattern of left ventricular myocardial filling (Grade I diastolic dysfunction).   7. Mild mitral annular calcification.   8. Mild thickening of the anterior and posterior mitral valve leaflets.   9. Trace mitral valve regurgitation.    < end of copied text >

## 2020-12-06 LAB
GLUCOSE BLDC GLUCOMTR-MCNC: 120 MG/DL — HIGH (ref 70–99)
GLUCOSE BLDC GLUCOMTR-MCNC: 159 MG/DL — HIGH (ref 70–99)
GLUCOSE BLDC GLUCOMTR-MCNC: 231 MG/DL — HIGH (ref 70–99)
GLUCOSE BLDC GLUCOMTR-MCNC: 284 MG/DL — HIGH (ref 70–99)
INR BLD: 1.7 RATIO — HIGH (ref 0.88–1.16)
PROTHROM AB SERPL-ACNC: 19.2 SEC — HIGH (ref 10.6–13.6)

## 2020-12-06 PROCEDURE — 99232 SBSQ HOSP IP/OBS MODERATE 35: CPT

## 2020-12-06 RX ORDER — WARFARIN SODIUM 2.5 MG/1
7.5 TABLET ORAL ONCE
Refills: 0 | Status: COMPLETED | OUTPATIENT
Start: 2020-12-06 | End: 2020-12-06

## 2020-12-06 RX ORDER — DILTIAZEM HCL 120 MG
120 CAPSULE, EXT RELEASE 24 HR ORAL THREE TIMES A DAY
Refills: 0 | Status: DISCONTINUED | OUTPATIENT
Start: 2020-12-06 | End: 2020-12-08

## 2020-12-06 RX ADMIN — LEVALBUTEROL 0.63 MILLIGRAM(S): 1.25 SOLUTION, CONCENTRATE RESPIRATORY (INHALATION) at 02:36

## 2020-12-06 RX ADMIN — Medication 40 MILLIGRAM(S): at 06:20

## 2020-12-06 RX ADMIN — Medication 120 MILLIGRAM(S): at 12:48

## 2020-12-06 RX ADMIN — LEVALBUTEROL 0.63 MILLIGRAM(S): 1.25 SOLUTION, CONCENTRATE RESPIRATORY (INHALATION) at 21:12

## 2020-12-06 RX ADMIN — AZITHROMYCIN 500 MILLIGRAM(S): 500 TABLET, FILM COATED ORAL at 08:19

## 2020-12-06 RX ADMIN — Medication 500 MICROGRAM(S): at 09:10

## 2020-12-06 RX ADMIN — INSULIN GLARGINE 25 UNIT(S): 100 INJECTION, SOLUTION SUBCUTANEOUS at 21:52

## 2020-12-06 RX ADMIN — ATORVASTATIN CALCIUM 10 MILLIGRAM(S): 80 TABLET, FILM COATED ORAL at 21:40

## 2020-12-06 RX ADMIN — Medication 90 MILLIGRAM(S): at 06:21

## 2020-12-06 RX ADMIN — Medication 500 MICROGRAM(S): at 15:31

## 2020-12-06 RX ADMIN — Medication 4: at 21:52

## 2020-12-06 RX ADMIN — INSULIN GLARGINE 25 UNIT(S): 100 INJECTION, SOLUTION SUBCUTANEOUS at 08:20

## 2020-12-06 RX ADMIN — LEVETIRACETAM 500 MILLIGRAM(S): 250 TABLET, FILM COATED ORAL at 17:12

## 2020-12-06 RX ADMIN — LISINOPRIL 40 MILLIGRAM(S): 2.5 TABLET ORAL at 06:21

## 2020-12-06 RX ADMIN — Medication 0.25 MILLIGRAM(S): at 01:32

## 2020-12-06 RX ADMIN — LEVALBUTEROL 0.63 MILLIGRAM(S): 1.25 SOLUTION, CONCENTRATE RESPIRATORY (INHALATION) at 15:31

## 2020-12-06 RX ADMIN — Medication 10 MILLIGRAM(S): at 13:11

## 2020-12-06 RX ADMIN — LEVALBUTEROL 0.63 MILLIGRAM(S): 1.25 SOLUTION, CONCENTRATE RESPIRATORY (INHALATION) at 09:11

## 2020-12-06 RX ADMIN — Medication 500 MICROGRAM(S): at 21:12

## 2020-12-06 RX ADMIN — Medication 6: at 17:12

## 2020-12-06 RX ADMIN — BUDESONIDE AND FORMOTEROL FUMARATE DIHYDRATE 2 PUFF(S): 160; 4.5 AEROSOL RESPIRATORY (INHALATION) at 21:15

## 2020-12-06 RX ADMIN — Medication 1 TABLET(S): at 17:12

## 2020-12-06 RX ADMIN — LEVETIRACETAM 500 MILLIGRAM(S): 250 TABLET, FILM COATED ORAL at 06:21

## 2020-12-06 RX ADMIN — Medication 120 MILLIGRAM(S): at 20:52

## 2020-12-06 RX ADMIN — Medication 5 UNIT(S): at 08:20

## 2020-12-06 RX ADMIN — BUDESONIDE AND FORMOTEROL FUMARATE DIHYDRATE 2 PUFF(S): 160; 4.5 AEROSOL RESPIRATORY (INHALATION) at 09:10

## 2020-12-06 RX ADMIN — Medication 0.25 MILLIGRAM(S): at 06:20

## 2020-12-06 RX ADMIN — Medication 5 UNIT(S): at 12:07

## 2020-12-06 RX ADMIN — Medication 2: at 12:06

## 2020-12-06 RX ADMIN — WARFARIN SODIUM 7.5 MILLIGRAM(S): 2.5 TABLET ORAL at 21:40

## 2020-12-06 RX ADMIN — CITALOPRAM 40 MILLIGRAM(S): 10 TABLET, FILM COATED ORAL at 08:19

## 2020-12-06 RX ADMIN — Medication 1 TABLET(S): at 08:19

## 2020-12-06 RX ADMIN — Medication 500 MICROGRAM(S): at 02:36

## 2020-12-06 RX ADMIN — Medication 5 UNIT(S): at 17:12

## 2020-12-06 NOTE — DIETITIAN INITIAL EVALUATION ADULT. - OTHER INFO
71 y/o male with h/o copd, htn, dm, afib on coumadin, seizure disorder was admitted yesterday to icu for acute on chronic hypercapnic , hypoxic respirator failure and copd exacerbation, clinically improving.    Pt found asleep, eating well without issue per RN

## 2020-12-06 NOTE — PROGRESS NOTE ADULT - SUBJECTIVE AND OBJECTIVE BOX
[place] [facility] - [facility], THE HEART CENTER                                   [address] [place] [address]                                                      PHONE: [phone]                                                         FAX: [phone]  [email or web address]  ---------------------------------------------------------------------------------------------------------------------------------    Overnight events/patient complaints: Comfortable, no palpitations.  Mild SILVEIRA.      No Known Allergies    MEDICATIONS  (STANDING):  atorvastatin 10 milliGRAM(s) Oral at bedtime  azithromycin   Tablet 500 milliGRAM(s) Oral daily  budesonide 160 MICROgram(s)/formoterol 4.5 MICROgram(s) Inhaler 2 Puff(s) Inhalation two times a day  citalopram 40 milliGRAM(s) Oral daily  dextrose 40% Gel 15 Gram(s) Oral once  dextrose 5%. 1000 milliLiter(s) (50 mL/Hr) IV Continuous <Continuous>  dextrose 5%. 1000 milliLiter(s) (100 mL/Hr) IV Continuous <Continuous>  dextrose 50% Injectable 25 Gram(s) IV Push once  dextrose 50% Injectable 12.5 Gram(s) IV Push once  diltiazem    Tablet 90 milliGRAM(s) Oral three times a day  glucagon  Injectable 1 milliGRAM(s) IntraMuscular once  influenza   Vaccine 0.5 milliLiter(s) IntraMuscular once  insulin glargine Injectable (LANTUS) 25 Unit(s) SubCutaneous every morning  insulin glargine Injectable (LANTUS) 25 Unit(s) SubCutaneous at bedtime  insulin lispro (ADMELOG) corrective regimen sliding scale   SubCutaneous Before meals and at bedtime  insulin lispro Injectable (ADMELOG) 5 Unit(s) SubCutaneous three times a day before meals  ipratropium    for Nebulization 500 MICROGram(s) Nebulizer every 6 hours  lactobacillus acidophilus 1 Tablet(s) Oral two times a day with meals  levalbuterol Inhalation 0.63 milliGRAM(s) Inhalation every 6 hours  levETIRAcetam 500 milliGRAM(s) Oral two times a day  lisinopril 40 milliGRAM(s) Oral daily  predniSONE   Tablet 40 milliGRAM(s) Oral daily    MEDICATIONS  (PRN):  diltiazem Injectable 10 milliGRAM(s) IV Push every 4 hours PRN heart rate > 140      Vital Signs Last 24 Hrs  T(C): 36.3 (06 Dec 2020 06:19), Max: 37.1 (05 Dec 2020 16:27)  T(F): 97.4 (06 Dec 2020 06:19), Max: 98.7 (05 Dec 2020 16:27)  HR: 110 (06 Dec 2020 06:19) (93 - 162)  BP: 123/68 (06 Dec 2020 06:19) (83/56 - 150/95)  BP(mean): --  RR: 18 (06 Dec 2020 06:19) (18 - 24)  SpO2: 100% (06 Dec 2020 06:19) (93% - 100%)  ICU Vital Signs Last 24 Hrs  CLAUDIA MABRY  I&O's Detail    05 Dec 2020 07:01  -  06 Dec 2020 07:00  --------------------------------------------------------  IN:  Total IN: 0 mL    OUT:    Voided (mL): 800 mL  Total OUT: 800 mL    Total NET: -800 mL        I&O's Summary    05 Dec 2020 07:01  -  06 Dec 2020 07:00  --------------------------------------------------------  IN: 0 mL / OUT: 800 mL / NET: -800 mL      Drug Dosing Weight  CLAUDIA MABRY      PHYSICAL EXAM:  General: NAD.  HEENT: Head; normocephalic.  Eyes: Pupils reactive.  Neck: Supple.  CARDIOVASCULAR: Mild tachy, irregular.  LUNGS: Normal breath sounds bilaterally.  ABDOMEN: Soft.  EXTREMITIES: No clubbing, cyanosis or edema.   SKIN: warm.  NEURO: Alert/oriented x 3.    PSYCH: normal affect.  LABS:                        11.9   18.77 )-----------( 319      ( 04 Dec 2020 08:11 )             38.7     12-04    140  |  90<L>  |  41.0<H>  ----------------------------<  377<H>  5.0   |  40.0<H>  |  0.77    Ca    8.7      04 Dec 2020 08:11      CLAUDIA MABRY      PT/INR - ( 06 Dec 2020 06:24 )   PT: 19.2 sec;   INR: 1.70 ratio               RADIOLOGY & ADDITIONAL STUDIES:    INTERPRETATION OF TELEMETRY (personally reviewed): AF, slightly improved rates. .    ECHO:   1. Technically difficult study. Measurements may not be accurate due to off axis views.   2. Endocardial visualization was enhanced with intravenous echo contrast.   3. Normal global left ventricular systolic function.   4. Left ventricular ejection fraction, by visual estimation, is 65 to 70%.   5. Moderately increased LV wall thickness.   6. Spectral Doppler shows impaired relaxation pattern of left ventricular myocardial filling (Grade I diastolic dysfunction).   7. Mild mitral annular calcification.   8. Mild thickening of the anterior and posterior mitral valve leaflets.   9. Trace mitral valve regurgitation.    MD Jamila Electronically signed on 12/2/2020 at 8:13:55 AM      Assessment and Plan:  70y Male h/o COPD, HTN, DM, permanent AF on coumadin, seizure d/o admitted initially to ICU with acute on chronic hypercapnic / hypoxic respiratory failure and COPD exacerbation clinically improving but with poorly rate controlled AF. He is not followed by a cardiologist as an outpatient. He denies palpitations or SOB at rest at this time. He does have SILVEIRA. His poorly rate controlled AF may contribute to his sx's. Albuterol nebs was changed to Xopenex / atrovent    1. Continue to optimize rate control of AF. Increase diltiazem to 120mg TID. Eventually transition to long acting CCB once rate controlled achieved. Digoxin was also added yesterday. Avoid Bblocker due to RAD.  2. Goal INR 2-3 for stroke risk reduction. CHADS VASC 3. Adjust coumadin dosing.

## 2020-12-06 NOTE — PROGRESS NOTE ADULT - SUBJECTIVE AND OBJECTIVE BOX
[facility] Division of Hospital Medicine    Chief Complaint:  Patient is a 70y old  Male who presents with a chief complaint of SOB (06 Dec 2020 07:01)      SUBJECTIVE / OVERNIGHT EVENTS:  Patient was seen and examined at bedside. Patient has been tachycardiac overnight. Patient states to be feeling better in regards to his SOB. No additional complaints provided   Patient denies chest pain, SOB, abd pain, N/V, fever, chills, dysuria or any other complaints. All remainder ROS negative.     MEDICATIONS  (STANDING):  atorvastatin 10 milliGRAM(s) Oral at bedtime  azithromycin   Tablet 500 milliGRAM(s) Oral daily  budesonide 160 MICROgram(s)/formoterol 4.5 MICROgram(s) Inhaler 2 Puff(s) Inhalation two times a day  citalopram 40 milliGRAM(s) Oral daily  dextrose 40% Gel 15 Gram(s) Oral once  dextrose 5%. 1000 milliLiter(s) (50 mL/Hr) IV Continuous <Continuous>  dextrose 5%. 1000 milliLiter(s) (100 mL/Hr) IV Continuous <Continuous>  dextrose 50% Injectable 25 Gram(s) IV Push once  dextrose 50% Injectable 12.5 Gram(s) IV Push once  diltiazem    Tablet 120 milliGRAM(s) Oral three times a day  glucagon  Injectable 1 milliGRAM(s) IntraMuscular once  influenza   Vaccine 0.5 milliLiter(s) IntraMuscular once  insulin glargine Injectable (LANTUS) 25 Unit(s) SubCutaneous every morning  insulin glargine Injectable (LANTUS) 25 Unit(s) SubCutaneous at bedtime  insulin lispro (ADMELOG) corrective regimen sliding scale   SubCutaneous Before meals and at bedtime  insulin lispro Injectable (ADMELOG) 5 Unit(s) SubCutaneous three times a day before meals  ipratropium    for Nebulization 500 MICROGram(s) Nebulizer every 6 hours  lactobacillus acidophilus 1 Tablet(s) Oral two times a day with meals  levalbuterol Inhalation 0.63 milliGRAM(s) Inhalation every 6 hours  levETIRAcetam 500 milliGRAM(s) Oral two times a day  lisinopril 40 milliGRAM(s) Oral daily  predniSONE   Tablet 40 milliGRAM(s) Oral daily    MEDICATIONS  (PRN):  diltiazem Injectable 10 milliGRAM(s) IV Push every 4 hours PRN heart rate > 140        I&O's Summary    05 Dec 2020 07:01  -  06 Dec 2020 07:00  --------------------------------------------------------  IN: 0 mL / OUT: 800 mL / NET: -800 mL    06 Dec 2020 07:01  -  06 Dec 2020 14:18  --------------------------------------------------------  IN: 0 mL / OUT: 250 mL / NET: -250 mL        PHYSICAL EXAM:  Vital Signs Last 24 Hrs  T(C): 36.3 (06 Dec 2020 06:19), Max: 37.1 (05 Dec 2020 16:27)  T(F): 97.4 (06 Dec 2020 06:19), Max: 98.7 (05 Dec 2020 16:27)  HR: 141 (06 Dec 2020 13:11) (93 - 155)  BP: 104/66 (06 Dec 2020 13:11) (93/65 - 150/95)  BP(mean): --  RR: 18 (06 Dec 2020 06:19) (18 - 24)  SpO2: 93% (06 Dec 2020 10:24) (93% - 100%)      Constitutional: NAD, on NC  ENT: MMM, no thrush, Supple, No JVD  Lungs: Decreased air entry b/l   Cardio: Irregular tachycardiac   Abdomen: Soft, Nontender, Nondistended; Bowel sounds present  Extremities: No calf tenderness, No pitting edema  Musculoskeletal:   No clubbing or cyanosis of digits; no joint swelling or tenderness to palpation  Psych: A+O to person, place, and time; affect appropriate  Neuro: CN 2-12 are intact and symmetric; no gross sensory deficits;   Skin: No rashes; no palpable lesions    LABS:          PT/INR - ( 06 Dec 2020 06:24 )   PT: 19.2 sec;   INR: 1.70 ratio                   CAPILLARY BLOOD GLUCOSE      POCT Blood Glucose.: 159 mg/dL (06 Dec 2020 11:50)  POCT Blood Glucose.: 120 mg/dL (06 Dec 2020 08:18)  POCT Blood Glucose.: 408 mg/dL (05 Dec 2020 21:03)  POCT Blood Glucose.: 222 mg/dL (05 Dec 2020 17:34)        RADIOLOGY REVIEWED

## 2020-12-06 NOTE — DIETITIAN INITIAL EVALUATION ADULT. - PERTINENT MEDS FT
MEDICATIONS  (STANDING):  atorvastatin 10 milliGRAM(s) Oral at bedtime  budesonide 160 MICROgram(s)/formoterol 4.5 MICROgram(s) Inhaler 2 Puff(s) Inhalation two times a day  citalopram 40 milliGRAM(s) Oral daily  dextrose 40% Gel 15 Gram(s) Oral once  dextrose 5%. 1000 milliLiter(s) (50 mL/Hr) IV Continuous <Continuous>  dextrose 5%. 1000 milliLiter(s) (100 mL/Hr) IV Continuous <Continuous>  dextrose 50% Injectable 25 Gram(s) IV Push once  dextrose 50% Injectable 12.5 Gram(s) IV Push once  diltiazem    Tablet 120 milliGRAM(s) Oral three times a day  glucagon  Injectable 1 milliGRAM(s) IntraMuscular once  influenza   Vaccine 0.5 milliLiter(s) IntraMuscular once  insulin glargine Injectable (LANTUS) 25 Unit(s) SubCutaneous every morning  insulin glargine Injectable (LANTUS) 25 Unit(s) SubCutaneous at bedtime  insulin lispro (ADMELOG) corrective regimen sliding scale   SubCutaneous Before meals and at bedtime  insulin lispro Injectable (ADMELOG) 5 Unit(s) SubCutaneous three times a day before meals  ipratropium    for Nebulization 500 MICROGram(s) Nebulizer every 6 hours  lactobacillus acidophilus 1 Tablet(s) Oral two times a day with meals  levalbuterol Inhalation 0.63 milliGRAM(s) Inhalation every 6 hours  levETIRAcetam 500 milliGRAM(s) Oral two times a day  lisinopril 40 milliGRAM(s) Oral daily  predniSONE   Tablet 40 milliGRAM(s) Oral daily  warfarin 7.5 milliGRAM(s) Oral once    MEDICATIONS  (PRN):  diltiazem Injectable 10 milliGRAM(s) IV Push every 4 hours PRN heart rate > 140

## 2020-12-06 NOTE — PROGRESS NOTE ADULT - ASSESSMENT
69 y/o male with h/o copd, htn, dm, afib on coumadin, seizure disorder was admitted yesterday to icu for acute on chronic hypercapnic , hypoxic respirator failure and copd exacerbation, clinically improving.    Acute on chronic respiratory failure with hypercapnia and hypoxia   - Completed azithromycin   - Tapered Prednisone down to 40 daily x 3 days then taper down by 10 every 3 days  - c/w albuterol PRN  - c/w Symbicort  - Bipap nightly  - 4L nasal cannula    DM  - FS with KENYA  - c/w Lantus 25 units BID  - Lispro 5 units TID with meals    HTN  - c/w Lisinopril 40mg daily  - c/w Cardizem 60mg TID    Afib with RVR  - compliance is questionable  - reports Coumadin 7.5 mg on Sunday, Monday, Wednesday and Thursday. Takes Coumadin 3.75mg on Tuesday, Friday and Saturday  - Rate remains uncontrolled  - Started on digoxin   - Cardizem titration as per cardiology   - Cardiology consulted, recommendations appreciated    Seizure Disorder  - c/w Keppra 500mg BID  - seizure precautions    DVT ppx  - Coumadin    Dispo: Anticipate DC in 24 to 48 hours if HR can be controlled. 71 y/o male with h/o copd, htn, dm, afib on coumadin, seizure disorder was admitted yesterday to icu for acute on chronic hypercapnic , hypoxic respirator failure and copd exacerbation, clinically improving.    Acute on chronic respiratory failure with hypercapnia and hypoxia   - Completed azithromycin   - Tapered Prednisone down to 40 daily x 3 days then taper down by 10 every 3 days  - c/w albuterol PRN  - c/w Symbicort  - Bipap nightly  - 4L nasal cannula    DM  - FS with KENYA  - c/w Lantus 25 units BID  - Lispro 5 units TID with meals    HTN  - c/w Lisinopril 40mg daily  - c/w Cardizem 60mg TID    Afib with RVR  - compliance is questionable  - reports Coumadin 7.5 mg on Sunday, Monday, Wednesday and Thursday. Takes Coumadin 3.75mg on Tuesday, Friday and Saturday  - Rate remains uncontrolled  - Started on digoxin   - Cardizem titration as per cardiology   - Cardiology consulted, recommendations appreciated    Seizure Disorder  - c/w Keppra 500mg BID  - seizure precautions    DVT ppx  - Coumadin    Discussed case with patient's daughter Chanelle [phone]  Dispo: Anticipate DC in 24 to 48 hours if HR can be controlled.

## 2020-12-07 LAB
ALBUMIN SERPL ELPH-MCNC: 2.8 G/DL — LOW (ref 3.3–5.2)
ALP SERPL-CCNC: 83 U/L — SIGNIFICANT CHANGE UP (ref 40–120)
ALT FLD-CCNC: 27 U/L — SIGNIFICANT CHANGE UP
ANION GAP SERPL CALC-SCNC: 8 MMOL/L — SIGNIFICANT CHANGE UP (ref 5–17)
APTT BLD: 28.1 SEC — SIGNIFICANT CHANGE UP (ref 27.5–35.5)
AST SERPL-CCNC: 22 U/L — SIGNIFICANT CHANGE UP
BASOPHILS # BLD AUTO: 0.03 K/UL — SIGNIFICANT CHANGE UP (ref 0–0.2)
BASOPHILS NFR BLD AUTO: 0.2 % — SIGNIFICANT CHANGE UP (ref 0–2)
BILIRUB SERPL-MCNC: <0.2 MG/DL — LOW (ref 0.4–2)
BUN SERPL-MCNC: 39 MG/DL — HIGH (ref 8–20)
CALCIUM SERPL-MCNC: 8.8 MG/DL — SIGNIFICANT CHANGE UP (ref 8.6–10.2)
CHLORIDE SERPL-SCNC: 95 MMOL/L — LOW (ref 98–107)
CO2 SERPL-SCNC: 39 MMOL/L — HIGH (ref 22–29)
CREAT SERPL-MCNC: 0.71 MG/DL — SIGNIFICANT CHANGE UP (ref 0.5–1.3)
EOSINOPHIL # BLD AUTO: 0.18 K/UL — SIGNIFICANT CHANGE UP (ref 0–0.5)
EOSINOPHIL NFR BLD AUTO: 1.4 % — SIGNIFICANT CHANGE UP (ref 0–6)
GLUCOSE BLDC GLUCOMTR-MCNC: 189 MG/DL — HIGH (ref 70–99)
GLUCOSE BLDC GLUCOMTR-MCNC: 206 MG/DL — HIGH (ref 70–99)
GLUCOSE BLDC GLUCOMTR-MCNC: 242 MG/DL — HIGH (ref 70–99)
GLUCOSE BLDC GLUCOMTR-MCNC: 274 MG/DL — HIGH (ref 70–99)
GLUCOSE SERPL-MCNC: 247 MG/DL — HIGH (ref 70–99)
HCT VFR BLD CALC: 38.3 % — LOW (ref 39–50)
HGB BLD-MCNC: 11.8 G/DL — LOW (ref 13–17)
IMM GRANULOCYTES NFR BLD AUTO: 1 % — SIGNIFICANT CHANGE UP (ref 0–1.5)
INR BLD: 1.27 RATIO — HIGH (ref 0.88–1.16)
LYMPHOCYTES # BLD AUTO: 1.19 K/UL — SIGNIFICANT CHANGE UP (ref 1–3.3)
LYMPHOCYTES # BLD AUTO: 9.3 % — LOW (ref 13–44)
MAGNESIUM SERPL-MCNC: 2.2 MG/DL — SIGNIFICANT CHANGE UP (ref 1.6–2.6)
MCHC RBC-ENTMCNC: 27.6 PG — SIGNIFICANT CHANGE UP (ref 27–34)
MCHC RBC-ENTMCNC: 30.8 GM/DL — LOW (ref 32–36)
MCV RBC AUTO: 89.5 FL — SIGNIFICANT CHANGE UP (ref 80–100)
MONOCYTES # BLD AUTO: 1.1 K/UL — HIGH (ref 0–0.9)
MONOCYTES NFR BLD AUTO: 8.6 % — SIGNIFICANT CHANGE UP (ref 2–14)
NEUTROPHILS # BLD AUTO: 10.16 K/UL — HIGH (ref 1.8–7.4)
NEUTROPHILS NFR BLD AUTO: 79.5 % — HIGH (ref 43–77)
PHOSPHATE SERPL-MCNC: 3.4 MG/DL — SIGNIFICANT CHANGE UP (ref 2.4–4.7)
PLATELET # BLD AUTO: 245 K/UL — SIGNIFICANT CHANGE UP (ref 150–400)
POTASSIUM SERPL-MCNC: 4.7 MMOL/L — SIGNIFICANT CHANGE UP (ref 3.5–5.3)
POTASSIUM SERPL-SCNC: 4.7 MMOL/L — SIGNIFICANT CHANGE UP (ref 3.5–5.3)
PROT SERPL-MCNC: 5.3 G/DL — LOW (ref 6.6–8.7)
PROTHROM AB SERPL-ACNC: 14.6 SEC — HIGH (ref 10.6–13.6)
RBC # BLD: 4.28 M/UL — SIGNIFICANT CHANGE UP (ref 4.2–5.8)
RBC # FLD: 13.9 % — SIGNIFICANT CHANGE UP (ref 10.3–14.5)
SODIUM SERPL-SCNC: 141 MMOL/L — SIGNIFICANT CHANGE UP (ref 135–145)
WBC # BLD: 12.79 K/UL — HIGH (ref 3.8–10.5)
WBC # FLD AUTO: 12.79 K/UL — HIGH (ref 3.8–10.5)

## 2020-12-07 PROCEDURE — 99232 SBSQ HOSP IP/OBS MODERATE 35: CPT

## 2020-12-07 RX ORDER — WARFARIN SODIUM 2.5 MG/1
7.5 TABLET ORAL ONCE
Refills: 0 | Status: COMPLETED | OUTPATIENT
Start: 2020-12-07 | End: 2020-12-07

## 2020-12-07 RX ORDER — DIGOXIN 250 MCG
0.25 TABLET ORAL DAILY
Refills: 0 | Status: DISCONTINUED | OUTPATIENT
Start: 2020-12-07 | End: 2020-12-08

## 2020-12-07 RX ADMIN — Medication 120 MILLIGRAM(S): at 12:53

## 2020-12-07 RX ADMIN — Medication 6: at 21:33

## 2020-12-07 RX ADMIN — LEVALBUTEROL 0.63 MILLIGRAM(S): 1.25 SOLUTION, CONCENTRATE RESPIRATORY (INHALATION) at 02:56

## 2020-12-07 RX ADMIN — BUDESONIDE AND FORMOTEROL FUMARATE DIHYDRATE 2 PUFF(S): 160; 4.5 AEROSOL RESPIRATORY (INHALATION) at 09:26

## 2020-12-07 RX ADMIN — Medication 500 MICROGRAM(S): at 15:30

## 2020-12-07 RX ADMIN — Medication 120 MILLIGRAM(S): at 04:33

## 2020-12-07 RX ADMIN — Medication 500 MICROGRAM(S): at 21:18

## 2020-12-07 RX ADMIN — Medication 120 MILLIGRAM(S): at 21:01

## 2020-12-07 RX ADMIN — INSULIN GLARGINE 25 UNIT(S): 100 INJECTION, SOLUTION SUBCUTANEOUS at 12:19

## 2020-12-07 RX ADMIN — LEVALBUTEROL 0.63 MILLIGRAM(S): 1.25 SOLUTION, CONCENTRATE RESPIRATORY (INHALATION) at 09:26

## 2020-12-07 RX ADMIN — Medication 4: at 17:59

## 2020-12-07 RX ADMIN — Medication 40 MILLIGRAM(S): at 04:33

## 2020-12-07 RX ADMIN — Medication 5 UNIT(S): at 08:36

## 2020-12-07 RX ADMIN — Medication 500 MICROGRAM(S): at 02:56

## 2020-12-07 RX ADMIN — LEVETIRACETAM 500 MILLIGRAM(S): 250 TABLET, FILM COATED ORAL at 17:59

## 2020-12-07 RX ADMIN — Medication 4: at 12:20

## 2020-12-07 RX ADMIN — Medication 1 TABLET(S): at 17:59

## 2020-12-07 RX ADMIN — Medication 500 MICROGRAM(S): at 09:26

## 2020-12-07 RX ADMIN — Medication 0.12 MILLIGRAM(S): at 04:33

## 2020-12-07 RX ADMIN — WARFARIN SODIUM 7.5 MILLIGRAM(S): 2.5 TABLET ORAL at 21:01

## 2020-12-07 RX ADMIN — LEVETIRACETAM 500 MILLIGRAM(S): 250 TABLET, FILM COATED ORAL at 04:36

## 2020-12-07 RX ADMIN — Medication 5 UNIT(S): at 12:20

## 2020-12-07 RX ADMIN — INSULIN GLARGINE 25 UNIT(S): 100 INJECTION, SOLUTION SUBCUTANEOUS at 21:33

## 2020-12-07 RX ADMIN — Medication 0.25 MILLIGRAM(S): at 13:55

## 2020-12-07 RX ADMIN — Medication 1 TABLET(S): at 12:20

## 2020-12-07 RX ADMIN — CITALOPRAM 40 MILLIGRAM(S): 10 TABLET, FILM COATED ORAL at 12:21

## 2020-12-07 RX ADMIN — Medication 5 UNIT(S): at 18:00

## 2020-12-07 RX ADMIN — LEVALBUTEROL 0.63 MILLIGRAM(S): 1.25 SOLUTION, CONCENTRATE RESPIRATORY (INHALATION) at 15:30

## 2020-12-07 RX ADMIN — ATORVASTATIN CALCIUM 10 MILLIGRAM(S): 80 TABLET, FILM COATED ORAL at 21:01

## 2020-12-07 RX ADMIN — LISINOPRIL 40 MILLIGRAM(S): 2.5 TABLET ORAL at 04:33

## 2020-12-07 RX ADMIN — LEVALBUTEROL 0.63 MILLIGRAM(S): 1.25 SOLUTION, CONCENTRATE RESPIRATORY (INHALATION) at 21:18

## 2020-12-07 RX ADMIN — Medication 2: at 08:36

## 2020-12-07 RX ADMIN — BUDESONIDE AND FORMOTEROL FUMARATE DIHYDRATE 2 PUFF(S): 160; 4.5 AEROSOL RESPIRATORY (INHALATION) at 21:18

## 2020-12-07 NOTE — PROGRESS NOTE ADULT - SUBJECTIVE AND OBJECTIVE BOX
[facility] Division of Hospital Medicine    Chief Complaint:  Patient is a 70y old  Male who presents with a chief complaint of SOB (07 Dec 2020 09:49)      SUBJECTIVE / OVERNIGHT EVENTS:  Patient was seen and examined at bedside. Patient reports that he feels well. Denies any palpitations or chest pain.   Patient denies chest pain, SOB, abd pain, N/V, fever, chills, dysuria or any other complaints. All remainder ROS negative.     MEDICATIONS  (STANDING):  atorvastatin 10 milliGRAM(s) Oral at bedtime  budesonide 160 MICROgram(s)/formoterol 4.5 MICROgram(s) Inhaler 2 Puff(s) Inhalation two times a day  citalopram 40 milliGRAM(s) Oral daily  dextrose 40% Gel 15 Gram(s) Oral once  dextrose 5%. 1000 milliLiter(s) (50 mL/Hr) IV Continuous <Continuous>  dextrose 5%. 1000 milliLiter(s) (100 mL/Hr) IV Continuous <Continuous>  dextrose 50% Injectable 25 Gram(s) IV Push once  dextrose 50% Injectable 12.5 Gram(s) IV Push once  digoxin     Tablet 0.25 milliGRAM(s) Oral daily  diltiazem    Tablet 120 milliGRAM(s) Oral three times a day  glucagon  Injectable 1 milliGRAM(s) IntraMuscular once  influenza   Vaccine 0.5 milliLiter(s) IntraMuscular once  insulin glargine Injectable (LANTUS) 25 Unit(s) SubCutaneous every morning  insulin glargine Injectable (LANTUS) 25 Unit(s) SubCutaneous at bedtime  insulin lispro (ADMELOG) corrective regimen sliding scale   SubCutaneous Before meals and at bedtime  insulin lispro Injectable (ADMELOG) 5 Unit(s) SubCutaneous three times a day before meals  ipratropium    for Nebulization 500 MICROGram(s) Nebulizer every 6 hours  lactobacillus acidophilus 1 Tablet(s) Oral two times a day with meals  levalbuterol Inhalation 0.63 milliGRAM(s) Inhalation every 6 hours  levETIRAcetam 500 milliGRAM(s) Oral two times a day  lisinopril 40 milliGRAM(s) Oral daily  predniSONE   Tablet 40 milliGRAM(s) Oral daily  warfarin 7.5 milliGRAM(s) Oral once    MEDICATIONS  (PRN):  diltiazem Injectable 10 milliGRAM(s) IV Push every 4 hours PRN heart rate > 140        I&O's Summary    06 Dec 2020 07:01  -  07 Dec 2020 07:00  --------------------------------------------------------  IN: 0 mL / OUT: 1075 mL / NET: -1075 mL    07 Dec 2020 07:01  -  07 Dec 2020 13:26  --------------------------------------------------------  IN: 260 mL / OUT: 300 mL / NET: -40 mL        PHYSICAL EXAM:  Vital Signs Last 24 Hrs  T(C): 36.8 (07 Dec 2020 08:20), Max: 36.8 (07 Dec 2020 08:20)  T(F): 98.3 (07 Dec 2020 08:20), Max: 98.3 (07 Dec 2020 08:20)  HR: 120 (07 Dec 2020 09:30) (97 - 154)  BP: 121/67 (07 Dec 2020 08:20) (110/65 - 131/74)  BP(mean): --  RR: 18 (07 Dec 2020 08:20) (18 - 20)  SpO2: 92% (07 Dec 2020 09:30) (92% - 100%)      Constitutional: NAD, well-developed, well-groomed, On NC  ENT: MMM, no thrush, Supple, No JVD  Lungs: Clear to auscultation bilaterally, good air entry, non-labored breathing  Cardio: Tachycardia, irregular  Abdomen: Soft, Nontender, Nondistended; Bowel sounds present  Extremities: No calf tenderness, No pitting edema  Musculoskeletal:   No clubbing or cyanosis of digits; no joint swelling or tenderness to palpation  Psych: A+O to person, place, and time; affect appropriate  Neuro: CN 2-12 are intact and symmetric; no gross sensory deficits;   Skin: No rashes; no palpable lesions    LABS:                        11.8   12.79 )-----------( 245      ( 07 Dec 2020 07:35 )             38.3     12-07    141  |  95<L>  |  39.0<H>  ----------------------------<  247<H>  4.7   |  39.0<H>  |  0.71    Ca    8.8      07 Dec 2020 07:35  Phos  3.4     12-07  Mg     2.2     12-07    TPro  5.3<L>  /  Alb  2.8<L>  /  TBili  <0.2<L>  /  DBili  x   /  AST  22  /  ALT  27  /  AlkPhos  83  12-07    PT/INR - ( 07 Dec 2020 09:53 )   PT: 14.6 sec;   INR: 1.27 ratio         PTT - ( 07 Dec 2020 09:53 )  PTT:28.1 sec          CAPILLARY BLOOD GLUCOSE      POCT Blood Glucose.: 206 mg/dL (07 Dec 2020 12:08)  POCT Blood Glucose.: 189 mg/dL (07 Dec 2020 08:24)  POCT Blood Glucose.: 231 mg/dL (06 Dec 2020 21:47)  POCT Blood Glucose.: 284 mg/dL (06 Dec 2020 17:09)        RADIOLOGY REVIEWED

## 2020-12-07 NOTE — PROGRESS NOTE ADULT - SUBJECTIVE AND OBJECTIVE BOX
[facility]                                   [address] [place] [address]                                                      PHONE: [phone]                                                         FAX: [phone]  [email or web address]  ---------------------------------------------------------------------------------------------------------------------------------    Overnight events/patient complaints: patient seen at bedside. He feels SOB this morning.       No Known Allergies    MEDICATIONS  (STANDING):  atorvastatin 10 milliGRAM(s) Oral at bedtime  budesonide 160 MICROgram(s)/formoterol 4.5 MICROgram(s) Inhaler 2 Puff(s) Inhalation two times a day  citalopram 40 milliGRAM(s) Oral daily  dextrose 40% Gel 15 Gram(s) Oral once  dextrose 5%. 1000 milliLiter(s) (50 mL/Hr) IV Continuous <Continuous>  dextrose 5%. 1000 milliLiter(s) (100 mL/Hr) IV Continuous <Continuous>  dextrose 50% Injectable 12.5 Gram(s) IV Push once  dextrose 50% Injectable 25 Gram(s) IV Push once  digoxin     Tablet 0.125 milliGRAM(s) Oral daily  diltiazem    Tablet 120 milliGRAM(s) Oral three times a day  glucagon  Injectable 1 milliGRAM(s) IntraMuscular once  influenza   Vaccine 0.5 milliLiter(s) IntraMuscular once  insulin glargine Injectable (LANTUS) 25 Unit(s) SubCutaneous every morning  insulin glargine Injectable (LANTUS) 25 Unit(s) SubCutaneous at bedtime  insulin lispro (ADMELOG) corrective regimen sliding scale   SubCutaneous Before meals and at bedtime  insulin lispro Injectable (ADMELOG) 5 Unit(s) SubCutaneous three times a day before meals  ipratropium    for Nebulization 500 MICROGram(s) Nebulizer every 6 hours  lactobacillus acidophilus 1 Tablet(s) Oral two times a day with meals  levalbuterol Inhalation 0.63 milliGRAM(s) Inhalation every 6 hours  levETIRAcetam 500 milliGRAM(s) Oral two times a day  lisinopril 40 milliGRAM(s) Oral daily  predniSONE   Tablet 40 milliGRAM(s) Oral daily    MEDICATIONS  (PRN):  diltiazem Injectable 10 milliGRAM(s) IV Push every 4 hours PRN heart rate > 140      Vital Signs Last 24 Hrs  T(C): 36.8 (07 Dec 2020 08:20), Max: 36.8 (07 Dec 2020 08:20)  T(F): 98.3 (07 Dec 2020 08:20), Max: 98.3 (07 Dec 2020 08:20)  HR: 120 (07 Dec 2020 09:30) (97 - 154)  BP: 121/67 (07 Dec 2020 08:20) (93/65 - 131/74)  BP(mean): --  RR: 18 (07 Dec 2020 08:20) (18 - 20)  SpO2: 92% (07 Dec 2020 09:30) (92% - 100%)  ICU Vital Signs Last 24 Hrs  CLAUDIA MABRY  I&O's Detail    06 Dec 2020 07:01  -  07 Dec 2020 07:00  --------------------------------------------------------  IN:  Total IN: 0 mL    OUT:    Voided (mL): 1075 mL  Total OUT: 1075 mL    Total NET: -1075 mL        Drug Dosing Weight  CLAUDIA MABRY      PHYSICAL EXAM:  General: Appears well developed, well nourished alert and cooperative.  HEENT: Head; normocephalic, atraumatic.  Eyes: Pupils reactive, cornea wnl.  Neck: Supple, no nodes adenopathy, no NVD or carotid bruit or thyromegaly.  CARDIOVASCULAR: irregularly irregular, Normal S1 and S2, No murmur, rub, gallop or lift.   LUNGS: reduced breath sounds b/l   ABDOMEN: Soft, nontender without mass or organomegaly. bowel sounds normoactive.  EXTREMITIES: No clubbing, cyanosis or edema. Distal pulses wnl.   SKIN: warm and dry with normal turgor.  NEURO: Alert/oriented x 3/normal motor exam. No pathologic reflexes.    PSYCH: normal affect.        LABS:                        11.8   12.79 )-----------( 245      ( 07 Dec 2020 07:35 )             38.3     12-07    141  |  95<L>  |  39.0<H>  ----------------------------<  247<H>  4.7   |  39.0<H>  |  0.71    Ca    8.8      07 Dec 2020 07:35  Phos  3.4     12-07  Mg     2.2     12-07    TPro  5.3<L>  /  Alb  2.8<L>  /  TBili  <0.2<L>  /  DBili  x   /  AST  22  /  ALT  27  /  AlkPhos  83  12-07    CLAUDIA MABRY    INTERPRETATION OF TELEMETRY (personally reviewed): atrial fibrillation with elevated -120 bpm      ASSESSMENT AND PLAN:  70y Male h/o COPD, HTN, DM, permanent AF on coumadin, seizure d/o admitted initially to ICU with acute on chronic hypercapnic / hypoxic respiratory failure and COPD exacerbation clinically improving but with poorly rate controlled AF. He is not followed by a cardiologist as an outpatient. He denies palpitations or SOB at rest at this time. He does have SILVEIRA. HR remains poorly controlled.     1. Continue to optimize rate control of AF. Continue diltiazem to 120mg TID. Eventually transition to long acting CCB once rate controlled achieved.   2. Increase Digoxin to 250 mcg daily  3. Goal INR 2-3 for stroke risk reduction. CHADS VASC 3. Adjust coumadin dosing.    Will follow closely with you.

## 2020-12-07 NOTE — PROGRESS NOTE ADULT - ASSESSMENT
71 y/o male with h/o copd, htn, dm, afib on coumadin, seizure disorder was admitted yesterday to icu for acute on chronic hypercapnic , hypoxic respirator failure and copd exacerbation, clinically improving.    Acute on chronic respiratory failure with hypercapnia and hypoxia   - Completed azithromycin   - Tapered Prednisone down to 40 daily x 3 days then taper down by 10 every 3 days. Titrate to 30 for 12/9  - c/w albuterol PRN  - c/w Symbicort  - Bipap nightly  - 4L nasal cannula    DM  - FS with KENYA  - c/w Lantus 25 units BID  - Lispro 5 units TID with meals    HTN  - c/w Lisinopril 40mg daily  - c/w Cardizem 60mg TID    Afib with RVR  - compliance is questionable  - reports Coumadin 7.5 mg on Sunday, Monday, Wednesday and Thursday. Takes Coumadin 3.75mg on Tuesday, Friday and Saturday  - Rate remains uncontrolled  - Started on digoxin, Increased today  - Cardizem titration as per cardiology   - Cardiology consulted, recommendations appreciated    Seizure Disorder  - c/w Keppra 500mg BID  - seizure precautions    DVT ppx  - Coumadin    Discussed case with patient's daughter Chanelle [phone] on 12/7/2020  Dispo: Anticipate DC in 24 to 48 hours if HR can be controlled.

## 2020-12-08 LAB
ALBUMIN SERPL ELPH-MCNC: 2.9 G/DL — LOW (ref 3.3–5.2)
ALP SERPL-CCNC: 78 U/L — SIGNIFICANT CHANGE UP (ref 40–120)
ALT FLD-CCNC: 28 U/L — SIGNIFICANT CHANGE UP
ANION GAP SERPL CALC-SCNC: 6 MMOL/L — SIGNIFICANT CHANGE UP (ref 5–17)
APTT BLD: 29.2 SEC — SIGNIFICANT CHANGE UP (ref 27.5–35.5)
AST SERPL-CCNC: 18 U/L — SIGNIFICANT CHANGE UP
BASOPHILS # BLD AUTO: 0.03 K/UL — SIGNIFICANT CHANGE UP (ref 0–0.2)
BASOPHILS NFR BLD AUTO: 0.2 % — SIGNIFICANT CHANGE UP (ref 0–2)
BILIRUB SERPL-MCNC: 0.2 MG/DL — LOW (ref 0.4–2)
BUN SERPL-MCNC: 31 MG/DL — HIGH (ref 8–20)
CALCIUM SERPL-MCNC: 8.7 MG/DL — SIGNIFICANT CHANGE UP (ref 8.6–10.2)
CHLORIDE SERPL-SCNC: 95 MMOL/L — LOW (ref 98–107)
CO2 SERPL-SCNC: 44 MMOL/L — HIGH (ref 22–29)
CREAT SERPL-MCNC: 0.7 MG/DL — SIGNIFICANT CHANGE UP (ref 0.5–1.3)
DIGOXIN SERPL-MCNC: 3.3 NG/ML — CRITICAL HIGH (ref 0.8–2)
EOSINOPHIL # BLD AUTO: 0.28 K/UL — SIGNIFICANT CHANGE UP (ref 0–0.5)
EOSINOPHIL NFR BLD AUTO: 2.3 % — SIGNIFICANT CHANGE UP (ref 0–6)
GLUCOSE BLDC GLUCOMTR-MCNC: 122 MG/DL — HIGH (ref 70–99)
GLUCOSE BLDC GLUCOMTR-MCNC: 269 MG/DL — HIGH (ref 70–99)
GLUCOSE BLDC GLUCOMTR-MCNC: 307 MG/DL — HIGH (ref 70–99)
GLUCOSE BLDC GLUCOMTR-MCNC: 65 MG/DL — LOW (ref 70–99)
GLUCOSE BLDC GLUCOMTR-MCNC: 74 MG/DL — SIGNIFICANT CHANGE UP (ref 70–99)
GLUCOSE SERPL-MCNC: 100 MG/DL — HIGH (ref 70–99)
HCT VFR BLD CALC: 37.6 % — LOW (ref 39–50)
HGB BLD-MCNC: 11.4 G/DL — LOW (ref 13–17)
IMM GRANULOCYTES NFR BLD AUTO: 0.8 % — SIGNIFICANT CHANGE UP (ref 0–1.5)
INR BLD: 1.49 RATIO — HIGH (ref 0.88–1.16)
LYMPHOCYTES # BLD AUTO: 1.86 K/UL — SIGNIFICANT CHANGE UP (ref 1–3.3)
LYMPHOCYTES # BLD AUTO: 15.4 % — SIGNIFICANT CHANGE UP (ref 13–44)
MAGNESIUM SERPL-MCNC: 2.1 MG/DL — SIGNIFICANT CHANGE UP (ref 1.8–2.6)
MCHC RBC-ENTMCNC: 27.2 PG — SIGNIFICANT CHANGE UP (ref 27–34)
MCHC RBC-ENTMCNC: 30.3 GM/DL — LOW (ref 32–36)
MCV RBC AUTO: 89.7 FL — SIGNIFICANT CHANGE UP (ref 80–100)
MONOCYTES # BLD AUTO: 1.26 K/UL — HIGH (ref 0–0.9)
MONOCYTES NFR BLD AUTO: 10.5 % — SIGNIFICANT CHANGE UP (ref 2–14)
NEUTROPHILS # BLD AUTO: 8.51 K/UL — HIGH (ref 1.8–7.4)
NEUTROPHILS NFR BLD AUTO: 70.8 % — SIGNIFICANT CHANGE UP (ref 43–77)
PHOSPHATE SERPL-MCNC: 3 MG/DL — SIGNIFICANT CHANGE UP (ref 2.4–4.7)
PLATELET # BLD AUTO: 250 K/UL — SIGNIFICANT CHANGE UP (ref 150–400)
POTASSIUM SERPL-MCNC: 4.4 MMOL/L — SIGNIFICANT CHANGE UP (ref 3.5–5.3)
POTASSIUM SERPL-SCNC: 4.4 MMOL/L — SIGNIFICANT CHANGE UP (ref 3.5–5.3)
PROT SERPL-MCNC: 5.3 G/DL — LOW (ref 6.6–8.7)
PROTHROM AB SERPL-ACNC: 17 SEC — HIGH (ref 10.6–13.6)
RBC # BLD: 4.19 M/UL — LOW (ref 4.2–5.8)
RBC # FLD: 14 % — SIGNIFICANT CHANGE UP (ref 10.3–14.5)
SODIUM SERPL-SCNC: 145 MMOL/L — SIGNIFICANT CHANGE UP (ref 135–145)
WBC # BLD: 12.04 K/UL — HIGH (ref 3.8–10.5)
WBC # FLD AUTO: 12.04 K/UL — HIGH (ref 3.8–10.5)

## 2020-12-08 PROCEDURE — 99232 SBSQ HOSP IP/OBS MODERATE 35: CPT

## 2020-12-08 RX ORDER — METOPROLOL TARTRATE 50 MG
5 TABLET ORAL ONCE
Refills: 0 | Status: COMPLETED | OUTPATIENT
Start: 2020-12-08 | End: 2020-12-08

## 2020-12-08 RX ORDER — DILTIAZEM HCL 120 MG
5 CAPSULE, EXT RELEASE 24 HR ORAL ONCE
Refills: 0 | Status: COMPLETED | OUTPATIENT
Start: 2020-12-08 | End: 2020-12-08

## 2020-12-08 RX ORDER — WARFARIN SODIUM 2.5 MG/1
7.5 TABLET ORAL ONCE
Refills: 0 | Status: COMPLETED | OUTPATIENT
Start: 2020-12-08 | End: 2020-12-08

## 2020-12-08 RX ORDER — DIGOXIN 250 MCG
0.12 TABLET ORAL DAILY
Refills: 0 | Status: DISCONTINUED | OUTPATIENT
Start: 2020-12-09 | End: 2020-12-09

## 2020-12-08 RX ORDER — METOPROLOL TARTRATE 50 MG
10 TABLET ORAL ONCE
Refills: 0 | Status: DISCONTINUED | OUTPATIENT
Start: 2020-12-08 | End: 2020-12-08

## 2020-12-08 RX ORDER — DILTIAZEM HCL 120 MG
480 CAPSULE, EXT RELEASE 24 HR ORAL DAILY
Refills: 0 | Status: DISCONTINUED | OUTPATIENT
Start: 2020-12-08 | End: 2020-12-09

## 2020-12-08 RX ORDER — SODIUM CHLORIDE 9 MG/ML
250 INJECTION INTRAMUSCULAR; INTRAVENOUS; SUBCUTANEOUS ONCE
Refills: 0 | Status: COMPLETED | OUTPATIENT
Start: 2020-12-08 | End: 2020-12-08

## 2020-12-08 RX ORDER — INSULIN GLARGINE 100 [IU]/ML
20 INJECTION, SOLUTION SUBCUTANEOUS AT BEDTIME
Refills: 0 | Status: DISCONTINUED | OUTPATIENT
Start: 2020-12-08 | End: 2020-12-09

## 2020-12-08 RX ORDER — METOPROLOL TARTRATE 50 MG
25 TABLET ORAL ONCE
Refills: 0 | Status: COMPLETED | OUTPATIENT
Start: 2020-12-08 | End: 2020-12-08

## 2020-12-08 RX ADMIN — WARFARIN SODIUM 7.5 MILLIGRAM(S): 2.5 TABLET ORAL at 21:49

## 2020-12-08 RX ADMIN — Medication 5 MILLIGRAM(S): at 16:10

## 2020-12-08 RX ADMIN — BUDESONIDE AND FORMOTEROL FUMARATE DIHYDRATE 2 PUFF(S): 160; 4.5 AEROSOL RESPIRATORY (INHALATION) at 20:47

## 2020-12-08 RX ADMIN — LEVALBUTEROL 0.63 MILLIGRAM(S): 1.25 SOLUTION, CONCENTRATE RESPIRATORY (INHALATION) at 09:09

## 2020-12-08 RX ADMIN — LEVALBUTEROL 0.63 MILLIGRAM(S): 1.25 SOLUTION, CONCENTRATE RESPIRATORY (INHALATION) at 20:47

## 2020-12-08 RX ADMIN — Medication 40 MILLIGRAM(S): at 05:04

## 2020-12-08 RX ADMIN — Medication 500 MICROGRAM(S): at 02:29

## 2020-12-08 RX ADMIN — LISINOPRIL 40 MILLIGRAM(S): 2.5 TABLET ORAL at 05:04

## 2020-12-08 RX ADMIN — Medication 5 UNIT(S): at 12:02

## 2020-12-08 RX ADMIN — CITALOPRAM 40 MILLIGRAM(S): 10 TABLET, FILM COATED ORAL at 17:22

## 2020-12-08 RX ADMIN — SODIUM CHLORIDE 1000 MILLILITER(S): 9 INJECTION INTRAMUSCULAR; INTRAVENOUS; SUBCUTANEOUS at 17:22

## 2020-12-08 RX ADMIN — Medication 120 MILLIGRAM(S): at 05:04

## 2020-12-08 RX ADMIN — Medication 1 TABLET(S): at 08:26

## 2020-12-08 RX ADMIN — LEVALBUTEROL 0.63 MILLIGRAM(S): 1.25 SOLUTION, CONCENTRATE RESPIRATORY (INHALATION) at 16:10

## 2020-12-08 RX ADMIN — Medication 5 UNIT(S): at 08:26

## 2020-12-08 RX ADMIN — ATORVASTATIN CALCIUM 10 MILLIGRAM(S): 80 TABLET, FILM COATED ORAL at 21:34

## 2020-12-08 RX ADMIN — BUDESONIDE AND FORMOTEROL FUMARATE DIHYDRATE 2 PUFF(S): 160; 4.5 AEROSOL RESPIRATORY (INHALATION) at 09:09

## 2020-12-08 RX ADMIN — Medication 6: at 17:22

## 2020-12-08 RX ADMIN — Medication 25 MILLIGRAM(S): at 18:20

## 2020-12-08 RX ADMIN — Medication 8: at 21:34

## 2020-12-08 RX ADMIN — LEVETIRACETAM 500 MILLIGRAM(S): 250 TABLET, FILM COATED ORAL at 17:22

## 2020-12-08 RX ADMIN — INSULIN GLARGINE 20 UNIT(S): 100 INJECTION, SOLUTION SUBCUTANEOUS at 21:34

## 2020-12-08 RX ADMIN — Medication 1 TABLET(S): at 17:22

## 2020-12-08 RX ADMIN — Medication 500 MICROGRAM(S): at 20:52

## 2020-12-08 RX ADMIN — INSULIN GLARGINE 25 UNIT(S): 100 INJECTION, SOLUTION SUBCUTANEOUS at 09:00

## 2020-12-08 RX ADMIN — Medication 5 UNIT(S): at 17:21

## 2020-12-08 RX ADMIN — LEVETIRACETAM 500 MILLIGRAM(S): 250 TABLET, FILM COATED ORAL at 05:04

## 2020-12-08 RX ADMIN — LEVALBUTEROL 0.63 MILLIGRAM(S): 1.25 SOLUTION, CONCENTRATE RESPIRATORY (INHALATION) at 02:29

## 2020-12-08 RX ADMIN — Medication 480 MILLIGRAM(S): at 15:16

## 2020-12-08 RX ADMIN — Medication 500 MICROGRAM(S): at 16:10

## 2020-12-08 RX ADMIN — Medication 0.25 MILLIGRAM(S): at 05:04

## 2020-12-08 NOTE — PROGRESS NOTE ADULT - ASSESSMENT
69 y/o male with h/o copd, htn, dm, afib on coumadin, seizure disorder was admitted yesterday to icu for acute on chronic hypercapnic , hypoxic respirator failure and copd exacerbation, clinically improving.    Acute on chronic respiratory failure with hypercapnia and hypoxia   - Completed azithromycin   - Tapered Prednisone down to 40 daily x 3 days then taper down by 10 every 3 days. Titrate to 30 for 12/9  - c/w albuterol PRN  - c/w Symbicort  - Bipap nightly  - 4L nasal cannula    DM  - FS with KENYA  - c/w Lantus 20 at night and 25 in AM  - Lispro 5 units TID with meals    HTN  - c/w Lisinopril 40mg daily  - c/w Cardizem 60mg TID    Afib with RVR  - compliance is questionable  - reports Coumadin 7.5 mg on Sunday, Monday, Wednesday and Thursday. Takes Coumadin 3.75mg on Tuesday, Friday and Saturday  - Rate remains uncontrolled  - Digoxin  - Cardizem titration as per cardiology   - Cardiology consulted, recommendations appreciated    Seizure Disorder  - c/w Keppra 500mg BID  - seizure precautions    DVT ppx  - Coumadin    Discussed case with patient's daughter Chanelle [phone] on 12/8/2020  Dispo: Anticipate DC in 24 to 48 hours if HR can be controlled.

## 2020-12-08 NOTE — PROGRESS NOTE ADULT - SUBJECTIVE AND OBJECTIVE BOX
[facility] Division of Hospital Medicine    Chief Complaint:  Patient is a 70y old  Male who presents with a chief complaint of SOB (08 Dec 2020 11:15)      SUBJECTIVE / OVERNIGHT EVENTS:  Patient was seen and examined at bedside. Patient states to be feeling well. But remains tachycardiac to 140s to 150s.   Patient denies chest pain, SOB, abd pain, N/V, fever, chills, dysuria or any other complaints. All remainder ROS negative.     MEDICATIONS  (STANDING):  atorvastatin 10 milliGRAM(s) Oral at bedtime  budesonide 160 MICROgram(s)/formoterol 4.5 MICROgram(s) Inhaler 2 Puff(s) Inhalation two times a day  citalopram 40 milliGRAM(s) Oral daily  dextrose 40% Gel 15 Gram(s) Oral once  dextrose 5%. 1000 milliLiter(s) (50 mL/Hr) IV Continuous <Continuous>  dextrose 5%. 1000 milliLiter(s) (100 mL/Hr) IV Continuous <Continuous>  dextrose 50% Injectable 25 Gram(s) IV Push once  dextrose 50% Injectable 12.5 Gram(s) IV Push once  diltiazem    milliGRAM(s) Oral daily  glucagon  Injectable 1 milliGRAM(s) IntraMuscular once  influenza   Vaccine 0.5 milliLiter(s) IntraMuscular once  insulin glargine Injectable (LANTUS) 25 Unit(s) SubCutaneous every morning  insulin glargine Injectable (LANTUS) 20 Unit(s) SubCutaneous at bedtime  insulin lispro (ADMELOG) corrective regimen sliding scale   SubCutaneous Before meals and at bedtime  insulin lispro Injectable (ADMELOG) 5 Unit(s) SubCutaneous three times a day before meals  ipratropium    for Nebulization 500 MICROGram(s) Nebulizer every 6 hours  lactobacillus acidophilus 1 Tablet(s) Oral two times a day with meals  levalbuterol Inhalation 0.63 milliGRAM(s) Inhalation every 6 hours  levETIRAcetam 500 milliGRAM(s) Oral two times a day  lisinopril 40 milliGRAM(s) Oral daily  predniSONE   Tablet 40 milliGRAM(s) Oral daily  sodium chloride 0.9% Bolus 250 milliLiter(s) IV Bolus once    MEDICATIONS  (PRN):        I&O's Summary    07 Dec 2020 07:01  -  08 Dec 2020 07:00  --------------------------------------------------------  IN: 670 mL / OUT: 850 mL / NET: -180 mL    08 Dec 2020 07:01  -  08 Dec 2020 14:41  --------------------------------------------------------  IN: 0 mL / OUT: 500 mL / NET: -500 mL        PHYSICAL EXAM:  Vital Signs Last 24 Hrs  T(C): 36.7 (08 Dec 2020 08:29), Max: 37.1 (07 Dec 2020 17:24)  T(F): 98 (08 Dec 2020 08:29), Max: 98.8 (07 Dec 2020 17:24)  HR: 124 (08 Dec 2020 11:21) (98 - 135)  BP: 89/57 (08 Dec 2020 11:21) (89/57 - 132/75)  BP(mean): --  RR: 18 (08 Dec 2020 08:29) (18 - 19)  SpO2: 99% (08 Dec 2020 09:11) (93% - 100%)      Constitutional: NAD, well-developed, well-groomed  ENT: MMM, no thrush, Supple, No JVD  Lungs: Clear to auscultation bilaterally, good air entry, non-labored breathing  Cardio: Tachycardiac  Abdomen: Soft, Nontender, Nondistended; Bowel sounds present  Extremities: No calf tenderness, No pitting edema  Musculoskeletal:   No clubbing or cyanosis of digits; no joint swelling or tenderness to palpation  Psych: A+O to person, place, and time; affect appropriate  Neuro: CN 2-12 are intact and symmetric; no gross sensory deficits;   Skin: No rashes; no palpable lesions    LABS:                        11.4   12.04 )-----------( 250      ( 08 Dec 2020 07:02 )             37.6     12-08    145  |  95<L>  |  31.0<H>  ----------------------------<  100<H>  4.4   |  44.0<H>  |  0.70    Ca    8.7      08 Dec 2020 07:02  Phos  3.0     12-08  Mg     2.1     12-08    TPro  5.3<L>  /  Alb  2.9<L>  /  TBili  0.2<L>  /  DBili  x   /  AST  18  /  ALT  28  /  AlkPhos  78  12-08    PT/INR - ( 08 Dec 2020 07:02 )   PT: 17.0 sec;   INR: 1.49 ratio         PTT - ( 08 Dec 2020 07:02 )  PTT:29.2 sec          CAPILLARY BLOOD GLUCOSE      POCT Blood Glucose.: 122 mg/dL (08 Dec 2020 11:30)  POCT Blood Glucose.: 74 mg/dL (08 Dec 2020 08:18)  POCT Blood Glucose.: 65 mg/dL (08 Dec 2020 08:17)  POCT Blood Glucose.: 274 mg/dL (07 Dec 2020 21:25)  POCT Blood Glucose.: 242 mg/dL (07 Dec 2020 17:18)        RADIOLOGY REVIEWED

## 2020-12-08 NOTE — PHYSICAL THERAPY INITIAL EVALUATION ADULT - PERTINENT HX OF CURRENT PROBLEM, REHAB EVAL
70y Male with past medical history significant for COPD, HTN, DM, permanent AF on coumadin, seizure disorder presents with hypercapnic/hypoxic respiratory failure with COPD exacerbation

## 2020-12-08 NOTE — PROGRESS NOTE ADULT - SUBJECTIVE AND OBJECTIVE BOX
[place] [facility]ARDIOVASCULAR - [facility], THE HEART CENTER                                   [address] [place] [address]                                                      PHONE: [phone]                                                         FAX: [phone]  [email or web address]  ---------------------------------------------------------------------------------------------------------------------------------    Overnight events/patient complaints:  Patient experiencing dyspnea with exertion.  No chest pain or palpitations.     PAST MEDICAL & SURGICAL HISTORY:  Seizure    Depression, unspecified depression type    Diabetes    Hypertension    Hyperlipidemia    Afib    COPD (chronic obstructive pulmonary disease)    Abdominal hernia    H/O carotid endarterectomy        No Known Allergies    MEDICATIONS  (STANDING):  atorvastatin 10 milliGRAM(s) Oral at bedtime  budesonide 160 MICROgram(s)/formoterol 4.5 MICROgram(s) Inhaler 2 Puff(s) Inhalation two times a day  citalopram 40 milliGRAM(s) Oral daily  dextrose 40% Gel 15 Gram(s) Oral once  dextrose 5%. 1000 milliLiter(s) (50 mL/Hr) IV Continuous <Continuous>  dextrose 5%. 1000 milliLiter(s) (100 mL/Hr) IV Continuous <Continuous>  dextrose 50% Injectable 25 Gram(s) IV Push once  dextrose 50% Injectable 12.5 Gram(s) IV Push once  diltiazem    Tablet 120 milliGRAM(s) Oral three times a day  glucagon  Injectable 1 milliGRAM(s) IntraMuscular once  influenza   Vaccine 0.5 milliLiter(s) IntraMuscular once  insulin glargine Injectable (LANTUS) 25 Unit(s) SubCutaneous every morning  insulin glargine Injectable (LANTUS) 25 Unit(s) SubCutaneous at bedtime  insulin lispro (ADMELOG) corrective regimen sliding scale   SubCutaneous Before meals and at bedtime  insulin lispro Injectable (ADMELOG) 5 Unit(s) SubCutaneous three times a day before meals  ipratropium    for Nebulization 500 MICROGram(s) Nebulizer every 6 hours  lactobacillus acidophilus 1 Tablet(s) Oral two times a day with meals  levalbuterol Inhalation 0.63 milliGRAM(s) Inhalation every 6 hours  levETIRAcetam 500 milliGRAM(s) Oral two times a day  lisinopril 40 milliGRAM(s) Oral daily  predniSONE   Tablet 40 milliGRAM(s) Oral daily    MEDICATIONS  (PRN):  diltiazem Injectable 10 milliGRAM(s) IV Push every 4 hours PRN heart rate > 140      Vital Signs Last 24 Hrs  T(C): 36.7 (08 Dec 2020 08:29), Max: 37.1 (07 Dec 2020 17:24)  T(F): 98 (08 Dec 2020 08:29), Max: 98.8 (07 Dec 2020 17:24)  HR: 102 (08 Dec 2020 09:11) (98 - 160)  BP: 117/67 (08 Dec 2020 08:29) (106/75 - 132/75)  BP(mean): 85 (07 Dec 2020 13:45) (85 - 85)  RR: 18 (08 Dec 2020 08:29) (18 - 19)  SpO2: 99% (08 Dec 2020 09:11) (93% - 100%)  ICU Vital Signs Last 24 Hrs  CLAUDIA MABRY  I&O's Detail    07 Dec 2020 07:01  -  08 Dec 2020 07:00  --------------------------------------------------------  IN:    Oral Fluid: 670 mL  Total IN: 670 mL    OUT:    Voided (mL): 850 mL  Total OUT: 850 mL    Total NET: -180 mL        I&O's Summary    07 Dec 2020 07:01  -  08 Dec 2020 07:00  --------------------------------------------------------  IN: 670 mL / OUT: 850 mL / NET: -180 mL      Drug Dosing Weight  CLAUDIA MABRY      PHYSICAL EXAM:  General: Appears well developed, well nourished alert and cooperative.  HEENT: Head; normocephalic, atraumatic.  Eyes: Pupils reactive, cornea wnl.  Neck: Supple, no nodes adenopathy, no NVD or carotid bruit or thyromegaly.  CARDIOVASCULAR: Normal S1 and S2, No murmur, rub, gallop or lift.  Irregular  LUNGS: No rales, rhonchi or wheeze. Faint heart sounds  ABDOMEN: Soft, nontender without mass or organomegaly. bowel sounds normoactive.  EXTREMITIES: No clubbing, cyanosis, +1 pitting edema b/l  SKIN: warm and dry with normal turgor.  NEURO: Alert/oriented x 3/normal motor exam. No pathologic reflexes.    PSYCH: normal affect.        LABS:                        11.4   12.04 )-----------( 250      ( 08 Dec 2020 07:02 )             37.6     12-08    145  |  95<L>  |  31.0<H>  ----------------------------<  100<H>  4.4   |  44.0<H>  |  0.70    Ca    8.7      08 Dec 2020 07:02  Phos  3.0     12-08  Mg     2.1     12-08    TPro  5.3<L>  /  Alb  2.9<L>  /  TBili  0.2<L>  /  DBili  x   /  AST  18  /  ALT  28  /  AlkPhos  78  12-08    CLAUDIA MABRY      PT/INR - ( 08 Dec 2020 07:02 )   PT: 17.0 sec;   INR: 1.49 ratio         PTT - ( 08 Dec 2020 07:02 )  PTT:29.2 sec      RADIOLOGY & ADDITIONAL STUDIES:    INTERPRETATION OF TELEMETRY (personally reviewed): afib with RVR    ECG:    ECHO: Summary:   1. Technically difficult study. Measurements may not be accurate due to off axis views.   2. Endocardial visualization was enhanced with intravenous echo contrast.   3. Normal global left ventricular systolic function.   4. Left ventricular ejection fraction, by visual estimation, is 65 to 70%.   5. Moderately increased LV wall thickness.   6. Spectral Doppler shows impaired relaxation pattern of left ventricular myocardial filling (Grade I diastolic dysfunction).   7. Mild mitral annular calcification.   8. Mild thickening of the anterior and posterior mitral valve leaflets.   9. Trace mitral valve regurgitation.    STRESS TEST:    CARDIAC CATHETERIZATION:    ASSESSMENT AND PLAN:  In summary, CLAUDIA MABRY is an 70y Male with past medical history significant for COPD, HTN, DM, permanent AF on coumadin, seizure disorder presents with hypercapnic/hypoxic respiratory failure with COPD exacerbation.    Afib with RVR- hold digoxin for one day and then restart at .125mg daily  -change diltizem to extended release from at 480mg daily (max dose)  -AC with coumdain.  Goal INR is 2-3  -can add low dose of toprol XL if HR uncontrolled.  Will hold off right now due to COPD  -if HR improved this afternoon, can consider sending home with INR checks and cardiology follow up    HLD- continue atorvastatin    HTN- continue lisinopril    Thank you for allowing [facility] to participate in the care of this patient.  Please feel free to call with any questions or concerns.

## 2020-12-08 NOTE — PHYSICAL THERAPY INITIAL EVALUATION ADULT - ADDITIONAL COMMENTS
Pt lives in a private home with his son, daughter in law, daughter and niece. 1 steps to enter with handrails, 1 steps inside with handrails. Pt was independent PTA with intermittent use of a rollator. Pt owns a rollator, and Home O2.

## 2020-12-08 NOTE — PROGRESS NOTE ADULT - ASSESSMENT
Assess    AECOPD - resolved  Chronic Hypoxia/ hypercarbia  Diastolic HF  Afib  Sz disorder  Moderate pre-test probability for ELISSA    Rec    Prednisone taper  LABA/ICS and LAMA  02 as needed  Doesn't clearly require OP Bipap or Trilogy pro  OP pulmonary and sleep FU

## 2020-12-08 NOTE — CHART NOTE - NSCHARTNOTEFT_GEN_A_CORE
called for digoxin level 3.3   patient already received digoxin this AM   discontinued digoxin   patient covered by [facility]  [facility]

## 2020-12-08 NOTE — PROGRESS NOTE ADULT - SUBJECTIVE AND OBJECTIVE BOX
PULMONARY PROGRESS NOTE      [id number]    Patient is a 70y old  Male who presents with a chief complaint of SOB (08 Dec 2020 09:49)     70y Male h/o COPD, HTN, DM, permanent AF on coumadin, seizure d/o admitted initially to ICU 2 days ago with acute on chronic hypercapnic / hypoxic respiratory failure and COPD exacerbation clinically improving but with poorly rate controlled AF. He is not followed by a cardiologist as an outpatient. He denies palpitations or SOB at rest. He does have SILVEIRA.      INTERVAL HPI/OVERNIGHT EVENTS:  Comfortable at bedside  Some H/O snoring     MEDICATIONS  (STANDING):  atorvastatin 10 milliGRAM(s) Oral at bedtime  budesonide 160 MICROgram(s)/formoterol 4.5 MICROgram(s) Inhaler 2 Puff(s) Inhalation two times a day  citalopram 40 milliGRAM(s) Oral daily  dextrose 40% Gel 15 Gram(s) Oral once  dextrose 5%. 1000 milliLiter(s) (50 mL/Hr) IV Continuous <Continuous>  dextrose 5%. 1000 milliLiter(s) (100 mL/Hr) IV Continuous <Continuous>  dextrose 50% Injectable 25 Gram(s) IV Push once  dextrose 50% Injectable 12.5 Gram(s) IV Push once  diltiazem    milliGRAM(s) Oral daily  glucagon  Injectable 1 milliGRAM(s) IntraMuscular once  influenza   Vaccine 0.5 milliLiter(s) IntraMuscular once  insulin glargine Injectable (LANTUS) 25 Unit(s) SubCutaneous every morning  insulin glargine Injectable (LANTUS) 25 Unit(s) SubCutaneous at bedtime  insulin lispro (ADMELOG) corrective regimen sliding scale   SubCutaneous Before meals and at bedtime  insulin lispro Injectable (ADMELOG) 5 Unit(s) SubCutaneous three times a day before meals  ipratropium    for Nebulization 500 MICROGram(s) Nebulizer every 6 hours  lactobacillus acidophilus 1 Tablet(s) Oral two times a day with meals  levalbuterol Inhalation 0.63 milliGRAM(s) Inhalation every 6 hours  levETIRAcetam 500 milliGRAM(s) Oral two times a day  lisinopril 40 milliGRAM(s) Oral daily  predniSONE   Tablet 40 milliGRAM(s) Oral daily      MEDICATIONS  (PRN):      Allergies    No Known Allergies    Intolerances        PAST MEDICAL & SURGICAL HISTORY:  Seizure    Depression, unspecified depression type    Diabetes    Hypertension    Hyperlipidemia    Afib    COPD (chronic obstructive pulmonary disease)    Abdominal hernia    H/O carotid endarterectomy        SOCIAL HISTORY  Smoking History:       REVIEW OF SYSTEMS:    CONSTITUTIONAL:  No distress    HEENT:  Eyes:  No diplopia or blurred vision. ENT:  No earache, sore throat or runny nose.    CARDIOVASCULAR:  No pressure, squeezing, tightness, heaviness or aching about the chest; no palpitations.    RESPIRATORY:  No cough, shortness of breath, PND or orthopnea. Mild SOBOE    GASTROINTESTINAL:  No nausea, vomiting or diarrhea.    GENITOURINARY:  No dysuria, frequency or urgency.    NEUROLOGIC:  No paresthesias, fasciculations, seizures or weakness.    PSYCHIATRIC:  No disorder of thought or mood.    Vital Signs Last 24 Hrs  T(C): 36.7 (08 Dec 2020 08:29), Max: 37.1 (07 Dec 2020 17:24)  T(F): 98 (08 Dec 2020 08:29), Max: 98.8 (07 Dec 2020 17:24)  HR: 102 (08 Dec 2020 09:11) (98 - 160)  BP: 117/67 (08 Dec 2020 08:29) (106/75 - 132/75)  BP(mean): 85 (07 Dec 2020 13:45) (85 - 85)  RR: 18 (08 Dec 2020 08:29) (18 - 19)  SpO2: 99% (08 Dec 2020 09:11) (93% - 100%)    PHYSICAL EXAMINATION:    GENERAL: The patient is awake and alert in no apparent distress.     HEENT: Head is normocephalic and atraumatic. Extraocular muscles are intact. Mucous membranes are moist.    NECK: Supple.    LUNGS: Clear to auscultation without wheezing, rales or rhonchi; respirations unlabored    HEART: Regular rate and rhythm without murmur.    ABDOMEN: Soft, nontender, and nondistended.      EXTREMITIES: Without any cyanosis, clubbing, rash, lesions or edema.    NEUROLOGIC: Grossly intact.    LABS:                        11.4   12.04 )-----------( 250      ( 08 Dec 2020 07:02 )             37.6     12-08    145  |  95<L>  |  31.0<H>  ----------------------------<  100<H>  4.4   |  44.0<H>  |  0.70    Ca    8.7      08 Dec 2020 07:02  Phos  3.0     12-08  Mg     2.1     12-08    TPro  5.3<L>  /  Alb  2.9<L>  /  TBili  0.2<L>  /  DBili  x   /  AST  18  /  ALT  28  /  AlkPhos  78  12-08    PT/INR - ( 08 Dec 2020 07:02 )   PT: 17.0 sec;   INR: 1.49 ratio         PTT - ( 08 Dec 2020 07:02 )  PTT:29.2 sec      RADIOLOGY & ADDITIONAL STUDIES:   EXAM:  XR CHEST PORTABLE URGENT 1V                          PROCEDURE DATE:  12/05/2020          INTERPRETATION:  Chest one view    HISTORY: COPD    COMPARISON STUDY: 5/23/2016    Frontal expiratory view of the chest shows the heart to be borderline in size. The lungs show mild basilar atelectasis and there is no evidence of pneumothorax nor pleural effusion.    IMPRESSION:  Mild atelectasis. Thank you for the courtesy of this referral.      RACHELLE COLORADO MD; Attending Interventional Radiologist  This document has been electronically signed. Dec  6 2020  9:05AM

## 2020-12-09 ENCOUNTER — TRANSCRIPTION ENCOUNTER (OUTPATIENT)
Age: 70
End: 2020-12-09

## 2020-12-09 VITALS
RESPIRATION RATE: 18 BRPM | SYSTOLIC BLOOD PRESSURE: 102 MMHG | HEART RATE: 88 BPM | DIASTOLIC BLOOD PRESSURE: 60 MMHG | TEMPERATURE: 98 F | OXYGEN SATURATION: 92 %

## 2020-12-09 LAB
ALBUMIN SERPL ELPH-MCNC: 2.9 G/DL — LOW (ref 3.3–5.2)
ALP SERPL-CCNC: 78 U/L — SIGNIFICANT CHANGE UP (ref 40–120)
ALT FLD-CCNC: 39 U/L — SIGNIFICANT CHANGE UP
ANION GAP SERPL CALC-SCNC: 5 MMOL/L — SIGNIFICANT CHANGE UP (ref 5–17)
APTT BLD: 30.9 SEC — SIGNIFICANT CHANGE UP (ref 27.5–35.5)
AST SERPL-CCNC: 32 U/L — SIGNIFICANT CHANGE UP
BASOPHILS # BLD AUTO: 0.04 K/UL — SIGNIFICANT CHANGE UP (ref 0–0.2)
BASOPHILS NFR BLD AUTO: 0.3 % — SIGNIFICANT CHANGE UP (ref 0–2)
BILIRUB SERPL-MCNC: 0.3 MG/DL — LOW (ref 0.4–2)
BUN SERPL-MCNC: 30 MG/DL — HIGH (ref 8–20)
CALCIUM SERPL-MCNC: 8.5 MG/DL — LOW (ref 8.6–10.2)
CHLORIDE SERPL-SCNC: 97 MMOL/L — LOW (ref 98–107)
CO2 SERPL-SCNC: 43 MMOL/L — HIGH (ref 22–29)
CREAT SERPL-MCNC: 0.69 MG/DL — SIGNIFICANT CHANGE UP (ref 0.5–1.3)
EOSINOPHIL # BLD AUTO: 0.26 K/UL — SIGNIFICANT CHANGE UP (ref 0–0.5)
EOSINOPHIL NFR BLD AUTO: 1.8 % — SIGNIFICANT CHANGE UP (ref 0–6)
GLUCOSE BLDC GLUCOMTR-MCNC: 106 MG/DL — HIGH (ref 70–99)
GLUCOSE BLDC GLUCOMTR-MCNC: 391 MG/DL — HIGH (ref 70–99)
GLUCOSE SERPL-MCNC: 99 MG/DL — SIGNIFICANT CHANGE UP (ref 70–99)
HCT VFR BLD CALC: 37.8 % — LOW (ref 39–50)
HGB BLD-MCNC: 11.4 G/DL — LOW (ref 13–17)
IMM GRANULOCYTES NFR BLD AUTO: 1.1 % — SIGNIFICANT CHANGE UP (ref 0–1.5)
INR BLD: 1.7 RATIO — HIGH (ref 0.88–1.16)
LYMPHOCYTES # BLD AUTO: 1.61 K/UL — SIGNIFICANT CHANGE UP (ref 1–3.3)
LYMPHOCYTES # BLD AUTO: 10.9 % — LOW (ref 13–44)
MCHC RBC-ENTMCNC: 27.4 PG — SIGNIFICANT CHANGE UP (ref 27–34)
MCHC RBC-ENTMCNC: 30.2 GM/DL — LOW (ref 32–36)
MCV RBC AUTO: 90.9 FL — SIGNIFICANT CHANGE UP (ref 80–100)
MONOCYTES # BLD AUTO: 1.27 K/UL — HIGH (ref 0–0.9)
MONOCYTES NFR BLD AUTO: 8.6 % — SIGNIFICANT CHANGE UP (ref 2–14)
NEUTROPHILS # BLD AUTO: 11.37 K/UL — HIGH (ref 1.8–7.4)
NEUTROPHILS NFR BLD AUTO: 77.3 % — HIGH (ref 43–77)
PLATELET # BLD AUTO: 238 K/UL — SIGNIFICANT CHANGE UP (ref 150–400)
POTASSIUM SERPL-MCNC: 4.5 MMOL/L — SIGNIFICANT CHANGE UP (ref 3.5–5.3)
POTASSIUM SERPL-SCNC: 4.5 MMOL/L — SIGNIFICANT CHANGE UP (ref 3.5–5.3)
PROT SERPL-MCNC: 5.3 G/DL — LOW (ref 6.6–8.7)
PROTHROM AB SERPL-ACNC: 19.2 SEC — HIGH (ref 10.6–13.6)
RBC # BLD: 4.16 M/UL — LOW (ref 4.2–5.8)
RBC # FLD: 14.1 % — SIGNIFICANT CHANGE UP (ref 10.3–14.5)
SODIUM SERPL-SCNC: 144 MMOL/L — SIGNIFICANT CHANGE UP (ref 135–145)
WBC # BLD: 14.71 K/UL — HIGH (ref 3.8–10.5)
WBC # FLD AUTO: 14.71 K/UL — HIGH (ref 3.8–10.5)

## 2020-12-09 PROCEDURE — 99239 HOSP IP/OBS DSCHRG MGMT >30: CPT

## 2020-12-09 RX ORDER — INSULIN LISPRO 100/ML
7 VIAL (ML) SUBCUTANEOUS
Qty: 0 | Refills: 0 | DISCHARGE
Start: 2020-12-09

## 2020-12-09 RX ORDER — METOPROLOL TARTRATE 50 MG
1 TABLET ORAL
Qty: 30 | Refills: 0
Start: 2020-12-09 | End: 2021-01-07

## 2020-12-09 RX ORDER — DIGOXIN 250 MCG
1 TABLET ORAL
Qty: 30 | Refills: 0
Start: 2020-12-09 | End: 2021-01-07

## 2020-12-09 RX ORDER — LISINOPRIL 2.5 MG/1
1 TABLET ORAL
Qty: 30 | Refills: 0
Start: 2020-12-09 | End: 2021-01-07

## 2020-12-09 RX ORDER — LISINOPRIL 2.5 MG/1
20 TABLET ORAL DAILY
Refills: 0 | Status: DISCONTINUED | OUTPATIENT
Start: 2020-12-10 | End: 2020-12-09

## 2020-12-09 RX ORDER — DILTIAZEM HCL 120 MG
2 CAPSULE, EXT RELEASE 24 HR ORAL
Qty: 60 | Refills: 0
Start: 2020-12-09 | End: 2021-01-07

## 2020-12-09 RX ORDER — INSULIN GLARGINE 100 [IU]/ML
20 INJECTION, SOLUTION SUBCUTANEOUS
Qty: 0 | Refills: 0 | DISCHARGE
Start: 2020-12-09

## 2020-12-09 RX ORDER — METOPROLOL TARTRATE 50 MG
25 TABLET ORAL DAILY
Refills: 0 | Status: DISCONTINUED | OUTPATIENT
Start: 2020-12-09 | End: 2020-12-09

## 2020-12-09 RX ORDER — INSULIN GLARGINE 100 [IU]/ML
25 INJECTION, SOLUTION SUBCUTANEOUS
Qty: 0 | Refills: 0 | DISCHARGE
Start: 2020-12-09

## 2020-12-09 RX ADMIN — CITALOPRAM 40 MILLIGRAM(S): 10 TABLET, FILM COATED ORAL at 11:22

## 2020-12-09 RX ADMIN — LEVALBUTEROL 0.63 MILLIGRAM(S): 1.25 SOLUTION, CONCENTRATE RESPIRATORY (INHALATION) at 15:08

## 2020-12-09 RX ADMIN — Medication 500 MICROGRAM(S): at 09:42

## 2020-12-09 RX ADMIN — Medication 500 MICROGRAM(S): at 15:08

## 2020-12-09 RX ADMIN — LEVALBUTEROL 0.63 MILLIGRAM(S): 1.25 SOLUTION, CONCENTRATE RESPIRATORY (INHALATION) at 03:46

## 2020-12-09 RX ADMIN — Medication 1 TABLET(S): at 08:53

## 2020-12-09 RX ADMIN — LEVALBUTEROL 0.63 MILLIGRAM(S): 1.25 SOLUTION, CONCENTRATE RESPIRATORY (INHALATION) at 09:42

## 2020-12-09 RX ADMIN — BUDESONIDE AND FORMOTEROL FUMARATE DIHYDRATE 2 PUFF(S): 160; 4.5 AEROSOL RESPIRATORY (INHALATION) at 09:42

## 2020-12-09 RX ADMIN — LISINOPRIL 40 MILLIGRAM(S): 2.5 TABLET ORAL at 05:17

## 2020-12-09 RX ADMIN — Medication 5 UNIT(S): at 12:27

## 2020-12-09 RX ADMIN — LEVETIRACETAM 500 MILLIGRAM(S): 250 TABLET, FILM COATED ORAL at 05:17

## 2020-12-09 RX ADMIN — Medication 480 MILLIGRAM(S): at 09:09

## 2020-12-09 RX ADMIN — Medication 30 MILLIGRAM(S): at 05:17

## 2020-12-09 RX ADMIN — Medication 500 MICROGRAM(S): at 03:47

## 2020-12-09 RX ADMIN — Medication 0.12 MILLIGRAM(S): at 05:17

## 2020-12-09 RX ADMIN — Medication 10: at 12:28

## 2020-12-09 RX ADMIN — Medication 25 MILLIGRAM(S): at 13:12

## 2020-12-09 NOTE — DISCHARGE NOTE PROVIDER - HOSPITAL COURSE
70M w/ PMHx DM, HTN, COPD on home O2, AF on coumadin, seizures was brought in by family for worsening SOB. Pt appears quite comfortable w/ no use of accessory muscles. Admits to have mild SOB for the past few days and occasional chest discomfort. No fever, cough, N/V/D  or pain abdomen. His sister in law was diagnosed w/ COVID recently but he has not had contact w/ her in a while. He is non complaint on home BiPAP and recently completed a steroid taper. ICU consulted for new BIPAP. Patient was admitted for acute on chronic hypercapnic , hypoxic respirator failure and copd exacerbation. Patient was started on steroids and started on a taper protocol. Rashmi was also found to be in a fib with rvr.  Rate was started to get controlled with addition of BB and digoxin. PT evaluated patient and recommended home. Patient is medically stable for discharge.     PHYSICAL EXAM:  Vital Signs Last 24 Hrs  T(F): 97.5 (09 Dec 2020 04:51), Max: 98.4 (08 Dec 2020 20:54)  HR: 99 (09 Dec 2020 13:03) (72 - 163)  BP: 102/62 (09 Dec 2020 13:03) (95/56 - 127/73)  RR: 18 (09 Dec 2020 13:03) (18 - 18)  SpO2: 90% (09 Dec 2020 13:03) (90% - 99%)    GENERAL: NAD, Resting in bed  Eyes: EOMI, PERRLA  ENMT: Conjunctiva and sclera clear; Moist mucous membranes, normal oropharynx, supple neck, No JVD, no lymphadenopathy, no thyroid nodules or enlargement  Cardiovascular: S1+S2 audible, regular rate & rhythm, no murmur, rubs or gallops.  Respiratory: Clear to auscultation bilaterally; No rales, rhonchi, wheezing, or rubs. Unlabored respirations on room air  GI: Bowel sounds present; Soft, Nontender, Nondistended. No hepatomegaly  Genitourinary: Deferred  Musculoskeletal: - 5/5 strength b/l upper and lower extremities, normal range of motion, no swollen or erythematous joints  Skin:  no breakdowns, ulcers or discharge, No rashes or lesions  Neurology: Alert & Oriented X3, non-focal and spontaneous movements of all extremities, CN 2 to 12 grossly intact   Psych: Appropriate mood and affect, calm, pleasant, Responds appropriately to questions    Acute on chronic respiratory failure with hypercapnia and hypoxia   - Completed azithromycin   - Tapered Prednisone down to 40 daily x 3 days then taper down by 10 every 3 days. Titrate to 30 for 12/9  - c/w albuterol PRN  - c/w Symbicort  - Bipap nightly  - 4L nasal cannula    DM  - FS with KENYA  - c/w Lantus 20 at night and 25 in AM  - Lispro 5 units TID with meals    HTN  Will decrease to Lisinopril 20mg daily  - c/w Cardizem 60mg TID    Afib with RVR  - compliance is questionable  - reports Coumadin 7.5 mg on Sunday, Monday, Wednesday and Thursday. Takes Coumadin 3.75mg on Tuesday, Friday and Saturday  - Rate remains uncontrolled  - Digoxin  - Cardizem titration as per cardiology   - Cardiology consulted, recommendations appreciated    Seizure Disorder  - c/w Keppra 500mg BID  - seizure precautions    DVT ppx  - Coumadin    Discussed case with patient's daughter Chanelle [phone] on 12/9/2020    Time spent on patients discharge 37 minutes

## 2020-12-09 NOTE — DISCHARGE NOTE PROVIDER - NSDCCPCAREPLAN_GEN_ALL_CORE_FT
PRINCIPAL DISCHARGE DIAGNOSIS  Diagnosis: Chronic obstructive pulmonary disease with acute exacerbation  Assessment and Plan of Treatment: Please take medications as prescribed. Please follow up with pulmonary upon discharge.      SECONDARY DISCHARGE DIAGNOSES  Diagnosis: Atrial fibrillation  Assessment and Plan of Treatment: Please take medications as prescribed. Please follow up with cardiology to monitor your INR and your digoxin level. Please take medications to control your heart rate.

## 2020-12-09 NOTE — DISCHARGE NOTE PROVIDER - NSDCMRMEDTOKEN_GEN_ALL_CORE_FT
albuterol 90 mcg/inh inhalation aerosol:  inhaled   budesonide-formoterol 160 mcg-4.5 mcg/inh inhalation aerosol: 2 puff(s) inhaled 2 times a day  Cardizem  mg/24 hours oral capsule, extended release: 2 cap(s) orally once a day  CeleXA 40 mg oral tablet: 1 tab(s) orally once a day  digoxin 125 mcg (0.125 mg) oral tablet: 1 tab(s) orally once a day   insulin glargine: 20 unit(s) subcutaneous once a day (at bedtime)  insulin glargine: 25 unit(s) subcutaneous once a day  insulin lispro 100 units/mL injectable solution: 7 unit(s) injectable 3 times a day (with meals)  Keppra 500 mg oral tablet: 1 tab(s) orally 2 times a day  lisinopril 20 mg oral tablet: 1 tab(s) orally once a day   metFORMIN 500 mg oral tablet: 1 tab(s) orally 2 times a day  metoprolol succinate 25 mg oral tablet, extended release: 1 tab(s) orally once a day  pravastatin 80 mg oral tablet: 1 tab(s) orally once a day (at bedtime)  predniSONE 10 mg oral tablet: 3 tab(s) orally once a day x 3 days  2 tab(s) orally once a day x 3 days  1 tab(s) orally once a day x 3 days  warfarin 5 mg oral tablet: 1 tab(s) orally once a day

## 2020-12-09 NOTE — DISCHARGE NOTE NURSING/CASE MANAGEMENT/SOCIAL WORK - PATIENT PORTAL LINK FT
You can access the FollowMyHealth Patient Portal offered by [facility] by registering at the following website: [email or web address]. By joining [employer]’s FollowMyHealth portal, you will also be able to view your health information using other applications (apps) compatible with our system.

## 2020-12-09 NOTE — ADVANCED PRACTICE NURSE CONSULT - RECOMMEDATIONS
after chart review pls consider decrease pm lantus to 18 units and  increase premeal admelog to 7 units while on steroids.

## 2020-12-09 NOTE — PROGRESS NOTE ADULT - SUBJECTIVE AND OBJECTIVE BOX
[place] CARDIOVASCULAR - [facility], THE HEART CENTER                                   [address] [place] [address]                                                      PHONE: [phone]                                                         FAX: [phone]  [email or web address]  ---------------------------------------------------------------------------------------------------------------------------------    Overnight events/patient complaints: patient seen at bedside. He denies chest pain or SOB above his baseline.       No Known Allergies    MEDICATIONS  (STANDING):  atorvastatin 10 milliGRAM(s) Oral at bedtime  budesonide 160 MICROgram(s)/formoterol 4.5 MICROgram(s) Inhaler 2 Puff(s) Inhalation two times a day  citalopram 40 milliGRAM(s) Oral daily  dextrose 40% Gel 15 Gram(s) Oral once  dextrose 5%. 1000 milliLiter(s) (50 mL/Hr) IV Continuous <Continuous>  dextrose 5%. 1000 milliLiter(s) (100 mL/Hr) IV Continuous <Continuous>  dextrose 50% Injectable 25 Gram(s) IV Push once  dextrose 50% Injectable 12.5 Gram(s) IV Push once  digoxin     Tablet 0.125 milliGRAM(s) Oral daily  diltiazem    milliGRAM(s) Oral daily  glucagon  Injectable 1 milliGRAM(s) IntraMuscular once  influenza   Vaccine 0.5 milliLiter(s) IntraMuscular once  insulin glargine Injectable (LANTUS) 25 Unit(s) SubCutaneous every morning  insulin glargine Injectable (LANTUS) 20 Unit(s) SubCutaneous at bedtime  insulin lispro (ADMELOG) corrective regimen sliding scale   SubCutaneous Before meals and at bedtime  insulin lispro Injectable (ADMELOG) 5 Unit(s) SubCutaneous three times a day before meals  ipratropium    for Nebulization 500 MICROGram(s) Nebulizer every 6 hours  lactobacillus acidophilus 1 Tablet(s) Oral two times a day with meals  levalbuterol Inhalation 0.63 milliGRAM(s) Inhalation every 6 hours  levETIRAcetam 500 milliGRAM(s) Oral two times a day  lisinopril 40 milliGRAM(s) Oral daily  predniSONE   Tablet 30 milliGRAM(s) Oral daily    MEDICATIONS  (PRN):      Vital Signs Last 24 Hrs  T(C): 36.4 (09 Dec 2020 04:51), Max: 36.9 (08 Dec 2020 20:54)  T(F): 97.5 (09 Dec 2020 04:51), Max: 98.4 (08 Dec 2020 20:54)  HR: 94 (09 Dec 2020 10:06) (72 - 163)  BP: 95/60 (09 Dec 2020 10:06) (89/57 - 127/73)  BP(mean): --  RR: 18 (09 Dec 2020 10:06) (18 - 18)  SpO2: 98% (09 Dec 2020 10:06) (94% - 99%)  ICU Vital Signs Last 24 Hrs  CLAUDIA MABRY  I&O's Detail    08 Dec 2020 07:01  -  09 Dec 2020 07:00  --------------------------------------------------------  IN:  Total IN: 0 mL    OUT:    Voided (mL): 1050 mL  Total OUT: 1050 mL    Total NET: -1050 mL      09 Dec 2020 07:01  -  09 Dec 2020 10:24  --------------------------------------------------------  IN:    Oral Fluid: 240 mL  Total IN: 240 mL    OUT:    Voided (mL): 300 mL  Total OUT: 300 mL    Total NET: -60 mL        Drug Dosing Weight  CLAUDIA MABRY      PHYSICAL EXAM:  General: Appears well developed, well nourished alert and cooperative.  HEENT: Head; normocephalic, atraumatic.  Eyes: Pupils reactive, cornea wnl.  Neck: Supple, no nodes adenopathy, no NVD or carotid bruit or thyromegaly.  CARDIOVASCULAR: Normal S1 and S2, No murmur, rub, gallop or lift.   LUNGS: No rales, rhonchi or wheeze. Normal breath sounds bilaterally.  ABDOMEN: Soft, nontender without mass or organomegaly. bowel sounds normoactive.  EXTREMITIES: No clubbing, cyanosis or edema. Distal pulses wnl.   SKIN: warm and dry with normal turgor.  NEURO: Alert/oriented x 3/normal motor exam. No pathologic reflexes.    PSYCH: normal affect.        LABS:                        11.4   14.71 )-----------( 238      ( 09 Dec 2020 07:44 )             37.8     12-09    144  |  97<L>  |  30.0<H>  ----------------------------<  99  4.5   |  43.0<H>  |  0.69    Ca    8.5<L>      09 Dec 2020 08:37  Phos  3.0     12-08  Mg     2.1     12-08    TPro  5.3<L>  /  Alb  2.9<L>  /  TBili  0.3<L>  /  DBili  x   /  AST  32  /  ALT  39  /  AlkPhos  78  12-09    CLAUDIA MABRY      PT/INR - ( 08 Dec 2020 07:02 )   PT: 17.0 sec;   INR: 1.49 ratio         PTT - ( 08 Dec 2020 07:02 )  PTT:29.2 sec      RADIOLOGY & ADDITIONAL STUDIES:    INTERPRETATION OF TELEMETRY (personally reviewed): atrial fibrillation at a controlled HR    ASSESSMENT AND PLAN:  70y Male h/o COPD, HTN, DM, permanent AF on coumadin, seizure d/o admitted initially to ICU with acute on chronic hypercapnic / hypoxic respiratory failure and COPD exacerbation clinically improving but with poorly rate controlled AF. He is not followed by a cardiologist as an outpatient. He denies palpitations or SOB at rest at this time. He does have SILVEIRA. HR has been labile but is controlled this morning.     Chronic Atrial Fibrillation, DM, HTN, COPD -- HR has been labile but is controlled overnight and this morning.  - continue Diltiazem 480 mg daily (max dose) and Digoxin 125 mcg daily -- low/normal BP does not allow for uptitration of AV ant blockers at this time  - continue Coumadin goal INR 2-3    Will follow closely with you.

## 2020-12-09 NOTE — DISCHARGE NOTE PROVIDER - PROVIDER TOKENS
PROVIDER:[TOKEN:[[id number]],FOLLOWUP:[1 week]],PROVIDER:[TOKEN:[[id number]],FOLLOWUP:[1 week]],FREE:[LAST:[PCP],PHONE:[(   )    -],FAX:[(   )    -],FOLLOWUP:[1-3 days]]

## 2020-12-10 ENCOUNTER — INPATIENT (INPATIENT)
Facility: HOSPITAL | Age: 70
LOS: 21 days | Discharge: HOME | DRG: 246 | End: 2021-01-01
Attending: HOSPITALIST | Admitting: INTERNAL MEDICINE
Payer: MEDICARE

## 2020-12-10 VITALS — OXYGEN SATURATION: 93 % | HEART RATE: 124 BPM | DIASTOLIC BLOOD PRESSURE: 109 MMHG | SYSTOLIC BLOOD PRESSURE: 155 MMHG

## 2020-12-10 DIAGNOSIS — Z98.89 OTHER SPECIFIED POSTPROCEDURAL STATES: Chronic | ICD-10-CM

## 2020-12-10 DIAGNOSIS — K46.9 UNSPECIFIED ABDOMINAL HERNIA WITHOUT OBSTRUCTION OR GANGRENE: Chronic | ICD-10-CM

## 2020-12-10 DIAGNOSIS — J96.21 ACUTE AND CHRONIC RESPIRATORY FAILURE WITH HYPOXIA: ICD-10-CM

## 2020-12-10 LAB
ALBUMIN SERPL ELPH-MCNC: 3.8 G/DL — SIGNIFICANT CHANGE UP (ref 3.3–5.2)
ALP SERPL-CCNC: 111 U/L — SIGNIFICANT CHANGE UP (ref 40–120)
ALT FLD-CCNC: 113 U/L — HIGH
AMPHET UR-MCNC: NEGATIVE — SIGNIFICANT CHANGE UP
ANION GAP SERPL CALC-SCNC: 10 MMOL/L — SIGNIFICANT CHANGE UP (ref 5–17)
ANION GAP SERPL CALC-SCNC: 6 MMOL/L — SIGNIFICANT CHANGE UP (ref 5–17)
APPEARANCE UR: CLEAR — SIGNIFICANT CHANGE UP
APTT BLD: 31.1 SEC — SIGNIFICANT CHANGE UP (ref 27.5–35.5)
AST SERPL-CCNC: 63 U/L — HIGH
B PERT DNA SPEC QL NAA+PROBE: SIGNIFICANT CHANGE UP
BACTERIA # UR AUTO: ABNORMAL
BARBITURATES UR SCN-MCNC: NEGATIVE — SIGNIFICANT CHANGE UP
BASE EXCESS BLDA CALC-SCNC: 12.1 MMOL/L — HIGH (ref -2–2)
BASE EXCESS BLDA CALC-SCNC: 13.7 MMOL/L — HIGH (ref -2–2)
BASE EXCESS BLDV CALC-SCNC: 14.2 MMOL/L — HIGH (ref -2–2)
BASOPHILS # BLD AUTO: 0.21 K/UL — HIGH (ref 0–0.2)
BASOPHILS NFR BLD AUTO: 0.9 % — SIGNIFICANT CHANGE UP (ref 0–2)
BENZODIAZ UR-MCNC: NEGATIVE — SIGNIFICANT CHANGE UP
BILIRUB SERPL-MCNC: 0.2 MG/DL — LOW (ref 0.4–2)
BILIRUB UR-MCNC: NEGATIVE — SIGNIFICANT CHANGE UP
BLOOD GAS COMMENTS ARTERIAL: SIGNIFICANT CHANGE UP
BLOOD GAS COMMENTS ARTERIAL: SIGNIFICANT CHANGE UP
BUN SERPL-MCNC: 42 MG/DL — HIGH (ref 8–20)
BUN SERPL-MCNC: 55 MG/DL — HIGH (ref 8–20)
C PNEUM DNA SPEC QL NAA+PROBE: SIGNIFICANT CHANGE UP
CA-I SERPL-SCNC: 1.19 MMOL/L — SIGNIFICANT CHANGE UP (ref 1.15–1.33)
CALCIUM SERPL-MCNC: 8.7 MG/DL — SIGNIFICANT CHANGE UP (ref 8.6–10.2)
CALCIUM SERPL-MCNC: 8.9 MG/DL — SIGNIFICANT CHANGE UP (ref 8.6–10.2)
CHLORIDE BLDV-SCNC: SIGNIFICANT CHANGE UP MMOL/L (ref 98–107)
CHLORIDE SERPL-SCNC: 93 MMOL/L — LOW (ref 98–107)
CHLORIDE SERPL-SCNC: 93 MMOL/L — LOW (ref 98–107)
CO2 SERPL-SCNC: 37 MMOL/L — HIGH (ref 22–29)
CO2 SERPL-SCNC: 45 MMOL/L — CRITICAL HIGH (ref 22–29)
COCAINE METAB.OTHER UR-MCNC: NEGATIVE — SIGNIFICANT CHANGE UP
COLOR SPEC: YELLOW — SIGNIFICANT CHANGE UP
CREAT SERPL-MCNC: 0.81 MG/DL — SIGNIFICANT CHANGE UP (ref 0.5–1.3)
CREAT SERPL-MCNC: 0.99 MG/DL — SIGNIFICANT CHANGE UP (ref 0.5–1.3)
DIFF PNL FLD: ABNORMAL
DIGOXIN SERPL-MCNC: 0.9 NG/ML — SIGNIFICANT CHANGE UP (ref 0.8–2)
EOSINOPHIL # BLD AUTO: 0.21 K/UL — SIGNIFICANT CHANGE UP (ref 0–0.5)
EOSINOPHIL NFR BLD AUTO: 0.9 % — SIGNIFICANT CHANGE UP (ref 0–6)
EPI CELLS # UR: SIGNIFICANT CHANGE UP
FLUAV H1 2009 PAND RNA SPEC QL NAA+PROBE: SIGNIFICANT CHANGE UP
FLUAV H1 RNA SPEC QL NAA+PROBE: SIGNIFICANT CHANGE UP
FLUAV H3 RNA SPEC QL NAA+PROBE: SIGNIFICANT CHANGE UP
FLUAV SUBTYP SPEC NAA+PROBE: SIGNIFICANT CHANGE UP
FLUBV RNA SPEC QL NAA+PROBE: SIGNIFICANT CHANGE UP
GAS PNL BLDA: SIGNIFICANT CHANGE UP
GAS PNL BLDA: SIGNIFICANT CHANGE UP
GAS PNL BLDV: 146 MMOL/L — HIGH (ref 135–145)
GAS PNL BLDV: SIGNIFICANT CHANGE UP
GAS PNL BLDV: SIGNIFICANT CHANGE UP
GLUCOSE BLDC GLUCOMTR-MCNC: 315 MG/DL — HIGH (ref 70–99)
GLUCOSE BLDC GLUCOMTR-MCNC: 412 MG/DL — HIGH (ref 70–99)
GLUCOSE BLDC GLUCOMTR-MCNC: 415 MG/DL — HIGH (ref 70–99)
GLUCOSE BLDV-MCNC: 234 MG/DL — HIGH (ref 70–99)
GLUCOSE SERPL-MCNC: 249 MG/DL — HIGH (ref 70–99)
GLUCOSE SERPL-MCNC: 452 MG/DL — HIGH (ref 70–99)
GLUCOSE UR QL: 50 MG/DL
HADV DNA SPEC QL NAA+PROBE: SIGNIFICANT CHANGE UP
HCO3 BLDA-SCNC: 36 MMOL/L — HIGH (ref 20–26)
HCO3 BLDA-SCNC: 38 MMOL/L — HIGH (ref 20–26)
HCO3 BLDV-SCNC: 38 MMOL/L — HIGH (ref 21–29)
HCOV PNL SPEC NAA+PROBE: SIGNIFICANT CHANGE UP
HCT VFR BLD CALC: 47 % — SIGNIFICANT CHANGE UP (ref 39–50)
HCT VFR BLDA CALC: 43 — SIGNIFICANT CHANGE UP (ref 39–50)
HGB BLD CALC-MCNC: 14.1 G/DL — SIGNIFICANT CHANGE UP (ref 13–17)
HGB BLD-MCNC: 13.5 G/DL — SIGNIFICANT CHANGE UP (ref 13–17)
HMPV RNA SPEC QL NAA+PROBE: SIGNIFICANT CHANGE UP
HOROWITZ INDEX BLDA+IHG-RTO: 100 — SIGNIFICANT CHANGE UP
HOROWITZ INDEX BLDA+IHG-RTO: 50 — SIGNIFICANT CHANGE UP
HPIV1 RNA SPEC QL NAA+PROBE: SIGNIFICANT CHANGE UP
HPIV2 RNA SPEC QL NAA+PROBE: SIGNIFICANT CHANGE UP
HPIV3 RNA SPEC QL NAA+PROBE: SIGNIFICANT CHANGE UP
HPIV4 RNA SPEC QL NAA+PROBE: SIGNIFICANT CHANGE UP
HYALINE CASTS # UR AUTO: ABNORMAL /LPF
INR BLD: 2.13 RATIO — HIGH (ref 0.88–1.16)
KETONES UR-MCNC: NEGATIVE — SIGNIFICANT CHANGE UP
LACTATE BLDV-MCNC: 0.4 MMOL/L — LOW (ref 0.5–2)
LACTATE SERPL-SCNC: 2.5 MMOL/L — HIGH (ref 0.5–2)
LEUKOCYTE ESTERASE UR-ACNC: NEGATIVE — SIGNIFICANT CHANGE UP
LYMPHOCYTES # BLD AUTO: 2.24 K/UL — SIGNIFICANT CHANGE UP (ref 1–3.3)
LYMPHOCYTES # BLD AUTO: 9.5 % — LOW (ref 13–44)
MANUAL SMEAR VERIFICATION: SIGNIFICANT CHANGE UP
MCHC RBC-ENTMCNC: 27.2 PG — SIGNIFICANT CHANGE UP (ref 27–34)
MCHC RBC-ENTMCNC: 28.7 GM/DL — LOW (ref 32–36)
MCV RBC AUTO: 94.8 FL — SIGNIFICANT CHANGE UP (ref 80–100)
METAMYELOCYTES # FLD: 4.3 % — HIGH (ref 0–0)
METHADONE UR-MCNC: NEGATIVE — SIGNIFICANT CHANGE UP
MONOCYTES # BLD AUTO: 1.84 K/UL — HIGH (ref 0–0.9)
MONOCYTES NFR BLD AUTO: 7.8 % — SIGNIFICANT CHANGE UP (ref 2–14)
NEUTROPHILS # BLD AUTO: 17.85 K/UL — HIGH (ref 1.8–7.4)
NEUTROPHILS NFR BLD AUTO: 74.8 % — SIGNIFICANT CHANGE UP (ref 43–77)
NEUTS BAND # BLD: 0.9 % — SIGNIFICANT CHANGE UP (ref 0–8)
NITRITE UR-MCNC: NEGATIVE — SIGNIFICANT CHANGE UP
NT-PROBNP SERPL-SCNC: 947 PG/ML — HIGH (ref 0–300)
OPIATES UR-MCNC: NEGATIVE — SIGNIFICANT CHANGE UP
OTHER CELLS CSF MANUAL: 19 ML/DL — SIGNIFICANT CHANGE UP (ref 18–22)
PCO2 BLDA: 74 MMHG — CRITICAL HIGH (ref 35–45)
PCO2 BLDA: 84 MMHG — CRITICAL HIGH (ref 35–45)
PCO2 BLDV: >108 MMHG — HIGH (ref 35–50)
PCP SPEC-MCNC: SIGNIFICANT CHANGE UP
PCP UR-MCNC: NEGATIVE — SIGNIFICANT CHANGE UP
PH BLDA: 7.32 — LOW (ref 7.35–7.45)
PH BLDA: 7.35 — SIGNIFICANT CHANGE UP (ref 7.35–7.45)
PH BLDV: 7.11 — CRITICAL LOW (ref 7.32–7.43)
PH UR: 6 — SIGNIFICANT CHANGE UP (ref 5–8)
PLAT MORPH BLD: NORMAL — SIGNIFICANT CHANGE UP
PLATELET # BLD AUTO: 339 K/UL — SIGNIFICANT CHANGE UP (ref 150–400)
PO2 BLDA: 144 MMHG — HIGH (ref 83–108)
PO2 BLDA: 236 MMHG — HIGH (ref 83–108)
PO2 BLDV: 172 MMHG — HIGH (ref 25–45)
POLYCHROMASIA BLD QL SMEAR: SLIGHT — SIGNIFICANT CHANGE UP
POTASSIUM BLDV-SCNC: 5.7 MMOL/L — HIGH (ref 3.4–4.5)
POTASSIUM SERPL-MCNC: 5.6 MMOL/L — HIGH (ref 3.5–5.3)
POTASSIUM SERPL-MCNC: 5.8 MMOL/L — HIGH (ref 3.5–5.3)
POTASSIUM SERPL-SCNC: 5.6 MMOL/L — HIGH (ref 3.5–5.3)
POTASSIUM SERPL-SCNC: 5.8 MMOL/L — HIGH (ref 3.5–5.3)
PROT SERPL-MCNC: 6.7 G/DL — SIGNIFICANT CHANGE UP (ref 6.6–8.7)
PROT UR-MCNC: 100 MG/DL
PROTHROM AB SERPL-ACNC: 23.8 SEC — HIGH (ref 10.6–13.6)
RAPID RVP RESULT: SIGNIFICANT CHANGE UP
RBC # BLD: 4.96 M/UL — SIGNIFICANT CHANGE UP (ref 4.2–5.8)
RBC # FLD: 14 % — SIGNIFICANT CHANGE UP (ref 10.3–14.5)
RBC BLD AUTO: ABNORMAL
RBC CASTS # UR COMP ASSIST: ABNORMAL /HPF (ref 0–4)
RV+EV RNA SPEC QL NAA+PROBE: SIGNIFICANT CHANGE UP
SAO2 % BLDA: 100 % — HIGH (ref 95–99)
SAO2 % BLDA: 99 % — SIGNIFICANT CHANGE UP (ref 95–99)
SAO2 % BLDV: 99 % — SIGNIFICANT CHANGE UP
SARS-COV-2 RNA SPEC QL NAA+PROBE: SIGNIFICANT CHANGE UP
SODIUM SERPL-SCNC: 140 MMOL/L — SIGNIFICANT CHANGE UP (ref 135–145)
SODIUM SERPL-SCNC: 144 MMOL/L — SIGNIFICANT CHANGE UP (ref 135–145)
SP GR SPEC: 1.02 — SIGNIFICANT CHANGE UP (ref 1.01–1.02)
STOMATOCYTES BLD QL SMEAR: SLIGHT — SIGNIFICANT CHANGE UP
THC UR QL: NEGATIVE — SIGNIFICANT CHANGE UP
TROPONIN T SERPL-MCNC: 0.03 NG/ML — SIGNIFICANT CHANGE UP (ref 0–0.06)
TROPONIN T SERPL-MCNC: <0.01 NG/ML — SIGNIFICANT CHANGE UP (ref 0–0.06)
UROBILINOGEN FLD QL: NEGATIVE MG/DL — SIGNIFICANT CHANGE UP
VARIANT LYMPHS # BLD: 0.9 % — SIGNIFICANT CHANGE UP (ref 0–6)
WBC # BLD: 23.58 K/UL — HIGH (ref 3.8–10.5)
WBC # FLD AUTO: 23.58 K/UL — HIGH (ref 3.8–10.5)
WBC UR QL: SIGNIFICANT CHANGE UP

## 2020-12-10 PROCEDURE — 71045 X-RAY EXAM CHEST 1 VIEW: CPT | Mod: 26

## 2020-12-10 PROCEDURE — 36556 INSERT NON-TUNNEL CV CATH: CPT

## 2020-12-10 PROCEDURE — 71045 X-RAY EXAM CHEST 1 VIEW: CPT | Mod: 26,77

## 2020-12-10 PROCEDURE — 31500 INSERT EMERGENCY AIRWAY: CPT

## 2020-12-10 PROCEDURE — 99291 CRITICAL CARE FIRST HOUR: CPT | Mod: 25

## 2020-12-10 PROCEDURE — 70450 CT HEAD/BRAIN W/O DYE: CPT | Mod: 26

## 2020-12-10 PROCEDURE — 99292 CRITICAL CARE ADDL 30 MIN: CPT | Mod: 25

## 2020-12-10 RX ORDER — DEXTROSE 50 % IN WATER 50 %
50 SYRINGE (ML) INTRAVENOUS ONCE
Refills: 0 | Status: COMPLETED | OUTPATIENT
Start: 2020-12-10 | End: 2020-12-10

## 2020-12-10 RX ORDER — MAGNESIUM SULFATE 500 MG/ML
2 VIAL (ML) INJECTION ONCE
Refills: 0 | Status: COMPLETED | OUTPATIENT
Start: 2020-12-10 | End: 2020-12-10

## 2020-12-10 RX ORDER — INSULIN HUMAN 100 [IU]/ML
10 INJECTION, SOLUTION SUBCUTANEOUS ONCE
Refills: 0 | Status: COMPLETED | OUTPATIENT
Start: 2020-12-10 | End: 2020-12-10

## 2020-12-10 RX ORDER — SODIUM CHLORIDE 9 MG/ML
1000 INJECTION, SOLUTION INTRAVENOUS ONCE
Refills: 0 | Status: COMPLETED | OUTPATIENT
Start: 2020-12-10 | End: 2020-12-10

## 2020-12-10 RX ORDER — SODIUM BICARBONATE 1 MEQ/ML
50 SYRINGE (ML) INTRAVENOUS ONCE
Refills: 0 | Status: COMPLETED | OUTPATIENT
Start: 2020-12-10 | End: 2020-12-10

## 2020-12-10 RX ORDER — SODIUM CHLORIDE 9 MG/ML
2300 INJECTION, SOLUTION INTRAVENOUS ONCE
Refills: 0 | Status: COMPLETED | OUTPATIENT
Start: 2020-12-10 | End: 2020-12-10

## 2020-12-10 RX ORDER — PIPERACILLIN AND TAZOBACTAM 4; .5 G/20ML; G/20ML
3.38 INJECTION, POWDER, LYOPHILIZED, FOR SOLUTION INTRAVENOUS EVERY 8 HOURS
Refills: 0 | Status: COMPLETED | OUTPATIENT
Start: 2020-12-10 | End: 2020-12-17

## 2020-12-10 RX ORDER — PIPERACILLIN AND TAZOBACTAM 4; .5 G/20ML; G/20ML
3.38 INJECTION, POWDER, LYOPHILIZED, FOR SOLUTION INTRAVENOUS ONCE
Refills: 0 | Status: COMPLETED | OUTPATIENT
Start: 2020-12-10 | End: 2020-12-10

## 2020-12-10 RX ORDER — CHLORHEXIDINE GLUCONATE 213 G/1000ML
15 SOLUTION TOPICAL EVERY 12 HOURS
Refills: 0 | Status: DISCONTINUED | OUTPATIENT
Start: 2020-12-10 | End: 2020-12-12

## 2020-12-10 RX ORDER — AMIODARONE HYDROCHLORIDE 400 MG/1
1 TABLET ORAL
Qty: 900 | Refills: 0 | Status: DISCONTINUED | OUTPATIENT
Start: 2020-12-10 | End: 2020-12-11

## 2020-12-10 RX ORDER — AMIODARONE HYDROCHLORIDE 400 MG/1
1 TABLET ORAL
Qty: 900 | Refills: 0 | Status: DISCONTINUED | OUTPATIENT
Start: 2020-12-10 | End: 2020-12-10

## 2020-12-10 RX ORDER — INSULIN GLARGINE 100 [IU]/ML
20 INJECTION, SOLUTION SUBCUTANEOUS AT BEDTIME
Refills: 0 | Status: DISCONTINUED | OUTPATIENT
Start: 2020-12-10 | End: 2020-12-11

## 2020-12-10 RX ORDER — ROCURONIUM BROMIDE 10 MG/ML
100 VIAL (ML) INTRAVENOUS ONCE
Refills: 0 | Status: COMPLETED | OUTPATIENT
Start: 2020-12-10 | End: 2020-12-10

## 2020-12-10 RX ORDER — SODIUM CHLORIDE 9 MG/ML
1000 INJECTION, SOLUTION INTRAVENOUS
Refills: 0 | Status: DISCONTINUED | OUTPATIENT
Start: 2020-12-10 | End: 2020-12-11

## 2020-12-10 RX ORDER — AMIODARONE HYDROCHLORIDE 400 MG/1
0.5 TABLET ORAL
Qty: 900 | Refills: 0 | Status: DISCONTINUED | OUTPATIENT
Start: 2020-12-10 | End: 2020-12-11

## 2020-12-10 RX ORDER — DEXTROSE 50 % IN WATER 50 %
15 SYRINGE (ML) INTRAVENOUS ONCE
Refills: 0 | Status: DISCONTINUED | OUTPATIENT
Start: 2020-12-10 | End: 2020-12-16

## 2020-12-10 RX ORDER — GLUCAGON INJECTION, SOLUTION 0.5 MG/.1ML
1 INJECTION, SOLUTION SUBCUTANEOUS ONCE
Refills: 0 | Status: DISCONTINUED | OUTPATIENT
Start: 2020-12-10 | End: 2021-01-01

## 2020-12-10 RX ORDER — PHENYLEPHRINE HYDROCHLORIDE 10 MG/ML
0.05 INJECTION INTRAVENOUS
Qty: 40 | Refills: 0 | Status: DISCONTINUED | OUTPATIENT
Start: 2020-12-10 | End: 2020-12-12

## 2020-12-10 RX ORDER — SODIUM CHLORIDE 9 MG/ML
1000 INJECTION, SOLUTION INTRAVENOUS
Refills: 0 | Status: DISCONTINUED | OUTPATIENT
Start: 2020-12-10 | End: 2020-12-12

## 2020-12-10 RX ORDER — DEXTROSE 50 % IN WATER 50 %
25 SYRINGE (ML) INTRAVENOUS ONCE
Refills: 0 | Status: DISCONTINUED | OUTPATIENT
Start: 2020-12-10 | End: 2021-01-01

## 2020-12-10 RX ORDER — PROPOFOL 10 MG/ML
20 INJECTION, EMULSION INTRAVENOUS
Qty: 1000 | Refills: 0 | Status: DISCONTINUED | OUTPATIENT
Start: 2020-12-10 | End: 2020-12-12

## 2020-12-10 RX ORDER — EPINEPHRINE 0.3 MG/.3ML
0.3 INJECTION INTRAMUSCULAR; SUBCUTANEOUS ONCE
Refills: 0 | Status: COMPLETED | OUTPATIENT
Start: 2020-12-10 | End: 2020-12-10

## 2020-12-10 RX ORDER — LEVETIRACETAM 250 MG/1
500 TABLET, FILM COATED ORAL EVERY 12 HOURS
Refills: 0 | Status: DISCONTINUED | OUTPATIENT
Start: 2020-12-10 | End: 2020-12-30

## 2020-12-10 RX ORDER — PANTOPRAZOLE SODIUM 20 MG/1
40 TABLET, DELAYED RELEASE ORAL DAILY
Refills: 0 | Status: DISCONTINUED | OUTPATIENT
Start: 2020-12-10 | End: 2020-12-11

## 2020-12-10 RX ORDER — NOREPINEPHRINE BITARTRATE/D5W 8 MG/250ML
0.05 PLASTIC BAG, INJECTION (ML) INTRAVENOUS
Qty: 8 | Refills: 0 | Status: DISCONTINUED | OUTPATIENT
Start: 2020-12-10 | End: 2020-12-10

## 2020-12-10 RX ORDER — VANCOMYCIN HCL 1 G
1000 VIAL (EA) INTRAVENOUS ONCE
Refills: 0 | Status: COMPLETED | OUTPATIENT
Start: 2020-12-10 | End: 2020-12-10

## 2020-12-10 RX ORDER — KETAMINE HYDROCHLORIDE 100 MG/ML
300 INJECTION INTRAMUSCULAR; INTRAVENOUS ONCE
Refills: 0 | Status: DISCONTINUED | OUTPATIENT
Start: 2020-12-10 | End: 2020-12-10

## 2020-12-10 RX ORDER — INSULIN LISPRO 100/ML
VIAL (ML) SUBCUTANEOUS EVERY 6 HOURS
Refills: 0 | Status: DISCONTINUED | OUTPATIENT
Start: 2020-12-10 | End: 2020-12-11

## 2020-12-10 RX ORDER — DEXTROSE 50 % IN WATER 50 %
12.5 SYRINGE (ML) INTRAVENOUS ONCE
Refills: 0 | Status: DISCONTINUED | OUTPATIENT
Start: 2020-12-10 | End: 2021-01-01

## 2020-12-10 RX ORDER — TETANUS TOXOID, REDUCED DIPHTHERIA TOXOID AND ACELLULAR PERTUSSIS VACCINE, ADSORBED 5; 2.5; 8; 8; 2.5 [IU]/.5ML; [IU]/.5ML; UG/.5ML; UG/.5ML; UG/.5ML
0.5 SUSPENSION INTRAMUSCULAR ONCE
Refills: 0 | Status: COMPLETED | OUTPATIENT
Start: 2020-12-10 | End: 2020-12-10

## 2020-12-10 RX ORDER — CHLORHEXIDINE GLUCONATE 213 G/1000ML
1 SOLUTION TOPICAL
Refills: 0 | Status: DISCONTINUED | OUTPATIENT
Start: 2020-12-10 | End: 2021-01-01

## 2020-12-10 RX ADMIN — INSULIN HUMAN 10 UNIT(S): 100 INJECTION, SOLUTION SUBCUTANEOUS at 19:18

## 2020-12-10 RX ADMIN — Medication 125 MILLIGRAM(S): at 12:00

## 2020-12-10 RX ADMIN — PIPERACILLIN AND TAZOBACTAM 25 GRAM(S): 4; .5 INJECTION, POWDER, LYOPHILIZED, FOR SOLUTION INTRAVENOUS at 22:02

## 2020-12-10 RX ADMIN — SODIUM CHLORIDE 75 MILLILITER(S): 9 INJECTION, SOLUTION INTRAVENOUS at 23:15

## 2020-12-10 RX ADMIN — Medication 8: at 19:18

## 2020-12-10 RX ADMIN — Medication 50 MILLILITER(S): at 19:18

## 2020-12-10 RX ADMIN — AMIODARONE HYDROCHLORIDE 33.3 MG/MIN: 400 TABLET ORAL at 17:39

## 2020-12-10 RX ADMIN — PROPOFOL 13.9 MICROGRAM(S)/KG/MIN: 10 INJECTION, EMULSION INTRAVENOUS at 13:12

## 2020-12-10 RX ADMIN — PROPOFOL 13.9 MICROGRAM(S)/KG/MIN: 10 INJECTION, EMULSION INTRAVENOUS at 22:01

## 2020-12-10 RX ADMIN — SODIUM CHLORIDE 2300 MILLILITER(S): 9 INJECTION, SOLUTION INTRAVENOUS at 12:00

## 2020-12-10 RX ADMIN — SODIUM CHLORIDE 1000 MILLILITER(S): 9 INJECTION, SOLUTION INTRAVENOUS at 22:22

## 2020-12-10 RX ADMIN — Medication 50 GRAM(S): at 12:00

## 2020-12-10 RX ADMIN — Medication 100 MILLIGRAM(S): at 12:05

## 2020-12-10 RX ADMIN — Medication 250 MILLIGRAM(S): at 17:14

## 2020-12-10 RX ADMIN — KETAMINE HYDROCHLORIDE 300 MILLIGRAM(S): 100 INJECTION INTRAMUSCULAR; INTRAVENOUS at 12:05

## 2020-12-10 RX ADMIN — PHENYLEPHRINE HYDROCHLORIDE 2.18 MICROGRAM(S)/KG/MIN: 10 INJECTION INTRAVENOUS at 18:26

## 2020-12-10 RX ADMIN — Medication 10.9 MICROGRAM(S)/KG/MIN: at 17:16

## 2020-12-10 RX ADMIN — EPINEPHRINE 0.3 MILLIGRAM(S): 0.3 INJECTION INTRAMUSCULAR; SUBCUTANEOUS at 12:00

## 2020-12-10 RX ADMIN — LEVETIRACETAM 420 MILLIGRAM(S): 250 TABLET, FILM COATED ORAL at 19:03

## 2020-12-10 RX ADMIN — Medication 50 MILLIEQUIVALENT(S): at 19:07

## 2020-12-10 RX ADMIN — TETANUS TOXOID, REDUCED DIPHTHERIA TOXOID AND ACELLULAR PERTUSSIS VACCINE, ADSORBED 0.5 MILLILITER(S): 5; 2.5; 8; 8; 2.5 SUSPENSION INTRAMUSCULAR at 13:08

## 2020-12-10 RX ADMIN — PIPERACILLIN AND TAZOBACTAM 200 GRAM(S): 4; .5 INJECTION, POWDER, LYOPHILIZED, FOR SOLUTION INTRAVENOUS at 13:08

## 2020-12-10 NOTE — ED PROVIDER NOTE - PHYSICAL EXAMINATION
Gen: agonally breathing, not responsive to pain or oral airway  Head: NCAT  HEENT: oral mucosa moist  Lung: agonal breathing, quiet chest  CV: tachy, irregular,   Abd: soft, grossly distended  MSK: No edema, no visible deformities  Neuro: not responding to pain, pupils sluggish  Skin: cool, clammy, skin tear left forearm

## 2020-12-10 NOTE — ED ADULT TRIAGE NOTE - CHIEF COMPLAINT QUOTE
Found unconscious barely responsive to pain, unknown covid status. Hypoxic on scene, chronic COPD on home O2. Brought to critical care, Dr. Ramirez at bedside.

## 2020-12-10 NOTE — H&P ADULT - NSHPPHYSICALEXAM_GEN_ALL_CORE
General: Intubated.  Eyes: PERRL, sluggish.  Lungs: Clear to ascultation b/l.  Cardiac: Tachycardic, +s1/s2, no murmurs appreciated.  Abd: Soft, nontender, nondistended.  Extremities: No lower extremity edema b/l.  Skin: Mottled.  Neuro: Off sedation, not moving extremities, unresponsive to painful stimuli.

## 2020-12-10 NOTE — H&P ADULT - ASSESSMENT
71 y/o M w/ a PMHx of COPD (noncompliant w/ CPAP), HTN, type 2 DM, afib (on Coumadin), and seizure disorder admitted w/:    1. Acute hypoxic/hypercapnic respiratory failure  2. Cardiac arrest  3. V tach  4. PNA  5. Hyperkalemia  6. Hyperglycemia    PLAN:  Neuro: Hypothermia protocol s/p cardiac arrest, however, currently auto-cooled (92 degrees). No sedation to assess mental status. Continue Keppra 500mg BID. CT head negative for acute intracranial event.  Cardiac: Actively titrating vasopressor to maintains MAP > 65. Will switch Levophed to Emre in setting of tachycardia. S/p PEA arrest --> v tach w/ a pulse, continue w/ amio at 1mg/min x 6 hours and then 0.5mg/min x 18 hrs. TTE on 12/1/20 w/ EF 65-70% and grade 1 diastolic dysfunction. Cardiology consulted.  Pulm: On full ventilatory support.  GI: NPO. Start tube feeds in AM.    Endo: Insulin s/s q6 hrs while NPO, Lantus 20u at bedtime. Goal FS < 180. F/u HgbA1C in AM.  Heme: Mechanical DVT prophylaxis w/ SCDs. Obtain     Dispo: Full code. Pt's son present at bedside in ED, updated on pt's current condition.    Case discussed w/ ICU attending, Dr. Rollins.    CRITICAL CARE TIME SPENT: 42 minutes 69 y/o M w/ a PMHx of COPD (noncompliant w/ CPAP), HTN, type 2 DM, afib (on Coumadin), and seizure disorder admitted w/:    1. Acute hypoxic/hypercapnic respiratory failure  2. Cardiac arrest  3. V tach  4. PNA  5. Hyperkalemia  6. Hyperglycemia    PLAN:  Neuro: Hypothermia protocol s/p cardiac arrest, however, currently auto-cooled (92 degrees). No sedation to assess mental status. Continue Keppra 500mg BID. CT head negative for acute intracranial event.  Cardiac: Actively titrating vasopressor to maintain MAP > 65. Will switch Levophed to Emre in setting of tachycardia. S/p PEA arrest --> v tach w/ a pulse, continue w/ amio at 1mg/min x 6 hours and then 0.5mg/min x 18 hrs. TTE on 12/1/20 w/ EF 65-70% and grade 1 diastolic dysfunction. Cardiology consulted.  Pulm: On full ventilatory support. Actively titrating FiO2 and PEEP to maintain SpO2 > 88%. Daily SBT/SAT as tolerated. Vent bundle in place. Chronic CO2 retainer, serum CO2 43 on discharge.   GI: NPO. Start tube feeds in AM.  Renal: Trend renal function. Monitor urine output. Hyperkalemic (K 5.8), treated w/ insulin, d50, and sodium bicarb. F/u repeat BMP.  ID: Leukocytosis, hypothermic. Continue to trend. CXR revealing bibasilar infiltrates. Treat empirically w/ Zosyn and Vanco. UA (-). F/u blood cultures, urine culture, sputum culture, and MRSA PCR.  Endo: Insulin s/s q6 hrs while NPO, Lantus 20u at bedtime. Goal FS < 180. F/u HgbA1C in AM.  Heme: Mechanical DVT prophylaxis w/ SCDs. On Coumadin, INR currently therapeutic. F/u repeat coags in AM and start heparin gtt per cardiology recs.    Dispo: Full code. Pt's son present at bedside in ED, updated on pt's current condition.    Case discussed w/ ICU attending, Dr. Rollins.    CRITICAL CARE TIME SPENT: 42 minutes

## 2020-12-10 NOTE — ED PROVIDER NOTE - PROGRESS NOTE DETAILS
patient switched to portable monitor, CO2 noed to drop to 15 on monitor, patient found to be pulseless, CPR initiated, patient given 2 rounds of Epi, HCO x1, CaCl x1, Mg x1, ROSC achieved, found in Vtach with pulse cardioverted at 100, 200x2, and 300 with conversion to sinus. BP hypertensive. continue propofol. EKG no STEMI. ICU and Cards consutled. Amio bolus and drip started. -Ashley DO patient found to be hypotensive, levophed started, propofol  held. a few beats of Vtach, CT head no ICH. central line placed. pending ICU consult -Ashley URBANO case discussed with family during code, patient is FULL CODE.

## 2020-12-10 NOTE — ED PROVIDER NOTE - CRITICAL CARE PROVIDED
additional history taking/conducted a detailed discussion of DNR status/telephone consultation with the patient's family/interpretation of diagnostic studies/consultation with other physicians/direct patient care (not related to procedure)/documentation

## 2020-12-10 NOTE — CONSULT NOTE ADULT - SUBJECTIVE AND OBJECTIVE BOX
[facility], THE HEART CENTER                                   [address] [place] [address]                                                      PHONE: [phone]                                                         FAX: [phone]  [email or web address]  ---------------------------------------------------------------------------------------------------------------------------------    HPI:  CLAUDIA MABRY is an 70y Male PMHX COPD, HTN, DM, permanent AF on coumadin, seizure d/o who was just  admitted  and discharged yesterday after being initially in ICU with acute on chronic hypercapnic / hypoxic respiratory failure and COPD exacerbation.   Pt had AF - that was poorly controlled during the hospitalization.  Pt now presents to [facility] ED with Cardiac Arrest. PT with reported VT.       ROS unable to be obtained due to clinical condition.     PAST MEDICAL & SURGICAL HISTORY:  Seizure    Depression, unspecified depression type    Diabetes    Hypertension    Hyperlipidemia    Afib    COPD (chronic obstructive pulmonary disease)    Abdominal hernia    H/O carotid endarterectomy        No Known Allergies      MEDICATIONS  (STANDING):  chlorhexidine 0.12% Liquid 15 milliLiter(s) Oral Mucosa every 12 hours  norepinephrine Infusion 0.05 MICROgram(s)/kG/Min (10.9 mL/Hr) IV Continuous <Continuous>  propofol Infusion 20 MICROgram(s)/kG/Min (13.9 mL/Hr) IV Continuous <Continuous>  vancomycin  IVPB 1000 milliGRAM(s) IV Intermittent once    MEDICATIONS  (PRN):      Family History: Pt denies hx of early cad, SCD, or congenital heart disease.      Social History:  Cigarettes:   former                 Alchohol:   no              Illicit Drug Abuse:  no    ROS:    Extensively Reviewed with pertinents as per HPI the remainder were negative.      Vital Signs Last 24 Hrs  T(C): 36.7 (09 Dec 2020 16:49), Max: 36.7 (09 Dec 2020 16:49)  T(F): 98 (09 Dec 2020 16:49), Max: 98 (09 Dec 2020 16:49)  HR: 120 (10 Dec 2020 14:31) (76 - 127)  BP: 113/88 (10 Dec 2020 14:31) (80/51 - 169/112)  BP(mean): 98 (10 Dec 2020 14:31) (61 - 108)  RR: 16 (10 Dec 2020 14:31) (16 - 18)  SpO2: 100% (10 Dec 2020 14:31) (92% - 100%)  ICU Vital Signs Last 24 Hrs  CLAUDIA MABRY  I&O's Detail    I&O's Summary    Drug Dosing Weight  CLAUDIA RAGHAVENDRA  Mode: AC/ CMV (Assist Control/ Continuous Mandatory Ventilation), RR (machine): 16, TV (machine): 480, FiO2: 50, PEEP: 5, MAP: 12, PIP: 45    PHYSICAL EXAM:  General: Appears well developed, well nourished alert and cooperative.  HEENT: Head; normocephalic, atraumatic.  Eyes: Pupils reactive, cornea wnl.  Neck: Supple, no nodes adenopathy, no NVD or carotid bruit or thyromegaly.  CARDIOVASCULAR: Normal S1 and S2, No murmur, rub, gallop or lift.   LUNGS: No rales, rhonchi or wheeze. Normal breath sounds bilaterally.  ABDOMEN: Soft, nontender without mass or organomegaly. bowel sounds normoactive.  EXTREMITIES: No clubbing, cyanosis or edema. Distal pulses wnl.   SKIN: warm and dry with normal turgor.  NEURO: Alert/oriented x 3/normal motor exam. No pathologic reflexes.    PSYCH: normal affect.        LABS:                        13.5   23.58 )-----------( 339      ( 10 Dec 2020 12:17 )             47.0     12-10    144  |  93<L>  |  42.0<H>  ----------------------------<  249<H>  5.8<H>   |  45.0<HH>  |  0.81    Ca    8.9      10 Dec 2020 12:17    TPro  6.7  /  Alb  3.8  /  TBili  0.2<L>  /  DBili  x   /  AST  63<H>  /  ALT  113<H>  /  AlkPhos  111  12-10    CLAUDIA MABRY  CARDIAC MARKERS ( 10 Dec 2020 12:17 )  x     / <0.01 ng/mL / x     / x     / x          PT/INR - ( 10 Dec 2020 12:17 )   PT: 23.8 sec;   INR: 2.13 ratio         PTT - ( 10 Dec 2020 12:17 )  PTT:31.1 sec      RADIOLOGY & ADDITIONAL STUDIES:    INTERPRETATION OF TELEMETRY (personally reviewed):    ECG: pending     ECHO:  12/2/2020  Summary:   1. Technically difficult study. Measurements may not be accurate due to off axis views.   2. Endocardial visualization was enhanced with intravenous echo contrast.   3. Normal global left ventricular systolic function.   4. Left ventricular ejection fraction, by visual estimation, is 65 to 70%.   5. Moderately increased LV wall thickness.   6. Spectral Doppler shows impaired relaxation pattern of left ventricular myocardial filling (Grade I diastolic dysfunction).   7. Mild mitral annular calcification.   8. Mild thickening of the anterior and posterior mitral valve leaflets.   9. Trace mitral valve regurgitation.    MD Jamila Electronically signed on 12/2/2020 at 8:13:55 AM  STRESS TEST:     Assessment and Plan:  In summary, CLAUDIA MABRY is an 70y Male with past medical history significant for     Cardiac Arrest -  -code chill  -eventual cardiac cath pending neuro status.   -ICU  -Amio gtt           Thank you for allowing [facility] to participate in the care of this patient.  Please feel free to call with any questions or concerns. [facility], THE HEART CENTER                                   [address] [place] [address]                                                      PHONE: [phone]                                                         FAX: [phone]  [email or web address]  ---------------------------------------------------------------------------------------------------------------------------------    HPI:  CLAUDIA MABRY is an 70y Male PMHX COPD, HTN, DM, permanent AF on coumadin, seizure d/o who was just  admitted  and discharged yesterday after being initially in ICU with acute on chronic hypercapnic / hypoxic respiratory failure and COPD exacerbation.   Pt had AF - that was poorly controlled during the hospitalization.  Pt now presents to [facility] ED  wiht leathargy, unresponsive, and agonal breathing.  He was found to be htn SBP>200 upon admission.  In the Er pt developed PEA, he subsequently went into VT requiring multiple shocks.      ROS unable to be obtained due to clinical condition.     PAST MEDICAL & SURGICAL HISTORY:  Seizure    Depression, unspecified depression type    Diabetes    Hypertension    Hyperlipidemia    Afib    COPD (chronic obstructive pulmonary disease)    Abdominal hernia    H/O carotid endarterectomy        No Known Allergies      MEDICATIONS  (STANDING):  chlorhexidine 0.12% Liquid 15 milliLiter(s) Oral Mucosa every 12 hours  norepinephrine Infusion 0.05 MICROgram(s)/kG/Min (10.9 mL/Hr) IV Continuous <Continuous>  propofol Infusion 20 MICROgram(s)/kG/Min (13.9 mL/Hr) IV Continuous <Continuous>  vancomycin  IVPB 1000 milliGRAM(s) IV Intermittent once    MEDICATIONS  (PRN):      Family History: Pt denies hx of early cad, SCD, or congenital heart disease.      Social History:  Cigarettes:   former                 Alchohol:   no              Illicit Drug Abuse:  no    ROS:    Extensively Reviewed with pertinents as per HPI the remainder were negative.      Vital Signs Last 24 Hrs  T(C): 36.7 (09 Dec 2020 16:49), Max: 36.7 (09 Dec 2020 16:49)  T(F): 98 (09 Dec 2020 16:49), Max: 98 (09 Dec 2020 16:49)  HR: 120 (10 Dec 2020 14:31) (76 - 127)  BP: 113/88 (10 Dec 2020 14:31) (80/51 - 169/112)  BP(mean): 98 (10 Dec 2020 14:31) (61 - 108)  RR: 16 (10 Dec 2020 14:31) (16 - 18)  SpO2: 100% (10 Dec 2020 14:31) (92% - 100%)  ICU Vital Signs Last 24 Hrs  CLAUDIA MABRY  I&O's Detail    I&O's Summary    Drug Dosing Weight  CLAUDIA MABRY  Mode: AC/ CMV (Assist Control/ Continuous Mandatory Ventilation), RR (machine): 16, TV (machine): 480, FiO2: 50, PEEP: 5, MAP: 12, PIP: 45    PHYSICAL EXAM:  General: Appears well developed, well nourished - sedate   HEENT: Head; normocephalic, atraumatic.  Eyes: Pupils reactive, cornea wnl.  Neck: Supple, no nodes adenopathy, no NVD or carotid bruit or thyromegaly.  CARDIOVASCULAR: Normal S1 and S2, No murmur, rub, gallop or lift.   LUNGS: No rales, rhonchi or wheeze. Normal breath sounds bilaterally.  ABDOMEN: Soft, nontender without mass or organomegaly. bowel sounds normoactive.  EXTREMITIES: No clubbing, cyanosis or edema. Distal pulses wnl.   SKIN: warm and dry with normal turgor.  NEURO: sedate   PSYCH: sedate         LABS:                        13.5   23.58 )-----------( 339      ( 10 Dec 2020 12:17 )             47.0     12-10    144  |  93<L>  |  42.0<H>  ----------------------------<  249<H>  5.8<H>   |  45.0<HH>  |  0.81    Ca    8.9      10 Dec 2020 12:17    TPro  6.7  /  Alb  3.8  /  TBili  0.2<L>  /  DBili  x   /  AST  63<H>  /  ALT  113<H>  /  AlkPhos  111  12-10    CLAUDIA MABRY  CARDIAC MARKERS ( 10 Dec 2020 12:17 )  x     / <0.01 ng/mL / x     / x     / x          PT/INR - ( 10 Dec 2020 12:17 )   PT: 23.8 sec;   INR: 2.13 ratio         PTT - ( 10 Dec 2020 12:17 )  PTT:31.1 sec      RADIOLOGY & ADDITIONAL STUDIES:    INTERPRETATION OF TELEMETRY (personally reviewed):    ECG: pending     ECHO:  12/2/2020  Summary:   1. Technically difficult study. Measurements may not be accurate due to off axis views.   2. Endocardial visualization was enhanced with intravenous echo contrast.   3. Normal global left ventricular systolic function.   4. Left ventricular ejection fraction, by visual estimation, is 65 to 70%.   5. Moderately increased LV wall thickness.   6. Spectral Doppler shows impaired relaxation pattern of left ventricular myocardial filling (Grade I diastolic dysfunction).   7. Mild mitral annular calcification.   8. Mild thickening of the anterior and posterior mitral valve leaflets.   9. Trace mitral valve regurgitation.    MD Jamila Electronically signed on 12/2/2020 at 8:13:55 AM  STRESS TEST:     Assessment and Plan:  In summary, CLAUDIA MABRY is an 70y Male PMHX COPD, HTN, DM, permanent AF on coumadin, seizure d/o who was just  admitted  and discharged yesterday after being initially in ICU with acute on chronic hypercapnic / hypoxic respiratory failure and COPD exacerbation.   Pt had AF - that was poorly controlled during the hospitalization.  Pt now presents to [facility] ED  wiht leathargy, unresponsive, and agonal breathing.  He was found to be htn SBP>200 upon admission.  In the Er pt developed PEA, he subsequently went into VT requiring multiple shocks.      Cardiac Arrest -  -code chill  -eventual cardiac cath pending neuro status.   -ICU  -Amio gtt   -vent as per ICU team   -hold coumadin for now    -start heparin gtt   -ct head - no ICH     Thank you for allowing [facility] to participate in the care of this patient.  Please feel free to call with any questions or concerns.       I spent35 min - critical care time coordinating care of the pt with the primary team and family.  Pt being admitted to micu for Cardiac arrest.  Pt required intubation, CPR, and shocking for his life threatening condition.

## 2020-12-10 NOTE — H&P ADULT - HISTORY OF PRESENT ILLNESS
69 y/o M w/ a PMHx of COPD (noncompliant w/ CPAP), HTN, type 2 DM, afib (on Coumadin), and seizure disorder who presented unresponsive, hypoxic (SpO2 70s), and agonally breathing. Pt was subsequently intubated. Shortly after intubation, pt suffered from a PEA arrest. Pt received epi x2, bicarb, calcium, and mag. ROSC was achieved, found to be in v tach w/ a pulse. Pt was cardioverted at 100J, 200J x2, and 300J w/ conversion to sinus rhythm. Amio bolus and infusion initiated. Initially      69 y/o M w/ a PMHx of COPD (noncompliant w/ CPAP), HTN, type 2 DM, afib (on Coumadin), and seizure disorder who presented unresponsive, hypoxic (SpO2 70s), and agonally breathing. Pt was subsequently intubated. Shortly after intubation, pt suffered from a PEA arrest. Pt received epi x2, bicarb, calcium, and mag. ROSC was achieved, found to be in v tach w/ a pulse. Pt was cardioverted at 100J, 200J x2, and 300J w/ conversion to sinus rhythm. Amio bolus and infusion initiated. Initially hypertensive, subsequently became hypotensive      69 y/o M w/ a PMHx of COPD (noncompliant w/ CPAP), HTN, type 2 DM, afib (on Coumadin), and seizure disorder who presented unresponsive, hypoxic (SpO2 70s), and agonally breathing. Pt was subsequently intubated. Shortly after intubation, pt suffered from a brief PEA arrest. Pt received epi x2, bicarb, calcium, and mag. ROSC was achieved, found to be in v tach w/ a pulse. Pt was cardioverted at 100J, 200J x2, and 300J w/ conversion to sinus rhythm. Amio bolus and infusion initiated. Initially hypertensive (SBP 200s), subsequently became hypotensive and Levophed infusion was started. Labs significant for leukocytosis (23K), hyperkalemia (K 5.8), uremia (BUN 45), and hyperglycemia. ABG revealing of a respiratory acidosis. CXR w/ small bibasilar infiltrates. S/p Zosyn and Vanco in ED. CT head negative for acute intracranial event. Admit to MICU for continuation of care.    Of note, pt was discharged from [facility] yesterday 12/9. Admitted to ICU w/ acute hypercapnic respiratory failure requiring BiPAP.     69 y/o M w/ a PMHx of COPD (noncompliant w/ CPAP), HTN, type 2 DM, afib (on Coumadin), and seizure disorder who presented unresponsive, hypoxic (SpO2 70s), and agonally breathing. Pt was subsequently intubated. Shortly after intubation, pt suffered from a brief PEA arrest. Pt received epi x2, bicarb, calcium, and mag. ROSC was achieved, found to be in v tach w/ a pulse. Pt was cardioverted at 100J, 200J x2, and 300J w/ conversion to sinus rhythm. Amio bolus and infusion initiated. Initially hypertensive (SBP 200s), subsequently became hypotensive and Levophed infusion was started. Labs significant for leukocytosis (23K), hyperkalemia (K 5.8), uremia (BUN 45), and hyperglycemia. ABG revealing of a respiratory acidosis. CXR w/ small bibasilar infiltrates. S/p Zosyn and Vanco in ED. CT head negative for acute intracranial event. Admit to MICU for continuation of care.    Of note, pt was admitted to ICU on 12/1 w/ acute hypercapnic respiratory failure requiring BiPAP. Discharged from [facility] yesterday 12/9.

## 2020-12-10 NOTE — ED PROVIDER NOTE - CLINICAL SUMMARY MEDICAL DECISION MAKING FREE TEXT BOX
patient w/ resp failure, intubaed for protection, quiet chest- given Epi IM, solumedrol, intubated, duoneb given via endotracheal tube, unable to give further with pending COVID status. patient stabilized after intubation- had breath stacking, disconnected from vent and decompressed manually. will get head CT as patient also hypertensive on arrival. ICU consult/admission. conact family

## 2020-12-10 NOTE — ED PROCEDURE NOTE - CPROC ED TRACHE INTUB DETAIL1
Patient connected to ventilator with settings as ordered./Patient was pre-oxygenated. An endotracheal tube (ETT) was placed through the vocal cords into the trachea.  ETT position was confirmed by auscultation of bilateral breath sounds to all lung fields. ETCO2 level was appropriate./During intubation, applied gentle pressure to the cricoid cartilage.

## 2020-12-10 NOTE — ED PROVIDER NOTE - CARE PLAN
Principal Discharge DX:	Acute on chronic respiratory failure with hypoxia and hypercapnia  Secondary Diagnosis:	Atrial fibrillation with rapid ventricular response  Secondary Diagnosis:	Metabolic encephalopathy  Secondary Diagnosis:	Cardiac arrest  Secondary Diagnosis:	Ventricular tachycardia

## 2020-12-10 NOTE — ED PROVIDER NOTE - OBJECTIVE STATEMENT
69yo M with HTN, A fib on coumadin, COPD on home O2 found unresponsive today agonal breathing and hypoxic in 70s. D/C yesterday from hospital for resp failure. being bagged by EMS.

## 2020-12-10 NOTE — ED ADULT NURSE NOTE - OBJECTIVE STATEMENT
pt BIBA unresponsive and agonal breathing pt intubated on arrival and after intubation pt went into PEA, pt given meds and cpr with rosc vtach with pulse pt shocked multiple times

## 2020-12-10 NOTE — ED PROVIDER NOTE - SECONDARY DIAGNOSIS.
Atrial fibrillation with rapid ventricular response Metabolic encephalopathy Cardiac arrest Ventricular tachycardia

## 2020-12-11 LAB
-  COAGULASE NEGATIVE STAPHYLOCOCCUS: SIGNIFICANT CHANGE UP
A1C WITH ESTIMATED AVERAGE GLUCOSE RESULT: 9.1 % — HIGH (ref 4–5.6)
ANION GAP SERPL CALC-SCNC: 8 MMOL/L — SIGNIFICANT CHANGE UP (ref 5–17)
ANION GAP SERPL CALC-SCNC: 9 MMOL/L — SIGNIFICANT CHANGE UP (ref 5–17)
APTT BLD: 31.1 SEC — SIGNIFICANT CHANGE UP (ref 27.5–35.5)
BASE EXCESS BLDA CALC-SCNC: 17.2 MMOL/L — HIGH (ref -2–2)
BLOOD GAS COMMENTS ARTERIAL: SIGNIFICANT CHANGE UP
BUN SERPL-MCNC: 50 MG/DL — HIGH (ref 8–20)
BUN SERPL-MCNC: 52 MG/DL — HIGH (ref 8–20)
CALCIUM SERPL-MCNC: 8.3 MG/DL — LOW (ref 8.6–10.2)
CALCIUM SERPL-MCNC: 8.4 MG/DL — LOW (ref 8.6–10.2)
CHLORIDE SERPL-SCNC: 92 MMOL/L — LOW (ref 98–107)
CHLORIDE SERPL-SCNC: 92 MMOL/L — LOW (ref 98–107)
CO2 SERPL-SCNC: 36 MMOL/L — HIGH (ref 22–29)
CO2 SERPL-SCNC: 37 MMOL/L — HIGH (ref 22–29)
CREAT SERPL-MCNC: 0.84 MG/DL — SIGNIFICANT CHANGE UP (ref 0.5–1.3)
CREAT SERPL-MCNC: 0.85 MG/DL — SIGNIFICANT CHANGE UP (ref 0.5–1.3)
CULTURE RESULTS: NO GROWTH — SIGNIFICANT CHANGE UP
ESTIMATED AVERAGE GLUCOSE: 214 MG/DL — HIGH (ref 68–114)
GAS PNL BLDA: SIGNIFICANT CHANGE UP
GLUCOSE BLDC GLUCOMTR-MCNC: 105 MG/DL — HIGH (ref 70–99)
GLUCOSE BLDC GLUCOMTR-MCNC: 125 MG/DL — HIGH (ref 70–99)
GLUCOSE BLDC GLUCOMTR-MCNC: 134 MG/DL — HIGH (ref 70–99)
GLUCOSE BLDC GLUCOMTR-MCNC: 136 MG/DL — HIGH (ref 70–99)
GLUCOSE BLDC GLUCOMTR-MCNC: 180 MG/DL — HIGH (ref 70–99)
GLUCOSE BLDC GLUCOMTR-MCNC: 191 MG/DL — HIGH (ref 70–99)
GLUCOSE BLDC GLUCOMTR-MCNC: 225 MG/DL — HIGH (ref 70–99)
GLUCOSE BLDC GLUCOMTR-MCNC: 229 MG/DL — HIGH (ref 70–99)
GLUCOSE BLDC GLUCOMTR-MCNC: 239 MG/DL — HIGH (ref 70–99)
GLUCOSE BLDC GLUCOMTR-MCNC: 266 MG/DL — HIGH (ref 70–99)
GLUCOSE BLDC GLUCOMTR-MCNC: 284 MG/DL — HIGH (ref 70–99)
GLUCOSE BLDC GLUCOMTR-MCNC: 344 MG/DL — HIGH (ref 70–99)
GLUCOSE BLDC GLUCOMTR-MCNC: 92 MG/DL — SIGNIFICANT CHANGE UP (ref 70–99)
GLUCOSE SERPL-MCNC: 234 MG/DL — HIGH (ref 70–99)
GLUCOSE SERPL-MCNC: 256 MG/DL — HIGH (ref 70–99)
GRAM STN FLD: SIGNIFICANT CHANGE UP
GRAM STN FLD: SIGNIFICANT CHANGE UP
HCO3 BLDA-SCNC: 41 MMOL/L — HIGH (ref 20–26)
HOROWITZ INDEX BLDA+IHG-RTO: 0.4 — SIGNIFICANT CHANGE UP
INR BLD: 2.98 RATIO — HIGH (ref 0.88–1.16)
MAGNESIUM SERPL-MCNC: 2.3 MG/DL — SIGNIFICANT CHANGE UP (ref 1.6–2.6)
MAGNESIUM SERPL-MCNC: 2.3 MG/DL — SIGNIFICANT CHANGE UP (ref 1.6–2.6)
METHOD TYPE: SIGNIFICANT CHANGE UP
MRSA PCR RESULT.: SIGNIFICANT CHANGE UP
ORGANISM # SPEC MICROSCOPIC CNT: SIGNIFICANT CHANGE UP
PCO2 BLDA: 52 MMHG — HIGH (ref 35–45)
PH BLDA: 7.52 — HIGH (ref 7.35–7.45)
PHOSPHATE SERPL-MCNC: 2.7 MG/DL — SIGNIFICANT CHANGE UP (ref 2.4–4.7)
PHOSPHATE SERPL-MCNC: 3.3 MG/DL — SIGNIFICANT CHANGE UP (ref 2.4–4.7)
PO2 BLDA: 133 MMHG — HIGH (ref 83–108)
POTASSIUM SERPL-MCNC: 4.6 MMOL/L — SIGNIFICANT CHANGE UP (ref 3.5–5.3)
POTASSIUM SERPL-MCNC: 4.7 MMOL/L — SIGNIFICANT CHANGE UP (ref 3.5–5.3)
POTASSIUM SERPL-SCNC: 4.6 MMOL/L — SIGNIFICANT CHANGE UP (ref 3.5–5.3)
POTASSIUM SERPL-SCNC: 4.7 MMOL/L — SIGNIFICANT CHANGE UP (ref 3.5–5.3)
PROCALCITONIN SERPL-MCNC: 0.89 NG/ML — HIGH (ref 0.02–0.1)
PROTHROM AB SERPL-ACNC: 32.8 SEC — HIGH (ref 10.6–13.6)
S AUREUS DNA NOSE QL NAA+PROBE: DETECTED
SAO2 % BLDA: 100 % — HIGH (ref 95–99)
SARS-COV-2 IGG SERPL QL IA: NEGATIVE — SIGNIFICANT CHANGE UP
SARS-COV-2 IGM SERPL IA-ACNC: 0.07 INDEX — SIGNIFICANT CHANGE UP
SODIUM SERPL-SCNC: 137 MMOL/L — SIGNIFICANT CHANGE UP (ref 135–145)
SODIUM SERPL-SCNC: 137 MMOL/L — SIGNIFICANT CHANGE UP (ref 135–145)
SPECIMEN SOURCE: SIGNIFICANT CHANGE UP

## 2020-12-11 PROCEDURE — 99291 CRITICAL CARE FIRST HOUR: CPT

## 2020-12-11 RX ORDER — INSULIN LISPRO 100/ML
VIAL (ML) SUBCUTANEOUS
Refills: 0 | Status: DISCONTINUED | OUTPATIENT
Start: 2020-12-11 | End: 2020-12-15

## 2020-12-11 RX ORDER — SODIUM CHLORIDE 9 MG/ML
1000 INJECTION, SOLUTION INTRAVENOUS
Refills: 0 | Status: DISCONTINUED | OUTPATIENT
Start: 2020-12-11 | End: 2020-12-12

## 2020-12-11 RX ORDER — IPRATROPIUM BROMIDE 0.2 MG/ML
1 SOLUTION, NON-ORAL INHALATION EVERY 6 HOURS
Refills: 0 | Status: DISCONTINUED | OUTPATIENT
Start: 2020-12-11 | End: 2020-12-11

## 2020-12-11 RX ORDER — HYDROCORTISONE 20 MG
50 TABLET ORAL EVERY 6 HOURS
Refills: 0 | Status: DISCONTINUED | OUTPATIENT
Start: 2020-12-11 | End: 2020-12-12

## 2020-12-11 RX ORDER — FAMOTIDINE 10 MG/ML
20 INJECTION INTRAVENOUS EVERY 12 HOURS
Refills: 0 | Status: DISCONTINUED | OUTPATIENT
Start: 2020-12-11 | End: 2020-12-12

## 2020-12-11 RX ORDER — ACETYLCYSTEINE 200 MG/ML
4 VIAL (ML) MISCELLANEOUS EVERY 6 HOURS
Refills: 0 | Status: COMPLETED | OUTPATIENT
Start: 2020-12-11 | End: 2020-12-13

## 2020-12-11 RX ORDER — VANCOMYCIN HCL 1 G
1250 VIAL (EA) INTRAVENOUS
Refills: 0 | Status: DISCONTINUED | OUTPATIENT
Start: 2020-12-11 | End: 2020-12-11

## 2020-12-11 RX ORDER — IPRATROPIUM/ALBUTEROL SULFATE 18-103MCG
3 AEROSOL WITH ADAPTER (GRAM) INHALATION EVERY 6 HOURS
Refills: 0 | Status: DISCONTINUED | OUTPATIENT
Start: 2020-12-11 | End: 2021-01-01

## 2020-12-11 RX ORDER — VANCOMYCIN HCL 1 G
1500 VIAL (EA) INTRAVENOUS
Refills: 0 | Status: DISCONTINUED | OUTPATIENT
Start: 2020-12-11 | End: 2020-12-13

## 2020-12-11 RX ORDER — AMIODARONE HYDROCHLORIDE 400 MG/1
0.5 TABLET ORAL
Qty: 900 | Refills: 0 | Status: DISCONTINUED | OUTPATIENT
Start: 2020-12-11 | End: 2020-12-12

## 2020-12-11 RX ORDER — ACETAMINOPHEN 500 MG
650 TABLET ORAL EVERY 6 HOURS
Refills: 0 | Status: DISCONTINUED | OUTPATIENT
Start: 2020-12-11 | End: 2020-12-12

## 2020-12-11 RX ADMIN — Medication 300 MILLIGRAM(S): at 23:08

## 2020-12-11 RX ADMIN — PROPOFOL 13.9 MICROGRAM(S)/KG/MIN: 10 INJECTION, EMULSION INTRAVENOUS at 06:41

## 2020-12-11 RX ADMIN — CHLORHEXIDINE GLUCONATE 15 MILLILITER(S): 213 SOLUTION TOPICAL at 05:40

## 2020-12-11 RX ADMIN — LEVETIRACETAM 420 MILLIGRAM(S): 250 TABLET, FILM COATED ORAL at 05:43

## 2020-12-11 RX ADMIN — PROPOFOL 13.9 MICROGRAM(S)/KG/MIN: 10 INJECTION, EMULSION INTRAVENOUS at 11:35

## 2020-12-11 RX ADMIN — Medication 1 PUFF(S): at 19:22

## 2020-12-11 RX ADMIN — Medication 300 MILLIGRAM(S): at 12:26

## 2020-12-11 RX ADMIN — Medication 50 MILLIGRAM(S): at 17:53

## 2020-12-11 RX ADMIN — Medication 4 MILLILITER(S): at 20:21

## 2020-12-11 RX ADMIN — Medication 3 MILLILITER(S): at 20:21

## 2020-12-11 RX ADMIN — Medication 5: at 18:20

## 2020-12-11 RX ADMIN — CHLORHEXIDINE GLUCONATE 1 APPLICATION(S): 213 SOLUTION TOPICAL at 05:40

## 2020-12-11 RX ADMIN — Medication 30 MILLIGRAM(S): at 11:34

## 2020-12-11 RX ADMIN — CHLORHEXIDINE GLUCONATE 15 MILLILITER(S): 213 SOLUTION TOPICAL at 17:57

## 2020-12-11 RX ADMIN — FAMOTIDINE 20 MILLIGRAM(S): 10 INJECTION INTRAVENOUS at 17:51

## 2020-12-11 RX ADMIN — Medication 1 PUFF(S): at 16:04

## 2020-12-11 RX ADMIN — Medication 50 MILLIGRAM(S): at 11:40

## 2020-12-11 RX ADMIN — LEVETIRACETAM 420 MILLIGRAM(S): 250 TABLET, FILM COATED ORAL at 18:08

## 2020-12-11 RX ADMIN — PIPERACILLIN AND TAZOBACTAM 25 GRAM(S): 4; .5 INJECTION, POWDER, LYOPHILIZED, FOR SOLUTION INTRAVENOUS at 14:45

## 2020-12-11 RX ADMIN — PIPERACILLIN AND TAZOBACTAM 25 GRAM(S): 4; .5 INJECTION, POWDER, LYOPHILIZED, FOR SOLUTION INTRAVENOUS at 22:47

## 2020-12-11 RX ADMIN — PIPERACILLIN AND TAZOBACTAM 25 GRAM(S): 4; .5 INJECTION, POWDER, LYOPHILIZED, FOR SOLUTION INTRAVENOUS at 05:40

## 2020-12-11 NOTE — PHARMACOTHERAPY INTERVENTION NOTE - NSPHARMCOMMASP
Pt ambulatory to bathroom for sample.  Given ice chips and NS for US ASP - Dose optimization/Non-Renal dose adjustment

## 2020-12-11 NOTE — PROGRESS NOTE ADULT - SUBJECTIVE AND OBJECTIVE BOX
Patient is a 70y old  Male who presents with a chief complaint of Acute hypoxic/hypercapnic respiratory failure, cardiac arrest (11 Dec 2020 10:02)      Events last 24 hours: ***    hypothermia protocol  -  amiodarone IV   -   some shivering -  buspirone added   off pressors     PAST MEDICAL & SURGICAL HISTORY:  Seizure    Depression, unspecified depression type    Diabetes    Hypertension    Hyperlipidemia    Afib    COPD (chronic obstructive pulmonary disease)    Abdominal hernia    H/O carotid endarterectomy          Medications:  piperacillin/tazobactam IVPB.. 3.375 Gram(s) IV Intermittent every 8 hours  vancomycin  IVPB 1500 milliGRAM(s) IV Intermittent <User Schedule>    aMIOdarone Infusion 0.5 mG/Min IV Continuous <Continuous>  phenylephrine    Infusion 0.05 MICROgram(s)/kG/Min IV Continuous <Continuous>    ipratropium 17 MICROgram(s) HFA Inhaler 1 Puff(s) Inhalation every 6 hours    acetaminophen    Suspension .. 650 milliGRAM(s) Oral every 6 hours PRN  busPIRone 30 milliGRAM(s) Oral every 8 hours PRN  levETIRAcetam  IVPB 500 milliGRAM(s) IV Intermittent every 12 hours  propofol Infusion 20 MICROgram(s)/kG/Min IV Continuous <Continuous>        famotidine Injectable 20 milliGRAM(s) IV Push every 12 hours      dextrose 40% Gel 15 Gram(s) Oral once  dextrose 50% Injectable 25 Gram(s) IV Push once  dextrose 50% Injectable 12.5 Gram(s) IV Push once  dextrose 50% Injectable 25 Gram(s) IV Push once  glucagon  Injectable 1 milliGRAM(s) IntraMuscular once  hydrocortisone sodium succinate Injectable 50 milliGRAM(s) IV Push every 6 hours  insulin regular Infusion 10 Unit(s)/Hr IV Continuous <Continuous>    dextrose 5%. 1000 milliLiter(s) IV Continuous <Continuous>  dextrose 5%. 1000 milliLiter(s) IV Continuous <Continuous>  lactated ringers. 1000 milliLiter(s) IV Continuous <Continuous>      chlorhexidine 0.12% Liquid 15 milliLiter(s) Oral Mucosa every 12 hours  chlorhexidine 2% Cloths 1 Application(s) Topical <User Schedule>        Mode: AC/ CMV (Assist Control/ Continuous Mandatory Ventilation)  RR (machine): 22  TV (machine): 480  FiO2: 40  PEEP: 5  MAP: 15  PIP: 39      ICU Vital Signs Last 24 Hrs  T(C): 36.7 (11 Dec 2020 11:59), Max: 37.2 (11 Dec 2020 03:03)  T(F): 98.1 (11 Dec 2020 11:59), Max: 99 (11 Dec 2020 03:03)  HR: 126 (11 Dec 2020 08:25) (90 - 133)  BP: 94/83 (11 Dec 2020 07:00) (80/51 - 145/87)  BP(mean): 89 (11 Dec 2020 07:00) (60 - 113)  ABP: --  ABP(mean): --  RR: 22 (11 Dec 2020 07:00) (16 - 22)  SpO2: 99% (11 Dec 2020 08:25) (95% - 100%)      ABG - ( 11 Dec 2020 04:39 )  pH, Arterial: 7.52  pH, Blood: x     /  pCO2: 52    /  pO2: 133   / HCO3: 41    / Base Excess: 17.2  /  SaO2: 100                 I&O's Detail    10 Dec 2020 07:01  -  11 Dec 2020 07:00  --------------------------------------------------------  IN:    Amiodarone: 136 mL    Insulin: 40 mL    Lactated Ringers: 375 mL    Lactated Ringers Bolus: 1000 mL    Phenylephrine: 76 mL    Propofol: 142 mL  Total IN: 1769 mL    OUT:    Indwelling Catheter - Urethral (mL): 440 mL  Total OUT: 440 mL    Total NET: 1329 mL            LABS:                        13.5   23.58 )-----------( 339      ( 10 Dec 2020 12:17 )             47.0     12-10    140  |  93<L>  |  55.0<H>  ----------------------------<  452<H>  5.6<H>   |  37.0<H>  |  0.99    Ca    8.7      10 Dec 2020 21:03    TPro  6.7  /  Alb  3.8  /  TBili  0.2<L>  /  DBili  x   /  AST  63<H>  /  ALT  113<H>  /  AlkPhos  111  12-10      CARDIAC MARKERS ( 10 Dec 2020 21:03 )  x     / 0.03 ng/mL / x     / x     / x      CARDIAC MARKERS ( 10 Dec 2020 12:17 )  x     / <0.01 ng/mL / x     / x     / x          CAPILLARY BLOOD GLUCOSE  180 (11 Dec 2020 07:00)      POCT Blood Glucose.: 134 mg/dL (11 Dec 2020 11:39)    PT/INR - ( 11 Dec 2020 05:05 )   PT: 32.8 sec;   INR: 2.98 ratio         PTT - ( 11 Dec 2020 05:05 )  PTT:31.1 sec  Urinalysis Basic - ( 10 Dec 2020 17:21 )    Color: Yellow / Appearance: Clear / S.025 / pH: x  Gluc: x / Ketone: Negative  / Bili: Negative / Urobili: Negative mg/dL   Blood: x / Protein: 100 mg/dL / Nitrite: Negative   Leuk Esterase: Negative / RBC: 3-5 /HPF / WBC 3-5   Sq Epi: x / Non Sq Epi: Occasional / Bacteria: Few      CULTURES:  Rapid RVP Result: NotDetec (12-10 @ 12:27)      Physical Examination:    General:    calm  and comfortable on   hypothermia protocol      HEENT:    sclera anicteric  pupils pinpoint    et and og     PULM: Clear to auscultation bilaterally,   no wheezing rales  rhonchi     NECK:   right ij     CVS: Regular rate and rhythm, no murmurs, rubs, or gallops    ABD: Soft, nondistended, nontender, normoactive bowel sounds, no masses    EXT:   mild edema     SKIN:   no rashes  cooling       NEURO:  sedated cannot assist   gu  clear urine  guerra   DEVICES:  og  et  right ij   guerra     RADIOLOGY: ***  69 y/o M w/ a PMHx of COPD (noncompliant w/ CPAP), HTN, type 2 DM, afib (on Coumadin), and seizure disorder who presented unresponsive, hypoxic (SpO2 70s), and agonally breathing. Pt was subsequently intubated. Shortly after intubation, pt suffered from a brief PEA arrest. Pt received epi x2, bicarb, calcium, and mag. ROSC was achieved, found to be in v tach w/ a pulse. Pt was cardioverted at 100J, 200J x2, and 300J w/ conversion to sinus rhythm. Amio bolus and infusion initiated. Initially hypertensive (SBP 200s), subsequently became hypotensive and Levophed infusion was started. Labs significant for leukocytosis (23K), hyperkalemia (K 5.8), uremia (BUN 45), and hyperglycemia. ABG revealing of a respiratory acidosis. CXR w/ small bibasilar infiltrates. S/p Zosyn and Vanco in ED. CT head negative for acute intracranial event.     1- respiratory  failure    hypoxic respiratory failure  with subsequent s/p   PEA arrest  2- copd  3- afib on coumadin  echo   ef 65-70% grade I  diastolic dysfunction    4- h/o seizures  5- htn   6- dm   7- aspiration likely     neurologic      hypothermia protocol    propofol      avoid shivering , keppra   resp  no wean until after protocol,  abg solu medrol   nebulizers   ID  zosyn  vanco  check level   cvs off pressors  monitor mean bp    --after IV amiodarone drip x 24 hours   change to po bid --  repeat echo and ekg   gi  no feeds during  protocol  pepcid  heme  inr  2-3        endocrine insulin drip off  f.s. glucose and coverage     Misc   updated son   David   answered all questions to apparent satisfaction     CRITICAL CARE TIME SPENT: * >35minutes CCM includin g coordination of care/ indisciplinary rounds /  review lab simaging and family discussions

## 2020-12-11 NOTE — PROGRESS NOTE ADULT - SUBJECTIVE AND OBJECTIVE BOX
[facility], THE [facility]                                   [address] [place] [address]                                                      PHONE: [phone]                                                         FAX: [phone]  [email or web address]  ---------------------------------------------------------------------------------------------------------------------------------    Overnight events/patient complaints:  Patient being cooled overnight for therapeutic hypothermia.  He remains sedated.  Pressors weaned off.  He is on an amiodarone drip.     PAST MEDICAL & SURGICAL HISTORY:  Seizure    Depression, unspecified depression type    Diabetes    Hypertension    Hyperlipidemia    Afib    COPD (chronic obstructive pulmonary disease)    Abdominal hernia    H/O carotid endarterectomy        No Known Allergies    MEDICATIONS  (STANDING):  aMIOdarone Infusion 0.5 mG/Min (16.7 mL/Hr) IV Continuous <Continuous>  chlorhexidine 0.12% Liquid 15 milliLiter(s) Oral Mucosa every 12 hours  chlorhexidine 2% Cloths 1 Application(s) Topical <User Schedule>  dextrose 40% Gel 15 Gram(s) Oral once  dextrose 5%. 1000 milliLiter(s) (50 mL/Hr) IV Continuous <Continuous>  dextrose 5%. 1000 milliLiter(s) (100 mL/Hr) IV Continuous <Continuous>  dextrose 50% Injectable 25 Gram(s) IV Push once  dextrose 50% Injectable 12.5 Gram(s) IV Push once  dextrose 50% Injectable 25 Gram(s) IV Push once  famotidine Injectable 20 milliGRAM(s) IV Push every 12 hours  glucagon  Injectable 1 milliGRAM(s) IntraMuscular once  insulin regular Infusion 10 Unit(s)/Hr (10 mL/Hr) IV Continuous <Continuous>  ipratropium 17 MICROgram(s) HFA Inhaler 1 Puff(s) Inhalation every 6 hours  lactated ringers. 1000 milliLiter(s) (75 mL/Hr) IV Continuous <Continuous>  levETIRAcetam  IVPB 500 milliGRAM(s) IV Intermittent every 12 hours  phenylephrine    Infusion 0.05 MICROgram(s)/kG/Min (2.18 mL/Hr) IV Continuous <Continuous>  piperacillin/tazobactam IVPB.. 3.375 Gram(s) IV Intermittent every 8 hours  propofol Infusion 20 MICROgram(s)/kG/Min (13.9 mL/Hr) IV Continuous <Continuous>    MEDICATIONS  (PRN):      Vital Signs Last 24 Hrs  T(C): 37.1 (11 Dec 2020 07:30), Max: 37.2 (11 Dec 2020 03:03)  T(F): 98.8 (11 Dec 2020 07:30), Max: 99 (11 Dec 2020 03:03)  HR: 126 (11 Dec 2020 08:25) (90 - 133)  BP: 94/83 (11 Dec 2020 07:00) (80/51 - 169/112)  BP(mean): 89 (11 Dec 2020 07:) (60 - 113)  RR: 22 (11 Dec 2020 07:00) (16 - 22)  SpO2: 99% (11 Dec 2020 08:25) (93% - 100%)  ICU Vital Signs Last 24 Hrs  CLAUDIA MABRY  I&O's Detail    10 Dec 2020 07:  -  11 Dec 2020 07:00  --------------------------------------------------------  IN:    Amiodarone: 136 mL    Insulin: 40 mL    Lactated Ringers: 375 mL    Lactated Ringers Bolus: 1000 mL    Phenylephrine: 76 mL    Propofol: 142 mL  Total IN: 1769 mL    OUT:    Indwelling Catheter - Urethral (mL): 440 mL  Total OUT: 440 mL    Total NET: 1329 mL        I&O's Summary    10 Dec 2020 07:01  -  11 Dec 2020 07:00  --------------------------------------------------------  IN: 1769 mL / OUT: 440 mL / NET: 1329 mL      Drug Dosing Weight  CLAUDIA MABRY  Mode: AC/ CMV (Assist Control/ Continuous Mandatory Ventilation), RR (machine): 22, TV (machine): 480, FiO2: 40, PEEP: 5, MAP: 15, PIP: 39    PHYSICAL EXAM:  General: Intubated and sedated on cooling blanket  HEENT: Head; normocephalic, atraumatic.  ET tube  Eyes: Pupils reactive, cornea wnl.  Neck: Supple, no nodes adenopathy, no NVD or carotid bruit or thyromegaly.  CARDIOVASCULAR: irregular S1 and S2, No murmur, rub, gallop or lift.   LUNGS: Faint breath sounds Mechanical breath sounds  ABDOMEN: Soft, nontender without mass or organomegaly.  EXTREMITIES: No clubbing, cyanosis or edema. Distal pulses wnl.  Cool extremities  SKIN: warm and dry with normal turgor.  NEURO: sedated  PSYCH: cannot be determined        LABS:                        13.5   23.58 )-----------( 339      ( 10 Dec 2020 12:17 )             47.0     12-10    140  |  93<L>  |  55.0<H>  ----------------------------<  452<H>  5.6<H>   |  37.0<H>  |  0.99    Ca    8.7      10 Dec 2020 21:03    TPro  6.7  /  Alb  3.8  /  TBili  0.2<L>  /  DBili  x   /  AST  63<H>  /  ALT  113<H>  /  AlkPhos  111  12-10    CLAUDIA MABRY  CARDIAC MARKERS ( 10 Dec 2020 21:03 )  x     / 0.03 ng/mL / x     / x     / x      CARDIAC MARKERS ( 10 Dec 2020 12:17 )  x     / <0.01 ng/mL / x     / x     / x          PT/INR - ( 11 Dec 2020 05:05 )   PT: 32.8 sec;   INR: 2.98 ratio         PTT - ( 11 Dec 2020 05:05 )  PTT:31.1 sec  Urinalysis Basic - ( 10 Dec 2020 17:21 )    Color: Yellow / Appearance: Clear / S.025 / pH: x  Gluc: x / Ketone: Negative  / Bili: Negative / Urobili: Negative mg/dL   Blood: x / Protein: 100 mg/dL / Nitrite: Negative   Leuk Esterase: Negative / RBC: 3-5 /HPF / WBC 3-5   Sq Epi: x / Non Sq Epi: Occasional / Bacteria: Few        RADIOLOGY & ADDITIONAL STUDIES:    INTERPRETATION OF TELEMETRY (personally reviewed): no events on telemetry, afib HR in 110s        ASSESSMENT AND PLAN:  In summary, CLAUDIA MABRY is an 70y Male with past medical history significant for COPD, HTN, DM, pAF on coumadin, seizure d/o with recent hospitalization for hypercapnic respiratory failure presents with htn emergency and subsequent PEA/VT arrest.    Cardiac arrest- patient presents unresponsive, hypoxic and hypertensive.  He had PEA arrest and was given epinephrine.  He was then cardioverted for VT and amiodarone was bolused with initiation of infusion.  He is being cooled for TH.    VT- on amiodarone drip.  After 24 hours of IV loading, would started on 400mg BID per OG tube for 7 days total.     Atrial fibrillation- perm afib.  Would continue coumadin with goal INR 2-3        Thank you for allowing [facility] to participate in the care of this patient.  Please feel free to call with any questions or concerns.     31 minutes spent in direct patient care in this critically ill patient

## 2020-12-12 LAB
ALBUMIN SERPL ELPH-MCNC: 2.7 G/DL — LOW (ref 3.3–5.2)
ALP SERPL-CCNC: 84 U/L — SIGNIFICANT CHANGE UP (ref 40–120)
ALT FLD-CCNC: 56 U/L — HIGH
ANION GAP SERPL CALC-SCNC: 3 MMOL/L — LOW (ref 5–17)
ANION GAP SERPL CALC-SCNC: 5 MMOL/L — SIGNIFICANT CHANGE UP (ref 5–17)
AST SERPL-CCNC: 26 U/L — SIGNIFICANT CHANGE UP
BILIRUB SERPL-MCNC: 0.5 MG/DL — SIGNIFICANT CHANGE UP (ref 0.4–2)
BUN SERPL-MCNC: 46 MG/DL — HIGH (ref 8–20)
BUN SERPL-MCNC: 47 MG/DL — HIGH (ref 8–20)
CALCIUM SERPL-MCNC: 8.5 MG/DL — LOW (ref 8.6–10.2)
CALCIUM SERPL-MCNC: 8.5 MG/DL — LOW (ref 8.6–10.2)
CHLORIDE SERPL-SCNC: 90 MMOL/L — LOW (ref 98–107)
CHLORIDE SERPL-SCNC: 92 MMOL/L — LOW (ref 98–107)
CO2 SERPL-SCNC: 38 MMOL/L — HIGH (ref 22–29)
CO2 SERPL-SCNC: 39 MMOL/L — HIGH (ref 22–29)
CREAT SERPL-MCNC: 0.83 MG/DL — SIGNIFICANT CHANGE UP (ref 0.5–1.3)
CREAT SERPL-MCNC: 0.9 MG/DL — SIGNIFICANT CHANGE UP (ref 0.5–1.3)
CULTURE RESULTS: SIGNIFICANT CHANGE UP
GAS PNL BLDA: SIGNIFICANT CHANGE UP
GAS PNL BLDA: SIGNIFICANT CHANGE UP
GLUCOSE BLDC GLUCOMTR-MCNC: 181 MG/DL — HIGH (ref 70–99)
GLUCOSE BLDC GLUCOMTR-MCNC: 191 MG/DL — HIGH (ref 70–99)
GLUCOSE BLDC GLUCOMTR-MCNC: 216 MG/DL — HIGH (ref 70–99)
GLUCOSE BLDC GLUCOMTR-MCNC: 301 MG/DL — HIGH (ref 70–99)
GLUCOSE BLDC GLUCOMTR-MCNC: 71 MG/DL — SIGNIFICANT CHANGE UP (ref 70–99)
GLUCOSE SERPL-MCNC: 249 MG/DL — HIGH (ref 70–99)
GLUCOSE SERPL-MCNC: 337 MG/DL — HIGH (ref 70–99)
HCT VFR BLD CALC: 38 % — LOW (ref 39–50)
HGB BLD-MCNC: 12.2 G/DL — LOW (ref 13–17)
INR BLD: 2.36 RATIO — HIGH (ref 0.88–1.16)
MAGNESIUM SERPL-MCNC: 2.3 MG/DL — SIGNIFICANT CHANGE UP (ref 1.6–2.6)
MAGNESIUM SERPL-MCNC: 2.4 MG/DL — SIGNIFICANT CHANGE UP (ref 1.6–2.6)
MCHC RBC-ENTMCNC: 27.2 PG — SIGNIFICANT CHANGE UP (ref 27–34)
MCHC RBC-ENTMCNC: 32.1 GM/DL — SIGNIFICANT CHANGE UP (ref 32–36)
MCV RBC AUTO: 84.6 FL — SIGNIFICANT CHANGE UP (ref 80–100)
ORGANISM # SPEC MICROSCOPIC CNT: SIGNIFICANT CHANGE UP
PHOSPHATE SERPL-MCNC: 4.7 MG/DL — SIGNIFICANT CHANGE UP (ref 2.4–4.7)
PHOSPHATE SERPL-MCNC: 5.4 MG/DL — HIGH (ref 2.4–4.7)
PLATELET # BLD AUTO: 231 K/UL — SIGNIFICANT CHANGE UP (ref 150–400)
POTASSIUM SERPL-MCNC: 4.1 MMOL/L — SIGNIFICANT CHANGE UP (ref 3.5–5.3)
POTASSIUM SERPL-MCNC: 4.2 MMOL/L — SIGNIFICANT CHANGE UP (ref 3.5–5.3)
POTASSIUM SERPL-SCNC: 4.1 MMOL/L — SIGNIFICANT CHANGE UP (ref 3.5–5.3)
POTASSIUM SERPL-SCNC: 4.2 MMOL/L — SIGNIFICANT CHANGE UP (ref 3.5–5.3)
PROT SERPL-MCNC: 5.5 G/DL — LOW (ref 6.6–8.7)
PROTHROM AB SERPL-ACNC: 26.3 SEC — HIGH (ref 10.6–13.6)
RBC # BLD: 4.49 M/UL — SIGNIFICANT CHANGE UP (ref 4.2–5.8)
RBC # FLD: 14.6 % — HIGH (ref 10.3–14.5)
SODIUM SERPL-SCNC: 133 MMOL/L — LOW (ref 135–145)
SODIUM SERPL-SCNC: 134 MMOL/L — LOW (ref 135–145)
VANCOMYCIN TROUGH SERPL-MCNC: 25.3 UG/ML — CRITICAL HIGH (ref 10–20)
WBC # BLD: 24.75 K/UL — HIGH (ref 3.8–10.5)
WBC # FLD AUTO: 24.75 K/UL — HIGH (ref 3.8–10.5)

## 2020-12-12 PROCEDURE — 93308 TTE F-UP OR LMTD: CPT | Mod: 26

## 2020-12-12 PROCEDURE — 99291 CRITICAL CARE FIRST HOUR: CPT

## 2020-12-12 RX ORDER — AMIODARONE HYDROCHLORIDE 400 MG/1
400 TABLET ORAL EVERY 12 HOURS
Refills: 0 | Status: COMPLETED | OUTPATIENT
Start: 2020-12-12 | End: 2020-12-18

## 2020-12-12 RX ORDER — METOPROLOL TARTRATE 50 MG
5 TABLET ORAL EVERY 6 HOURS
Refills: 0 | Status: DISCONTINUED | OUTPATIENT
Start: 2020-12-12 | End: 2020-12-13

## 2020-12-12 RX ORDER — INSULIN GLARGINE 100 [IU]/ML
10 INJECTION, SOLUTION SUBCUTANEOUS AT BEDTIME
Refills: 0 | Status: DISCONTINUED | OUTPATIENT
Start: 2020-12-12 | End: 2020-12-15

## 2020-12-12 RX ORDER — SODIUM CHLORIDE 9 MG/ML
500 INJECTION, SOLUTION INTRAVENOUS ONCE
Refills: 0 | Status: COMPLETED | OUTPATIENT
Start: 2020-12-12 | End: 2020-12-12

## 2020-12-12 RX ORDER — INSULIN LISPRO 100/ML
VIAL (ML) SUBCUTANEOUS EVERY 6 HOURS
Refills: 0 | Status: DISCONTINUED | OUTPATIENT
Start: 2020-12-12 | End: 2020-12-12

## 2020-12-12 RX ADMIN — CHLORHEXIDINE GLUCONATE 1 APPLICATION(S): 213 SOLUTION TOPICAL at 06:36

## 2020-12-12 RX ADMIN — Medication 50 MILLIGRAM(S): at 06:34

## 2020-12-12 RX ADMIN — FAMOTIDINE 20 MILLIGRAM(S): 10 INJECTION INTRAVENOUS at 06:34

## 2020-12-12 RX ADMIN — PIPERACILLIN AND TAZOBACTAM 25 GRAM(S): 4; .5 INJECTION, POWDER, LYOPHILIZED, FOR SOLUTION INTRAVENOUS at 22:48

## 2020-12-12 RX ADMIN — Medication 300 MILLIGRAM(S): at 11:52

## 2020-12-12 RX ADMIN — PROPOFOL 13.9 MICROGRAM(S)/KG/MIN: 10 INJECTION, EMULSION INTRAVENOUS at 01:28

## 2020-12-12 RX ADMIN — PIPERACILLIN AND TAZOBACTAM 25 GRAM(S): 4; .5 INJECTION, POWDER, LYOPHILIZED, FOR SOLUTION INTRAVENOUS at 06:35

## 2020-12-12 RX ADMIN — Medication 3 MILLILITER(S): at 03:07

## 2020-12-12 RX ADMIN — Medication 3 MILLILITER(S): at 16:15

## 2020-12-12 RX ADMIN — LEVETIRACETAM 420 MILLIGRAM(S): 250 TABLET, FILM COATED ORAL at 17:50

## 2020-12-12 RX ADMIN — LEVETIRACETAM 420 MILLIGRAM(S): 250 TABLET, FILM COATED ORAL at 06:35

## 2020-12-12 RX ADMIN — CHLORHEXIDINE GLUCONATE 15 MILLILITER(S): 213 SOLUTION TOPICAL at 06:36

## 2020-12-12 RX ADMIN — AMIODARONE HYDROCHLORIDE 400 MILLIGRAM(S): 400 TABLET ORAL at 22:49

## 2020-12-12 RX ADMIN — SODIUM CHLORIDE 500 MILLILITER(S): 9 INJECTION, SOLUTION INTRAVENOUS at 15:19

## 2020-12-12 RX ADMIN — Medication 3 MILLILITER(S): at 08:27

## 2020-12-12 RX ADMIN — Medication 4 MILLILITER(S): at 22:36

## 2020-12-12 RX ADMIN — Medication 5 MILLIGRAM(S): at 17:24

## 2020-12-12 RX ADMIN — Medication 3: at 12:29

## 2020-12-12 RX ADMIN — Medication 9: at 06:49

## 2020-12-12 RX ADMIN — Medication 4 MILLILITER(S): at 03:07

## 2020-12-12 RX ADMIN — PROPOFOL 13.9 MICROGRAM(S)/KG/MIN: 10 INJECTION, EMULSION INTRAVENOUS at 06:34

## 2020-12-12 RX ADMIN — INSULIN GLARGINE 10 UNIT(S): 100 INJECTION, SOLUTION SUBCUTANEOUS at 01:42

## 2020-12-12 RX ADMIN — Medication 50 MILLIGRAM(S): at 00:42

## 2020-12-12 RX ADMIN — PIPERACILLIN AND TAZOBACTAM 25 GRAM(S): 4; .5 INJECTION, POWDER, LYOPHILIZED, FOR SOLUTION INTRAVENOUS at 15:12

## 2020-12-12 RX ADMIN — Medication 3: at 15:18

## 2020-12-12 RX ADMIN — Medication 3 MILLILITER(S): at 22:36

## 2020-12-12 RX ADMIN — Medication 4 MILLILITER(S): at 16:15

## 2020-12-12 NOTE — PROGRESS NOTE ADULT - ASSESSMENT
HPI/INTERVAL EVENTS: no major events    EXAM:   intubated and sedated on propofol  PERRL  bilat air entry,  regular rhythm,   abd soft  +edema    RADIOLOGY REVIEWED:  cxr - small basilar infiltrates bilat     IMPRESSION/ASSESSMENT & PLAN:   70 male with hx of copd, HTN, DM, afib on warfarin, seizures, who presented 12/10 with hypoxemia, altered mentals status, agonal breathing, intubated in ED and developed a samson-intubation cardiac arrest (initially PEA then VT requiring cardioversion).    Cardiac arrest/Anoxic encephalopathy   Underwent theraputic hypothermia  Will convert amiodarone to PO in AM  If mental status improves patient will need [facility]    Shock  Now off pressors    Acute respiratory failure  on 30% fio2  unable to extubate due to encephalopathy    Aspiration pneumonia  On vancomycin and zosyn, cultures pending    DM  Off insulin drip.  Start lantus and lispro    Afib  - check INR daily  - amio for rate/rhythm control    Diet: start TF  DVT proph: warfarin

## 2020-12-12 NOTE — PROGRESS NOTE ADULT - SUBJECTIVE AND OBJECTIVE BOX
On Admission  12-10-20 (2d)  HPI:  69 y/o M w/ a PMHx of COPD (noncompliant w/ CPAP), HTN, type 2 DM, afib (on Coumadin), and seizure disorder who presented unresponsive, hypoxic (SpO2 70s), and agonally breathing. Pt was subsequently intubated. Shortly after intubation, pt suffered from a brief PEA arrest. Pt received epi x2, bicarb, calcium, and mag. ROSC was achieved, found to be in v tach w/ a pulse. Pt was cardioverted at 100J, 200J x2, and 300J w/ conversion to sinus rhythm. Amio bolus and infusion initiated. Initially hypertensive (SBP 200s), subsequently became hypotensive and Levophed infusion was started. Labs significant for leukocytosis (23K), hyperkalemia (K 5.8), uremia (BUN 45), and hyperglycemia. ABG revealing of a respiratory acidosis. CXR w/ small bibasilar infiltrates. S/p Zosyn and Vanco in ED. CT head negative for acute intracranial event. Admit to MICU for continuation of care.    Of note, pt was admitted to ICU on  w/ acute hypercapnic respiratory failure requiring BiPAP. Discharged from [facility] yesterday . (10 Dec 2020 16:52)    PAST MEDICAL & SURGICAL HISTORY:  Seizure    Depression, unspecified depression type    Diabetes    Hypertension    Hyperlipidemia    Afib    COPD (chronic obstructive pulmonary disease)    Abdominal hernia    H/O carotid endarterectomy        Antimicrobial:  piperacillin/tazobactam IVPB.. 3.375 Gram(s) IV Intermittent every 8 hours  vancomycin  IVPB 1500 milliGRAM(s) IV Intermittent <User Schedule>    Cardiovascular:  aMIOdarone Infusion 0.5 mG/Min IV Continuous <Continuous>  phenylephrine    Infusion 0.05 MICROgram(s)/kG/Min IV Continuous <Continuous>    Pulmonary:  acetylcysteine 20%  Inhalation 4 milliLiter(s) Inhalation every 6 hours  albuterol/ipratropium for Nebulization 3 milliLiter(s) Nebulizer every 6 hours    Hematalogic:    Other:  acetaminophen    Suspension .. 650 milliGRAM(s) Oral every 6 hours PRN  busPIRone 30 milliGRAM(s) Oral every 8 hours PRN  chlorhexidine 0.12% Liquid 15 milliLiter(s) Oral Mucosa every 12 hours  chlorhexidine 2% Cloths 1 Application(s) Topical <User Schedule>  dextrose 40% Gel 15 Gram(s) Oral once  dextrose 50% Injectable 25 Gram(s) IV Push once  dextrose 50% Injectable 12.5 Gram(s) IV Push once  dextrose 50% Injectable 25 Gram(s) IV Push once  famotidine Injectable 20 milliGRAM(s) IV Push every 12 hours  glucagon  Injectable 1 milliGRAM(s) IntraMuscular once  hydrocortisone sodium succinate Injectable 50 milliGRAM(s) IV Push every 6 hours  insulin lispro (ADMELOG) corrective regimen sliding scale   SubCutaneous three times a day before meals  insulin regular Infusion 10 Unit(s)/Hr IV Continuous <Continuous>  levETIRAcetam  IVPB 500 milliGRAM(s) IV Intermittent every 12 hours  propofol Infusion 20 MICROgram(s)/kG/Min IV Continuous <Continuous>      Drug Dosing Weight  Height (cm): 180.3 (10 Dec 2020 12:05)  Weight (kg): 108.6 (10 Dec 2020 20:00)  BMI (kg/m2): 33.4 (10 Dec 2020 20:00)  BSA (m2): 2.28 (10 Dec 2020 20:00)    T(C): 35.5 (20 @ 00:00), Max: 37.2 (20 @ 03:03)  HR: 120 (20 @ 00:00)  BP: 108/86 (20 @ 00:00)  BP(mean): 95 (20 @ 00:00)  ABP: --  ABP(mean): --  RR: 22 (20 @ 00:00)  SpO2: 99% (20 @ 00:00)    ABG - ( 11 Dec 2020 04:39 )  pH, Arterial: 7.52  pH, Blood: x     /  pCO2: 52    /  pO2: 133   / HCO3: 41    / Base Excess: 17.2  /  SaO2: 100                   12-10 @ 07:01  -   @ 07:00  --------------------------------------------------------  IN: 1769 mL / OUT: 440 mL / NET: 1329 mL        Mode: AC/ CMV (Assist Control/ Continuous Mandatory Ventilation)  RR (machine): 22  TV (machine): 480  FiO2: 30  PEEP: 5  MAP: 14  PIP: 32        LABS:  CBC Full  -  ( 10 Dec 2020 12:17 )  WBC Count : 23.58 K/uL  RBC Count : 4.96 M/uL  Hemoglobin : 13.5 g/dL  Hematocrit : 47.0 %  Platelet Count - Automated : 339 K/uL  Mean Cell Volume : 94.8 fl  Mean Cell Hemoglobin : 27.2 pg  Mean Cell Hemoglobin Concentration : 28.7 gm/dL  Auto Neutrophil # : 17.85 K/uL  Auto Lymphocyte # : 2.24 K/uL  Auto Monocyte # : 1.84 K/uL  Auto Eosinophil # : 0.21 K/uL  Auto Basophil # : 0.21 K/uL  Auto Neutrophil % : 74.8 %  Auto Lymphocyte % : 9.5 %  Auto Monocyte % : 7.8 %  Auto Eosinophil % : 0.9 %  Auto Basophil % : 0.9 %        137  |  92<L>  |  50.0<H>  ----------------------------<  234<H>  4.6   |  36.0<H>  |  0.84    Ca    8.3<L>      11 Dec 2020 20:20  Phos  3.3     12-11  Mg     2.3     12-    TPro  6.7  /  Alb  3.8  /  TBili  0.2<L>  /  DBili  x   /  AST  63<H>  /  ALT  113<H>  /  AlkPhos  111  12-10    PT/INR - ( 11 Dec 2020 05:05 )   PT: 32.8 sec;   INR: 2.98 ratio         PTT - ( 11 Dec 2020 05:05 )  PTT:31.1 sec  Urinalysis Basic - ( 10 Dec 2020 17:21 )    Color: Yellow / Appearance: Clear / S.025 / pH: x  Gluc: x / Ketone: Negative  / Bili: Negative / Urobili: Negative mg/dL   Blood: x / Protein: 100 mg/dL / Nitrite: Negative   Leuk Esterase: Negative / RBC: 3-5 /HPF / WBC 3-5   Sq Epi: x / Non Sq Epi: Occasional / Bacteria: Few      Culture Results:   No growth (12-10 @ 22:15)    ____________________________________________________________________________________________________

## 2020-12-12 NOTE — DIETITIAN INITIAL EVALUATION ADULT. - ENTERAL
initiate tube feeds as medically feasible; Glucerna 1.5 goal rate of 50 ml/hr (x20 hrs) at this time; provides 1000 ml, 1500 kcal, 83g protein, 759 ml free water, and 100% of RDIs for vitamins/minerals; additional free water per MD discretion.

## 2020-12-12 NOTE — DIETITIAN INITIAL EVALUATION ADULT. - PERTINENT MEDS FT
MEDICATIONS  (STANDING):  acetylcysteine 20%  Inhalation 4 milliLiter(s) Inhalation every 6 hours  albuterol/ipratropium for Nebulization 3 milliLiter(s) Nebulizer every 6 hours  aMIOdarone    Tablet 400 milliGRAM(s) Oral every 12 hours  aMIOdarone Infusion 0.5 mG/Min (16.7 mL/Hr) IV Continuous <Continuous>  chlorhexidine 0.12% Liquid 15 milliLiter(s) Oral Mucosa every 12 hours  chlorhexidine 2% Cloths 1 Application(s) Topical <User Schedule>  dextrose 40% Gel 15 Gram(s) Oral once  dextrose 50% Injectable 25 Gram(s) IV Push once  dextrose 50% Injectable 12.5 Gram(s) IV Push once  dextrose 50% Injectable 25 Gram(s) IV Push once  famotidine Injectable 20 milliGRAM(s) IV Push every 12 hours  glucagon  Injectable 1 milliGRAM(s) IntraMuscular once  hydrocortisone sodium succinate Injectable 50 milliGRAM(s) IV Push every 6 hours  insulin glargine Injectable (LANTUS) 10 Unit(s) SubCutaneous at bedtime  insulin lispro (ADMELOG) corrective regimen sliding scale   SubCutaneous three times a day before meals  levETIRAcetam  IVPB 500 milliGRAM(s) IV Intermittent every 12 hours  piperacillin/tazobactam IVPB.. 3.375 Gram(s) IV Intermittent every 8 hours  propofol Infusion 20 MICROgram(s)/kG/Min (13.9 mL/Hr) IV Continuous <Continuous>  vancomycin  IVPB 1500 milliGRAM(s) IV Intermittent <User Schedule>    MEDICATIONS  (PRN):  acetaminophen    Suspension .. 650 milliGRAM(s) Oral every 6 hours PRN Temp greater or equal to 38C (100.4F)  busPIRone 30 milliGRAM(s) Oral every 8 hours PRN shivering

## 2020-12-12 NOTE — PROGRESS NOTE ADULT - SUBJECTIVE AND OBJECTIVE BOX
[facility] - [facility], THE HEART CENTER                                   [address] [place] [address]                                                      PHONE: [phone]                                                         FAX: [phone]  [email or web address]  ---------------------------------------------------------------------------------------------------------------------------------    Overnight events/patient complaints:  Patient doing well overnight.  He was extubated this morning.     PAST MEDICAL & SURGICAL HISTORY:  Seizure    Depression, unspecified depression type    Diabetes    Hypertension    Hyperlipidemia    Afib    COPD (chronic obstructive pulmonary disease)    Abdominal hernia    H/O carotid endarterectomy        No Known Allergies    MEDICATIONS  (STANDING):  acetylcysteine 20%  Inhalation 4 milliLiter(s) Inhalation every 6 hours  albuterol/ipratropium for Nebulization 3 milliLiter(s) Nebulizer every 6 hours  aMIOdarone    Tablet 400 milliGRAM(s) Oral every 12 hours  aMIOdarone Infusion 0.5 mG/Min (16.7 mL/Hr) IV Continuous <Continuous>  chlorhexidine 2% Cloths 1 Application(s) Topical <User Schedule>  dextrose 40% Gel 15 Gram(s) Oral once  dextrose 50% Injectable 25 Gram(s) IV Push once  dextrose 50% Injectable 12.5 Gram(s) IV Push once  dextrose 50% Injectable 25 Gram(s) IV Push once  glucagon  Injectable 1 milliGRAM(s) IntraMuscular once  insulin glargine Injectable (LANTUS) 10 Unit(s) SubCutaneous at bedtime  insulin lispro (ADMELOG) corrective regimen sliding scale   SubCutaneous three times a day before meals  levETIRAcetam  IVPB 500 milliGRAM(s) IV Intermittent every 12 hours  multiple electrolytes Injection Type 1 Bolus 500 milliLiter(s) IV Bolus once  piperacillin/tazobactam IVPB.. 3.375 Gram(s) IV Intermittent every 8 hours  vancomycin  IVPB 1500 milliGRAM(s) IV Intermittent <User Schedule>    MEDICATIONS  (PRN):  acetaminophen    Suspension .. 650 milliGRAM(s) Oral every 6 hours PRN Temp greater or equal to 38C (100.4F)      Vital Signs Last 24 Hrs  T(C): 35.7 (12 Dec 2020 08:04), Max: 36.7 (11 Dec 2020 11:59)  T(F): 96.3 (12 Dec 2020 08:04), Max: 98.1 (11 Dec 2020 11:59)  HR: 135 (12 Dec 2020 10:35) (94 - 135)  BP: 165/105 (12 Dec 2020 10:35) (100/72 - 165/105)  BP(mean): 119 (12 Dec 2020 10:35) (80 - 128)  RR: 26 (12 Dec 2020 10:35) (14 - 26)  SpO2: 98% (12 Dec 2020 10:35) (90% - 100%)  ICU Vital Signs Last 24 Hrs  CLAUDIA MABRY  I&O's Detail    11 Dec 2020 07:01  -  12 Dec 2020 07:00  --------------------------------------------------------  IN:    Amiodarone: 48 mL    Amiodarone: 344.4 mL    Insulin: 5 mL    Lactated Ringers: 975 mL    Lactated Ringers: 375 mL    Propofol: 580.8 mL  Total IN: 2328.2 mL    OUT:    Indwelling Catheter - Urethral (mL): 1050 mL    Phenylephrine: 0 mL  Total OUT: 1050 mL    Total NET: 1278.2 mL      12 Dec 2020 07:01  -  12 Dec 2020 11:15  --------------------------------------------------------  IN:    Amiodarone: 83.5 mL    Propofol: 10 mL  Total IN: 93.5 mL    OUT:    Indwelling Catheter - Urethral (mL): 375 mL  Total OUT: 375 mL    Total NET: -281.5 mL        I&O's Summary    11 Dec 2020 07:  -  12 Dec 2020 07:00  --------------------------------------------------------  IN: 2328.2 mL / OUT: 1050 mL / NET: 1278.2 mL    12 Dec 2020 07:01  -  12 Dec 2020 11:15  --------------------------------------------------------  IN: 93.5 mL / OUT: 375 mL / NET: -281.5 mL      Drug Dosing Robert MABRY  Mode: CPAP with PS, FiO2: 30, PEEP: 5, PS: 10, MAP: 9    PHYSICAL EXAM:  General: Appears well developed, well nourished alert and cooperative.  HEENT: Head; normocephalic, atraumatic.  Eyes: Pupils reactive, cornea wnl.  Neck: Supple, no nodes adenopathy, no NVD or carotid bruit or thyromegaly.  CARDIOVASCULAR: irregular S1 and S2, No murmur, rub, gallop or lift.   LUNGS: Decreased breath sounds throughout  ABDOMEN: Soft, nontender without mass or organomegaly. bowel sounds normoactive.  EXTREMITIES: No clubbing, cyanosis or edema. Distal pulses wnl.   SKIN: warm and dry with normal turgor.  NEURO: Alert/oriented   PSYCH: normal affect.        LABS:                        12.2   24.75 )-----------( 231      ( 12 Dec 2020 05:29 )             38.0     12-12    134<L>  |  90<L>  |  46.0<H>  ----------------------------<  337<H>  4.2   |  39.0<H>  |  0.90    Ca    8.5<L>      12 Dec 2020 05:29  Phos  4.7     12-12  Mg     2.4     12-12    TPro  5.5<L>  /  Alb  2.7<L>  /  TBili  0.5  /  DBili  x   /  AST  26  /  ALT  56<H>  /  AlkPhos  84  12-12    CLAUDIA MABRY  CARDIAC MARKERS ( 10 Dec 2020 21:03 )  x     / 0.03 ng/mL / x     / x     / x      CARDIAC MARKERS ( 10 Dec 2020 12:17 )  x     / <0.01 ng/mL / x     / x     / x          PT/INR - ( 12 Dec 2020 05:29 )   PT: 26.3 sec;   INR: 2.36 ratio         PTT - ( 11 Dec 2020 05:05 )  PTT:31.1 sec  Urinalysis Basic - ( 10 Dec 2020 17:21 )    Color: Yellow / Appearance: Clear / S.025 / pH: x  Gluc: x / Ketone: Negative  / Bili: Negative / Urobili: Negative mg/dL   Blood: x / Protein: 100 mg/dL / Nitrite: Negative   Leuk Esterase: Negative / RBC: 3-5 /HPF / WBC 3-5   Sq Epi: x / Non Sq Epi: Occasional / Bacteria: Few        RADIOLOGY & ADDITIONAL STUDIES:    INTERPRETATION OF TELEMETRY (personally reviewed):    ECG:    ECHO:    STRESS TEST:    CARDIAC CATHETERIZATION:    ASSESSMENT AND PLAN:  In summary, CLAUDIA MABRY is an 70y Male with past medical history significant for COPD, HTN, DM, pAF on coumadin, seizure d/o with recent hospitalization for hypercapnic respiratory failure presents with htn emergency and subsequent PEA/VT arrest.    Cardiac arrest- patient presented unresponsive, hypoxic and hypertensive.  He had PEA arrest and was given epinephrine.  He was then cardioverted for VT and amiodarone was bolused with initiation of infusion.  Now, he is extubated on high flow.    VT- on amiodarone drip.  Switch amio to 400mg PO BID for 7 days.  Then, 200mg daily after.      Atrial fibrillation- perm afib.  Would continue coumadin with goal INR 2-3    Trops have been negative.  I believe that cardiac cath would be low yield since the nature of the arrest seems to be hypoxia from lung disease rather than primarily an arrhythmia such as VT or ischemia.  Will continue to follow patient.     Thank you for allowing [facility] to participate in the care of this patient.  Please feel free to call with any questions or concerns.

## 2020-12-12 NOTE — DIETITIAN INITIAL EVALUATION ADULT. - PERTINENT LABORATORY DATA
12-12 Na134 mmol/L<L> Glu 337 mg/dL<H> K+ 4.2 mmol/L Cr  0.90 mg/dL BUN 46.0 mg/dL<H> Phos 4.7 mg/dL Alb 2.7 g/dL<L> PAB n/a  HgA1c 9.1%

## 2020-12-12 NOTE — DIETITIAN INITIAL EVALUATION ADULT. - OTHER INFO
70 male with hx of copd, HTN, DM, afib on warfarin, seizures, who presented 12/10 with hypoxemia, altered mentals status, agonal breathing, intubated in ED and developed a samson-intubation cardiac arrest (initially PEA then VT requiring cardioversion). Pt remains intubated/sedated at this time, unable to obtain nutrition hx. Propofol running at 24.4 ml/hr (x24 hrs would provide ~644 kcal). Tube feeds ordered, however, not running at this time. Per documentation, if mental status improves pt will need LHC. RD to follow up.

## 2020-12-13 LAB
ANION GAP SERPL CALC-SCNC: 4 MMOL/L — LOW (ref 5–17)
APTT BLD: 29.5 SEC — SIGNIFICANT CHANGE UP (ref 27.5–35.5)
BASOPHILS # BLD AUTO: 0.04 K/UL — SIGNIFICANT CHANGE UP (ref 0–0.2)
BASOPHILS NFR BLD AUTO: 0.2 % — SIGNIFICANT CHANGE UP (ref 0–2)
BUN SERPL-MCNC: 30 MG/DL — HIGH (ref 8–20)
CALCIUM SERPL-MCNC: 8.4 MG/DL — LOW (ref 8.6–10.2)
CHLORIDE SERPL-SCNC: 97 MMOL/L — LOW (ref 98–107)
CO2 SERPL-SCNC: 41 MMOL/L — HIGH (ref 22–29)
CREAT SERPL-MCNC: 0.83 MG/DL — SIGNIFICANT CHANGE UP (ref 0.5–1.3)
CULTURE RESULTS: SIGNIFICANT CHANGE UP
EOSINOPHIL # BLD AUTO: 0.18 K/UL — SIGNIFICANT CHANGE UP (ref 0–0.5)
EOSINOPHIL NFR BLD AUTO: 0.8 % — SIGNIFICANT CHANGE UP (ref 0–6)
GLUCOSE BLDC GLUCOMTR-MCNC: 104 MG/DL — HIGH (ref 70–99)
GLUCOSE BLDC GLUCOMTR-MCNC: 107 MG/DL — HIGH (ref 70–99)
GLUCOSE BLDC GLUCOMTR-MCNC: 201 MG/DL — HIGH (ref 70–99)
GLUCOSE SERPL-MCNC: 85 MG/DL — SIGNIFICANT CHANGE UP (ref 70–99)
HCT VFR BLD CALC: 38.2 % — LOW (ref 39–50)
HGB BLD-MCNC: 11.9 G/DL — LOW (ref 13–17)
IMM GRANULOCYTES NFR BLD AUTO: 1 % — SIGNIFICANT CHANGE UP (ref 0–1.5)
LYMPHOCYTES # BLD AUTO: 2.28 K/UL — SIGNIFICANT CHANGE UP (ref 1–3.3)
LYMPHOCYTES # BLD AUTO: 9.7 % — LOW (ref 13–44)
MAGNESIUM SERPL-MCNC: 2.1 MG/DL — SIGNIFICANT CHANGE UP (ref 1.6–2.6)
MCHC RBC-ENTMCNC: 27.2 PG — SIGNIFICANT CHANGE UP (ref 27–34)
MCHC RBC-ENTMCNC: 31.2 GM/DL — LOW (ref 32–36)
MCV RBC AUTO: 87.2 FL — SIGNIFICANT CHANGE UP (ref 80–100)
MONOCYTES # BLD AUTO: 2.22 K/UL — HIGH (ref 0–0.9)
MONOCYTES NFR BLD AUTO: 9.4 % — SIGNIFICANT CHANGE UP (ref 2–14)
NEUTROPHILS # BLD AUTO: 18.6 K/UL — HIGH (ref 1.8–7.4)
NEUTROPHILS NFR BLD AUTO: 78.9 % — HIGH (ref 43–77)
PHOSPHATE SERPL-MCNC: 3.8 MG/DL — SIGNIFICANT CHANGE UP (ref 2.4–4.7)
PLATELET # BLD AUTO: 254 K/UL — SIGNIFICANT CHANGE UP (ref 150–400)
POTASSIUM SERPL-MCNC: 4.3 MMOL/L — SIGNIFICANT CHANGE UP (ref 3.5–5.3)
POTASSIUM SERPL-SCNC: 4.3 MMOL/L — SIGNIFICANT CHANGE UP (ref 3.5–5.3)
RBC # BLD: 4.38 M/UL — SIGNIFICANT CHANGE UP (ref 4.2–5.8)
RBC # FLD: 14.8 % — HIGH (ref 10.3–14.5)
SODIUM SERPL-SCNC: 142 MMOL/L — SIGNIFICANT CHANGE UP (ref 135–145)
SPECIMEN SOURCE: SIGNIFICANT CHANGE UP
WBC # BLD: 23.55 K/UL — HIGH (ref 3.8–10.5)
WBC # FLD AUTO: 23.55 K/UL — HIGH (ref 3.8–10.5)

## 2020-12-13 PROCEDURE — 99233 SBSQ HOSP IP/OBS HIGH 50: CPT

## 2020-12-13 RX ORDER — CITALOPRAM 10 MG/1
40 TABLET, FILM COATED ORAL DAILY
Refills: 0 | Status: DISCONTINUED | OUTPATIENT
Start: 2020-12-13 | End: 2021-01-01

## 2020-12-13 RX ORDER — DILTIAZEM HCL 120 MG
60 CAPSULE, EXT RELEASE 24 HR ORAL
Refills: 0 | Status: DISCONTINUED | OUTPATIENT
Start: 2020-12-13 | End: 2021-01-01

## 2020-12-13 RX ORDER — POLYETHYLENE GLYCOL 3350 17 G/17G
17 POWDER, FOR SOLUTION ORAL DAILY
Refills: 0 | Status: DISCONTINUED | OUTPATIENT
Start: 2020-12-13 | End: 2021-01-01

## 2020-12-13 RX ORDER — FAMOTIDINE 10 MG/ML
20 INJECTION INTRAVENOUS
Refills: 0 | Status: DISCONTINUED | OUTPATIENT
Start: 2020-12-13 | End: 2021-01-01

## 2020-12-13 RX ORDER — WARFARIN SODIUM 2.5 MG/1
5 TABLET ORAL ONCE
Refills: 0 | Status: DISCONTINUED | OUTPATIENT
Start: 2020-12-13 | End: 2020-12-14

## 2020-12-13 RX ORDER — METOPROLOL TARTRATE 50 MG
25 TABLET ORAL
Refills: 0 | Status: DISCONTINUED | OUTPATIENT
Start: 2020-12-13 | End: 2021-01-01

## 2020-12-13 RX ORDER — ATORVASTATIN CALCIUM 80 MG/1
40 TABLET, FILM COATED ORAL AT BEDTIME
Refills: 0 | Status: DISCONTINUED | OUTPATIENT
Start: 2020-12-13 | End: 2021-01-01

## 2020-12-13 RX ORDER — DIGOXIN 250 MCG
0.12 TABLET ORAL DAILY
Refills: 0 | Status: DISCONTINUED | OUTPATIENT
Start: 2020-12-13 | End: 2021-01-01

## 2020-12-13 RX ORDER — SACCHAROMYCES BOULARDII 250 MG
250 POWDER IN PACKET (EA) ORAL
Refills: 0 | Status: DISCONTINUED | OUTPATIENT
Start: 2020-12-13 | End: 2020-12-30

## 2020-12-13 RX ADMIN — Medication 3 MILLILITER(S): at 20:24

## 2020-12-13 RX ADMIN — PIPERACILLIN AND TAZOBACTAM 25 GRAM(S): 4; .5 INJECTION, POWDER, LYOPHILIZED, FOR SOLUTION INTRAVENOUS at 22:30

## 2020-12-13 RX ADMIN — Medication 5: at 15:25

## 2020-12-13 RX ADMIN — CITALOPRAM 40 MILLIGRAM(S): 10 TABLET, FILM COATED ORAL at 15:09

## 2020-12-13 RX ADMIN — Medication 25 MILLIGRAM(S): at 05:19

## 2020-12-13 RX ADMIN — Medication 60 MILLIGRAM(S): at 17:10

## 2020-12-13 RX ADMIN — Medication 3 MILLILITER(S): at 15:28

## 2020-12-13 RX ADMIN — Medication 60 MILLIGRAM(S): at 05:19

## 2020-12-13 RX ADMIN — PIPERACILLIN AND TAZOBACTAM 25 GRAM(S): 4; .5 INJECTION, POWDER, LYOPHILIZED, FOR SOLUTION INTRAVENOUS at 05:21

## 2020-12-13 RX ADMIN — Medication 60 MILLIGRAM(S): at 11:09

## 2020-12-13 RX ADMIN — Medication 4 MILLILITER(S): at 03:50

## 2020-12-13 RX ADMIN — ATORVASTATIN CALCIUM 40 MILLIGRAM(S): 80 TABLET, FILM COATED ORAL at 22:30

## 2020-12-13 RX ADMIN — LEVETIRACETAM 420 MILLIGRAM(S): 250 TABLET, FILM COATED ORAL at 17:09

## 2020-12-13 RX ADMIN — Medication 0.12 MILLIGRAM(S): at 05:19

## 2020-12-13 RX ADMIN — Medication 3 MILLILITER(S): at 08:25

## 2020-12-13 RX ADMIN — AMIODARONE HYDROCHLORIDE 400 MILLIGRAM(S): 400 TABLET ORAL at 09:43

## 2020-12-13 RX ADMIN — Medication 30 MILLILITER(S): at 11:07

## 2020-12-13 RX ADMIN — Medication 4 MILLILITER(S): at 15:30

## 2020-12-13 RX ADMIN — Medication 25 MILLIGRAM(S): at 17:09

## 2020-12-13 RX ADMIN — FAMOTIDINE 20 MILLIGRAM(S): 10 INJECTION INTRAVENOUS at 17:09

## 2020-12-13 RX ADMIN — POLYETHYLENE GLYCOL 3350 17 GRAM(S): 17 POWDER, FOR SOLUTION ORAL at 11:07

## 2020-12-13 RX ADMIN — Medication 4 MILLILITER(S): at 08:24

## 2020-12-13 RX ADMIN — PIPERACILLIN AND TAZOBACTAM 25 GRAM(S): 4; .5 INJECTION, POWDER, LYOPHILIZED, FOR SOLUTION INTRAVENOUS at 15:09

## 2020-12-13 RX ADMIN — CHLORHEXIDINE GLUCONATE 1 APPLICATION(S): 213 SOLUTION TOPICAL at 05:20

## 2020-12-13 RX ADMIN — LEVETIRACETAM 420 MILLIGRAM(S): 250 TABLET, FILM COATED ORAL at 05:20

## 2020-12-13 RX ADMIN — INSULIN GLARGINE 10 UNIT(S): 100 INJECTION, SOLUTION SUBCUTANEOUS at 22:30

## 2020-12-13 RX ADMIN — AMIODARONE HYDROCHLORIDE 400 MILLIGRAM(S): 400 TABLET ORAL at 22:30

## 2020-12-13 RX ADMIN — Medication 3 MILLILITER(S): at 03:50

## 2020-12-13 RX ADMIN — Medication 5 MILLIGRAM(S): at 00:05

## 2020-12-13 NOTE — PROGRESS NOTE ADULT - SUBJECTIVE AND OBJECTIVE BOX
HOSPITALIST PROGRESS NOTE    CLAUDIA MABRY  [id number]  70yMale    Patient is a 70y old  Male who presents with a chief complaint of Acute hypoxic/hypercapnic respiratory failure, cardiac arrest (13 Dec 2020 11:55)      SUBJECTIVE:   Chart reviewed - last and current admission.     70 year old male with PMH HTN, Dyslipidemia, T2DM, COPD on home O2 3L, Chronic AF on Coumadin and seizure disordered was admitted for acute on chronic respiratory failure from 12/1-12/9/2020 and was discharged home. Brought in the following day unresponsive, hypoxic (SpO2 70s), and agonally breathing. Subsequently intubated and developed brief PEA arrest which was treated with epi x2, bicarb, calcium, and mag. ROSC was achieved, found to be in v tach w/ a pulse. Pt was cardioverted at 100J, 200J x2, and 300J w/ conversion to sinus rhythm. Amio bolus and infusion initiated. Initially hypertensive (SBP 200s), subsequently became hypotensive and Levophed infusion was started. Labs significant for leukocytosis (23K), hyperkalemia (K 5.8), uremia (BUN 45), and hyperglycemia. ABG revealing of a respiratory acidosis. CXR w/ small bibasilar infiltrates. S/p Zosyn and Vanco in ED. CT head negative for acute intracranial event. He was admitted to MICU; subsequently extubated and currently being downgraded to SDU on medical service.    Patient seen and examined at bedside in MICU 1 for respiratory failure.  Denies shortness of breath (on HFNC), wants to go home today  Denies any dizziness, chest pain, palpitations, fever, chills, nausea, vomiting, abdominal pain or diarrhea      OBJECTIVE:  Vital Signs Last 24 Hrs  T(C): 36.6 (13 Dec 2020 15:36), Max: 38 (13 Dec 2020 07:46)  T(F): 97.8 (13 Dec 2020 15:36), Max: 100.4 (13 Dec 2020 07:46)  HR: 116 (13 Dec 2020 16:00) (91 - 143)  BP: 117/64 (13 Dec 2020 16:00) (91/70 - 129/96)  BP(mean): 76 (13 Dec 2020 16:00) (72 - 108)  RR: 23 (13 Dec 2020 16:00) (15 - 31)  SpO2: 95% (13 Dec 2020 16:00) (84% - 100%)    PHYSICAL EXAMINATION  General: Elderly male, jorgito, sitting up in chair  HEENT:  HFNC  NECK:  supple  CVS: regular rate and rhythm S1 S2  RESP:  Fair air flow - no wheeze or crackles  GI:  Soft nondistended nontender BS+  : Drummond(+)  MSK:  moves all extremities, though weak  CNS:  Alert, knows in Hospital - follows commands  INTEG:  warm dry  PSYCH:  impaired insight as requesting to go home today    MONITOR:  CAPILLARY BLOOD GLUCOSE      POCT Blood Glucose.: 104 mg/dL (13 Dec 2020 06:31)  POCT Blood Glucose.: 71 mg/dL (12 Dec 2020 23:03)      A1C with Estimated Average Glucose Result: 9.1 % (12.11.20 @ 05:05)   I&O's Summary    12 Dec 2020 07:01  -  13 Dec 2020 07:00  --------------------------------------------------------  IN: 2289.5 mL / OUT: 2275 mL / NET: 14.5 mL    13 Dec 2020 07:01  -  13 Dec 2020 16:55  --------------------------------------------------------  IN: 100 mL / OUT: 0 mL / NET: 100 mL                            11.9   23.55 )-----------( 254      ( 13 Dec 2020 04:32 )             38.2     PT/INR - ( 12 Dec 2020 05:29 )   PT: 26.3 sec;   INR: 2.36 ratio         PTT - ( 13 Dec 2020 04:32 )  PTT:29.5 sec  12-13    142  |  97<L>  |  30.0<H>  ----------------------------<  85  4.3   |  41.0<H>  |  0.83    Ca    8.4<L>      13 Dec 2020 04:32  Phos  3.8     12-13  Mg     2.1     12-13    TPro  5.5<L>  /  Alb  2.7<L>  /  TBili  0.5  /  DBili  x   /  AST  26  /  ALT  56<H>  /  AlkPhos  84  12-12            Culture:    TTE:    RADIOLOGY        MEDICATIONS  (STANDING):  albuterol/ipratropium for Nebulization 3 milliLiter(s) Nebulizer every 6 hours  aMIOdarone    Tablet 400 milliGRAM(s) Oral every 12 hours  atorvastatin 40 milliGRAM(s) Oral at bedtime  chlorhexidine 2% Cloths 1 Application(s) Topical <User Schedule>  citalopram 40 milliGRAM(s) Oral daily  dextrose 40% Gel 15 Gram(s) Oral once  dextrose 50% Injectable 25 Gram(s) IV Push once  dextrose 50% Injectable 12.5 Gram(s) IV Push once  dextrose 50% Injectable 25 Gram(s) IV Push once  digoxin     Tablet 0.125 milliGRAM(s) Oral daily  diltiazem    Tablet 60 milliGRAM(s) Oral four times a day  famotidine    Tablet 20 milliGRAM(s) Oral two times a day  glucagon  Injectable 1 milliGRAM(s) IntraMuscular once  insulin glargine Injectable (LANTUS) 10 Unit(s) SubCutaneous at bedtime  insulin lispro (ADMELOG) corrective regimen sliding scale   SubCutaneous three times a day before meals  levETIRAcetam  IVPB 500 milliGRAM(s) IV Intermittent every 12 hours  metoprolol tartrate 25 milliGRAM(s) Oral two times a day  piperacillin/tazobactam IVPB.. 3.375 Gram(s) IV Intermittent every 8 hours  polyethylene glycol 3350 17 Gram(s) Oral daily  warfarin 5 milliGRAM(s) Oral every 24 hours      MEDICATIONS  (PRN):  aluminum hydroxide/magnesium hydroxide/simethicone Suspension 30 milliLiter(s) Oral every 4 hours PRN Dyspepsia

## 2020-12-13 NOTE — PROGRESS NOTE ADULT - SUBJECTIVE AND OBJECTIVE BOX
[facility] - [facility], THE HEART CENTER                                   [address] [place] [address]                                                      PHONE: [phone]                                                         FAX: [phone]  [email or web address]  ---------------------------------------------------------------------------------------------------------------------------------    Overnight events/patient complaints:  Patient feels slightly better today.  he is weak but breathing is improving.  No chest pain.     PAST MEDICAL & SURGICAL HISTORY:  Seizure    Depression, unspecified depression type    Diabetes    Hypertension    Hyperlipidemia    Afib    COPD (chronic obstructive pulmonary disease)    Abdominal hernia    H/O carotid endarterectomy        No Known Allergies    MEDICATIONS  (STANDING):  acetylcysteine 20%  Inhalation 4 milliLiter(s) Inhalation every 6 hours  albuterol/ipratropium for Nebulization 3 milliLiter(s) Nebulizer every 6 hours  aMIOdarone    Tablet 400 milliGRAM(s) Oral every 12 hours  atorvastatin 40 milliGRAM(s) Oral at bedtime  chlorhexidine 2% Cloths 1 Application(s) Topical <User Schedule>  citalopram 40 milliGRAM(s) Oral daily  dextrose 40% Gel 15 Gram(s) Oral once  dextrose 50% Injectable 25 Gram(s) IV Push once  dextrose 50% Injectable 12.5 Gram(s) IV Push once  dextrose 50% Injectable 25 Gram(s) IV Push once  digoxin     Tablet 0.125 milliGRAM(s) Oral daily  diltiazem    Tablet 60 milliGRAM(s) Oral four times a day  famotidine    Tablet 20 milliGRAM(s) Oral two times a day  glucagon  Injectable 1 milliGRAM(s) IntraMuscular once  insulin glargine Injectable (LANTUS) 10 Unit(s) SubCutaneous at bedtime  insulin lispro (ADMELOG) corrective regimen sliding scale   SubCutaneous three times a day before meals  levETIRAcetam  IVPB 500 milliGRAM(s) IV Intermittent every 12 hours  metoprolol tartrate 25 milliGRAM(s) Oral two times a day  piperacillin/tazobactam IVPB.. 3.375 Gram(s) IV Intermittent every 8 hours  polyethylene glycol 3350 17 Gram(s) Oral daily  warfarin 5 milliGRAM(s) Oral every 24 hours    MEDICATIONS  (PRN):  aluminum hydroxide/magnesium hydroxide/simethicone Suspension 30 milliLiter(s) Oral every 4 hours PRN Dyspepsia      Vital Signs Last 24 Hrs  T(C): 36.8 (13 Dec 2020 11:52), Max: 38 (13 Dec 2020 07:46)  T(F): 98.3 (13 Dec 2020 11:52), Max: 100.4 (13 Dec 2020 07:46)  HR: 117 (13 Dec 2020 10:00) (93 - 143)  BP: 107/68 (13 Dec 2020 10:00) (91/70 - 155/143)  BP(mean): 81 (13 Dec 2020 10:00) (72 - 148)  RR: 20 (13 Dec 2020 10:00) (13 - 31)  SpO2: 99% (13 Dec 2020 10:00) (84% - 100%)  ICU Vital Signs Last 24 Hrs  CLAUDIA MABRY  I&O's Detail    12 Dec 2020 07:01  -  13 Dec 2020 07:00  --------------------------------------------------------  IN:    Amiodarone: 83.5 mL    IV PiggyBack: 275 mL    IV PiggyBack: 200 mL    IV PiggyBack: 501 mL    multiple electrolytes Injection Type 1 Bolus: 500 mL    Oral Fluid: 720 mL    Propofol: 10 mL  Total IN: 2289.5 mL    OUT:    Indwelling Catheter - Urethral (mL): 2275 mL  Total OUT: 2275 mL    Total NET: 14.5 mL        I&O's Summary    12 Dec 2020 07:01  -  13 Dec 2020 07:00  --------------------------------------------------------  IN: 2289.5 mL / OUT: 2275 mL / NET: 14.5 mL      Drug Dosing Weight  CLAUDIA MABRY      PHYSICAL EXAM:  General: Appears well developed, well nourished alert and cooperative.  HEENT: Head; normocephalic, atraumatic.  Eyes: Pupils reactive, cornea wnl.  Neck: Supple, no nodes adenopathy, no NVD or carotid bruit or thyromegaly.  CARDIOVASCULAR: irregular S1 and S2, No murmur, rub, gallop or lift.   LUNGS: Decreased breath sounds throughout  ABDOMEN: Soft, nontender without mass or organomegaly. bowel sounds normoactive.  EXTREMITIES: No clubbing, cyanosis or edema. Distal pulses wnl.   SKIN: warm and dry with normal turgor.  NEURO: Alert/oriented   PSYCH: normal affect.        LABS:                        11.9   23.55 )-----------( 254      ( 13 Dec 2020 04:32 )             38.2     12-13    142  |  97<L>  |  30.0<H>  ----------------------------<  85  4.3   |  41.0<H>  |  0.83    Ca    8.4<L>      13 Dec 2020 04:32  Phos  3.8     12-13  Mg     2.1     12-13    TPro  5.5<L>  /  Alb  2.7<L>  /  TBili  0.5  /  DBili  x   /  AST  26  /  ALT  56<H>  /  AlkPhos  84  12-12    CLAUDIA MABRY      PT/INR - ( 12 Dec 2020 05:29 )   PT: 26.3 sec;   INR: 2.36 ratio         PTT - ( 13 Dec 2020 04:32 )  PTT:29.5 sec      RADIOLOGY & ADDITIONAL STUDIES:    INTERPRETATION OF TELEMETRY (personally reviewed): no events, afib    ECG:        ASSESSMENT AND PLAN:  In summary, CLAUDIA MABRY is a 70y Male with past medical history significant for COPD, HTN, DM, pAF on coumadin, seizure d/o with recent hospitalization for hypercapnic respiratory failure presents with htn emergency and subsequent PEA/VT arrest.    Cardiac arrest- patient presented unresponsive, hypoxic and hypertensive.  He had PEA arrest and was given epinephrine.  He was then cardioverted for VT with amio loading.  Now, he is extubated on high flow.    VT- On amio 400mg PO BID for 7 days.  Then, 200mg daily after.      Atrial fibrillation- perm afib.  Would continue coumadin with goal INR 2-3; continue rate control strategy with home meds.     Trops have been negative.  I believe that cardiac cath would be low yield since the nature of the arrest seems to be hypoxia from lung disease rather than primarily an arrhythmia such as VT or ischemia.  Will continue to follow patient.     Thank you for allowing [facility] to participate in the care of this patient.  Please feel free to call with any questions or concerns.

## 2020-12-13 NOTE — PROGRESS NOTE ADULT - ASSESSMENT
70 year old male with PMH HTN, Dyslipidemia, T2DM, COPD on home O2 3L, Chronic AF on Coumadin and seizure disordered was admitted for acute on chronic respiratory failure from 12/1-12/9/2020 and was discharged home. Brought in the following day unresponsive, hypoxic (SpO2 70s), and agonally breathing. Subsequently intubated and developed brief PEA arrest, VT s/p Cardioversion    Respiratory failure, acute on chronic, mixed due to COPD exacerbation with Respiratory acidosis - s/p extubation.  -   - Supplemental O2, HFNC, wean as tolerated  - Duonebs    PEA arrest, VT s/p Cardioversion (likely due to hypoxia from above). AF with RVR. Now rate controlled with therapeutic INR. TTE limited.  - Continue Amiodarone, Digoxin, CCB  - Continue Coumadin  - Repeat TTE    Leukocytosis  - currently being treated for possible pneumonia ([facility])  - Continue IV Zosyn for now  - Follow up cultures    HTN - controlled on current regimen  - Continue CCB    T2DM with good glycemic control currently A1c 9.1  - Continue BGM with SSI  - Continue basal and bolus Insulins  Monitor for hypo/hyperglycemia    Seizure disorder  - Continue AED    Prophylactic measure  - VTEp, therapeutic INR    Prognosis - guarded  Advance Directives - Full code  Disposition  - pending clinical improvement; anticipate at least 48 hours   70 year old male with PMH HTN, Dyslipidemia, T2DM, COPD on home O2 3L, Chronic AF on Coumadin and seizure disordered was admitted for acute on chronic respiratory failure from 12/1-12/9/2020 and was discharged home. Brought in the following day unresponsive, hypoxic (SpO2 70s), and agonally breathing. Subsequently intubated and developed brief PEA arrest, VT s/p Cardioversion    Respiratory failure, acute on chronic, mixed due to COPD exacerbation with Respiratory acidosis - s/p extubation.  -   - Supplemental O2, HFNC, wean as tolerated  - Duonebs    PEA arrest, VT s/p Cardioversion (likely due to hypoxia from above). AF with RVR. Now rate controlled with therapeutic INR. TTE limited.  - Continue Amiodarone, Digoxin, CCB  - Continue Coumadin  - Repeat TTE    Leukocytosis  - currently being treated for possible pneumonia ([facility]).   Blood culture with [facility] - Staph. epidermidis. Urine and sputum cultures (-)  - Aspiration precautions  - Continue IV Zosyn for now  - Follow up cultures    HTN - controlled on current regimen  - Continue CCB    T2DM with good glycemic control currently A1c 9.1  - Continue BGM with SSI  - Continue basal and bolus Insulins  Monitor for hypo/hyperglycemia    Seizure disorder  - Continue AED    Prophylactic measure  - VTEp, therapeutic INR    Prognosis - guarded  Advance Directives - Full code  Disposition  - pending clinical improvement; anticipate at least 48 hours

## 2020-12-13 NOTE — PROGRESS NOTE ADULT - ASSESSMENT
70 male with hx of copd, HTN, DM, afib on warfarin, seizures, who presented 12/10 with hypoxemia, altered mentals status, agonal breathing, intubated in ED and developed a samson-intubation cardiac arrest (initially PEA then VT requiring cardioversion).    Problem List:  1) Cardiac arrest- PEA --> VT  2) Acute hypoxic respiratory failure  3) A-fib RVR  4) PNA    1) cardiac arrest  - Underwent hypothermia protocol, now warmed, and extubated to HFNC  - PEA to VT arrest, may benefit from cardiac cath   - mental status improved   - Amio 400mg PO BID, no further VT episodes    2) hypoxic respiratory failure  - Extubated to HFNC, currently on 60% and 40L, will wean FiO2 as tolerated and plan to get to nasal canula if tolerates  - Duonebs, chest pT, and mucomyst for secretions due to PNA    3) A-fib RVR  - 70 male with hx of copd, HTN, DM, afib on warfarin, seizures, who presented 12/10 with hypoxemia, altered mentals status, agonal breathing, intubated in ED and developed a samson-intubation cardiac arrest (initially PEA then VT requiring cardioversion).    Problem List:  1) Cardiac arrest- PEA --> VT  2) Acute hypoxic respiratory failure  3) A-fib RVR  4) PNA    1) cardiac arrest  - Underwent hypothermia protocol, now warmed, and extubated to HFNC  - PEA to VT arrest, may benefit from cardiac cath   - mental status improved   - Amio 400mg PO BID, no further VT episodes  - Will need repeat TTE when HR more stable from A-fib, previous was difficult to read     2) hypoxic respiratory failure  - Extubated to HFNC, currently on 60% and 40L, will wean FiO2 as tolerated and plan to get to nasal canula if tolerates  - Duonebs, chest pT, and mucomyst for secretions due to PNA    3) A-fib RVR  - Amio PO  - Restart home dose digoxin, metoprolol and cardizem   - Restart home dose coumadin 5mg and check INR in AM     4) PNA  -Zosyn and vanomycin on board  -Sputum culture with normal jacinto   -d/c vancomycin given no evidence for MRSA on culture or PCR, also trough is high     DVT-P: SCDs, restart coumadin  remove guerra for TOV, OOB, miralax for bowel regimen  PT eval

## 2020-12-13 NOTE — PROGRESS NOTE ADULT - SUBJECTIVE AND OBJECTIVE BOX
Patient is a 70y old  Male who presents with a chief complaint of Acute hypoxic/hypercapnic respiratory failure, cardiac arrest (12 Dec 2020 11:15)      BRIEF HOSPITAL COURSE: 70 male with hx of copd, HTN, DM, afib on warfarin, seizures, who presented 12/10 with hypoxemia, altered mentals status, agonal breathing, intubated in ED and developed a smason-intubation cardiac arrest (initially PEA then VT requiring cardioversion).      Events last 24 hours: Patient was able to be extubated to HFNC yesterday. Mildly encephalopathic, passes dysphagia screen, still with A-fib with RVR. Awake and alert at this time.     PAST MEDICAL & SURGICAL HISTORY:  Seizure    Depression, unspecified depression type    Diabetes    Hypertension    Hyperlipidemia    Afib    COPD (chronic obstructive pulmonary disease)    Abdominal hernia    H/O carotid endarterectomy        Review of Systems:  CONSTITUTIONAL: No fever, chills, or fatigue  EYES: No eye pain, visual disturbances, or discharge  ENMT:  No difficulty hearing, tinnitus, vertigo; No sinus or throat pain  NECK: No pain or stiffness  RESPIRATORY: No cough, wheezing, chills or hemoptysis; No shortness of breath  CARDIOVASCULAR: No chest pain, palpitations, dizziness, or leg swelling  GASTROINTESTINAL: No abdominal or epigastric pain. No nausea, vomiting, or hematemesis; No diarrhea or constipation. No melena or hematochezia.  GENITOURINARY: No dysuria, frequency, hematuria, or incontinence  NEUROLOGICAL: No headaches, memory loss, loss of strength, numbness, or tremors  SKIN: No itching, burning, rashes, or lesions   MUSCULOSKELETAL: No joint pain or swelling; No muscle, back, or extremity pain  PSYCHIATRIC: No depression, anxiety, mood swings, or difficulty sleeping      Medications:  piperacillin/tazobactam IVPB.. 3.375 Gram(s) IV Intermittent every 8 hours  vancomycin  IVPB 1500 milliGRAM(s) IV Intermittent <User Schedule>    aMIOdarone    Tablet 400 milliGRAM(s) Oral every 12 hours  digoxin     Tablet 0.125 milliGRAM(s) Oral daily  diltiazem    Tablet 60 milliGRAM(s) Oral four times a day  metoprolol tartrate 25 milliGRAM(s) Oral two times a day    acetylcysteine 20%  Inhalation 4 milliLiter(s) Inhalation every 6 hours  albuterol/ipratropium for Nebulization 3 milliLiter(s) Nebulizer every 6 hours    levETIRAcetam  IVPB 500 milliGRAM(s) IV Intermittent every 12 hours        polyethylene glycol 3350 17 Gram(s) Oral daily      atorvastatin 40 milliGRAM(s) Oral at bedtime  dextrose 40% Gel 15 Gram(s) Oral once  dextrose 50% Injectable 25 Gram(s) IV Push once  dextrose 50% Injectable 12.5 Gram(s) IV Push once  dextrose 50% Injectable 25 Gram(s) IV Push once  glucagon  Injectable 1 milliGRAM(s) IntraMuscular once  insulin glargine Injectable (LANTUS) 10 Unit(s) SubCutaneous at bedtime  insulin lispro (ADMELOG) corrective regimen sliding scale   SubCutaneous three times a day before meals        chlorhexidine 2% Cloths 1 Application(s) Topical <User Schedule>        Mode: CPAP with PS  FiO2: 30  PEEP: 5  PS: 10  MAP: 9      ICU Vital Signs Last 24 Hrs  T(C): 37.7 (13 Dec 2020 04:00), Max: 37.7 (13 Dec 2020 04:00)  T(F): 99.9 (13 Dec 2020 04:00), Max: 99.9 (13 Dec 2020 04:00)  HR: 111 (13 Dec 2020 04:00) (100 - 143)  BP: 112/86 (13 Dec 2020 04:00) (91/70 - 165/105)  BP(mean): 95 (13 Dec 2020 04:00) (79 - 148)  ABP: --  ABP(mean): --  RR: 18 (13 Dec 2020 04:00) (13 - 26)  SpO2: 95% (13 Dec 2020 04:00) (87% - 100%)      ABG - ( 12 Dec 2020 12:24 )  pH, Arterial: 7.39  pH, Blood: x     /  pCO2: 66    /  pO2: 75    / HCO3: 36    / Base Excess: 12.3  /  SaO2: 94                  I&O's Detail    11 Dec 2020 07:01  -  12 Dec 2020 07:00  --------------------------------------------------------  IN:    Amiodarone: 48 mL    Amiodarone: 344.4 mL    Insulin: 5 mL    Lactated Ringers: 975 mL    Lactated Ringers: 375 mL    Propofol: 580.8 mL  Total IN: 2328.2 mL    OUT:    Indwelling Catheter - Urethral (mL): 1050 mL    Phenylephrine: 0 mL  Total OUT: 1050 mL    Total NET: 1278.2 mL      12 Dec 2020 07:01  -  13 Dec 2020 04:30  --------------------------------------------------------  IN:    Amiodarone: 83.5 mL    IV PiggyBack: 200 mL    IV PiggyBack: 100 mL    IV PiggyBack: 501 mL    multiple electrolytes Injection Type 1 Bolus: 500 mL    Oral Fluid: 480 mL    Propofol: 10 mL  Total IN: 1874.5 mL    OUT:    Indwelling Catheter - Urethral (mL): 2025 mL  Total OUT: 2025 mL    Total NET: -150.5 mL            LABS:                        12.2   24.75 )-----------( 231      ( 12 Dec 2020 05:29 )             38.0     12-12    133<L>  |  92<L>  |  47.0<H>  ----------------------------<  249<H>  4.1   |  38.0<H>  |  0.83    Ca    8.5<L>      12 Dec 2020 11:06  Phos  5.4     12-12  Mg     2.3     12-12    TPro  5.5<L>  /  Alb  2.7<L>  /  TBili  0.5  /  DBili  x   /  AST  26  /  ALT  56<H>  /  AlkPhos  84  12-12          CAPILLARY BLOOD GLUCOSE  180 (11 Dec 2020 07:00)      POCT Blood Glucose.: 71 mg/dL (12 Dec 2020 23:03)    PT/INR - ( 12 Dec 2020 05:29 )   PT: 26.3 sec;   INR: 2.36 ratio         PTT - ( 11 Dec 2020 05:05 )  PTT:31.1 sec    CULTURES:  Culture Results:   Normal Respiratory Britt present (12-11-20 @ 16:38)  Culture Results:   No growth (12-10-20 @ 22:15)  Culture Results:   Growth in anaerobic bottle: Staphylococcus epidermidis  Coag Negative Staphylococcus  Single set isolate, possible contaminant. Contact  Microbiology if susceptibility testing clinically  indicated. (12-10-20 @ 15:10)  Culture Results:   No growth at 48 hours (12-10-20 @ 15:09)  Rapid RVP Result: NotDetec (12-10-20 @ 12:27)      Physical Examination:    General Alert, interactive, nonfocal    HEENT: Pupils equal, reactive to light.  Symmetric.    PULM: mild rhonchi bilaterally     CVS: irregular irregular rhythm, tachycardic     ABD: Soft, nondistended, nontender, normoactive bowel sounds, no masses    EXT:  Mild LE edema     SKIN: Warm and well perfused, no rashes noted.    NEURO: A&Ox1, strength 5/5 all extremities, cranial nerves grossly intact, no focal deficits      CRITICAL CARE TIME SPENT: 60 minutes assessing presenting problems of acute illness, which pose high probability of life threatening deterioration or end organ damage/dysfunction, as well as medical decision making including initiating plan of care, reviewing data, reviewing radiologic exams, discussing with multidisciplinary team,  discussing goals of care with patient/family, and writing this note.  Non-inclusive of procedures performed,

## 2020-12-13 NOTE — CHART NOTE - NSCHARTNOTEFT_GEN_A_CORE
OOB to chair with assistance   Improved delirium since extubation   Slowly improving heart rate after introduction of home meds including Cardizem, Dig, BB and on Coumadin   Plan d/w Cardio: neg tro *3-4; no p[alanna for cath for now; would need better TTE as 12/12 study limited due to uncontrolled HR  Transferring out of MICU   Family called, updated and all questions answered

## 2020-12-14 LAB
ANION GAP SERPL CALC-SCNC: 6 MMOL/L — SIGNIFICANT CHANGE UP (ref 5–17)
APTT BLD: 29.3 SEC — SIGNIFICANT CHANGE UP (ref 27.5–35.5)
BUN SERPL-MCNC: 30 MG/DL — HIGH (ref 8–20)
CALCIUM SERPL-MCNC: 8.4 MG/DL — LOW (ref 8.6–10.2)
CHLORIDE SERPL-SCNC: 97 MMOL/L — LOW (ref 98–107)
CO2 SERPL-SCNC: 41 MMOL/L — HIGH (ref 22–29)
CREAT SERPL-MCNC: 0.8 MG/DL — SIGNIFICANT CHANGE UP (ref 0.5–1.3)
GLUCOSE BLDC GLUCOMTR-MCNC: 159 MG/DL — HIGH (ref 70–99)
GLUCOSE BLDC GLUCOMTR-MCNC: 183 MG/DL — HIGH (ref 70–99)
GLUCOSE BLDC GLUCOMTR-MCNC: 210 MG/DL — HIGH (ref 70–99)
GLUCOSE BLDC GLUCOMTR-MCNC: 213 MG/DL — HIGH (ref 70–99)
GLUCOSE SERPL-MCNC: 149 MG/DL — HIGH (ref 70–99)
HCT VFR BLD CALC: 36.5 % — LOW (ref 39–50)
HGB BLD-MCNC: 11.1 G/DL — LOW (ref 13–17)
INR BLD: 1.27 RATIO — HIGH (ref 0.88–1.16)
MAGNESIUM SERPL-MCNC: 2.2 MG/DL — SIGNIFICANT CHANGE UP (ref 1.6–2.6)
MCHC RBC-ENTMCNC: 27.3 PG — SIGNIFICANT CHANGE UP (ref 27–34)
MCHC RBC-ENTMCNC: 30.4 GM/DL — LOW (ref 32–36)
MCV RBC AUTO: 89.9 FL — SIGNIFICANT CHANGE UP (ref 80–100)
PHOSPHATE SERPL-MCNC: 3.6 MG/DL — SIGNIFICANT CHANGE UP (ref 2.4–4.7)
PLATELET # BLD AUTO: 226 K/UL — SIGNIFICANT CHANGE UP (ref 150–400)
POTASSIUM SERPL-MCNC: 4.4 MMOL/L — SIGNIFICANT CHANGE UP (ref 3.5–5.3)
POTASSIUM SERPL-SCNC: 4.4 MMOL/L — SIGNIFICANT CHANGE UP (ref 3.5–5.3)
PROTHROM AB SERPL-ACNC: 14.6 SEC — HIGH (ref 10.6–13.6)
RBC # BLD: 4.06 M/UL — LOW (ref 4.2–5.8)
RBC # FLD: 14.6 % — HIGH (ref 10.3–14.5)
SODIUM SERPL-SCNC: 144 MMOL/L — SIGNIFICANT CHANGE UP (ref 135–145)
WBC # BLD: 18.15 K/UL — HIGH (ref 3.8–10.5)
WBC # FLD AUTO: 18.15 K/UL — HIGH (ref 3.8–10.5)

## 2020-12-14 PROCEDURE — 93306 TTE W/DOPPLER COMPLETE: CPT | Mod: 26

## 2020-12-14 PROCEDURE — 99233 SBSQ HOSP IP/OBS HIGH 50: CPT

## 2020-12-14 RX ORDER — WARFARIN SODIUM 2.5 MG/1
5 TABLET ORAL ONCE
Refills: 0 | Status: COMPLETED | OUTPATIENT
Start: 2020-12-14 | End: 2020-12-14

## 2020-12-14 RX ADMIN — PIPERACILLIN AND TAZOBACTAM 25 GRAM(S): 4; .5 INJECTION, POWDER, LYOPHILIZED, FOR SOLUTION INTRAVENOUS at 05:04

## 2020-12-14 RX ADMIN — AMIODARONE HYDROCHLORIDE 400 MILLIGRAM(S): 400 TABLET ORAL at 21:48

## 2020-12-14 RX ADMIN — Medication 25 MILLIGRAM(S): at 17:10

## 2020-12-14 RX ADMIN — Medication 0.12 MILLIGRAM(S): at 05:03

## 2020-12-14 RX ADMIN — WARFARIN SODIUM 5 MILLIGRAM(S): 2.5 TABLET ORAL at 21:00

## 2020-12-14 RX ADMIN — Medication 250 MILLIGRAM(S): at 05:02

## 2020-12-14 RX ADMIN — ATORVASTATIN CALCIUM 40 MILLIGRAM(S): 80 TABLET, FILM COATED ORAL at 21:48

## 2020-12-14 RX ADMIN — Medication 3 MILLILITER(S): at 20:33

## 2020-12-14 RX ADMIN — INSULIN GLARGINE 10 UNIT(S): 100 INJECTION, SOLUTION SUBCUTANEOUS at 21:48

## 2020-12-14 RX ADMIN — CHLORHEXIDINE GLUCONATE 1 APPLICATION(S): 213 SOLUTION TOPICAL at 04:19

## 2020-12-14 RX ADMIN — Medication 60 MILLIGRAM(S): at 05:03

## 2020-12-14 RX ADMIN — CITALOPRAM 40 MILLIGRAM(S): 10 TABLET, FILM COATED ORAL at 12:05

## 2020-12-14 RX ADMIN — Medication 5: at 12:06

## 2020-12-14 RX ADMIN — LEVETIRACETAM 420 MILLIGRAM(S): 250 TABLET, FILM COATED ORAL at 20:47

## 2020-12-14 RX ADMIN — Medication 25 MILLIGRAM(S): at 05:02

## 2020-12-14 RX ADMIN — AMIODARONE HYDROCHLORIDE 400 MILLIGRAM(S): 400 TABLET ORAL at 10:01

## 2020-12-14 RX ADMIN — PIPERACILLIN AND TAZOBACTAM 25 GRAM(S): 4; .5 INJECTION, POWDER, LYOPHILIZED, FOR SOLUTION INTRAVENOUS at 21:48

## 2020-12-14 RX ADMIN — Medication 3 MILLILITER(S): at 15:18

## 2020-12-14 RX ADMIN — FAMOTIDINE 20 MILLIGRAM(S): 10 INJECTION INTRAVENOUS at 17:11

## 2020-12-14 RX ADMIN — Medication 40 MILLIGRAM(S): at 17:11

## 2020-12-14 RX ADMIN — Medication 3: at 08:33

## 2020-12-14 RX ADMIN — Medication 5: at 17:08

## 2020-12-14 RX ADMIN — Medication 3 MILLILITER(S): at 08:57

## 2020-12-14 RX ADMIN — Medication 60 MILLIGRAM(S): at 12:05

## 2020-12-14 RX ADMIN — LEVETIRACETAM 420 MILLIGRAM(S): 250 TABLET, FILM COATED ORAL at 05:03

## 2020-12-14 RX ADMIN — Medication 250 MILLIGRAM(S): at 17:09

## 2020-12-14 RX ADMIN — Medication 60 MILLIGRAM(S): at 17:09

## 2020-12-14 RX ADMIN — PIPERACILLIN AND TAZOBACTAM 25 GRAM(S): 4; .5 INJECTION, POWDER, LYOPHILIZED, FOR SOLUTION INTRAVENOUS at 16:14

## 2020-12-14 RX ADMIN — FAMOTIDINE 20 MILLIGRAM(S): 10 INJECTION INTRAVENOUS at 05:03

## 2020-12-14 RX ADMIN — Medication 3 MILLILITER(S): at 03:14

## 2020-12-14 NOTE — PROGRESS NOTE ADULT - ASSESSMENT
70 year old male with PMH HTN, Dyslipidemia, T2DM, COPD on home O2 3L, Chronic AF on Coumadin and seizure disordered was admitted for acute on chronic respiratory failure from 12/1-12/9/2020 and was discharged home. Brought in the following day unresponsive, hypoxic (SpO2 70s), and agonally breathing. Subsequently intubated and developed brief PEA arrest, VT s/p Cardioversion    Respiratory failure, acute on chronic, mixed due to COPD exacerbation with Respiratory acidosis - s/p extubation. Interval improvement from HFNC to 4LNC   - ; downgrade from SDU to telemetry  - Supplemental O2, wean as tolerated  - Duonebs  - Add Solumedrol    PEA arrest, VT s/p Cardioversion (likely due to hypoxia from above). AF with RVR. Now rate controlled with therapeutic INR. TTE limited.  - Continue Amiodarone, Digoxin, CCB  - Continue Coumadin  - Repeat TTE pending  - Cardiac catheterization in am     Leukocytosis  - currently being treated for possible pneumonia ([facility]).   Blood culture with [facility] - Staph. epidermidis. Urine and sputum cultures (-)  - Aspiration precautions  - Continue IV Zosyn for now  - Follow up cultures  - ID consult    HTN - controlled on current regimen  - Continue CCB    T2DM with good glycemic control currently A1c 9.1  - Continue BGM with SSI  - Continue basal and bolus Insulins  Monitor for  hyperglycemia with steroids    Seizure disorder  - Continue AED    Prophylactic measure  - VTEp, therapeutic INR    Prognosis - guarded  Advance Directives - Full code  Disposition  - pending clinical improvement; anticipate at least 48 hours

## 2020-12-14 NOTE — PROGRESS NOTE ADULT - SUBJECTIVE AND OBJECTIVE BOX
HOSPITALIST PROGRESS NOTE    CLAUDIA MABRY  [id number]  70yMale    Patient is a 70y old  Male who presents with a chief complaint of Acute hypoxic/hypercapnic respiratory failure, cardiac arrest (14 Dec 2020 10:00)      SUBJECTIVE:   Chart reviewed since last visit.  Patient seen and examined at bedside for respiratory failure, AF, leukocytosis  Feels better - less shortness of breath  Denies any chest pain, palpitations, dizziness, nausea, abdominal pain.      OBJECTIVE:  Vital Signs Last 24 Hrs  T(C): 36.7 (14 Dec 2020 08:25), Max: 36.7 (14 Dec 2020 00:20)  T(F): 98 (14 Dec 2020 08:25), Max: 98 (14 Dec 2020 00:20)  HR: 90 (14 Dec 2020 09:01) (69 - 116)  BP: 113/54 (14 Dec 2020 08:25) (92/53 - 124/71)  BP(mean): 71 (14 Dec 2020 08:25) (61 - 86)  RR: 18 (14 Dec 2020 09:08) (15 - 23)  SpO2: 100% (14 Dec 2020 09:08) (91% - 100%)      PHYSICAL EXAMINATION  General: Elderly male, jorgito, sitting up in bed, having lunch  HEENT:  4LNC  NECK:  supple  CVS: regular rate and rhythm S1 S2  RESP:  Poor air flow - no wheeze or crackles  GI:  Soft nondistended nontender BS+  : [place](+)  MSK:  moves all extremities, though weak  CNS:  Alert, knows in Hospital - follows commands  INTEG:  warm dry  PSYCH:  Fair mood    MONITOR:  CAPILLARY BLOOD GLUCOSE      POCT Blood Glucose.: 213 mg/dL (14 Dec 2020 12:02)  POCT Blood Glucose.: 159 mg/dL (14 Dec 2020 08:14)  POCT Blood Glucose.: 107 mg/dL (13 Dec 2020 22:28)  POCT Blood Glucose.: 201 mg/dL (13 Dec 2020 15:24)        I&O's Summary    13 Dec 2020 07:01  -  14 Dec 2020 07:00  --------------------------------------------------------  IN: 1020 mL / OUT: 500 mL / NET: 520 mL                            11.1   18.15 )-----------( 226      ( 14 Dec 2020 01:51 )             36.5     PT/INR - ( 14 Dec 2020 01:51 )   PT: 14.6 sec;   INR: 1.27 ratio         PTT - ( 14 Dec 2020 01:51 )  PTT:29.3 sec  12-14    144  |  97<L>  |  30.0<H>  ----------------------------<  149<H>  4.4   |  41.0<H>  |  0.80    Ca    8.4<L>      14 Dec 2020 01:51  Phos  3.6     12-14  Mg     2.2     12-14              Culture:    TTE:    RADIOLOGY        MEDICATIONS  (STANDING):  albuterol/ipratropium for Nebulization 3 milliLiter(s) Nebulizer every 6 hours  aMIOdarone    Tablet 400 milliGRAM(s) Oral every 12 hours  atorvastatin 40 milliGRAM(s) Oral at bedtime  chlorhexidine 2% Cloths 1 Application(s) Topical <User Schedule>  citalopram 40 milliGRAM(s) Oral daily  dextrose 40% Gel 15 Gram(s) Oral once  dextrose 50% Injectable 25 Gram(s) IV Push once  dextrose 50% Injectable 12.5 Gram(s) IV Push once  dextrose 50% Injectable 25 Gram(s) IV Push once  digoxin     Tablet 0.125 milliGRAM(s) Oral daily  diltiazem    Tablet 60 milliGRAM(s) Oral four times a day  famotidine    Tablet 20 milliGRAM(s) Oral two times a day  glucagon  Injectable 1 milliGRAM(s) IntraMuscular once  insulin glargine Injectable (LANTUS) 10 Unit(s) SubCutaneous at bedtime  insulin lispro (ADMELOG) corrective regimen sliding scale   SubCutaneous three times a day before meals  levETIRAcetam  IVPB 500 milliGRAM(s) IV Intermittent every 12 hours  metoprolol tartrate 25 milliGRAM(s) Oral two times a day  piperacillin/tazobactam IVPB.. 3.375 Gram(s) IV Intermittent every 8 hours  polyethylene glycol 3350 17 Gram(s) Oral daily  saccharomyces boulardii 250 milliGRAM(s) Oral two times a day  warfarin 5 milliGRAM(s) Oral once      MEDICATIONS  (PRN):  aluminum hydroxide/magnesium hydroxide/simethicone Suspension 30 milliLiter(s) Oral every 4 hours PRN Dyspepsia

## 2020-12-14 NOTE — PROGRESS NOTE ADULT - SUBJECTIVE AND OBJECTIVE BOX
[facility] - [facility]                                   [address] [place] [address]                                                      PHONE: [phone]                                                         FAX: [phone]  [email or web address]  ---------------------------------------------------------------------------------------------------------------------------------    Overnight events/patient complaints:  NAD feeling ok today + SILVEIRA     No Known Allergies    MEDICATIONS  (STANDING):  albuterol/ipratropium for Nebulization 3 milliLiter(s) Nebulizer every 6 hours  aMIOdarone    Tablet 400 milliGRAM(s) Oral every 12 hours  atorvastatin 40 milliGRAM(s) Oral at bedtime  chlorhexidine 2% Cloths 1 Application(s) Topical <User Schedule>  citalopram 40 milliGRAM(s) Oral daily  dextrose 40% Gel 15 Gram(s) Oral once  dextrose 50% Injectable 25 Gram(s) IV Push once  dextrose 50% Injectable 12.5 Gram(s) IV Push once  dextrose 50% Injectable 25 Gram(s) IV Push once  digoxin     Tablet 0.125 milliGRAM(s) Oral daily  diltiazem    Tablet 60 milliGRAM(s) Oral four times a day  famotidine    Tablet 20 milliGRAM(s) Oral two times a day  glucagon  Injectable 1 milliGRAM(s) IntraMuscular once  insulin glargine Injectable (LANTUS) 10 Unit(s) SubCutaneous at bedtime  insulin lispro (ADMELOG) corrective regimen sliding scale   SubCutaneous three times a day before meals  levETIRAcetam  IVPB 500 milliGRAM(s) IV Intermittent every 12 hours  metoprolol tartrate 25 milliGRAM(s) Oral two times a day  piperacillin/tazobactam IVPB.. 3.375 Gram(s) IV Intermittent every 8 hours  polyethylene glycol 3350 17 Gram(s) Oral daily  saccharomyces boulardii 250 milliGRAM(s) Oral two times a day  warfarin 5 milliGRAM(s) Oral once    MEDICATIONS  (PRN):  aluminum hydroxide/magnesium hydroxide/simethicone Suspension 30 milliLiter(s) Oral every 4 hours PRN Dyspepsia      Vital Signs Last 24 Hrs  T(C): 36.7 (14 Dec 2020 08:25), Max: 36.8 (13 Dec 2020 11:52)  T(F): 98 (14 Dec 2020 08:25), Max: 98.3 (13 Dec 2020 11:52)  HR: 90 (14 Dec 2020 09:01) (69 - 117)  BP: 113/54 (14 Dec 2020 08:25) (92/53 - 124/71)  BP(mean): 71 (14 Dec 2020 08:25) (61 - 86)  RR: 18 (14 Dec 2020 09:08) (15 - 23)  SpO2: 100% (14 Dec 2020 09:08) (91% - 100%)  ICU Vital Signs Last 24 Hrs  CLAUDIA MABRY  I&O's Detail    13 Dec 2020 07:01  -  14 Dec 2020 07:00  --------------------------------------------------------  IN:    IV PiggyBack: 100 mL    IV PiggyBack: 200 mL    Oral Fluid: 720 mL  Total IN: 1020 mL    OUT:    Indwelling Catheter - Urethral (mL): 500 mL  Total OUT: 500 mL    Total NET: 520 mL        I&O's Summary    13 Dec 2020 07:01  -  14 Dec 2020 07:00  --------------------------------------------------------  IN: 1020 mL / OUT: 500 mL / NET: 520 mL      Drug Dosing Weight  CLAUDIA MABRY      PHYSICAL EXAM:  General: Appears well developed, well nourished alert and cooperative.  HEENT: Head; normocephalic, atraumatic.  Eyes: Pupils reactive, cornea wnl.  Neck: Supple, no nodes adenopathy, no NVD or carotid bruit or thyromegaly.  CARDIOVASCULAR: Normal S1 and S2, No murmur, rub, gallop or lift.   LUNGS: wheezing without rales   ABDOMEN: Soft, nontender without mass or organomegaly. bowel sounds normoactive.  EXTREMITIES: No clubbing, cyanosis or edema. Distal pulses wnl.   SKIN: warm and dry with normal turgor.  NEURO: Alert/oriented x 3/normal motor exam. No pathologic reflexes.    PSYCH: normal affect.        LABS:                        11.1   18.15 )-----------( 226      ( 14 Dec 2020 01:51 )             36.5     12-14    144  |  97<L>  |  30.0<H>  ----------------------------<  149<H>  4.4   |  41.0<H>  |  0.80    Ca    8.4<L>      14 Dec 2020 01:51  Phos  3.6     12-14  Mg     2.2     12-14      CLAUDIA MABRY      PT/INR - ( 14 Dec 2020 01:51 )   PT: 14.6 sec;   INR: 1.27 ratio         PTT - ( 14 Dec 2020 01:51 )  PTT:29.3 sec      RADIOLOGY & ADDITIONAL STUDIES:    INTERPRETATION OF TELEMETRY (personally reviewed):    Summary:   1. Technically difficult study.   2. Patient is tachycardic during the exam.   3. Endocardial visualization was enhanced with intravenous echo contrast.   4. Despite administration of Echo contrast, LV is not well visualized. Unable to comment on LV function due to poor images and Hr of 140's. LV septum appearshypokinetic to akinetic on limited views.   5. Moderately reduced RV systolic function.   6. Moderately enlarged right ventricle.   7. Finding DW MICU-PA. Consider alternative imaging modality for further evaluation.    MD Jamila Electronically signed on 12/12/2020 at 6:03:20 PM            ASSESSMENT AND PLAN:  In summary, CLAUDIA MABRY is an 70y Male with past medical history significant for COPD, HTN, DM, pAF on coumadin, seizure d/o with recent hospitalization for hypercapnic respiratory failure presents with htn emergency and subsequent PEA/VT arrest; TTE TDS limited WMA but unable to evaluate LV due to poor images     Plan  1.  Right and left heart cath Tues   2.  Full dose Lovenox for x one today   3.  Continue current CV medications

## 2020-12-15 LAB
ALBUMIN SERPL ELPH-MCNC: 3.1 G/DL — LOW (ref 3.3–5.2)
ALBUMIN SERPL ELPH-MCNC: 3.1 G/DL — LOW (ref 3.3–5.2)
ALP SERPL-CCNC: 109 U/L — SIGNIFICANT CHANGE UP (ref 40–120)
ALP SERPL-CCNC: 116 U/L — SIGNIFICANT CHANGE UP (ref 40–120)
ALT FLD-CCNC: 52 U/L — HIGH
ALT FLD-CCNC: 58 U/L — HIGH
ANION GAP SERPL CALC-SCNC: 11 MMOL/L — SIGNIFICANT CHANGE UP (ref 5–17)
ANION GAP SERPL CALC-SCNC: 4 MMOL/L — LOW (ref 5–17)
AST SERPL-CCNC: 34 U/L — SIGNIFICANT CHANGE UP
AST SERPL-CCNC: 34 U/L — SIGNIFICANT CHANGE UP
BASOPHILS # BLD AUTO: 0 K/UL — SIGNIFICANT CHANGE UP (ref 0–0.2)
BASOPHILS NFR BLD AUTO: 0 % — SIGNIFICANT CHANGE UP (ref 0–2)
BILIRUB SERPL-MCNC: 0.3 MG/DL — LOW (ref 0.4–2)
BILIRUB SERPL-MCNC: 0.4 MG/DL — SIGNIFICANT CHANGE UP (ref 0.4–2)
BLD GP AB SCN SERPL QL: SIGNIFICANT CHANGE UP
BUN SERPL-MCNC: 28 MG/DL — HIGH (ref 8–20)
BUN SERPL-MCNC: 29 MG/DL — HIGH (ref 8–20)
CALCIUM SERPL-MCNC: 8.6 MG/DL — SIGNIFICANT CHANGE UP (ref 8.6–10.2)
CALCIUM SERPL-MCNC: 8.7 MG/DL — SIGNIFICANT CHANGE UP (ref 8.6–10.2)
CHLORIDE SERPL-SCNC: 95 MMOL/L — LOW (ref 98–107)
CHLORIDE SERPL-SCNC: 96 MMOL/L — LOW (ref 98–107)
CO2 SERPL-SCNC: 38 MMOL/L — HIGH (ref 22–29)
CO2 SERPL-SCNC: 42 MMOL/L — HIGH (ref 22–29)
CREAT SERPL-MCNC: 0.85 MG/DL — SIGNIFICANT CHANGE UP (ref 0.5–1.3)
CREAT SERPL-MCNC: 1 MG/DL — SIGNIFICANT CHANGE UP (ref 0.5–1.3)
CULTURE RESULTS: SIGNIFICANT CHANGE UP
ELLIPTOCYTES BLD QL SMEAR: SLIGHT — SIGNIFICANT CHANGE UP
EOSINOPHIL # BLD AUTO: 0 K/UL — SIGNIFICANT CHANGE UP (ref 0–0.5)
EOSINOPHIL NFR BLD AUTO: 0 % — SIGNIFICANT CHANGE UP (ref 0–6)
GAS PNL BLDA: SIGNIFICANT CHANGE UP
GLUCOSE BLDC GLUCOMTR-MCNC: 149 MG/DL — HIGH (ref 70–99)
GLUCOSE BLDC GLUCOMTR-MCNC: 223 MG/DL — HIGH (ref 70–99)
GLUCOSE BLDC GLUCOMTR-MCNC: 233 MG/DL — HIGH (ref 70–99)
GLUCOSE BLDC GLUCOMTR-MCNC: 246 MG/DL — HIGH (ref 70–99)
GLUCOSE BLDC GLUCOMTR-MCNC: 262 MG/DL — HIGH (ref 70–99)
GLUCOSE BLDC GLUCOMTR-MCNC: 282 MG/DL — HIGH (ref 70–99)
GLUCOSE SERPL-MCNC: 224 MG/DL — HIGH (ref 70–99)
GLUCOSE SERPL-MCNC: 283 MG/DL — HIGH (ref 70–99)
HCT VFR BLD CALC: 33.6 % — LOW (ref 39–50)
HCT VFR BLD CALC: 35.9 % — LOW (ref 39–50)
HGB BLD-MCNC: 10.3 G/DL — LOW (ref 13–17)
HGB BLD-MCNC: 10.7 G/DL — LOW (ref 13–17)
HYPOCHROMIA BLD QL: SLIGHT — SIGNIFICANT CHANGE UP
INR BLD: 1.23 RATIO — HIGH (ref 0.88–1.16)
LYMPHOCYTES # BLD AUTO: 0.51 K/UL — LOW (ref 1–3.3)
LYMPHOCYTES # BLD AUTO: 2 % — LOW (ref 13–44)
MAGNESIUM SERPL-MCNC: 2.3 MG/DL — SIGNIFICANT CHANGE UP (ref 1.6–2.6)
MANUAL SMEAR VERIFICATION: SIGNIFICANT CHANGE UP
MCHC RBC-ENTMCNC: 26.9 PG — LOW (ref 27–34)
MCHC RBC-ENTMCNC: 27.6 PG — SIGNIFICANT CHANGE UP (ref 27–34)
MCHC RBC-ENTMCNC: 29.8 GM/DL — LOW (ref 32–36)
MCHC RBC-ENTMCNC: 30.7 GM/DL — LOW (ref 32–36)
MCV RBC AUTO: 90.1 FL — SIGNIFICANT CHANGE UP (ref 80–100)
MCV RBC AUTO: 90.2 FL — SIGNIFICANT CHANGE UP (ref 80–100)
MONOCYTES # BLD AUTO: 1.54 K/UL — HIGH (ref 0–0.9)
MONOCYTES NFR BLD AUTO: 6 % — SIGNIFICANT CHANGE UP (ref 2–14)
NEUTROPHILS # BLD AUTO: 23.6 K/UL — HIGH (ref 1.8–7.4)
NEUTROPHILS NFR BLD AUTO: 92 % — HIGH (ref 43–77)
NRBC # BLD: 0 /100 — SIGNIFICANT CHANGE UP (ref 0–0)
OVALOCYTES BLD QL SMEAR: SLIGHT — SIGNIFICANT CHANGE UP
PHOSPHATE SERPL-MCNC: 3.4 MG/DL — SIGNIFICANT CHANGE UP (ref 2.4–4.7)
PLAT MORPH BLD: NORMAL — SIGNIFICANT CHANGE UP
PLATELET # BLD AUTO: 220 K/UL — SIGNIFICANT CHANGE UP (ref 150–400)
PLATELET # BLD AUTO: 255 K/UL — SIGNIFICANT CHANGE UP (ref 150–400)
POIKILOCYTOSIS BLD QL AUTO: SLIGHT — SIGNIFICANT CHANGE UP
POTASSIUM SERPL-MCNC: 4.9 MMOL/L — SIGNIFICANT CHANGE UP (ref 3.5–5.3)
POTASSIUM SERPL-MCNC: 5.2 MMOL/L — SIGNIFICANT CHANGE UP (ref 3.5–5.3)
POTASSIUM SERPL-SCNC: 4.9 MMOL/L — SIGNIFICANT CHANGE UP (ref 3.5–5.3)
POTASSIUM SERPL-SCNC: 5.2 MMOL/L — SIGNIFICANT CHANGE UP (ref 3.5–5.3)
PROCALCITONIN SERPL-MCNC: 0.13 NG/ML — HIGH (ref 0.02–0.1)
PROCALCITONIN SERPL-MCNC: 0.14 NG/ML — HIGH (ref 0.02–0.1)
PROT SERPL-MCNC: 5.8 G/DL — LOW (ref 6.6–8.7)
PROT SERPL-MCNC: 5.9 G/DL — LOW (ref 6.6–8.7)
PROTHROM AB SERPL-ACNC: 14.1 SEC — HIGH (ref 10.6–13.6)
RBC # BLD: 3.73 M/UL — LOW (ref 4.2–5.8)
RBC # BLD: 3.98 M/UL — LOW (ref 4.2–5.8)
RBC # FLD: 14.3 % — SIGNIFICANT CHANGE UP (ref 10.3–14.5)
RBC # FLD: 14.5 % — SIGNIFICANT CHANGE UP (ref 10.3–14.5)
RBC BLD AUTO: ABNORMAL
SODIUM SERPL-SCNC: 142 MMOL/L — SIGNIFICANT CHANGE UP (ref 135–145)
SODIUM SERPL-SCNC: 143 MMOL/L — SIGNIFICANT CHANGE UP (ref 135–145)
SPECIMEN SOURCE: SIGNIFICANT CHANGE UP
STOMATOCYTES BLD QL SMEAR: SLIGHT — SIGNIFICANT CHANGE UP
WBC # BLD: 22.28 K/UL — HIGH (ref 3.8–10.5)
WBC # BLD: 25.65 K/UL — HIGH (ref 3.8–10.5)
WBC # FLD AUTO: 22.28 K/UL — HIGH (ref 3.8–10.5)
WBC # FLD AUTO: 25.65 K/UL — HIGH (ref 3.8–10.5)

## 2020-12-15 PROCEDURE — 99233 SBSQ HOSP IP/OBS HIGH 50: CPT

## 2020-12-15 PROCEDURE — 93010 ELECTROCARDIOGRAM REPORT: CPT

## 2020-12-15 PROCEDURE — 99291 CRITICAL CARE FIRST HOUR: CPT

## 2020-12-15 PROCEDURE — 99292 CRITICAL CARE ADDL 30 MIN: CPT

## 2020-12-15 PROCEDURE — 99222 1ST HOSP IP/OBS MODERATE 55: CPT

## 2020-12-15 RX ORDER — ASPIRIN/CALCIUM CARB/MAGNESIUM 324 MG
81 TABLET ORAL DAILY
Refills: 0 | Status: DISCONTINUED | OUTPATIENT
Start: 2020-12-16 | End: 2021-01-01

## 2020-12-15 RX ORDER — INFLUENZA VIRUS VACCINE 15; 15; 15; 15 UG/.5ML; UG/.5ML; UG/.5ML; UG/.5ML
0.5 SUSPENSION INTRAMUSCULAR ONCE
Refills: 0 | Status: DISCONTINUED | OUTPATIENT
Start: 2020-12-15 | End: 2021-01-01

## 2020-12-15 RX ORDER — DEXMEDETOMIDINE HYDROCHLORIDE IN 0.9% SODIUM CHLORIDE 4 UG/ML
0.5 INJECTION INTRAVENOUS
Qty: 400 | Refills: 0 | Status: DISCONTINUED | OUTPATIENT
Start: 2020-12-15 | End: 2020-12-16

## 2020-12-15 RX ORDER — INSULIN HUMAN 100 [IU]/ML
5 INJECTION, SOLUTION SUBCUTANEOUS
Qty: 50 | Refills: 0 | Status: DISCONTINUED | OUTPATIENT
Start: 2020-12-15 | End: 2020-12-16

## 2020-12-15 RX ORDER — CLOPIDOGREL BISULFATE 75 MG/1
75 TABLET, FILM COATED ORAL DAILY
Refills: 0 | Status: DISCONTINUED | OUTPATIENT
Start: 2020-12-16 | End: 2021-01-01

## 2020-12-15 RX ORDER — CHLORHEXIDINE GLUCONATE 213 G/1000ML
1 SOLUTION TOPICAL
Refills: 0 | Status: DISCONTINUED | OUTPATIENT
Start: 2020-12-15 | End: 2020-12-16

## 2020-12-15 RX ORDER — KETAMINE HYDROCHLORIDE 100 MG/ML
100 INJECTION INTRAMUSCULAR; INTRAVENOUS ONCE
Refills: 0 | Status: DISCONTINUED | OUTPATIENT
Start: 2020-12-15 | End: 2020-12-15

## 2020-12-15 RX ORDER — ASPIRIN/CALCIUM CARB/MAGNESIUM 324 MG
81 TABLET ORAL ONCE
Refills: 0 | Status: COMPLETED | OUTPATIENT
Start: 2020-12-15 | End: 2020-12-15

## 2020-12-15 RX ORDER — FUROSEMIDE 40 MG
80 TABLET ORAL ONCE
Refills: 0 | Status: COMPLETED | OUTPATIENT
Start: 2020-12-15 | End: 2020-12-15

## 2020-12-15 RX ORDER — WARFARIN SODIUM 2.5 MG/1
5 TABLET ORAL ONCE
Refills: 0 | Status: DISCONTINUED | OUTPATIENT
Start: 2020-12-15 | End: 2020-12-15

## 2020-12-15 RX ORDER — CANGRELOR 50 MG/1
4 INJECTION, POWDER, LYOPHILIZED, FOR SOLUTION INTRAVENOUS
Qty: 50 | Refills: 0 | Status: DISCONTINUED | OUTPATIENT
Start: 2020-12-15 | End: 2020-12-16

## 2020-12-15 RX ORDER — INSULIN LISPRO 100/ML
3 VIAL (ML) SUBCUTANEOUS
Refills: 0 | Status: DISCONTINUED | OUTPATIENT
Start: 2020-12-15 | End: 2020-12-15

## 2020-12-15 RX ORDER — ALBUTEROL 90 UG/1
2.5 AEROSOL, METERED ORAL ONCE
Refills: 0 | Status: COMPLETED | OUTPATIENT
Start: 2020-12-15 | End: 2020-12-15

## 2020-12-15 RX ORDER — CLOPIDOGREL BISULFATE 75 MG/1
600 TABLET, FILM COATED ORAL ONCE
Refills: 0 | Status: COMPLETED | OUTPATIENT
Start: 2020-12-15 | End: 2020-12-15

## 2020-12-15 RX ADMIN — LEVETIRACETAM 420 MILLIGRAM(S): 250 TABLET, FILM COATED ORAL at 05:22

## 2020-12-15 RX ADMIN — Medication 40 MILLIGRAM(S): at 05:20

## 2020-12-15 RX ADMIN — CHLORHEXIDINE GLUCONATE 1 APPLICATION(S): 213 SOLUTION TOPICAL at 05:19

## 2020-12-15 RX ADMIN — INSULIN HUMAN 5 UNIT(S)/HR: 100 INJECTION, SOLUTION SUBCUTANEOUS at 20:00

## 2020-12-15 RX ADMIN — CLOPIDOGREL BISULFATE 600 MILLIGRAM(S): 75 TABLET, FILM COATED ORAL at 20:48

## 2020-12-15 RX ADMIN — Medication 3 MILLILITER(S): at 02:51

## 2020-12-15 RX ADMIN — Medication 3 MILLILITER(S): at 20:48

## 2020-12-15 RX ADMIN — CITALOPRAM 40 MILLIGRAM(S): 10 TABLET, FILM COATED ORAL at 13:43

## 2020-12-15 RX ADMIN — Medication 60 MILLIGRAM(S): at 05:21

## 2020-12-15 RX ADMIN — Medication 7: at 13:43

## 2020-12-15 RX ADMIN — Medication 3 MILLILITER(S): at 09:25

## 2020-12-15 RX ADMIN — KETAMINE HYDROCHLORIDE 100 MILLIGRAM(S): 100 INJECTION INTRAMUSCULAR; INTRAVENOUS at 18:00

## 2020-12-15 RX ADMIN — Medication 60 MILLIGRAM(S): at 14:01

## 2020-12-15 RX ADMIN — PIPERACILLIN AND TAZOBACTAM 25 GRAM(S): 4; .5 INJECTION, POWDER, LYOPHILIZED, FOR SOLUTION INTRAVENOUS at 21:57

## 2020-12-15 RX ADMIN — Medication 40 MILLIGRAM(S): at 19:35

## 2020-12-15 RX ADMIN — LEVETIRACETAM 420 MILLIGRAM(S): 250 TABLET, FILM COATED ORAL at 19:30

## 2020-12-15 RX ADMIN — ATORVASTATIN CALCIUM 40 MILLIGRAM(S): 80 TABLET, FILM COATED ORAL at 21:58

## 2020-12-15 RX ADMIN — PIPERACILLIN AND TAZOBACTAM 25 GRAM(S): 4; .5 INJECTION, POWDER, LYOPHILIZED, FOR SOLUTION INTRAVENOUS at 13:44

## 2020-12-15 RX ADMIN — Medication 25 MILLIGRAM(S): at 05:21

## 2020-12-15 RX ADMIN — PIPERACILLIN AND TAZOBACTAM 25 GRAM(S): 4; .5 INJECTION, POWDER, LYOPHILIZED, FOR SOLUTION INTRAVENOUS at 05:22

## 2020-12-15 RX ADMIN — AMIODARONE HYDROCHLORIDE 400 MILLIGRAM(S): 400 TABLET ORAL at 09:39

## 2020-12-15 RX ADMIN — FAMOTIDINE 20 MILLIGRAM(S): 10 INJECTION INTRAVENOUS at 05:21

## 2020-12-15 RX ADMIN — Medication 1 MILLIGRAM(S): at 18:10

## 2020-12-15 RX ADMIN — Medication 0.12 MILLIGRAM(S): at 05:21

## 2020-12-15 RX ADMIN — AMIODARONE HYDROCHLORIDE 400 MILLIGRAM(S): 400 TABLET ORAL at 21:58

## 2020-12-15 RX ADMIN — Medication 80 MILLIGRAM(S): at 17:50

## 2020-12-15 RX ADMIN — ALBUTEROL 2.5 MILLIGRAM(S): 90 AEROSOL, METERED ORAL at 18:55

## 2020-12-15 RX ADMIN — Medication 250 MILLIGRAM(S): at 05:21

## 2020-12-15 NOTE — CONSULT NOTE ADULT - SUBJECTIVE AND OBJECTIVE BOX
Patient is a 70y old  Male who presents with a chief complaint of Acute hypoxic/hypercapnic respiratory failure, cardiac arrest (15 Dec 2020 17:04)      HPI/BRIEF HOSPITAL COURSE:   70 year old male with PMH HTN, Dyslipidemia, T2DM, COPD on home O2 3L, Chronic AF on Coumadin and seizure disordered was admitted for acute on chronic respiratory failure from 12/1-12/9/2020 and was discharged home. Brought in the following day unresponsive, hypoxic (SpO2 70s), and agonally breathing. Subsequently intubated and developed brief PEA arrest, VT s/p Cardioversion    Events last 24 hours:   Went to Cath for diagnostic and therapeutic PCI and s/p RCA 2 THI and staged LAD planned, through procedure, hyperactive delirium and also started swinging legs, with hematoma in right groin at access site, received 1 versed and  of fentanyl through procedure and reversal agent (Flumazenil) post procedure by anesthesia; ICU called for worsening resp distress, agitation with right groin venous sheath and high ACT and right groin hematoma    PAST MEDICAL & SURGICAL HISTORY:  Seizure    Depression, unspecified depression type    Diabetes    Hypertension    Hyperlipidemia    Afib    COPD (chronic obstructive pulmonary disease)    Abdominal hernia    H/O carotid endarterectomy        Could not obtain ROS due to underlying mentation    Physical Examination:    ICU Vital Signs Last 24 Hrs  T(C): 36.6 (15 Dec 2020 12:50), Max: 37.3 (15 Dec 2020 05:07)  T(F): 97.9 (15 Dec 2020 12:50), Max: 99.1 (15 Dec 2020 05:07)  HR: 102 (15 Dec 2020 17:45) (65 - 121)  BP: 155/101 (15 Dec 2020 17:45) (100/72 - 173/79)  BP(mean): 116 (15 Dec 2020 17:45) (81 - 116)  ABP: --  ABP(mean): --  RR: 16 (15 Dec 2020 17:45) (16 - 25)  SpO2: 100% (15 Dec 2020 17:45) (90% - 100%)      General: Severe resp distress,   Neuro: AAO*3, No motor, sensory, or cranial nerve deficit  HEENT: Pupils equal, reactive to light, Oral mucosa  PULM: b/l decreased breath sounds with wheezing and rales  CVS: Irregular rhythm and uncontrolled rate, no murmurs, rubs, or gallops  ABD: Soft, nondistended, nontender, normoactive bowel sounds, no CVA tenderness  EXT: 2+ b/l LE edema, nontender with pedal pulse palpable   SKIN: Warm and well perfused, no acute rashes       Medications:  piperacillin/tazobactam IVPB.. 3.375 Gram(s) IV Intermittent every 8 hours  aMIOdarone    Tablet 400 milliGRAM(s) Oral every 12 hours  digoxin     Tablet 0.125 milliGRAM(s) Oral daily  diltiazem    Tablet 60 milliGRAM(s) Oral four times a day  furosemide   Injectable 80 milliGRAM(s) IV Push once  metoprolol tartrate 25 milliGRAM(s) Oral two times a day  albuterol/ipratropium for Nebulization 3 milliLiter(s) Nebulizer every 6 hours  citalopram 40 milliGRAM(s) Oral daily  ketamine Injectable 100 milliGRAM(s) IV Push once  levETIRAcetam  IVPB 500 milliGRAM(s) IV Intermittent every 12 hours      aspirin  chewable 81 milliGRAM(s) Oral once  cangrelor Infusion 4 MICROgram(s)/kG/Min IV Continuous <Continuous>  clopidogrel Tablet 600 milliGRAM(s) Oral once  warfarin 5 milliGRAM(s) Oral once    aluminum hydroxide/magnesium hydroxide/simethicone Suspension 30 milliLiter(s) Oral every 4 hours PRN  famotidine    Tablet 20 milliGRAM(s) Oral two times a day  polyethylene glycol 3350 17 Gram(s) Oral daily      atorvastatin 40 milliGRAM(s) Oral at bedtime  dextrose 40% Gel 15 Gram(s) Oral once  dextrose 50% Injectable 25 Gram(s) IV Push once  dextrose 50% Injectable 12.5 Gram(s) IV Push once  dextrose 50% Injectable 25 Gram(s) IV Push once  glucagon  Injectable 1 milliGRAM(s) IntraMuscular once  insulin glargine Injectable (LANTUS) 10 Unit(s) SubCutaneous at bedtime  insulin lispro (ADMELOG) corrective regimen sliding scale   SubCutaneous three times a day before meals  insulin lispro Injectable (ADMELOG) 3 Unit(s) SubCutaneous three times a day before meals  insulin regular Infusion 5 Unit(s)/Hr IV Continuous <Continuous>  methylPREDNISolone sodium succinate Injectable 40 milliGRAM(s) IV Push every 12 hours      influenza   Vaccine 0.5 milliLiter(s) IntraMuscular once    chlorhexidine 2% Cloths 1 Application(s) Topical <User Schedule>  chlorhexidine 4% Liquid 1 Application(s) Topical <User Schedule>    saccharomyces boulardii 250 milliGRAM(s) Oral two times a day          I&O's Detail    14 Dec 2020 07:01  -  15 Dec 2020 07:00  --------------------------------------------------------  IN:  Total IN: 0 mL    OUT:    Voided (mL): 750 mL  Total OUT: 750 mL    Total NET: -750 mL            RADIOLOGY/ Microbiology/ Labs: reviewed     I have personally provided 80  minutes of critical care time excluding time spent on separate procedures

## 2020-12-15 NOTE — PROGRESS NOTE ADULT - SUBJECTIVE AND OBJECTIVE BOX
Patient is a 70y old  Male who presents with a chief complaint of Acute hypoxic/hypercapnic respiratory failure, cardiac arrest (15 Dec 2020 13:50)      HPI:  71 y/o M w/ a PMHx of COPD (noncompliant w/ CPAP), HTN, type 2 DM, afib (on Coumadin), and seizure disorder who presented unresponsive, hypoxic (SpO2 70s), and agonally breathing. Pt was subsequently intubated. Shortly after intubation, pt suffered from a brief PEA arrest. Pt received epi x2, bicarb, calcium, and mag. ROSC was achieved, found to be in v tach w/ a pulse. Pt was cardioverted at 100J, 200J x2, and 300J w/ conversion to sinus rhythm. Amio bolus and infusion initiated. Initially hypertensive (SBP 200s), subsequently became hypotensive and Levophed infusion was started. Labs significant for leukocytosis (23K), hyperkalemia (K 5.8), uremia (BUN 45), and hyperglycemia. ABG revealing of a respiratory acidosis. CXR w/ small bibasilar infiltrates. S/p Zosyn and Vanco in ED. CT head negative for acute intracranial event. Admit to MICU for continuation of care.    Of note, pt was admitted to ICU on 12/1 w/ acute hypercapnic respiratory failure requiring BiPAP. Discharged from [facility] yesterday 12/9. (10 Dec 2020 16:52)      PAST MEDICAL & SURGICAL HISTORY:  Seizure    Depression, unspecified depression type    Diabetes    Hypertension    Hyperlipidemia    Afib    COPD (chronic obstructive pulmonary disease)    Abdominal hernia    H/O carotid endarterectomy        PREVIOUS DIAGNOSTIC TESTING:      < from: TTE Echo Complete w/ Contrast w/ Doppler (12.14.20 @ 14:42) >  Summary:   1. Technically difficult study.   2. Endocardial visualization was enhanced with intravenous echo contrast.   3. Left ventricular ejection fraction, by visual estimation, is 60 to 65%.   4. Normal global left ventricular systolic function.   5. The left ventricular diastolic function could not be assessed in this study.   6. Normal left ventricular internal cavity size.   7. Moderately enlarged right ventricle. Moderately reduced RV systolic function.   8. The left atrium is normal in size.   9. Moderately enlarged right atrium.  10. Mild thickening and calcification of the anterior and posterior mitral valve leaflets.  11. Mild mitral valve regurgitation.  12. Mild tricuspid regurgitation.  13. There is no evidence of pericardial effusion.    MD [place] Electronically signed on 12/14/2020 at 4:23:18 PM    < end of copied text >        Allergies  No Known Allergies          MEDICATIONS  (STANDING):  albuterol/ipratropium for Nebulization 3 milliLiter(s) Nebulizer every 6 hours  aMIOdarone    Tablet 400 milliGRAM(s) Oral every 12 hours  atorvastatin 40 milliGRAM(s) Oral at bedtime  chlorhexidine 2% Cloths 1 Application(s) Topical <User Schedule>  citalopram 40 milliGRAM(s) Oral daily  dextrose 40% Gel 15 Gram(s) Oral once  dextrose 50% Injectable 25 Gram(s) IV Push once  dextrose 50% Injectable 12.5 Gram(s) IV Push once  dextrose 50% Injectable 25 Gram(s) IV Push once  digoxin     Tablet 0.125 milliGRAM(s) Oral daily  diltiazem    Tablet 60 milliGRAM(s) Oral four times a day  famotidine    Tablet 20 milliGRAM(s) Oral two times a day  glucagon  Injectable 1 milliGRAM(s) IntraMuscular once  influenza   Vaccine 0.5 milliLiter(s) IntraMuscular once  insulin glargine Injectable (LANTUS) 10 Unit(s) SubCutaneous at bedtime  insulin lispro (ADMELOG) corrective regimen sliding scale   SubCutaneous three times a day before meals  insulin lispro Injectable (ADMELOG) 3 Unit(s) SubCutaneous three times a day before meals  levETIRAcetam  IVPB 500 milliGRAM(s) IV Intermittent every 12 hours  methylPREDNISolone sodium succinate Injectable 40 milliGRAM(s) IV Push every 12 hours  metoprolol tartrate 25 milliGRAM(s) Oral two times a day  piperacillin/tazobactam IVPB.. 3.375 Gram(s) IV Intermittent every 8 hours  polyethylene glycol 3350 17 Gram(s) Oral daily  saccharomyces boulardii 250 milliGRAM(s) Oral two times a day    MEDICATIONS  (PRN):  aluminum hydroxide/magnesium hydroxide/simethicone Suspension 30 milliLiter(s) Oral every 4 hours PRN Dyspepsia    	    Vital Signs Last 24 Hrs  T(C): 36.6 (15 Dec 2020 12:50), Max: 37.3 (15 Dec 2020 05:07)  T(F): 97.9 (15 Dec 2020 12:50), Max: 99.1 (15 Dec 2020 05:07)  HR: 118 (15 Dec 2020 13:59) (65 - 118)  BP: 130/75 (15 Dec 2020 13:59) (100/72 - 132/73)  BP(mean): 81 (14 Dec 2020 20:00) (81 - 81)  RR: 18 (15 Dec 2020 12:50) (17 - 18)  SpO2: 96% (15 Dec 2020 12:50) (90% - 100%)    I&O's Detail    14 Dec 2020 07:01  -  15 Dec 2020 07:00  --------------------------------------------------------  IN:  Total IN: 0 mL    OUT:    Voided (mL): 750 mL  Total OUT: 750 mL    Total NET: -750 mL          PHYSICAL EXAM:  Appearance: Normal, well nourished	  HEENT:   Normal oral mucosa, PERRL, EOMI, sclera non-icteric	  Cardiovascular: Normal S1 S2, No JVD, No cardiac murmurs, , No peripheral edema  Respiratory: Lungs clear to auscultation	  Psychiatry: A & O x 3, Mood & affect appropriate  Skin: No rashes, No ecchymoses, No cyanosis, multiple skin tears to b/l UE  Neurologic: Grossly non-focal with full strength in all four extremities  Extremities: Normal range of motion, No clubbing, cyanosis or edema  Vascular: Peripheral pulses non palpable , strong b/l by doppler    INTERPRETATION OF TELEMETRY: Afib with RVR    ECG:    LABS:                        10.7   22.28 )-----------( 220      ( 15 Dec 2020 12:04 )             35.9     12-15    143  |  95<L>  |  29.0<H>  ----------------------------<  283<H>  5.2   |  38.0<H>  |  0.85    Ca    8.7      15 Dec 2020 12:04  Phos  3.6     12-14  Mg     2.2     12-14    TPro  5.9<L>  /  Alb  3.1<L>  /  TBili  0.4  /  DBili  x   /  AST  34  /  ALT  58<H>  /  AlkPhos  116  12-15        PT/INR - ( 15 Dec 2020 12:04 )   PT: 14.1 sec;   INR: 1.23 ratio         PTT - ( 14 Dec 2020 01:51 )  PTT:29.3 sec    I&O's Summary    14 Dec 2020 07:01  -  15 Dec 2020 07:00  --------------------------------------------------------  IN: 0 mL / OUT: 750 mL / NET: -750 mL      Assessment and Plan    In summary, CLAUDIA MABRY is an 70y Male with past medical history significant for COPD, HTN, DM, pAF on coumadin, seizure d/o with recent hospitalization for hypercapnic respiratory failure presents with htn emergency and subsequent PEA/VT arrest; TTE TDS limited WMA but unable to evaluate LV due to poor images     ASA 4  Mallampati II  GFR 87  Creat 0.85  Bleeding Risk 1.9%      Plan  1.  Right and left heart cath Tues   2.  Full dose Lovenox for x one today   3.  Continue current CV medications

## 2020-12-15 NOTE — CHART NOTE - NSCHARTNOTEFT_GEN_A_CORE
Pt sp cardiac catheterization with THI on 12/15/2020. Pt denies pain or discomfort at this time.  Vss. # 7 venous sheath removed.  Pt tolerated well.  Manual compression applied for 20 minutes.  Hemostasis achieved. No ooze or hematoma noted at site.

## 2020-12-15 NOTE — PROGRESS NOTE ADULT - SUBJECTIVE AND OBJECTIVE BOX
[facility]                                   [address] [place] [address]                                                      PHONE: [phone]                                                         FAX: [phone]  [email or web address]  ---------------------------------------------------------------------------------------------------------------------------------    Please see cath report.    Severe LAD and RCA disease.  The RCA was treated with PTCA and STENT with good result.  Pt became very combative needing sedation with anesthesia.  Recommend observation at MICU for next 24 to 48 hours.   ASA and plavix for now.   Spoke with family.  Will eventually need PCI of LAD      Jacob Benitez MD    Office [phone]  Cell     [phone]

## 2020-12-15 NOTE — PROGRESS NOTE ADULT - SUBJECTIVE AND OBJECTIVE BOX
Nurse Practitioner Progress note:   s/p [facility] with successful PCI and THI x 2 to the RCA  < from: Cardiac Cath Lab - Adult (12.15.20 @ 15:12) >  LM:   --  LM: Normal.  LAD:   --  Mid LAD: There was a 80 % stenosis.  CX:   --  Circumflex: Angiography showed minor luminal irregularities with  no flow limiting lesions. The vessel arose anomalously from the proximal  RCA.  RCA:   --  RCA: iFR index of 0.82  --  Mid RCA: There was a 80 % stenosis.  --  Distal RCA: There was a 90 % stenosis.  COMPLICATIONS: There were no complications. No complications occurred  during the cath lab visit.  DIAGNOSTIC IMPRESSIONS: Severe RCA and LAD disease. The RCA was treated  with PTCA and STENT (THI-Resolute) with IVUS  DIAGNOSTIC RECOMMENDATIONS: Plavix and coumadin Will need PCI of LAD in  future  INTERVENTIONAL IMPRESSIONS: Severe RCA and LAD disease. The RCA was treated  with PTCA and STENT (THI-Resolute) with IVUS  INTERVENTIONAL RECOMMENDATIONS: Plavix and coumadin Will need PCI of LAD in  future  Prepared and signed by  Jacob Benitez MD  Signed 12/15/2020 17:36:26    < end of copied text >        Patient confused and combative, a danger to himself and others as he is trying to climb over the siderails.  Denies chest pain, shortness of breath, dizziness or palpitations at this time    Right groin procedure site arterial site closed with an angioseal, #7Fr venous sheath insitu. Site CDI.  No bleeding, no hematoma, site soft, non tender,     T(C): 36.6 (12-15-20 @ 12:50), Max: 37.3 (12-15-20 @ 05:07)  HR: 121 (12-15-20 @ 15:00) (65 - 121)  BP: 173/79 (12-15-20 @ 15:00) (100/72 - 173/79)  RR: 25 (12-15-20 @ 15:00) (18 - 25)  SpO2: 99% (12-15-20 @ 15:00) (90% - 100%)    CBC Full  -  ( 15 Dec 2020 12:04 )  WBC Count : 22.28 K/uL  RBC Count : 3.98 M/uL  Hemoglobin : 10.7 g/dL  Hematocrit : 35.9 %  Platelet Count - Automated : 220 K/uL  Mean Cell Volume : 90.2 fl  Mean Cell Hemoglobin : 26.9 pg  Mean Cell Hemoglobin Concentration : 29.8 gm/dL      12-15    143  |  95<L>  |  29.0<H>  ----------------------------<  283<H>  5.2   |  38.0<H>  |  0.85    Ca    8.7      15 Dec 2020 12:04  Phos  3.6     12-14  Mg     2.2     12-14    TPro  5.9<L>  /  Alb  3.1<L>  /  TBili  0.4  /  DBili  x   /  AST  34  /  ALT  58<H>  /  AlkPhos  116  12-15            MEDICATIONS  (STANDING):  albuterol/ipratropium for Nebulization 3 milliLiter(s) Nebulizer every 6 hours  aMIOdarone    Tablet 400 milliGRAM(s) Oral every 12 hours  aspirin  chewable 81 milliGRAM(s) Oral once  atorvastatin 40 milliGRAM(s) Oral at bedtime  cangrelor Infusion 4 MICROgram(s)/kG/Min (130 mL/Hr) IV Continuous <Continuous>  chlorhexidine 2% Cloths 1 Application(s) Topical <User Schedule>  chlorhexidine 4% Liquid 1 Application(s) Topical <User Schedule>  citalopram 40 milliGRAM(s) Oral daily  clopidogrel Tablet 600 milliGRAM(s) Oral once  dextrose 40% Gel 15 Gram(s) Oral once  dextrose 50% Injectable 25 Gram(s) IV Push once  dextrose 50% Injectable 12.5 Gram(s) IV Push once  dextrose 50% Injectable 25 Gram(s) IV Push once  digoxin     Tablet 0.125 milliGRAM(s) Oral daily  diltiazem    Tablet 60 milliGRAM(s) Oral four times a day  famotidine    Tablet 20 milliGRAM(s) Oral two times a day  furosemide   Injectable 80 milliGRAM(s) IV Push once  glucagon  Injectable 1 milliGRAM(s) IntraMuscular once  influenza   Vaccine 0.5 milliLiter(s) IntraMuscular once  insulin glargine Injectable (LANTUS) 10 Unit(s) SubCutaneous at bedtime  insulin lispro (ADMELOG) corrective regimen sliding scale   SubCutaneous three times a day before meals  insulin lispro Injectable (ADMELOG) 3 Unit(s) SubCutaneous three times a day before meals  insulin regular Infusion 5 Unit(s)/Hr (5 mL/Hr) IV Continuous <Continuous>  ketamine Injectable 100 milliGRAM(s) IV Push once  levETIRAcetam  IVPB 500 milliGRAM(s) IV Intermittent every 12 hours  methylPREDNISolone sodium succinate Injectable 40 milliGRAM(s) IV Push every 12 hours  metoprolol tartrate 25 milliGRAM(s) Oral two times a day  piperacillin/tazobactam IVPB.. 3.375 Gram(s) IV Intermittent every 8 hours  polyethylene glycol 3350 17 Gram(s) Oral daily  saccharomyces boulardii 250 milliGRAM(s) Oral two times a day  warfarin 5 milliGRAM(s) Oral once    MEDICATIONS  (PRN):  aluminum hydroxide/magnesium hydroxide/simethicone Suspension 30 milliLiter(s) Oral every 4 hours PRN Dyspepsia        HPI:  69 y/o M w/ a PMHx of COPD (noncompliant w/ CPAP), HTN, type 2 DM, afib (on Coumadin), and seizure disorder who presented unresponsive, hypoxic (SpO2 70s), and agonally breathing. Pt was subsequently intubated. Shortly after intubation, pt suffered from a brief PEA arrest. Pt received epi x2, bicarb, calcium, and mag. ROSC was achieved, found to be in v tach w/ a pulse. Pt was cardioverted at 100J, 200J x2, and 300J w/ conversion to sinus rhythm. Amio bolus and infusion initiated. Initially hypertensive (SBP 200s), subsequently became hypotensive and Levophed infusion was started. Labs significant for leukocytosis (23K), hyperkalemia (K 5.8), uremia (BUN 45), and hyperglycemia. ABG revealing of a respiratory acidosis. CXR w/ small bibasilar infiltrates. S/p Zosyn and Vanco in ED. CT head negative for acute intracranial event. Admit to MICU for continuation of care.    Of note, pt was admitted to ICU on 12/1 w/ acute hypercapnic respiratory failure requiring BiPAP. Discharged from [facility] yesterday 12/9. (10 Dec 2020 16:52)    now s/p [facility] with succesful PCI and THI to    ASSESSMENT/PLAN:    Coronary artery disease  -Admit to MICU  -VS, labs, diet, activity as per PCI orders  -IV hydration  -Encourage PO fluids  -Cangrelor infusion  at 4 micrograms/Kg/min until 1900 then DC  -Plavix 600mg po x 1 tonight at 1900  -Aspirin 81 mg PO daily  -Plavix 75mg PO daily  -Re-evaluate by cardiology tomorrow for restarting coumadin  -Metoprolol tartrate 25 mg PO twice daily  -atorvastatin 40mg PO QHS   -Plan of care discussed with patient, family and MD  -NP to see in AM to evaluate labs, EKG and procedure site check   Nurse Practitioner Progress note:   s/p [facility] with successful PCI and THI x 2 to the RCA  < from: Cardiac Cath Lab - Adult (12.15.20 @ 15:12) >  LM:   --  LM: Normal.  LAD:   --  Mid LAD: There was a 80 % stenosis.  CX:   --  Circumflex: Angiography showed minor luminal irregularities with  no flow limiting lesions. The vessel arose anomalously from the proximal  RCA.  RCA:   --  RCA: iFR index of 0.82  --  Mid RCA: There was a 80 % stenosis.  --  Distal RCA: There was a 90 % stenosis.  COMPLICATIONS: There were no complications. No complications occurred  during the cath lab visit.  DIAGNOSTIC IMPRESSIONS: Severe RCA and LAD disease. The RCA was treated  with PTCA and STENT (THI-Resolute) with IVUS  DIAGNOSTIC RECOMMENDATIONS: Plavix and coumadin Will need PCI of LAD in  future  INTERVENTIONAL IMPRESSIONS: Severe RCA and LAD disease. The RCA was treated  with PTCA and STENT (THI-Resolute) with IVUS  INTERVENTIONAL RECOMMENDATIONS: Plavix and coumadin Will need PCI of LAD in  future  Prepared and signed by  Jacob Benitez MD  Signed 12/15/2020 17:36:26    < end of copied text >        Patient confused and combative, a danger to himself and others as he is trying to climb over the siderails.  Denies chest pain, shortness of breath, dizziness or palpitations at this time    Right groin procedure site arterial site closed with an angioseal, #7Fr venous sheath insitu. Site CDI.  No bleeding, no hematoma, site soft, non tender,     T(C): 36.6 (12-15-20 @ 12:50), Max: 37.3 (12-15-20 @ 05:07)  HR: 121 (12-15-20 @ 15:00) (65 - 121)  BP: 173/79 (12-15-20 @ 15:00) (100/72 - 173/79)  RR: 25 (12-15-20 @ 15:00) (18 - 25)  SpO2: 99% (12-15-20 @ 15:00) (90% - 100%)    CBC Full  -  ( 15 Dec 2020 12:04 )  WBC Count : 22.28 K/uL  RBC Count : 3.98 M/uL  Hemoglobin : 10.7 g/dL  Hematocrit : 35.9 %  Platelet Count - Automated : 220 K/uL  Mean Cell Volume : 90.2 fl  Mean Cell Hemoglobin : 26.9 pg  Mean Cell Hemoglobin Concentration : 29.8 gm/dL      12-15    143  |  95<L>  |  29.0<H>  ----------------------------<  283<H>  5.2   |  38.0<H>  |  0.85    Ca    8.7      15 Dec 2020 12:04  Phos  3.6     12-14  Mg     2.2     12-14    TPro  5.9<L>  /  Alb  3.1<L>  /  TBili  0.4  /  DBili  x   /  AST  34  /  ALT  58<H>  /  AlkPhos  116  12-15            MEDICATIONS  (STANDING):  albuterol/ipratropium for Nebulization 3 milliLiter(s) Nebulizer every 6 hours  aMIOdarone    Tablet 400 milliGRAM(s) Oral every 12 hours  aspirin  chewable 81 milliGRAM(s) Oral once  atorvastatin 40 milliGRAM(s) Oral at bedtime  cangrelor Infusion 4 MICROgram(s)/kG/Min (130 mL/Hr) IV Continuous <Continuous>  chlorhexidine 2% Cloths 1 Application(s) Topical <User Schedule>  chlorhexidine 4% Liquid 1 Application(s) Topical <User Schedule>  citalopram 40 milliGRAM(s) Oral daily  clopidogrel Tablet 600 milliGRAM(s) Oral once  dextrose 40% Gel 15 Gram(s) Oral once  dextrose 50% Injectable 25 Gram(s) IV Push once  dextrose 50% Injectable 12.5 Gram(s) IV Push once  dextrose 50% Injectable 25 Gram(s) IV Push once  digoxin     Tablet 0.125 milliGRAM(s) Oral daily  diltiazem    Tablet 60 milliGRAM(s) Oral four times a day  famotidine    Tablet 20 milliGRAM(s) Oral two times a day  furosemide   Injectable 80 milliGRAM(s) IV Push once  glucagon  Injectable 1 milliGRAM(s) IntraMuscular once  influenza   Vaccine 0.5 milliLiter(s) IntraMuscular once  insulin glargine Injectable (LANTUS) 10 Unit(s) SubCutaneous at bedtime  insulin lispro (ADMELOG) corrective regimen sliding scale   SubCutaneous three times a day before meals  insulin lispro Injectable (ADMELOG) 3 Unit(s) SubCutaneous three times a day before meals  insulin regular Infusion 5 Unit(s)/Hr (5 mL/Hr) IV Continuous <Continuous>  ketamine Injectable 100 milliGRAM(s) IV Push once  levETIRAcetam  IVPB 500 milliGRAM(s) IV Intermittent every 12 hours  methylPREDNISolone sodium succinate Injectable 40 milliGRAM(s) IV Push every 12 hours  metoprolol tartrate 25 milliGRAM(s) Oral two times a day  piperacillin/tazobactam IVPB.. 3.375 Gram(s) IV Intermittent every 8 hours  polyethylene glycol 3350 17 Gram(s) Oral daily  saccharomyces boulardii 250 milliGRAM(s) Oral two times a day  warfarin 5 milliGRAM(s) Oral once    MEDICATIONS  (PRN):  aluminum hydroxide/magnesium hydroxide/simethicone Suspension 30 milliLiter(s) Oral every 4 hours PRN Dyspepsia        HPI:  71 y/o M w/ a PMHx of COPD (noncompliant w/ CPAP), HTN, type 2 DM, afib (on Coumadin), and seizure disorder who presented unresponsive, hypoxic (SpO2 70s), and agonally breathing. Pt was subsequently intubated. Shortly after intubation, pt suffered from a brief PEA arrest. Pt received epi x2, bicarb, calcium, and mag. ROSC was achieved, found to be in v tach w/ a pulse. Pt was cardioverted at 100J, 200J x2, and 300J w/ conversion to sinus rhythm. Amio bolus and infusion initiated. Initially hypertensive (SBP 200s), subsequently became hypotensive and Levophed infusion was started. Labs significant for leukocytosis (23K), hyperkalemia (K 5.8), uremia (BUN 45), and hyperglycemia. ABG revealing of a respiratory acidosis. CXR w/ small bibasilar infiltrates. S/p Zosyn and Vanco in ED. CT head negative for acute intracranial event. Admit to MICU for continuation of care.    Of note, pt was admitted to ICU on 12/1 w/ acute hypercapnic respiratory failure requiring BiPAP. Discharged from [facility] yesterday 12/9. (10 Dec 2020 16:52)    now s/p [facility] with succesful PCI and THI to    ASSESSMENT/PLAN:    Coronary artery disease  -Admit to MICU  -VS, labs, diet, activity as per PCI orders  -IV hydration  -Encourage PO fluids  -Cangrelor infusion  at 4 micrograms/Kg/min until 1900 then DC  -Plavix 600mg po x 1 tonight at 1900  -Aspirin 81 mg PO daily  -Plavix 75mg PO daily  -Re-evaluate by cardiology tomorrow for restarting coumadin  -Metoprolol tartrate 25 mg PO twice daily  -atorvastatin 40mg PO QHS   -Plan of care discussed with patient, family and MD  -NP to see in AM to evaluate labs, EKG and procedure site check  -four point restraints until groin site is stable and venous sheath is removed, then re-evaluate patient safety issue

## 2020-12-15 NOTE — PROGRESS NOTE ADULT - SUBJECTIVE AND OBJECTIVE BOX
HOSPITALIST PROGRESS NOTE    CLAUDIA MABRY  [id number]  70yMale    Patient is a 70y old  Male who presents with a chief complaint of Acute hypoxic/hypercapnic respiratory failure, cardiac arrest (15 Dec 2020 14:55)      SUBJECTIVE:   Chart reviewed since last visit.  Patient seen and examined at bedside.      OBJECTIVE:  Vital Signs Last 24 Hrs  T(C): 36.6 (15 Dec 2020 12:50), Max: 37.3 (15 Dec 2020 05:07)  T(F): 97.9 (15 Dec 2020 12:50), Max: 99.1 (15 Dec 2020 05:07)  HR: 121 (15 Dec 2020 15:00) (65 - 121)  BP: 173/79 (15 Dec 2020 15:00) (100/72 - 173/79)  BP(mean): 81 (14 Dec 2020 20:00) (81 - 81)  RR: 25 (15 Dec 2020 15:00) (18 - 25)  SpO2: 99% (15 Dec 2020 15:00) (90% - 100%)    PHYSICAL EXAMINATION  General: Elderly male, jorgito, sitting up in bed  NAD  HEENT:  4LNC  NECK:  supple  CVS: regular rate and rhythm S1 S2  RESP:  Poor air flow - no wheeze or crackles  GI:  Soft nondistended nontender BS+  : No suprapubic tenderness  MSK:  moves all extremities, though weak  CNS:  Alert, knows in Hospital - follows commands  INTEG:  ecchymoses LUE  PSYCH:  Fair mood    MONITOR: AF  CAPILLARY BLOOD GLUCOSE      POCT Blood Glucose.: 262 mg/dL (15 Dec 2020 12:49)  POCT Blood Glucose.: 282 mg/dL (15 Dec 2020 06:12)  POCT Blood Glucose.: 183 mg/dL (14 Dec 2020 21:49)  POCT Blood Glucose.: 210 mg/dL (14 Dec 2020 16:34)        I&O's Summary    14 Dec 2020 07:01  -  15 Dec 2020 07:00  --------------------------------------------------------  IN: 0 mL / OUT: 750 mL / NET: -750 mL                            10.7   22.28 )-----------( 220      ( 15 Dec 2020 12:04 )             35.9     PT/INR - ( 15 Dec 2020 12:04 )   PT: 14.1 sec;   INR: 1.23 ratio         PTT - ( 14 Dec 2020 01:51 )  PTT:29.3 sec  12-15    143  |  95<L>  |  29.0<H>  ----------------------------<  283<H>  5.2   |  38.0<H>  |  0.85    Ca    8.7      15 Dec 2020 12:04  Phos  3.6     12-14  Mg     2.2     12-14    TPro  5.9<L>  /  Alb  3.1<L>  /  TBili  0.4  /  DBili  x   /  AST  34  /  ALT  58<H>  /  AlkPhos  116  12-15            Culture:    TTE:  < from: TTE Echo Complete w/ Contrast w/ Doppler (12.14.20 @ 14:42) >    EXAM:  ECHO TTE WITH CON COMP W DOPP      PROCEDURE DATE:  Dec 14 2020   .      INTERPRETATION:  REPORT:  TRANSTHORACIC ECHOCARDIOGRAM REPORT        Patient Name:   CLAUDIA MABRY Patient Location: Inpatient  Medical Rec #:  [id number]      Accession #:      [id number]  Account #:                    Height:           70.9 in 180.0 cm  YOB: 1950      Weight:           238.1 lb 108.00 kg  Patient Age:    70 years      BSA:              2.27 m²  Patient Gender: M             BP:     115/76 mmHg      Date of Exam:        12/14/2020 2:42:37 PM  Sonographer:         Karie Posadas  Referring Physician: Sai Guadalupe MD    Procedure:     Echocardiogram with Definity Contrast and 2D Echo/Doppler/Color                 Doppler Complete.  Indications:   Dyspnea, unspecified - R06.00  Diagnosis:     Dyspnea, unspecified - R06.00  Study Details: Technically difficult study. Study quality was adversely affected                 due to patient obesity, body habitus, COPD, no parasternal                 windows and lung interference.        2D AND M-MODE MEASUREMENTS (normal ranges within parentheses):  Left Ventricle:                   Normal   Aorta/Left Atrium:          Normal  IVSd (Mmode):           0.92 cm  (0.7-1.1) Aortic Root (2D):  3.37 cm (2.4-3.7)  LVPWd (Mmode):          1.09 cm  (0.7-1.1)  LVIDd (Mmode):          5.30 cm  (3.4-5.7)  LVIDs (Mmode):          4.38 cm  LV FS (Mmode):          17.4 %    (>25%)  Rel. Wall Thickness Mm   0.41     (<0.42)  LV Mass Index:Mmode   89.4 g/m²            PHYSICIAN INTERPRETATION:  Left Ventricle: Endocardial visualization was enhanced with intravenous echo contrast. The left ventricular internal cavity size is normal.  Global LV systolic function was normal. Left ventricular ejection fraction, by visual estimation, is 60 to 65%. The left ventricular diastolic function could not be assessed in this study.  Right Ventricle: The right ventricular size is moderately enlarged. RV systolic function is moderately reduced.  Left Atrium: The left atrium is normal in size.  Right Atrium: Moderately enlarged right atrium.  Pericardium: There is no evidence of pericardial effusion. There is a significant pericardial fat pad present.  Mitral Valve: Mild thickening and calcification of the anterior and posterior mitral valve leaflets. Mild mitral valve regurgitation is seen. The MR jet is centrally-directed.  Tricuspid Valve: Structurally normal tricuspid valve, with normal leaflet excursion. Mild tricuspid regurgitation isvisualized. Adequate TR velocity was not obtained to accurately assess RVSP.  Aortic Valve: The aortic valve was not well visualized.  Pulmonic Valve: The pulmonic valve was not well visualized.  Aorta: The aorta was not well visualized.      Summary:   1. Technically difficult study.   2. Endocardial visualization was enhanced with intravenous echo contrast.   3. Left ventricular ejection fraction, by visual estimation, is 60 to 65%.   4. Normal global left ventricular systolic function.   5. The left ventricular diastolic function could not be assessed in this study.   6. Normal left ventricular internal cavity size.   7. Moderately enlarged right ventricle. Moderately reduced RV systolic function.   8. The left atrium is normal in size.   9. Moderately enlarged right atrium.  10. Mild thickening and calcification of the anterior and posterior mitral valve leaflets.  11. Mild mitral valve regurgitation.  12. Mild tricuspid regurgitation.  13. There is no evidence of pericardial effusion.    MD [place] Electronically signed on 12/14/2020 at 4:23:18 PM    < end of copied text >  tt  RADIOLOGY        MEDICATIONS  (STANDING):  albuterol/ipratropium for Nebulization 3 milliLiter(s) Nebulizer every 6 hours  aMIOdarone    Tablet 400 milliGRAM(s) Oral every 12 hours  aspirin  chewable 81 milliGRAM(s) Oral once  atorvastatin 40 milliGRAM(s) Oral at bedtime  chlorhexidine 2% Cloths 1 Application(s) Topical <User Schedule>  citalopram 40 milliGRAM(s) Oral daily  dextrose 40% Gel 15 Gram(s) Oral once  dextrose 50% Injectable 25 Gram(s) IV Push once  dextrose 50% Injectable 12.5 Gram(s) IV Push once  dextrose 50% Injectable 25 Gram(s) IV Push once  digoxin     Tablet 0.125 milliGRAM(s) Oral daily  diltiazem    Tablet 60 milliGRAM(s) Oral four times a day  famotidine    Tablet 20 milliGRAM(s) Oral two times a day  glucagon  Injectable 1 milliGRAM(s) IntraMuscular once  influenza   Vaccine 0.5 milliLiter(s) IntraMuscular once  insulin glargine Injectable (LANTUS) 10 Unit(s) SubCutaneous at bedtime  insulin lispro (ADMELOG) corrective regimen sliding scale   SubCutaneous three times a day before meals  insulin lispro Injectable (ADMELOG) 3 Unit(s) SubCutaneous three times a day before meals  levETIRAcetam  IVPB 500 milliGRAM(s) IV Intermittent every 12 hours  methylPREDNISolone sodium succinate Injectable 40 milliGRAM(s) IV Push every 12 hours  metoprolol tartrate 25 milliGRAM(s) Oral two times a day  piperacillin/tazobactam IVPB.. 3.375 Gram(s) IV Intermittent every 8 hours  polyethylene glycol 3350 17 Gram(s) Oral daily  saccharomyces boulardii 250 milliGRAM(s) Oral two times a day      MEDICATIONS  (PRN):  aluminum hydroxide/magnesium hydroxide/simethicone Suspension 30 milliLiter(s) Oral every 4 hours PRN Dyspepsia

## 2020-12-15 NOTE — CONSULT NOTE ADULT - ASSESSMENT
1: Hyperactive delirium   2: Acute on chronic hypoxic respiratory failure   3: Acute on chronic pulmonary edema   4: HCAP   5: Afib with RVR   6: CAD s/p PCI  to RCA * 2 THI with staged/ planned LAD PCI   7: Uncontrolled hyperglycemia with underlying DM-2   8: Anasarca     Patient seen and examined   Full code   Needs MICU admission as post PCI for CAD with active un intervened LAD lesion with resp distress and delirium, at very high risk for worsening resp failure and cardiac arrest again    - To start HFNC 40 LPM with 50% to keep saturation > 90%,  Lasix 80 mg IV*1, Duoneb stat, C/w Steroids   - Ketamine 100 mg *1 followed by Benzo as needed, and Precedex as needed, 4 points restrains until right groin venous sheath is removed by Cardio (awaiting ACT to be < 180-200), constant observation after that   - Stat CBC, CMP, Mg, PO4, ABG  - C/w IV Zosyn   - Insulin infusion for now with q1 FS and once BS < 200, would add Lantus as well as pre- meal and ISS coverage   - ASA, Plavix (after Cangrelor finished)

## 2020-12-15 NOTE — PROGRESS NOTE ADULT - ASSESSMENT
70 year old male with PMH HTN, Dyslipidemia, T2DM, COPD on home O2 3L, Chronic AF on Coumadin and seizure disordered was admitted for acute on chronic respiratory failure from 12/1-12/9/2020 and was discharged home. Brought in the following day unresponsive, hypoxic (SpO2 70s), and agonally breathing. Subsequently intubated and developed brief PEA arrest, VT s/p Cardioversion    Respiratory failure, acute on chronic, mixed due to COPD exacerbation with Respiratory acidosis - s/p extubation. Interval improvement from HFNC to 4LNC   - ; Telemetry  - Supplemental O2, wean as tolerated  - Duonebs  - Solumedrol 40 q12; change to Prednisone soon    PEA arrest, VT s/p Cardioversion (likely due to hypoxia from above). AF with RVR. Now rate controlled with sub-therapeutic INR. TTE limited, repeat with preserved LVSF. moderately decreased RVSF  - Continue Amiodarone, Digoxin, CCB  - Continue Coumadin  - Cardiac catheterization later today    Leukocytosis  - currently being treated for possible pneumonia (GUNNER).   Blood culture with [facility] - Staph. epidermidis. Urine and sputum cultures (-)  - Aspiration precautions  - Continue IV Zosyn for now  - Follow up cultures (cultures ordered 12/13 - not collected then)  - ID consult, discussed with Dr Donaldson    HTN - controlled on current regimen  - Continue CCB    T2DM with hyperglycemia due to Steroids.  A1c 9.1  - Continue BGM with SSI  - Continue Lantus 10U and Admelog 3U TID  Monitor for  hyperglycemia with steroids    Seizure disorder  - Continue AED    Prophylactic measure  - VTEp - continue Coumadin    Prognosis - guarded  Advance Directives - Full code  Disposition  - pending clinical improvement; anticipate at least 24 hours

## 2020-12-15 NOTE — CONSULT NOTE ADULT - ASSESSMENT
71 y/o M w/ a PMHx of COPD (noncompliant w/ CPAP), HTN, type 2 DM, afib (on Coumadin), and seizure disorder who presented unresponsive, hypoxic (SpO2 70s), and agonally breathing. Pt was subsequently intubated. Shortly after intubation, pt suffered from a brief PEA arrest. Pt received epi x2, bicarb, calcium, and mag. ROSC was achieved, found to be in v tach w/ a pulse. Pt was cardioverted at 100J, 200J x2, and 300J w/ conversion to sinus rhythm. Amio bolus and infusion initiated. Initially hypertensive (SBP 200s), subsequently became hypotensive and Levophed infusion was started. Labs significant for leukocytosis (23K), hyperkalemia (K 5.8), uremia (BUN 45), and hyperglycemia. ABG revealing of a respiratory acidosis. CXR w/ small bibasilar infiltrates. S/p Zosyn and Vanco in ED. CT head negative for acute intracranial event. Admit to MICU for continuation of care.    Of note, pt was admitted to ICU on 12/1 w/ acute hypercapnic respiratory failure requiring BiPAP. Discharged from [facility] yesterday 12/9. (10 Dec 2020 16:52)    patient admitted to MICU, for Respiratory failure, acute on chronic, mixed due to COPD exacerbation with Respiratory acidosis - s/p extubation 12/12/2020.    For his PEA arrest and VT, he was s/p CPR and s/p Cardioversion (likely due to hypoxia from above).    He is pending cardiac  catheterization today.    He has been treated empirically for possible PNA, with Zosyn.   Blood culture with Staph epidermidis in 1 of 2 sets. Urine and sputum cultures (-)    WBC are noted to be elevated:  WBC Count: 22.28 K/uL (12-15-20 @ 12:04)  WBC Count: 18.15 K/uL (12-14-20 @ 01:51)  WBC Count: 23.55 K/uL (12-13-20 @ 04:32)  WBC Count: 24.75 K/uL (12-12-20 @ 05:29)    Labs further reviewed:  Procalcitonin, Serum: 0.14 ng/mL (12-15-20 @ 12:04)  Procalcitonin, Serum: 0.89 ng/mL (12-11-20 @ 05:05)    Impression:  staph epi bacteremia  WBC elevation  s/p cardiac arrest  s/p cardioversion        Plan:  - staph epidermidis is contaminant., concurrent culture is negative.  Repeat not sent.  Defer repeating cultures.     WBC elevation  - reactive to Cardiac events and steroids.   - procalcitonin is normal  - unlikely to be from unrecognized infection    Steroids Course:  methylPREDNISolone sodium succinate Injectable   125 milliGRAM(s) (12-10-20 @ 12:00)  hydrocortisone sodium succinate Injectable    50 milliGRAM(s) (12-12-20 @ 06:34)   50 milliGRAM(s) (12-12-20 @ 00:42)   50 milliGRAM(s) (12-11-20 @ 17:53)   50 milliGRAM(s) (12-11-20 @ 11:40)  methylPREDNISolone sodium succinate Injectable   40 milliGRAM(s) (12-14-20 @ 17:11)   40 milliGRAM(s) (12-15-20 @ 05:20)          SHOULD complete course of 7 days Zosyn, then stop    Please remove right neck IJ line if no longer needed.  He has peripheral IV lines.       No further specific infectious disease recommendations. Will be available as needed.

## 2020-12-15 NOTE — CONSULT NOTE ADULT - SUBJECTIVE AND OBJECTIVE BOX
[facility]  INFECTIOUS DISEASES AND INTERNAL MEDICINE at [place]  =======================================================  Everardo Koehler MD  Diplomates American Board of Internal Medicine and Infectious Diseases  Tel  [phone]  Fax [phone]  =======================================================    [id number]  CLAUDIA MABRY   HPI:  69 y/o M w/ a PMHx of COPD (noncompliant w/ CPAP), HTN, type 2 DM, afib (on Coumadin), and seizure disorder who presented unresponsive, hypoxic (SpO2 70s), and agonally breathing. Pt was subsequently intubated. Shortly after intubation, pt suffered from a brief PEA arrest. Pt received epi x2, bicarb, calcium, and mag. ROSC was achieved, found to be in v tach w/ a pulse. Pt was cardioverted at 100J, 200J x2, and 300J w/ conversion to sinus rhythm. Amio bolus and infusion initiated. Initially hypertensive (SBP 200s), subsequently became hypotensive and Levophed infusion was started. Labs significant for leukocytosis (23K), hyperkalemia (K 5.8), uremia (BUN 45), and hyperglycemia. ABG revealing of a respiratory acidosis. CXR w/ small bibasilar infiltrates. S/p Zosyn and Vanco in ED. CT head negative for acute intracranial event. Admit to MICU for continuation of care.    Of note, pt was admitted to ICU on 12/1 w/ acute hypercapnic respiratory failure requiring BiPAP. Discharged from [facility] yesterday 12/9. (10 Dec 2020 16:52)    patient admitted to MICU, for Respiratory failure, acute on chronic, mixed due to COPD exacerbation with Respiratory acidosis - s/p extubation 12/12/2020.    For his PEA arrest and VT, he was s/p CPR and s/p Cardioversion (likely due to hypoxia from above).    He is pending cardiac  catheterization today.    He has been treated empirically for possible PNA, with Zosyn.   Blood culture with Staph epidermidis in 1 of 2 sets. Urine and sputum cultures (-)    WBC are noted to be elevated:  WBC Count: 22.28 K/uL (12-15-20 @ 12:04)  WBC Count: 18.15 K/uL (12-14-20 @ 01:51)  WBC Count: 23.55 K/uL (12-13-20 @ 04:32)  WBC Count: 24.75 K/uL (12-12-20 @ 05:29)    Labs further reviewed:  Procalcitonin, Serum: 0.14 ng/mL (12-15-20 @ 12:04)  Procalcitonin, Serum: 0.89 ng/mL (12-11-20 @ 05:05)      I have personally reviewed the labs and data; pertinent labs and data are listed in this note; please see below.   =======================================================  Past Medical & Surgical Hx:  =====================  PAST MEDICAL & SURGICAL HISTORY:  Seizure    Depression, unspecified depression type    Diabetes    Hypertension    Hyperlipidemia    Afib    COPD (chronic obstructive pulmonary disease)    Abdominal hernia    H/O carotid endarterectomy      Problem List:  ==========  HEALTH ISSUES - PROBLEM Dx:        Social Hx:  =======  no toxic habits currently    FAMILY HISTORY:  No pertinent family history in first degree relatives    no significant family history of immunosuppressive disorders in mother or father   =======================================================    REVIEW OF SYSTEMS:  CONSTITUTIONAL:  No Fever or chills  HEENT:  No diplopia or blurred vision.  No earache, sore throat or runny nose.  CARDIOVASCULAR:  No pressure, squeezing, strangling, tightness, heaviness or aching about the chest, neck, axilla or epigastrium.  RESPIRATORY:  SLIGHT cough, shortness of breath  GASTROINTESTINAL:  No nausea, vomiting or diarrhea.  GENITOURINARY:  No dysuria, frequency or urgency. No Blood in urine  MUSCULOSKELETAL:  no joint aches, no muscle pain  SKIN:  No change in skin, hair or nails.  NEUROLOGIC:  No Headaches, seizures or weakness.  PSYCHIATRIC:  No disorder of thought or mood.  ENDOCRINE:  No heat or cold intolerance  HEMATOLOGICAL:  No easy bruising or bleeding.    =======================================================  Allergies    No Known Allergies    Intolerances    Antibiotics:  piperacillin/tazobactam IVPB.. 3.375 Gram(s) IV Intermittent every 8 hours    Other medications:  albuterol/ipratropium for Nebulization 3 milliLiter(s) Nebulizer every 6 hours  aMIOdarone    Tablet 400 milliGRAM(s) Oral every 12 hours  atorvastatin 40 milliGRAM(s) Oral at bedtime  chlorhexidine 2% Cloths 1 Application(s) Topical <User Schedule>  citalopram 40 milliGRAM(s) Oral daily  dextrose 40% Gel 15 Gram(s) Oral once  dextrose 50% Injectable 25 Gram(s) IV Push once  dextrose 50% Injectable 12.5 Gram(s) IV Push once  dextrose 50% Injectable 25 Gram(s) IV Push once  digoxin     Tablet 0.125 milliGRAM(s) Oral daily  diltiazem    Tablet 60 milliGRAM(s) Oral four times a day  famotidine    Tablet 20 milliGRAM(s) Oral two times a day  glucagon  Injectable 1 milliGRAM(s) IntraMuscular once  influenza   Vaccine 0.5 milliLiter(s) IntraMuscular once  insulin glargine Injectable (LANTUS) 10 Unit(s) SubCutaneous at bedtime  insulin lispro (ADMELOG) corrective regimen sliding scale   SubCutaneous three times a day before meals  insulin lispro Injectable (ADMELOG) 3 Unit(s) SubCutaneous three times a day before meals  levETIRAcetam  IVPB 500 milliGRAM(s) IV Intermittent every 12 hours  methylPREDNISolone sodium succinate Injectable 40 milliGRAM(s) IV Push every 12 hours  metoprolol tartrate 25 milliGRAM(s) Oral two times a day  polyethylene glycol 3350 17 Gram(s) Oral daily  saccharomyces boulardii 250 milliGRAM(s) Oral two times a day     piperacillin/tazobactam IVPB.   200 mL/Hr IV Intermittent (12-10-20 @ 13:08)    25 mL/Hr IV Intermittent (12-10-20 @ 22:02)   25 mL/Hr IV Intermittent (12-11-20 @ 05:40)   25 mL/Hr IV Intermittent (12-11-20 @ 14:45)   25 mL/Hr IV Intermittent (12-11-20 @ 22:47)   25 mL/Hr IV Intermittent (12-12-20 @ 06:35)   25 mL/Hr IV Intermittent (12-12-20 @ 15:12)   25 mL/Hr IV Intermittent (12-12-20 @ 22:48)   25 mL/Hr IV Intermittent (12-13-20 @ 05:21)   25 mL/Hr IV Intermittent (12-13-20 @ 15:09)   25 mL/Hr IV Intermittent (12-13-20 @ 22:30)   25 mL/Hr IV Intermittent (12-14-20 @ 05:04)   25 mL/Hr IV Intermittent (12-14-20 @ 16:14)   25 mL/Hr IV Intermittent (12-14-20 @ 21:48)   25 mL/Hr IV Intermittent (12-15-20 @ 05:22)   25 mL/Hr IV Intermittent (12-15-20 @ 13:44)    vancomycin  IVPB   250 mL/Hr IV Intermittent (12-10-20 @ 17:14)   300 mL/Hr IV Intermittent (12-11-20 @ 12:26)   300 mL/Hr IV Intermittent (12-11-20 @ 23:08)   300 mL/Hr IV Intermittent (12-12-20 @ 11:52)      ======================================================  Physical Exam:  ============  T(F): 97.9 (15 Dec 2020 12:50), Max: 99.1 (15 Dec 2020 05:07)  HR: 116 (15 Dec 2020 12:50)  BP: 129/71 (15 Dec 2020 12:50)  RR: 18 (15 Dec 2020 12:50)  SpO2: 96% (15 Dec 2020 12:50) (90% - 100%)  temp max in last 48H T(F): , Max: 99.1 (12-15-20 @ 05:07)    General:  No acute distress.  OBESE  Eye: Pupils are equal, round and reactive to light, Extraocular movements are intact, Normal conjunctiva.  HENT: Normocephalic, Oral mucosa is moist, No pharyngeal erythema, No sinus tenderness.  Neck: Supple, No lymphadenopathy.  RIGHT neck IJ triple lumen catheter.   Respiratory: Lungs  with fair air entry  Cardiovascular: Normal rate, Regular rhythm,   Gastrointestinal: Soft, Non-tender, Non-distended, Normal bowel sounds.  Genitourinary: No costovertebral angle tenderness.  Lymphatics: No lymphadenopathy neck,   Musculoskeletal: Normal range of motion, Normal strength.  Integumentary: No rash.  Neurologic: Alert, Oriented, No focal deficits, Cranial Nerves II-XII are grossly intact.  Psychiatric: Appropriate mood & affect.    =======================================================  Labs:                        10.7   22.28 )-----------( 220      ( 15 Dec 2020 12:04 )             35.9      12-15    143  |  95<L>  |  29.0<H>  ----------------------------<  283<H>  5.2   |  38.0<H>  |  0.85    Ca    8.7      15 Dec 2020 12:04  Phos  3.6     12-14  Mg     2.2     12-14    TPro  5.9<L>  /  Alb  3.1<L>  /  TBili  0.4  /  DBili  x   /  AST  34  /  ALT  58<H>  /  AlkPhos  116  12-15      Culture - Sputum (collected 12-11-20 @ 16:38)  Source: .Sputum Sputum  Gram Stain (12-11-20 @ 18:38):    Few polymorphonuclear leukocytes per low power field    No Squamous epithelial cells per low power field    Few Gram positive cocci in pairs per oil power field  Final Report (12-13-20 @ 17:27):    Normal Respiratory Britt present    Culture - Urine (collected 12-10-20 @ 22:15)  Source: .Urine Clean Catch (Midstream)  Final Report (12-11-20 @ 18:40):    No growth    Culture - Blood (collected 12-10-20 @ 15:10)  Source: .Blood Blood-Peripheral  Gram Stain (12-11-20 @ 10:04):    Growth in anaerobic bottle: Gram Positive Cocci in Clusters    Gram Stain performed by:    [facility]    [address] [place] [id number]    ,    TYPE: (C=Critical, N=Notification, A=Abnormal) c    TESTS:  _ gs    DATE/TIME CALLED: _ 12/11/2020 10:03:34    CALLED TO: Nani kapadia rn    READ BACK (2 Patient Identifiers)(Y/N): _ y    READ BACK VALUES (Y/N): _ y    CALLED BY: Nani herrera  Final Report (12-12-20 @ 17:05):    Growth in anaerobic bottle: Staphylococcus epidermidis    Coag Negative Staphylococcus    Single set isolate, possible contaminant. Contact    Microbiology if susceptibility testing clinically    indicated.  Organism: Blood Culture PCR (12-12-20 @ 17:05)    Sensitivities:      -  Coagulase negative Staphylococcus: Detec      Method Type: PCR  Organism: Blood Culture PCR (12-11-20 @ 10:31)    Culture - Blood (collected 12-10-20 @ 15:09)  Source: .Blood Blood-Peripheral      Creatinine, Serum: 0.85 mg/dL (12-15-20 @ 12:04)  Creatinine, Serum: 0.80 mg/dL (12-14-20 @ 01:51)  Creatinine, Serum: 0.83 mg/dL (12-13-20 @ 04:32)  Creatinine, Serum: 0.83 mg/dL (12-12-20 @ 11:06)  Creatinine, Serum: 0.90 mg/dL (12-12-20 @ 05:29)  Creatinine, Serum: 0.84 mg/dL (12-11-20 @ 20:20)  Creatinine, Serum: 0.85 mg/dL (12-11-20 @ 15:37)  Creatinine, Serum: 0.99 mg/dL (12-10-20 @ 21:03)       Procalcitonin, Serum: 0.14 ng/mL (12-15-20 @ 12:04)  Procalcitonin, Serum: 0.89 ng/mL (12-11-20 @ 05:05)      SARS-CoV-2: NotDetec (12-10-20 @ 12:27)  Rapid RVP Result: NotDetec (12-10-20 @ 12:27)    COVID-19 IgG Antibody Index: 0.07 Index (12-11-20 @ 03:24)  COVID-19 IgG Antibody Interpretation: Negative (12-11-20 @ 03:24)  COVID-19 IgG Antibody Index: <0.10 Index (12-02-20 @ 09:37)  COVID-19 IgG Antibody Interpretation: Negative (12-02-20 @ 09:37)  COVID-19 PCR: NotDetec (12-01-20 @ 13:13)    < from: Xray Chest 1 View- PORTABLE-Urgent (Xray Chest 1 View- PORTABLE-Urgent .) (12.10.20 @ 15:32) >   EXAM:  XR CHEST PORTABLE URGENT 1V                          PROCEDURE DATE:  12/10/2020          INTERPRETATION:  AP chest on December 10, 2020 at 2:36 PM. 2 images. Patient had central line placement.    Heart magnified by technique.    There is an increasing left base infiltrate compared to earlier in the day. Endotracheal tube and nasogastric tube remain.    Present film shows a right jugular line with the tip in the superior vena caval area.    There may also be developing right base effusion rather small.    IMPRESSION: Increasing small basilar infiltrates left greater than right. Right jugular line inserted.       OLMAN PUCKETT MD; Attending Radiologist  This document has been electronically signed. Dec 10 2020  3:43PM    < end of copied text >

## 2020-12-16 LAB
ANION GAP SERPL CALC-SCNC: 6 MMOL/L — SIGNIFICANT CHANGE UP (ref 5–17)
APTT BLD: 27.6 SEC — SIGNIFICANT CHANGE UP (ref 27.5–35.5)
BASOPHILS # BLD AUTO: 0.04 K/UL — SIGNIFICANT CHANGE UP (ref 0–0.2)
BASOPHILS NFR BLD AUTO: 0.2 % — SIGNIFICANT CHANGE UP (ref 0–2)
BUN SERPL-MCNC: 28 MG/DL — HIGH (ref 8–20)
CALCIUM SERPL-MCNC: 8.5 MG/DL — LOW (ref 8.6–10.2)
CHLORIDE SERPL-SCNC: 96 MMOL/L — LOW (ref 98–107)
CO2 SERPL-SCNC: 45 MMOL/L — CRITICAL HIGH (ref 22–29)
CREAT SERPL-MCNC: 0.9 MG/DL — SIGNIFICANT CHANGE UP (ref 0.5–1.3)
EOSINOPHIL # BLD AUTO: 0 K/UL — SIGNIFICANT CHANGE UP (ref 0–0.5)
EOSINOPHIL NFR BLD AUTO: 0 % — SIGNIFICANT CHANGE UP (ref 0–6)
GLUCOSE BLDC GLUCOMTR-MCNC: 129 MG/DL — HIGH (ref 70–99)
GLUCOSE BLDC GLUCOMTR-MCNC: 138 MG/DL — HIGH (ref 70–99)
GLUCOSE BLDC GLUCOMTR-MCNC: 140 MG/DL — HIGH (ref 70–99)
GLUCOSE BLDC GLUCOMTR-MCNC: 148 MG/DL — HIGH (ref 70–99)
GLUCOSE BLDC GLUCOMTR-MCNC: 151 MG/DL — HIGH (ref 70–99)
GLUCOSE BLDC GLUCOMTR-MCNC: 153 MG/DL — HIGH (ref 70–99)
GLUCOSE BLDC GLUCOMTR-MCNC: 153 MG/DL — HIGH (ref 70–99)
GLUCOSE BLDC GLUCOMTR-MCNC: 156 MG/DL — HIGH (ref 70–99)
GLUCOSE BLDC GLUCOMTR-MCNC: 160 MG/DL — HIGH (ref 70–99)
GLUCOSE BLDC GLUCOMTR-MCNC: 160 MG/DL — HIGH (ref 70–99)
GLUCOSE BLDC GLUCOMTR-MCNC: 287 MG/DL — HIGH (ref 70–99)
GLUCOSE BLDC GLUCOMTR-MCNC: 378 MG/DL — HIGH (ref 70–99)
GLUCOSE BLDC GLUCOMTR-MCNC: 411 MG/DL — HIGH (ref 70–99)
GLUCOSE SERPL-MCNC: 142 MG/DL — HIGH (ref 70–99)
HCT VFR BLD CALC: 27.9 % — LOW (ref 39–50)
HGB BLD-MCNC: 8.7 G/DL — LOW (ref 13–17)
IMM GRANULOCYTES NFR BLD AUTO: 1.2 % — SIGNIFICANT CHANGE UP (ref 0–1.5)
INR BLD: 1.68 RATIO — HIGH (ref 0.88–1.16)
LYMPHOCYTES # BLD AUTO: 0.78 K/UL — LOW (ref 1–3.3)
LYMPHOCYTES # BLD AUTO: 3.1 % — LOW (ref 13–44)
MAGNESIUM SERPL-MCNC: 2.1 MG/DL — SIGNIFICANT CHANGE UP (ref 1.6–2.6)
MCHC RBC-ENTMCNC: 27.7 PG — SIGNIFICANT CHANGE UP (ref 27–34)
MCHC RBC-ENTMCNC: 31.2 GM/DL — LOW (ref 32–36)
MCV RBC AUTO: 88.9 FL — SIGNIFICANT CHANGE UP (ref 80–100)
MONOCYTES # BLD AUTO: 1.07 K/UL — HIGH (ref 0–0.9)
MONOCYTES NFR BLD AUTO: 4.2 % — SIGNIFICANT CHANGE UP (ref 2–14)
NEUTROPHILS # BLD AUTO: 23.02 K/UL — HIGH (ref 1.8–7.4)
NEUTROPHILS NFR BLD AUTO: 91.3 % — HIGH (ref 43–77)
PHOSPHATE SERPL-MCNC: 2.9 MG/DL — SIGNIFICANT CHANGE UP (ref 2.4–4.7)
PLATELET # BLD AUTO: 221 K/UL — SIGNIFICANT CHANGE UP (ref 150–400)
POTASSIUM SERPL-MCNC: 4.5 MMOL/L — SIGNIFICANT CHANGE UP (ref 3.5–5.3)
POTASSIUM SERPL-SCNC: 4.5 MMOL/L — SIGNIFICANT CHANGE UP (ref 3.5–5.3)
PROTHROM AB SERPL-ACNC: 19 SEC — HIGH (ref 10.6–13.6)
RBC # BLD: 3.14 M/UL — LOW (ref 4.2–5.8)
RBC # FLD: 14.2 % — SIGNIFICANT CHANGE UP (ref 10.3–14.5)
SODIUM SERPL-SCNC: 146 MMOL/L — HIGH (ref 135–145)
WBC # BLD: 25.2 K/UL — HIGH (ref 3.8–10.5)
WBC # FLD AUTO: 25.2 K/UL — HIGH (ref 3.8–10.5)

## 2020-12-16 PROCEDURE — 93010 ELECTROCARDIOGRAM REPORT: CPT

## 2020-12-16 PROCEDURE — 99233 SBSQ HOSP IP/OBS HIGH 50: CPT

## 2020-12-16 RX ORDER — METOPROLOL TARTRATE 50 MG
5 TABLET ORAL ONCE
Refills: 0 | Status: COMPLETED | OUTPATIENT
Start: 2020-12-16 | End: 2020-12-16

## 2020-12-16 RX ORDER — INSULIN GLARGINE 100 [IU]/ML
15 INJECTION, SOLUTION SUBCUTANEOUS EVERY MORNING
Refills: 0 | Status: DISCONTINUED | OUTPATIENT
Start: 2020-12-17 | End: 2020-12-17

## 2020-12-16 RX ORDER — AMIODARONE HYDROCHLORIDE 400 MG/1
200 TABLET ORAL DAILY
Refills: 0 | Status: DISCONTINUED | OUTPATIENT
Start: 2020-12-16 | End: 2020-12-19

## 2020-12-16 RX ORDER — ENOXAPARIN SODIUM 100 MG/ML
40 INJECTION SUBCUTANEOUS DAILY
Refills: 0 | Status: DISCONTINUED | OUTPATIENT
Start: 2020-12-16 | End: 2020-12-17

## 2020-12-16 RX ORDER — INSULIN GLARGINE 100 [IU]/ML
15 INJECTION, SOLUTION SUBCUTANEOUS ONCE
Refills: 0 | Status: COMPLETED | OUTPATIENT
Start: 2020-12-16 | End: 2020-12-16

## 2020-12-16 RX ORDER — FUROSEMIDE 40 MG
60 TABLET ORAL ONCE
Refills: 0 | Status: COMPLETED | OUTPATIENT
Start: 2020-12-16 | End: 2020-12-16

## 2020-12-16 RX ORDER — SODIUM CHLORIDE 9 MG/ML
1000 INJECTION, SOLUTION INTRAVENOUS
Refills: 0 | Status: DISCONTINUED | OUTPATIENT
Start: 2020-12-16 | End: 2021-01-01

## 2020-12-16 RX ORDER — INSULIN GLARGINE 100 [IU]/ML
25 INJECTION, SOLUTION SUBCUTANEOUS
Refills: 0 | Status: DISCONTINUED | OUTPATIENT
Start: 2020-12-16 | End: 2020-12-19

## 2020-12-16 RX ORDER — ENOXAPARIN SODIUM 100 MG/ML
40 INJECTION SUBCUTANEOUS DAILY
Refills: 0 | Status: DISCONTINUED | OUTPATIENT
Start: 2020-12-16 | End: 2020-12-16

## 2020-12-16 RX ORDER — FUROSEMIDE 40 MG
40 TABLET ORAL DAILY
Refills: 0 | Status: DISCONTINUED | OUTPATIENT
Start: 2020-12-16 | End: 2020-12-19

## 2020-12-16 RX ORDER — INSULIN LISPRO 100/ML
VIAL (ML) SUBCUTANEOUS
Refills: 0 | Status: DISCONTINUED | OUTPATIENT
Start: 2020-12-16 | End: 2021-01-01

## 2020-12-16 RX ORDER — DEXTROSE 50 % IN WATER 50 %
15 SYRINGE (ML) INTRAVENOUS ONCE
Refills: 0 | Status: DISCONTINUED | OUTPATIENT
Start: 2020-12-16 | End: 2021-01-01

## 2020-12-16 RX ADMIN — CHLORHEXIDINE GLUCONATE 1 APPLICATION(S): 213 SOLUTION TOPICAL at 06:02

## 2020-12-16 RX ADMIN — INSULIN HUMAN 5 UNIT(S)/HR: 100 INJECTION, SOLUTION SUBCUTANEOUS at 06:07

## 2020-12-16 RX ADMIN — FAMOTIDINE 20 MILLIGRAM(S): 10 INJECTION INTRAVENOUS at 05:59

## 2020-12-16 RX ADMIN — Medication 250 MILLIGRAM(S): at 06:00

## 2020-12-16 RX ADMIN — Medication 3 MILLILITER(S): at 08:43

## 2020-12-16 RX ADMIN — PIPERACILLIN AND TAZOBACTAM 25 GRAM(S): 4; .5 INJECTION, POWDER, LYOPHILIZED, FOR SOLUTION INTRAVENOUS at 14:09

## 2020-12-16 RX ADMIN — POLYETHYLENE GLYCOL 3350 17 GRAM(S): 17 POWDER, FOR SOLUTION ORAL at 11:15

## 2020-12-16 RX ADMIN — Medication 81 MILLIGRAM(S): at 11:15

## 2020-12-16 RX ADMIN — INSULIN GLARGINE 15 UNIT(S): 100 INJECTION, SOLUTION SUBCUTANEOUS at 11:37

## 2020-12-16 RX ADMIN — FAMOTIDINE 20 MILLIGRAM(S): 10 INJECTION INTRAVENOUS at 18:04

## 2020-12-16 RX ADMIN — Medication 3 MILLILITER(S): at 02:28

## 2020-12-16 RX ADMIN — Medication 60 MILLIGRAM(S): at 00:03

## 2020-12-16 RX ADMIN — AMIODARONE HYDROCHLORIDE 400 MILLIGRAM(S): 400 TABLET ORAL at 21:18

## 2020-12-16 RX ADMIN — Medication 60 MILLIGRAM(S): at 18:04

## 2020-12-16 RX ADMIN — Medication 12: at 18:08

## 2020-12-16 RX ADMIN — CHLORHEXIDINE GLUCONATE 1 APPLICATION(S): 213 SOLUTION TOPICAL at 06:01

## 2020-12-16 RX ADMIN — Medication 40 MILLIGRAM(S): at 06:00

## 2020-12-16 RX ADMIN — Medication 3 MILLILITER(S): at 16:11

## 2020-12-16 RX ADMIN — PIPERACILLIN AND TAZOBACTAM 25 GRAM(S): 4; .5 INJECTION, POWDER, LYOPHILIZED, FOR SOLUTION INTRAVENOUS at 21:18

## 2020-12-16 RX ADMIN — Medication 25 MILLIGRAM(S): at 05:59

## 2020-12-16 RX ADMIN — LEVETIRACETAM 420 MILLIGRAM(S): 250 TABLET, FILM COATED ORAL at 18:04

## 2020-12-16 RX ADMIN — Medication 10: at 21:19

## 2020-12-16 RX ADMIN — Medication 40 MILLIGRAM(S): at 18:04

## 2020-12-16 RX ADMIN — PIPERACILLIN AND TAZOBACTAM 25 GRAM(S): 4; .5 INJECTION, POWDER, LYOPHILIZED, FOR SOLUTION INTRAVENOUS at 05:57

## 2020-12-16 RX ADMIN — Medication 250 MILLIGRAM(S): at 18:04

## 2020-12-16 RX ADMIN — Medication 3 MILLILITER(S): at 20:32

## 2020-12-16 RX ADMIN — CITALOPRAM 40 MILLIGRAM(S): 10 TABLET, FILM COATED ORAL at 11:37

## 2020-12-16 RX ADMIN — ENOXAPARIN SODIUM 40 MILLIGRAM(S): 100 INJECTION SUBCUTANEOUS at 11:15

## 2020-12-16 RX ADMIN — ATORVASTATIN CALCIUM 40 MILLIGRAM(S): 80 TABLET, FILM COATED ORAL at 21:18

## 2020-12-16 RX ADMIN — INSULIN GLARGINE 25 UNIT(S): 100 INJECTION, SOLUTION SUBCUTANEOUS at 21:18

## 2020-12-16 RX ADMIN — Medication 40 MILLIGRAM(S): at 06:03

## 2020-12-16 RX ADMIN — Medication 25 MILLIGRAM(S): at 18:05

## 2020-12-16 RX ADMIN — Medication 60 MILLIGRAM(S): at 10:46

## 2020-12-16 RX ADMIN — Medication 5 MILLIGRAM(S): at 15:59

## 2020-12-16 RX ADMIN — Medication 60 MILLIGRAM(S): at 05:59

## 2020-12-16 RX ADMIN — AMIODARONE HYDROCHLORIDE 400 MILLIGRAM(S): 400 TABLET ORAL at 11:15

## 2020-12-16 RX ADMIN — LEVETIRACETAM 420 MILLIGRAM(S): 250 TABLET, FILM COATED ORAL at 05:57

## 2020-12-16 RX ADMIN — CLOPIDOGREL BISULFATE 75 MILLIGRAM(S): 75 TABLET, FILM COATED ORAL at 11:15

## 2020-12-16 RX ADMIN — Medication 0.12 MILLIGRAM(S): at 06:00

## 2020-12-16 RX ADMIN — Medication 60 MILLIGRAM(S): at 11:15

## 2020-12-16 NOTE — PHYSICAL THERAPY INITIAL EVALUATION ADULT - PLANNED THERAPY INTERVENTIONS, PT EVAL
strengthening/bed mobility training/transfer training/stair training/gait training
stair negotiation/balance training/bed mobility training/gait training/strengthening/transfer training

## 2020-12-16 NOTE — CHART NOTE - NSCHARTNOTEFT_GEN_A_CORE
69 y/o M, former smoker, with PMHx of COPD, HTN HLD DM II afib (on coumadin) sz disorder CAD s/p PCI and THI placement to mRCA and dRCA via [facility] femoral artery with Dr. Jacob Benitez ([facility])  Stents: resolute doron 3.50 x34MM and resolute doron 4.0 x22MM  Eventual staged PCI to be performed of LAD (80%)    Right groin site: expansive ecchymosis extending to mid thigh, manually compressed hematoma; soft; +3 pitting edema to BLLE, weak +1 PT pulse    [facility] for Cardiology plan  Mgmt per ICU  Discussed activity restrictions

## 2020-12-16 NOTE — PROGRESS NOTE ADULT - ASSESSMENT
HPI/INTERVAL EVENTS: no major events, no complaints, "I miss my kids"    EXAM:   awake and alert, nad, on high flow nc  PERRL  irregular rhythm  bilat air entry  abd soft  diffuse anasarca     RADIOLOGY REVIEWED:  cxr - small mild basilar opacities     IMPRESSION/ASSESSMENT & PLAN:   70 y male with hx of COPD, HTN, DM, afib on warfarin as outpatient, seizures, presented with hypoxemic acute respiratory failure, suffered a brief witnessed PEA cardiac arrest after intubation.  Patient underwent elective LHC on 12/15 with PCI and stent placement in RCA, with plans for staged PCI to LAD. During LHC patient became agitated and delirious necessitating transfer to MICU.  HPI/INTERVAL EVENTS: no major events, no complaints, "I miss my kids"    EXAM:   awake and alert, nad, on high flow nc  PERRL  irregular rhythm  bilat air entry  abd soft  diffuse anasarca     RADIOLOGY REVIEWED:  cxr - small mild basilar opacities     IMPRESSION/ASSESSMENT & PLAN:   70 y male with hx of COPD, HTN, DM, afib on warfarin as outpatient, seizures, presented with hypoxemic acute respiratory failure, suffered a brief witnessed PEA cardiac arrest after intubation.  Patient underwent elective LHC on 12/15 with PCI and stent placement in RCA, with plans for staged PCI to LAD. During LHC patient became agitated and delirious necessitating transfer to MICU.     Hyperactive delirium  Resolved, now off precedex    CAD s/p PCI to RCA  Plan for staged PCI to LAD  - ASA, plavix, statin, metoprolol    Afib with RVR  - metoprolol, diltiazem, digoxin  Defer to cardiology on when to start full AC    CHFpEF  Volume overloaded  - lasix daily    COPD exac  - on high flow now, will need long term nocturnal NIV for chronic hypercapnic respiratory failure  - steroids, abx, bronchodilators     DVT prophylaxis: lovenox  Diet: Dash diet  Activity: OOB, PT request

## 2020-12-16 NOTE — PHYSICAL THERAPY INITIAL EVALUATION ADULT - IMPAIRMENTS FOUND, PT EVAL
gait, locomotion, and balance/gross motor
aerobic capacity/endurance/gait, locomotion, and balance/muscle strength/ventilation and respiration/gas exchange

## 2020-12-16 NOTE — PHYSICAL THERAPY INITIAL EVALUATION ADULT - CRITERIA FOR SKILLED THERAPEUTIC INTERVENTIONS
impairments found
impairments found/functional limitations in following categories/rehab potential/therapy frequency/predicted duration of therapy intervention

## 2020-12-16 NOTE — PROGRESS NOTE ADULT - ASSESSMENT
70y Male with past medical history significant for COPD, HTN, DM, pAF on coumadin, seizure d/o with recent hospitalization for hypercapnic respiratory failure presents with HTN emergency and subsequent PEA/VT arrest on 12/10 this admission     CAD-- s/p cath with Severe RCA and LAD disease. The RCA was treated with PTCA and STENT (THI-Resolute) with IVUS with residual LAD lesion,  - Plan for intervention on LAD on Friday. Will need to be done with anesthesia   - Post cath will have EP evaluation  - Continue ASA with Plavix, BB, Statin     HTN- on metoprolol and cardizem and digoxin,     AF-- Rate controlled with diltiazem/Metoprolol  - Hold coumadin at this time  VT-- On amiodarone. decreased to 200 mg daily per Dr Ray recommendations     Blood loss anemia-- Hold Lovenox at this time given groin hematoma.   - Monitor H/H    Shortness of breath, Hypoxia on high flow  - Likely multifactorial including CAD, AF, ELISSA-OHS  - LVEDP was elevated on cath.  - Use of diuretics with worsening alkalosis     Dyslipidemia- statin    COPD exacerbation-- on high flow now, will need long term nocturnal NIV for chronic hypercapnic respiratory failure  - steroids, abx, bronchodilators     DVT prophylaxis: lovenox  Diet: Dash diet  Activity: OOB, PT request         70y Male with past medical history significant for COPD, HTN, DM, pAF on coumadin, seizure d/o with recent hospitalization for hypercapnic respiratory failure presents with HTN emergency and subsequent PEA/VT arrest on 12/10 this admission     CAD-- s/p cath with Severe RCA and LAD disease. The RCA was treated with PTCA and STENT (THI-Resolute) with IVUS with residual LAD lesion,  - Plan for intervention on LAD on Friday. Will need to be done with anesthesia   - Post cath will have EP evaluation  - Continue ASA with Plavix, BB, Statin     HTN- on metoprolol and cardizem and digoxin,     AF-- Rate controlled with diltiazem/Metoprolol  - Hold coumadin at this time  VT-- On amiodarone. decreased to 200 mg daily per Dr Ray recommendations     Blood loss anemia-- Hold Lovenox at this time given groin hematoma. - needs repeat serial CBC if drop may need sonogram  - Monitor H/H    Shortness of breath, Hypoxia on high flow  - Likely multifactorial including CAD, AF, ELISSA-OHS  - LVEDP was elevated on cath.  - Use of diuretics with worsening alkalosis     Dyslipidemia- statin    COPD exacerbation-- on high flow now, will need long term nocturnal NIV for chronic hypercapnic respiratory failure  - steroids, abx, bronchodilators     DVT prophylaxis: lovenox  Diet: Dash diet  Activity: OOB, PT request

## 2020-12-16 NOTE — PHYSICAL THERAPY INITIAL EVALUATION ADULT - GENERAL OBSERVATIONS, REHAB EVAL
Pt. greeted resting in chair + hi flow O2, IV, , monitor, agreeable to PT
Pt received in semi mccloud position, + 4.5L O2 via NC, agreeable to PT.

## 2020-12-16 NOTE — PHYSICAL THERAPY INITIAL EVALUATION ADULT - ACTIVE RANGE OF MOTION EXAMINATION, REHAB EVAL
no Active ROM deficits were identified
bilateral  lower extremity Active ROM was WFL (within functional limits)

## 2020-12-16 NOTE — PROGRESS NOTE ADULT - SUBJECTIVE AND OBJECTIVE BOX
On Admission  12-10-20 (6d)  HPI:  69 y/o M w/ a PMHx of COPD (noncompliant w/ CPAP), HTN, type 2 DM, afib (on Coumadin), and seizure disorder who presented unresponsive, hypoxic (SpO2 70s), and agonally breathing. Pt was subsequently intubated. Shortly after intubation, pt suffered from a brief PEA arrest. Pt received epi x2, bicarb, calcium, and mag. ROSC was achieved, found to be in v tach w/ a pulse. Pt was cardioverted at 100J, 200J x2, and 300J w/ conversion to sinus rhythm. Amio bolus and infusion initiated. Initially hypertensive (SBP 200s), subsequently became hypotensive and Levophed infusion was started. Labs significant for leukocytosis (23K), hyperkalemia (K 5.8), uremia (BUN 45), and hyperglycemia. ABG revealing of a respiratory acidosis. CXR w/ small bibasilar infiltrates. S/p Zosyn and Vanco in ED. CT head negative for acute intracranial event. Admit to MICU for continuation of care.    Of note, pt was admitted to ICU on 12/1 w/ acute hypercapnic respiratory failure requiring BiPAP. Discharged from [facility] yesterday 12/9. (10 Dec 2020 16:52)    PAST MEDICAL & SURGICAL HISTORY:  Seizure    Depression, unspecified depression type    Diabetes    Hypertension    Hyperlipidemia    Afib    COPD (chronic obstructive pulmonary disease)    Abdominal hernia    H/O carotid endarterectomy        Antimicrobial:  piperacillin/tazobactam IVPB.. 3.375 Gram(s) IV Intermittent every 8 hours    Cardiovascular:  aMIOdarone    Tablet 400 milliGRAM(s) Oral every 12 hours  digoxin     Tablet 0.125 milliGRAM(s) Oral daily  diltiazem    Tablet 60 milliGRAM(s) Oral four times a day  metoprolol tartrate 25 milliGRAM(s) Oral two times a day    Pulmonary:  albuterol/ipratropium for Nebulization 3 milliLiter(s) Nebulizer every 6 hours    Hematalogic:  aspirin  chewable 81 milliGRAM(s) Oral daily  cangrelor Infusion 4 MICROgram(s)/kG/Min IV Continuous <Continuous>  clopidogrel Tablet 75 milliGRAM(s) Oral daily    Other:  aluminum hydroxide/magnesium hydroxide/simethicone Suspension 30 milliLiter(s) Oral every 4 hours PRN  atorvastatin 40 milliGRAM(s) Oral at bedtime  chlorhexidine 2% Cloths 1 Application(s) Topical <User Schedule>  chlorhexidine 4% Liquid 1 Application(s) Topical <User Schedule>  citalopram 40 milliGRAM(s) Oral daily  dextrose 50% Injectable 25 Gram(s) IV Push once  dextrose 50% Injectable 12.5 Gram(s) IV Push once  dextrose 50% Injectable 25 Gram(s) IV Push once  famotidine    Tablet 20 milliGRAM(s) Oral two times a day  glucagon  Injectable 1 milliGRAM(s) IntraMuscular once  influenza   Vaccine 0.5 milliLiter(s) IntraMuscular once  insulin regular Infusion 5 Unit(s)/Hr IV Continuous <Continuous>  levETIRAcetam  IVPB 500 milliGRAM(s) IV Intermittent every 12 hours  methylPREDNISolone sodium succinate Injectable 40 milliGRAM(s) IV Push every 12 hours  polyethylene glycol 3350 17 Gram(s) Oral daily  saccharomyces boulardii 250 milliGRAM(s) Oral two times a day      Drug Dosing Weight  Height (cm): 180.3 (10 Dec 2020 12:05)  Weight (kg): 108.6 (10 Dec 2020 20:00)  BMI (kg/m2): 33.4 (10 Dec 2020 20:00)  BSA (m2): 2.28 (10 Dec 2020 20:00)    T(C): 36.7 (12-15-20 @ 23:31), Max: 37.3 (12-15-20 @ 05:07)  HR: 122 (12-16-20 @ 01:00)  BP: 105/79 (12-16-20 @ 01:00)  BP(mean): 88 (12-16-20 @ 01:00)  ABP: --  ABP(mean): --  RR: 15 (12-16-20 @ 01:00)  SpO2: 100% (12-16-20 @ 01:00)    ABG - ( 15 Dec 2020 19:07 )  pH, Arterial: 7.35  pH, Blood: x     /  pCO2: 80    /  pO2: 72    / HCO3: 41    / Base Excess: 16.6  /  SaO2: 95                    12-14 @ 07:01  -  12-15 @ 07:00  --------------------------------------------------------  IN: 0 mL / OUT: 750 mL / NET: -750 mL              LABS:  CBC Full  -  ( 15 Dec 2020 18:14 )  WBC Count : 25.65 K/uL  RBC Count : 3.73 M/uL  Hemoglobin : 10.3 g/dL  Hematocrit : 33.6 %  Platelet Count - Automated : 255 K/uL  Mean Cell Volume : 90.1 fl  Mean Cell Hemoglobin : 27.6 pg  Mean Cell Hemoglobin Concentration : 30.7 gm/dL  Auto Neutrophil # : 23.60 K/uL  Auto Lymphocyte # : 0.51 K/uL  Auto Monocyte # : 1.54 K/uL  Auto Eosinophil # : 0.00 K/uL  Auto Basophil # : 0.00 K/uL  Auto Neutrophil % : 92.0 %  Auto Lymphocyte % : 2.0 %  Auto Monocyte % : 6.0 %  Auto Eosinophil % : 0.0 %  Auto Basophil % : 0.0 %    12-15    142  |  96<L>  |  28.0<H>  ----------------------------<  224<H>  4.9   |  42.0<H>  |  1.00    Ca    8.6      15 Dec 2020 18:14  Phos  3.4     12-15  Mg     2.3     12-15    TPro  5.8<L>  /  Alb  3.1<L>  /  TBili  0.3<L>  /  DBili  x   /  AST  34  /  ALT  52<H>  /  AlkPhos  109  12-15    PT/INR - ( 15 Dec 2020 12:04 )   PT: 14.1 sec;   INR: 1.23 ratio         PTT - ( 14 Dec 2020 01:51 )  PTT:29.3 sec      ____________________________________________________________________________________________________

## 2020-12-16 NOTE — PHARMACOTHERAPY INTERVENTION NOTE - COMMENTS
check level in am on amio and dig which can increase dig conc
on steroids since 12/1 and d/c home on taper
prn shivering
Bedside care participation. RSI

## 2020-12-16 NOTE — PROGRESS NOTE ADULT - SUBJECTIVE AND OBJECTIVE BOX
CLAUDIA MABRY Patient is a 70y old  Male who presents with a chief complaint of Acute hypoxic/hypercapnic respiratory failure, cardiac arrest (16 Dec 2020 08:50)     HPI:  71 y/o M w/ a PMHx of COPD (noncompliant w/ CPAP), HTN, type 2 DM, afib (on Coumadin), and seizure disorder who presented unresponsive, hypoxic (SpO2 70s), and agonally breathing. Pt was subsequently intubated. Shortly after intubation, pt suffered from a brief PEA arrest. Pt received epi x2, bicarb, calcium, and mag. ROSC was achieved, found to be in v tach w/ a pulse. Pt was cardioverted at 100J, 200J x2, and 300J w/ conversion to sinus rhythm. Amio bolus and infusion initiated. Initially hypertensive (SBP 200s), subsequently became hypotensive and Levophed infusion was started. Labs significant for leukocytosis (23K), hyperkalemia (K 5.8), uremia (BUN 45), and hyperglycemia. ABG revealing of a respiratory acidosis. CXR w/ small bibasilar infiltrates. S/p Zosyn and Vanco in ED. CT head negative for acute intracranial event. Admit to MICU for continuation of care.    Of note, pt was admitted to ICU on 12/1 w/ acute hypercapnic respiratory failure requiring BiPAP. Discharged from [facility] yesterday 12/9. (10 Dec 2020 16:52)    The patient was seen and evaluated   The patient is in no acute distress.  Denied any fever chest pain, palpitations, shortness of breath, abdominal pain, fever, dysuria, cough, edema       I&O's Summary    15 Dec 2020 07:01  -  16 Dec 2020 07:00  --------------------------------------------------------  IN: 622.1 mL / OUT: 950 mL / NET: -327.9 mL    16 Dec 2020 07:01  -  16 Dec 2020 15:51  --------------------------------------------------------  IN: 8 mL / OUT: 653 mL / NET: -645 mL      Allergies    No Known Allergies    Intolerances      HEALTH ISSUES - PROBLEM Dx:        PAST MEDICAL & SURGICAL HISTORY:  Seizure    Depression, unspecified depression type    Diabetes    Hypertension    Hyperlipidemia    Afib    COPD (chronic obstructive pulmonary disease)    Abdominal hernia    H/O carotid endarterectomy            Vital Signs Last 24 Hrs  T(C): 36.4 (16 Dec 2020 11:49), Max: 36.7 (15 Dec 2020 23:31)  T(F): 97.5 (16 Dec 2020 11:49), Max: 98 (15 Dec 2020 23:31)  HR: 121 (16 Dec 2020 12:00) (79 - 122)  BP: 108/67 (16 Dec 2020 12:00) (82/68 - 155/101)  BP(mean): 76 (16 Dec 2020 12:00) (64 - 116)  RR: 18 (16 Dec 2020 12:00) (13 - 23)  SpO2: 98% (16 Dec 2020 12:00) (92% - 100%)T(C): 36.4 (12-16-20 @ 11:49), Max: 36.7 (12-15-20 @ 23:31)  HR: 121 (12-16-20 @ 12:00) (79 - 122)  BP: 108/67 (12-16-20 @ 12:00) (82/68 - 155/101)  RR: 18 (12-16-20 @ 12:00) (13 - 23)  SpO2: 98% (12-16-20 @ 12:00) (92% - 100%)  Wt(kg): --    PHYSICAL EXAM:    GENERAL: NAD, well-groomed, well-developed  HEAD:  Atraumatic, Normocephalic  EYES: EOMI, PERRLA, conjunctiva and sclera clear  ENMT:  Moist mucous membranes,  No lesions  NECK: Supple, No JVD, Normal thyroid  NERVOUS SYSTEM:  Alert & Oriented X3,  Moves upper and lower extremities; CNS-II-XII  CHEST/LUNG: Clear to auscultation bilaterally; No rales, rhonchi, wheezing,   HEART: Regular rate and rhythm; No murmurs,   ABDOMEN: Soft, Nontender, Nondistended; Bowel sounds present  EXTREMITIES:  Peripheral Pulses, No  cyanosis, or edema  SKIN: No rashes or lesions  psychiatry- mood and affect approprite, Insight and judgement intact     albuterol/ipratropium for Nebulization 3 milliLiter(s) Nebulizer every 6 hours  aluminum hydroxide/magnesium hydroxide/simethicone Suspension 30 milliLiter(s) Oral every 4 hours PRN  aMIOdarone    Tablet 400 milliGRAM(s) Oral every 12 hours  aspirin  chewable 81 milliGRAM(s) Oral daily  atorvastatin 40 milliGRAM(s) Oral at bedtime  chlorhexidine 2% Cloths 1 Application(s) Topical <User Schedule>  citalopram 40 milliGRAM(s) Oral daily  clopidogrel Tablet 75 milliGRAM(s) Oral daily  dextrose 40% Gel 15 Gram(s) Oral once  dextrose 5%. 1000 milliLiter(s) IV Continuous <Continuous>  dextrose 5%. 1000 milliLiter(s) IV Continuous <Continuous>  dextrose 50% Injectable 25 Gram(s) IV Push once  dextrose 50% Injectable 12.5 Gram(s) IV Push once  dextrose 50% Injectable 25 Gram(s) IV Push once  digoxin     Tablet 0.125 milliGRAM(s) Oral daily  diltiazem    Tablet 60 milliGRAM(s) Oral four times a day  enoxaparin Injectable 40 milliGRAM(s) SubCutaneous daily  famotidine    Tablet 20 milliGRAM(s) Oral two times a day  furosemide   Injectable 40 milliGRAM(s) IV Push daily  glucagon  Injectable 1 milliGRAM(s) IntraMuscular once  influenza   Vaccine 0.5 milliLiter(s) IntraMuscular once  insulin glargine Injectable (LANTUS) 25 Unit(s) SubCutaneous two times a day  insulin lispro (ADMELOG) corrective regimen sliding scale   SubCutaneous Before meals and at bedtime  levETIRAcetam  IVPB 500 milliGRAM(s) IV Intermittent every 12 hours  methylPREDNISolone sodium succinate Injectable 40 milliGRAM(s) IV Push every 12 hours  metoprolol tartrate 25 milliGRAM(s) Oral two times a day  metoprolol tartrate Injectable 5 milliGRAM(s) IV Push once  metoprolol tartrate Injectable 5 milliGRAM(s) IV Push once  piperacillin/tazobactam IVPB.. 3.375 Gram(s) IV Intermittent every 8 hours  polyethylene glycol 3350 17 Gram(s) Oral daily  saccharomyces boulardii 250 milliGRAM(s) Oral two times a day      LABS:  ABG - ( 15 Dec 2020 19:07 )  pH, Arterial: 7.35  pH, Blood: x     /  pCO2: 80    /  pO2: 72    / HCO3: 41    / Base Excess: 16.6  /  SaO2: 95                                      8.7    25.20 )-----------( 221      ( 16 Dec 2020 04:48 )             27.9     12-16    146<H>  |  96<L>  |  28.0<H>  ----------------------------<  142<H>  4.5   |  45.0<HH>  |  0.90    Ca    8.5<L>      16 Dec 2020 04:48  Phos  2.9     12-16  Mg     2.1     12-16    TPro  5.8<L>  /  Alb  3.1<L>  /  TBili  0.3<L>  /  DBili  x   /  AST  34  /  ALT  52<H>  /  AlkPhos  109  12-15    LIVER FUNCTIONS - ( 15 Dec 2020 18:14 )  Alb: 3.1 g/dL / Pro: 5.8 g/dL / ALK PHOS: 109 U/L / ALT: 52 U/L / AST: 34 U/L / GGT: x           PT/INR - ( 16 Dec 2020 04:48 )   PT: 19.0 sec;   INR: 1.68 ratio         PTT - ( 16 Dec 2020 04:48 )  PTT:27.6 sec        CAPILLARY BLOOD GLUCOSE      POCT Blood Glucose.: 148 mg/dL (16 Dec 2020 12:04)  POCT Blood Glucose.: 129 mg/dL (16 Dec 2020 09:29)  POCT Blood Glucose.: 138 mg/dL (16 Dec 2020 08:27)  POCT Blood Glucose.: 156 mg/dL (16 Dec 2020 07:04)  POCT Blood Glucose.: 160 mg/dL (16 Dec 2020 06:05)  POCT Blood Glucose.: 151 mg/dL (16 Dec 2020 05:00)  POCT Blood Glucose.: 140 mg/dL (16 Dec 2020 04:20)  POCT Blood Glucose.: 160 mg/dL (16 Dec 2020 02:25)  POCT Blood Glucose.: 153 mg/dL (16 Dec 2020 01:17)  POCT Blood Glucose.: 153 mg/dL (16 Dec 2020 00:13)  POCT Blood Glucose.: 149 mg/dL (15 Dec 2020 23:21)  POCT Blood Glucose.: 223 mg/dL (15 Dec 2020 22:05)  POCT Blood Glucose.: 246 mg/dL (15 Dec 2020 21:22)  POCT Blood Glucose.: 233 mg/dL (15 Dec 2020 20:12)      RADIOLOGY & ADDITIONAL TESTS:      Consultant notes reviewed    Case discussed with consultant/provider/ family /patient  CLAUDIA MABRY Patient is a 70y old  Male who presents with a chief complaint of Acute hypoxic/hypercapnic respiratory failure, cardiac arrest (16 Dec 2020 08:50)     HPI:  69 y/o M w/ a PMHx of COPD (noncompliant w/ CPAP), HTN, type 2 DM, afib (on Coumadin), and seizure disorder who presented unresponsive, hypoxic (SpO2 70s), and agonally breathing. Pt was subsequently intubated. Shortly after intubation, pt suffered from a brief PEA arrest. Pt received epi x2, bicarb, calcium, and mag. ROSC was achieved, found to be in v tach w/ a pulse. Pt was cardioverted at 100J, 200J x2, and 300J w/ conversion to sinus rhythm. Amio bolus and infusion initiated. Initially hypertensive (SBP 200s), subsequently became hypotensive and Levophed infusion was started. Labs significant for leukocytosis (23K), hyperkalemia (K 5.8), uremia (BUN 45), and hyperglycemia. ABG revealing of a respiratory acidosis. CXR w/ small bibasilar infiltrates. S/p Zosyn and Vanco in ED. CT head negative for acute intracranial event. Admit to MICU for continuation of care.    Of note, pt was admitted to ICU on 12/1 w/ acute hypercapnic respiratory failure requiring BiPAP. Discharged from [facility] yesterday 12/9. (10 Dec 2020 16:52)    The patient was seen and evaluated   The patient is in no acute distress.      I&O's Summary    15 Dec 2020 07:01  -  16 Dec 2020 07:00  --------------------------------------------------------  IN: 622.1 mL / OUT: 950 mL / NET: -327.9 mL    16 Dec 2020 07:01  -  16 Dec 2020 15:51  --------------------------------------------------------  IN: 8 mL / OUT: 653 mL / NET: -645 mL      Allergies    No Known Allergies    Intolerances      HEALTH ISSUES - PROBLEM Dx:        PAST MEDICAL & SURGICAL HISTORY:  Seizure    Depression, unspecified depression type    Diabetes    Hypertension    Hyperlipidemia    Afib    COPD (chronic obstructive pulmonary disease)    Abdominal hernia    H/O carotid endarterectomy            Vital Signs Last 24 Hrs  T(C): 36.4 (16 Dec 2020 11:49), Max: 36.7 (15 Dec 2020 23:31)  T(F): 97.5 (16 Dec 2020 11:49), Max: 98 (15 Dec 2020 23:31)  HR: 121 (16 Dec 2020 12:00) (79 - 122)  BP: 108/67 (16 Dec 2020 12:00) (82/68 - 155/101)  BP(mean): 76 (16 Dec 2020 12:00) (64 - 116)  RR: 18 (16 Dec 2020 12:00) (13 - 23)  SpO2: 98% (16 Dec 2020 12:00) (92% - 100%)T(C): 36.4 (12-16-20 @ 11:49), Max: 36.7 (12-15-20 @ 23:31)  HR: 121 (12-16-20 @ 12:00) (79 - 122)  BP: 108/67 (12-16-20 @ 12:00) (82/68 - 155/101)  RR: 18 (12-16-20 @ 12:00) (13 - 23)  SpO2: 98% (12-16-20 @ 12:00) (92% - 100%)  Wt(kg): --    PHYSICAL EXAM:    GENERAL: NAD, ill appearing   NERVOUS SYSTEM:  Alert & barely Moves upper and lower extremities; CNS-II-XII  CHEST/LUNG: Clear to auscultation bilaterally; No rales, rhonchi, wheezing,   HEART: Regular rate and rhythm; No murmurs,   ABDOMEN: Soft, Nontender, Nondistended; Bowel sounds present  EXTREMITIES:  Peripheral Pulses, No  cyanosis, or edema  SKIN: hematoma groin leg     albuterol/ipratropium for Nebulization 3 milliLiter(s) Nebulizer every 6 hours  aluminum hydroxide/magnesium hydroxide/simethicone Suspension 30 milliLiter(s) Oral every 4 hours PRN  aMIOdarone    Tablet 400 milliGRAM(s) Oral every 12 hours  aspirin  chewable 81 milliGRAM(s) Oral daily  atorvastatin 40 milliGRAM(s) Oral at bedtime  chlorhexidine 2% Cloths 1 Application(s) Topical <User Schedule>  citalopram 40 milliGRAM(s) Oral daily  clopidogrel Tablet 75 milliGRAM(s) Oral daily  dextrose 40% Gel 15 Gram(s) Oral once  dextrose 5%. 1000 milliLiter(s) IV Continuous <Continuous>  dextrose 5%. 1000 milliLiter(s) IV Continuous <Continuous>  dextrose 50% Injectable 25 Gram(s) IV Push once  dextrose 50% Injectable 12.5 Gram(s) IV Push once  dextrose 50% Injectable 25 Gram(s) IV Push once  digoxin     Tablet 0.125 milliGRAM(s) Oral daily  diltiazem    Tablet 60 milliGRAM(s) Oral four times a day  enoxaparin Injectable 40 milliGRAM(s) SubCutaneous daily  famotidine    Tablet 20 milliGRAM(s) Oral two times a day  furosemide   Injectable 40 milliGRAM(s) IV Push daily  glucagon  Injectable 1 milliGRAM(s) IntraMuscular once  influenza   Vaccine 0.5 milliLiter(s) IntraMuscular once  insulin glargine Injectable (LANTUS) 25 Unit(s) SubCutaneous two times a day  insulin lispro (ADMELOG) corrective regimen sliding scale   SubCutaneous Before meals and at bedtime  levETIRAcetam  IVPB 500 milliGRAM(s) IV Intermittent every 12 hours  methylPREDNISolone sodium succinate Injectable 40 milliGRAM(s) IV Push every 12 hours  metoprolol tartrate 25 milliGRAM(s) Oral two times a day  metoprolol tartrate Injectable 5 milliGRAM(s) IV Push once  metoprolol tartrate Injectable 5 milliGRAM(s) IV Push once  piperacillin/tazobactam IVPB.. 3.375 Gram(s) IV Intermittent every 8 hours  polyethylene glycol 3350 17 Gram(s) Oral daily  saccharomyces boulardii 250 milliGRAM(s) Oral two times a day      LABS:  ABG - ( 15 Dec 2020 19:07 )  pH, Arterial: 7.35  pH, Blood: x     /  pCO2: 80    /  pO2: 72    / HCO3: 41    / Base Excess: 16.6  /  SaO2: 95                                      8.7    25.20 )-----------( 221      ( 16 Dec 2020 04:48 )             27.9     12-16    146<H>  |  96<L>  |  28.0<H>  ----------------------------<  142<H>  4.5   |  45.0<HH>  |  0.90    Ca    8.5<L>      16 Dec 2020 04:48  Phos  2.9     12-16  Mg     2.1     12-16    TPro  5.8<L>  /  Alb  3.1<L>  /  TBili  0.3<L>  /  DBili  x   /  AST  34  /  ALT  52<H>  /  AlkPhos  109  12-15    LIVER FUNCTIONS - ( 15 Dec 2020 18:14 )  Alb: 3.1 g/dL / Pro: 5.8 g/dL / ALK PHOS: 109 U/L / ALT: 52 U/L / AST: 34 U/L / GGT: x           PT/INR - ( 16 Dec 2020 04:48 )   PT: 19.0 sec;   INR: 1.68 ratio         PTT - ( 16 Dec 2020 04:48 )  PTT:27.6 sec        CAPILLARY BLOOD GLUCOSE      POCT Blood Glucose.: 148 mg/dL (16 Dec 2020 12:04)  POCT Blood Glucose.: 129 mg/dL (16 Dec 2020 09:29)  POCT Blood Glucose.: 138 mg/dL (16 Dec 2020 08:27)  POCT Blood Glucose.: 156 mg/dL (16 Dec 2020 07:04)  POCT Blood Glucose.: 160 mg/dL (16 Dec 2020 06:05)  POCT Blood Glucose.: 151 mg/dL (16 Dec 2020 05:00)  POCT Blood Glucose.: 140 mg/dL (16 Dec 2020 04:20)  POCT Blood Glucose.: 160 mg/dL (16 Dec 2020 02:25)  POCT Blood Glucose.: 153 mg/dL (16 Dec 2020 01:17)  POCT Blood Glucose.: 153 mg/dL (16 Dec 2020 00:13)  POCT Blood Glucose.: 149 mg/dL (15 Dec 2020 23:21)  POCT Blood Glucose.: 223 mg/dL (15 Dec 2020 22:05)  POCT Blood Glucose.: 246 mg/dL (15 Dec 2020 21:22)  POCT Blood Glucose.: 233 mg/dL (15 Dec 2020 20:12)      RADIOLOGY & ADDITIONAL TESTS:      Consultant notes reviewed    Case discussed with consultant/provider/ family /patient

## 2020-12-16 NOTE — PHYSICAL THERAPY INITIAL EVALUATION ADULT - PERTINENT HX OF CURRENT PROBLEM, REHAB EVAL
70 year old male with PMH HTN, Dyslipidemia, T2DM, COPD on home O2 3L, Chronic AF on Coumadin and seizure disordered was admitted for acute on chronic respiratory failure from 12/1-12/9/2020 and was discharged home. Brought in the following day unresponsive, hypoxic (SpO2 70s), and agonally breathing. Subsequently intubated and developed brief PEA arrest
s/p cath with severe RCA and LAD disease, treated with PTCA and stent. Pt admitted for acute chronic respiratory failure, hx of seizure disorder.

## 2020-12-16 NOTE — PROGRESS NOTE ADULT - SUBJECTIVE AND OBJECTIVE BOX
[facility], [facility]                              [address] [address]                                                 PHONE: [phone]                                                 FAX: [phone]  -----------------------------------------------------------------------------------------------  Pt seen and examined. FU for  CAD    Overnight events/Complaints: Pt remains on high flow. No chest pain. Breathing at baseline.    Vital Signs Last 24 Hrs  T(C): 36.4 (16 Dec 2020 07:35), Max: 36.7 (15 Dec 2020 23:31)  T(F): 97.6 (16 Dec 2020 07:35), Max: 98 (15 Dec 2020 23:31)  HR: 79 (16 Dec 2020 08:00) (79 - 122)  BP: 96/74 (16 Dec 2020 08:00) (82/68 - 173/79)  BP(mean): 83 (16 Dec 2020 08:00) (64 - 116)  RR: 17 (16 Dec 2020 08:00) (13 - 25)  SpO2: 97% (16 Dec 2020 08:00) (90% - 100%)  I&O's Summary    15 Dec 2020 07:01  -  16 Dec 2020 07:00  --------------------------------------------------------  IN: 622.1 mL / OUT: 950 mL / NET: -327.9 mL        PHYSICAL EXAM:  Constitutional: Obese male on high flow  HEENT:     Head: Normocephalic and atraumatic  Neck: supple. No JVD  Cardiovascular: regular S1 S2  Respiratory: Lungs clear to auscultation; exp wheeze  Gastrointestinal:  Soft, Non-tender, + BS	  Musculoskeletal: Normal range of motion. + edema  Skin: Warm and dry. No cyanosis . No diaphoresis.  Neurologic: Alert oriented to time place and person. Normal strength and no tremor.        LABS:                        8.7    25.20 )-----------( 221      ( 16 Dec 2020 04:48 )             27.9     12-16    146<H>  |  96<L>  |  28.0<H>  ----------------------------<  142<H>  4.5   |  45.0<HH>  |  0.90    Ca    8.5<L>      16 Dec 2020 04:48  Phos  2.9     12-16  Mg     2.1     12-16    TPro  5.8<L>  /  Alb  3.1<L>  /  TBili  0.3<L>  /  DBili  x   /  AST  34  /  ALT  52<H>  /  AlkPhos  109  12-15        PT/INR - ( 16 Dec 2020 04:48 )   PT: 19.0 sec;   INR: 1.68 ratio         PTT - ( 16 Dec 2020 04:48 )  PTT:27.6 sec    RADIOLOGY & ADDITIONAL STUDIES: (reviewed)  CXR was independently visualized/reviewed  and demonstrated: small basilar infiltrates    CARDIOLOGY TESTING:(reviewed)     ECHOCARDIOGRAM independently visualized/reviewed and demonstrated :    1. Technically difficult study.   2. Endocardial visualization was enhanced with intravenous echo contrast.   3. Left ventricular ejection fraction, by visual estimation, is 60 to 65%.   4. Normal global left ventricular systolic function.   5. The left ventricular diastolic function could not be assessed in this study.   6. Normal left ventricular internal cavity size.   7. Moderately enlarged right ventricle. Moderately reduced RV systolic function.   8. The left atrium is normal in size.   9. Moderately enlarged right atrium.  10. Mild thickening and calcification of the anterior and posterior mitral valve leaflets.  11. Mild mitral valve regurgitation.  12. Mild tricuspid regurgitation.  13. There is no evidence of pericardial effusion.    CARDIAC CATH:  CORONARY VESSELS: The coronary circulation is right dominant.  LM:   --  LM: Normal.  LAD:   --  Mid LAD: There was a 80 % stenosis.  CX:   --  Circumflex: Angiography showed minor luminal irregularities with  no flow limiting lesions. The vessel arose anomalously from the proximal  RCA.  RCA:   --  RCA: iFR index of 0.82  --  Mid RCA: There was a 80 % stenosis.  --  Distal RCA: There was a 90 % stenosis.  COMPLICATIONS: There were no complications. No complications occurred  during the cath lab visit.  DIAGNOSTIC IMPRESSIONS: Severe RCA and LAD disease. The RCA was treated  with PTCA and STENT (THI-Resolute) with IVUS  DIAGNOSTIC RECOMMENDATIONS: Plavix and coumadin Will need PCI of LAD in  future  INTERVENTIONAL IMPRESSIONS: Severe RCA and LAD disease. The RCA was treated  with PTCA and STENT (THI-Resolute) with IVUS  INTERVENTIONAL RECOMMENDATIONS: Plavix and coumadin Will need PCI of LAD in  future    TELEMETRY independently visualized/reviewed and demonstrated : AF 70-80    MEDICATIONS:(reviewed)  MEDICATIONS  (STANDING):  albuterol/ipratropium for Nebulization 3 milliLiter(s) Nebulizer every 6 hours  aMIOdarone    Tablet 400 milliGRAM(s) Oral every 12 hours  aspirin  chewable 81 milliGRAM(s) Oral daily  atorvastatin 40 milliGRAM(s) Oral at bedtime  chlorhexidine 2% Cloths 1 Application(s) Topical <User Schedule>  chlorhexidine 4% Liquid 1 Application(s) Topical <User Schedule>  citalopram 40 milliGRAM(s) Oral daily  clopidogrel Tablet 75 milliGRAM(s) Oral daily  dextrose 50% Injectable 25 Gram(s) IV Push once  dextrose 50% Injectable 12.5 Gram(s) IV Push once  dextrose 50% Injectable 25 Gram(s) IV Push once  digoxin     Tablet 0.125 milliGRAM(s) Oral daily  diltiazem    Tablet 60 milliGRAM(s) Oral four times a day  enoxaparin Injectable 40 milliGRAM(s) SubCutaneous daily  famotidine    Tablet 20 milliGRAM(s) Oral two times a day  furosemide   Injectable 40 milliGRAM(s) IV Push daily  glucagon  Injectable 1 milliGRAM(s) IntraMuscular once  influenza   Vaccine 0.5 milliLiter(s) IntraMuscular once  insulin regular Infusion 5 Unit(s)/Hr (5 mL/Hr) IV Continuous <Continuous>  levETIRAcetam  IVPB 500 milliGRAM(s) IV Intermittent every 12 hours  methylPREDNISolone sodium succinate Injectable 40 milliGRAM(s) IV Push every 12 hours  metoprolol tartrate 25 milliGRAM(s) Oral two times a day  piperacillin/tazobactam IVPB.. 3.375 Gram(s) IV Intermittent every 8 hours  polyethylene glycol 3350 17 Gram(s) Oral daily  saccharomyces boulardii 250 milliGRAM(s) Oral two times a day                                                  [facility], [facility]                              [address] [address]                                                 PHONE: [phone]                                                 FAX: [phone]  -----------------------------------------------------------------------------------------------  Pt seen and examined. FU for  CAD    Overnight events/Complaints: Pt remains on high flow. No chest pain. Breathing at baseline.    Vital Signs Last 24 Hrs  T(C): 36.4 (16 Dec 2020 07:35), Max: 36.7 (15 Dec 2020 23:31)  T(F): 97.6 (16 Dec 2020 07:35), Max: 98 (15 Dec 2020 23:31)  HR: 79 (16 Dec 2020 08:00) (79 - 122)  BP: 96/74 (16 Dec 2020 08:00) (82/68 - 173/79)  BP(mean): 83 (16 Dec 2020 08:00) (64 - 116)  RR: 17 (16 Dec 2020 08:00) (13 - 25)  SpO2: 97% (16 Dec 2020 08:00) (90% - 100%)  I&O's Summary    15 Dec 2020 07:01  -  16 Dec 2020 07:00  --------------------------------------------------------  IN: 622.1 mL / OUT: 950 mL / NET: -327.9 mL    PHYSICAL EXAM:  Constitutional: Obese male on high flow  HEENT:     Head: Normocephalic and atraumatic  Neck: supple. No JVD  Cardiovascular: regular S1 S2  Respiratory: Lungs clear to auscultation; exp wheeze  Gastrointestinal:  Soft, Non-tender, + BS	  Musculoskeletal: Normal range of motion. + edema  Skin: Warm and dry. No cyanosis . No diaphoresis.  Neurologic: Alert oriented to time place and person. Normal strength and no tremor.        LABS:                        8.7    25.20 )-----------( 221      ( 16 Dec 2020 04:48 )             27.9     12-16    146<H>  |  96<L>  |  28.0<H>  ----------------------------<  142<H>  4.5   |  45.0<HH>  |  0.90    Ca    8.5<L>      16 Dec 2020 04:48  Phos  2.9     12-16  Mg     2.1     12-16    TPro  5.8<L>  /  Alb  3.1<L>  /  TBili  0.3<L>  /  DBili  x   /  AST  34  /  ALT  52<H>  /  AlkPhos  109  12-15        PT/INR - ( 16 Dec 2020 04:48 )   PT: 19.0 sec;   INR: 1.68 ratio         PTT - ( 16 Dec 2020 04:48 )  PTT:27.6 sec    RADIOLOGY & ADDITIONAL STUDIES: (reviewed)  CXR was independently visualized/reviewed  and demonstrated: small basilar infiltrates    CARDIOLOGY TESTING:(reviewed)     ECHOCARDIOGRAM independently visualized/reviewed and demonstrated :    1. Technically difficult study.   2. Endocardial visualization was enhanced with intravenous echo contrast.   3. Left ventricular ejection fraction, by visual estimation, is 60 to 65%.   4. Normal global left ventricular systolic function.   5. The left ventricular diastolic function could not be assessed in this study.   6. Normal left ventricular internal cavity size.   7. Moderately enlarged right ventricle. Moderately reduced RV systolic function.   8. The left atrium is normal in size.   9. Moderately enlarged right atrium.  10. Mild thickening and calcification of the anterior and posterior mitral valve leaflets.  11. Mild mitral valve regurgitation.  12. Mild tricuspid regurgitation.  13. There is no evidence of pericardial effusion.    CARDIAC CATH:  CORONARY VESSELS: The coronary circulation is right dominant.  LM:   --  LM: Normal.  LAD:   --  Mid LAD: There was a 80 % stenosis.  CX:   --  Circumflex: Angiography showed minor luminal irregularities with  no flow limiting lesions. The vessel arose anomalously from the proximal  RCA.  RCA:   --  RCA: iFR index of 0.82  --  Mid RCA: There was a 80 % stenosis.  --  Distal RCA: There was a 90 % stenosis.  COMPLICATIONS: There were no complications. No complications occurred  during the cath lab visit.  DIAGNOSTIC IMPRESSIONS: Severe RCA and LAD disease. The RCA was treated  with PTCA and STENT (THI-Resolute) with IVUS  DIAGNOSTIC RECOMMENDATIONS: Plavix and coumadin Will need PCI of LAD in  future  INTERVENTIONAL IMPRESSIONS: Severe RCA and LAD disease. The RCA was treated  with PTCA and STENT (THI-Resolute) with IVUS  INTERVENTIONAL RECOMMENDATIONS: Plavix and coumadin Will need PCI of LAD in  future    TELEMETRY independently visualized/reviewed and demonstrated : AF 70-80    MEDICATIONS:(reviewed)  MEDICATIONS  (STANDING):  albuterol/ipratropium for Nebulization 3 milliLiter(s) Nebulizer every 6 hours  aMIOdarone    Tablet 400 milliGRAM(s) Oral every 12 hours  aspirin  chewable 81 milliGRAM(s) Oral daily  atorvastatin 40 milliGRAM(s) Oral at bedtime  chlorhexidine 2% Cloths 1 Application(s) Topical <User Schedule>  chlorhexidine 4% Liquid 1 Application(s) Topical <User Schedule>  citalopram 40 milliGRAM(s) Oral daily  clopidogrel Tablet 75 milliGRAM(s) Oral daily  dextrose 50% Injectable 25 Gram(s) IV Push once  dextrose 50% Injectable 12.5 Gram(s) IV Push once  dextrose 50% Injectable 25 Gram(s) IV Push once  digoxin     Tablet 0.125 milliGRAM(s) Oral daily  diltiazem    Tablet 60 milliGRAM(s) Oral four times a day  enoxaparin Injectable 40 milliGRAM(s) SubCutaneous daily  famotidine    Tablet 20 milliGRAM(s) Oral two times a day  furosemide   Injectable 40 milliGRAM(s) IV Push daily  glucagon  Injectable 1 milliGRAM(s) IntraMuscular once  influenza   Vaccine 0.5 milliLiter(s) IntraMuscular once  insulin regular Infusion 5 Unit(s)/Hr (5 mL/Hr) IV Continuous <Continuous>  levETIRAcetam  IVPB 500 milliGRAM(s) IV Intermittent every 12 hours  methylPREDNISolone sodium succinate Injectable 40 milliGRAM(s) IV Push every 12 hours  metoprolol tartrate 25 milliGRAM(s) Oral two times a day  piperacillin/tazobactam IVPB.. 3.375 Gram(s) IV Intermittent every 8 hours  polyethylene glycol 3350 17 Gram(s) Oral daily  saccharomyces boulardii 250 milliGRAM(s) Oral two times a day

## 2020-12-16 NOTE — PROGRESS NOTE ADULT - ASSESSMENT
70y Male with past medical history significant for COPD, HTN, DM, pAF on coumadin, seizure d/o with recent hospitalization for hypercapnic respiratory failure presents with HTN emergency and subsequent PEA/VT arrest.  s/p cath with Severe RCA and LAD disease. The RCA was treated with PTCA and STENT (THI-Resolute) with IVUS with residual LAD lesion    CAD  - Continue ASA with Plavix    HTN  - BP controlled    Dyslipidemia  - Continue statin    AF  - Rates controlled with diltiazem  - Had been on coumadin and lovenox    Shortness of breath, Hypoxia on high flow  - Likely multifactorial including CAD, AF, ELISSA-OHS  - d/c lasix     VT  - On amiodarone. can decrease to 200 mg daily   70y Male with past medical history significant for COPD, HTN, DM, pAF on coumadin, seizure d/o with recent hospitalization for hypercapnic respiratory failure presents with HTN emergency and subsequent PEA/VT arrest.  s/p cath with Severe RCA and LAD disease. The RCA was treated with PTCA and STENT (THI-Resolute) with IVUS with residual LAD lesion    CAD  - Continue ASA with Plavix  - Plan for intervention on LAD on Friday. Will need to be done with anesthesia     HTN  - BP controlled    Dyslipidemia  - Continue statin    AF  - Rates controlled with diltiazem  - Hold coumadin at this time  - Resume full dose lovenox for AF this evening if groin post cath remains stable    Shortness of breath, Hypoxia on high flow  - Likely multifactorial including CAD, AF, ELISSA-OHS  - d/c lasix     VT  - On amiodarone. can decrease to 200 mg daily    d/w ICU team and Dr. Benitez   70y Male with past medical history significant for COPD, HTN, DM, pAF on coumadin, seizure d/o with recent hospitalization for hypercapnic respiratory failure presents with HTN emergency and subsequent PEA/VT arrest.  s/p cath with Severe RCA and LAD disease. The RCA was treated with PTCA and STENT (THI-Resolute) with IVUS with residual LAD lesion    CAD  - Continue ASA with Plavix  - Plan for intervention on LAD on Friday. Will need to be done with anesthesia     HTN  - BP controlled    Dyslipidemia  - Continue statin    AF  - Rates controlled with diltiazem  - Hold coumadin at this time    Blood loss anemia  - Hold Lovenox at this time given groin hematoma.   - Monitor H/H    Shortness of breath, Hypoxia on high flow  - Likely multifactorial including CAD, AF, ELISSA-OHS  - LVEDP was elevated on cath. On lasix now  - Use of diuretics with worsening alkalosis     VT  - On amiodarone. can decrease to 200 mg daily    d/w ICU team and Dr. Benitez   70y Male with past medical history significant for COPD, HTN, DM, pAF on coumadin, seizure d/o with recent hospitalization for hypercapnic respiratory failure presents with HTN emergency and subsequent PEA/VT arrest.  s/p cath with Severe RCA and LAD disease. The RCA was treated with PTCA and STENT (THI-Resolute) with IVUS with residual LAD lesion    CAD  - Continue ASA with Plavix  - Plan for intervention on LAD on Friday. Will need to be done with anesthesia   - Post cath will have EP evaluation    HTN  - BP controlled    Dyslipidemia  - Continue statin    AF  - Rates controlled with diltiazem  - Hold coumadin at this time    Blood loss anemia  - Hold Lovenox at this time given groin hematoma.   - Monitor H/H    Shortness of breath, Hypoxia on high flow  - Likely multifactorial including CAD, AF, ELISSA-OHS  - LVEDP was elevated on cath. On lasix now  - Use of diuretics with worsening alkalosis     VT  - On amiodarone. can decrease to 200 mg daily    d/w ICU team and Dr. Benitez

## 2020-12-16 NOTE — PHYSICAL THERAPY INITIAL EVALUATION ADULT - PRECAUTIONS/LIMITATIONS, REHAB EVAL
oxygen therapy device and L/min
4.5L O2 via NC/cardiac precautions/fall precautions/oxygen therapy device and L/min/seizure precautions

## 2020-12-16 NOTE — PHYSICAL THERAPY INITIAL EVALUATION ADULT - PASSIVE RANGE OF MOTION EXAMINATION, REHAB EVAL
no Passive ROM deficits were identified
bilateral lower extremity Passive ROM was WFL (within functional limits)

## 2020-12-16 NOTE — PHYSICAL THERAPY INITIAL EVALUATION ADULT - ADDITIONAL COMMENTS
Pt. lives with his son and teenage grandson in a house with 1STE + 1 inside without a rail. Pt. son and grandson are not at home during the day. Pt. reports he was independent PTA and does not own DME.
As per pt; Lives with son and teenage grandson on first floor with 1 [address] no rails + 1 step inside home no rail. Pt reports he was independent PTA and claims to own WC and RW but does not use them.  Pt son grandson are available to assist upon d/c to home.

## 2020-12-17 LAB
ANION GAP SERPL CALC-SCNC: 7 MMOL/L — SIGNIFICANT CHANGE UP (ref 5–17)
BUN SERPL-MCNC: 37 MG/DL — HIGH (ref 8–20)
CALCIUM SERPL-MCNC: 8.5 MG/DL — LOW (ref 8.6–10.2)
CHLORIDE SERPL-SCNC: 89 MMOL/L — LOW (ref 98–107)
CO2 SERPL-SCNC: 44 MMOL/L — HIGH (ref 22–29)
CREAT SERPL-MCNC: 0.97 MG/DL — SIGNIFICANT CHANGE UP (ref 0.5–1.3)
DIGOXIN SERPL-MCNC: 1.1 NG/ML — SIGNIFICANT CHANGE UP (ref 0.8–2)
GLUCOSE BLDC GLUCOMTR-MCNC: 221 MG/DL — HIGH (ref 70–99)
GLUCOSE BLDC GLUCOMTR-MCNC: 222 MG/DL — HIGH (ref 70–99)
GLUCOSE BLDC GLUCOMTR-MCNC: 274 MG/DL — HIGH (ref 70–99)
GLUCOSE BLDC GLUCOMTR-MCNC: 324 MG/DL — HIGH (ref 70–99)
GLUCOSE SERPL-MCNC: 246 MG/DL — HIGH (ref 70–99)
HCT VFR BLD CALC: 25.2 % — LOW (ref 39–50)
HCT VFR BLD CALC: 27.3 % — LOW (ref 39–50)
HGB BLD-MCNC: 7.9 G/DL — LOW (ref 13–17)
HGB BLD-MCNC: 8.5 G/DL — LOW (ref 13–17)
INR BLD: 1.87 RATIO — HIGH (ref 0.88–1.16)
MAGNESIUM SERPL-MCNC: 2 MG/DL — SIGNIFICANT CHANGE UP (ref 1.6–2.6)
MCHC RBC-ENTMCNC: 27.2 PG — SIGNIFICANT CHANGE UP (ref 27–34)
MCHC RBC-ENTMCNC: 31.3 GM/DL — LOW (ref 32–36)
MCV RBC AUTO: 86.9 FL — SIGNIFICANT CHANGE UP (ref 80–100)
PHOSPHATE SERPL-MCNC: 3.3 MG/DL — SIGNIFICANT CHANGE UP (ref 2.4–4.7)
PLATELET # BLD AUTO: 188 K/UL — SIGNIFICANT CHANGE UP (ref 150–400)
POTASSIUM SERPL-MCNC: 4.4 MMOL/L — SIGNIFICANT CHANGE UP (ref 3.5–5.3)
POTASSIUM SERPL-SCNC: 4.4 MMOL/L — SIGNIFICANT CHANGE UP (ref 3.5–5.3)
PROCALCITONIN SERPL-MCNC: 0.13 NG/ML — HIGH (ref 0.02–0.1)
PROTHROM AB SERPL-ACNC: 21.1 SEC — HIGH (ref 10.6–13.6)
RBC # BLD: 2.9 M/UL — LOW (ref 4.2–5.8)
RBC # FLD: 14.5 % — SIGNIFICANT CHANGE UP (ref 10.3–14.5)
SODIUM SERPL-SCNC: 140 MMOL/L — SIGNIFICANT CHANGE UP (ref 135–145)
WBC # BLD: 22.06 K/UL — HIGH (ref 3.8–10.5)
WBC # FLD AUTO: 22.06 K/UL — HIGH (ref 3.8–10.5)

## 2020-12-17 PROCEDURE — 73700 CT LOWER EXTREMITY W/O DYE: CPT | Mod: 26,RT

## 2020-12-17 PROCEDURE — 93926 LOWER EXTREMITY STUDY: CPT | Mod: 26,RT

## 2020-12-17 PROCEDURE — 99222 1ST HOSP IP/OBS MODERATE 55: CPT

## 2020-12-17 PROCEDURE — 99233 SBSQ HOSP IP/OBS HIGH 50: CPT

## 2020-12-17 RX ORDER — INSULIN LISPRO 100/ML
9 VIAL (ML) SUBCUTANEOUS
Refills: 0 | Status: DISCONTINUED | OUTPATIENT
Start: 2020-12-17 | End: 2020-12-20

## 2020-12-17 RX ADMIN — Medication 60 MILLIGRAM(S): at 06:05

## 2020-12-17 RX ADMIN — Medication 81 MILLIGRAM(S): at 11:06

## 2020-12-17 RX ADMIN — AMIODARONE HYDROCHLORIDE 400 MILLIGRAM(S): 400 TABLET ORAL at 11:06

## 2020-12-17 RX ADMIN — PIPERACILLIN AND TAZOBACTAM 25 GRAM(S): 4; .5 INJECTION, POWDER, LYOPHILIZED, FOR SOLUTION INTRAVENOUS at 15:53

## 2020-12-17 RX ADMIN — INSULIN GLARGINE 25 UNIT(S): 100 INJECTION, SOLUTION SUBCUTANEOUS at 22:50

## 2020-12-17 RX ADMIN — Medication 250 MILLIGRAM(S): at 18:08

## 2020-12-17 RX ADMIN — ATORVASTATIN CALCIUM 40 MILLIGRAM(S): 80 TABLET, FILM COATED ORAL at 22:49

## 2020-12-17 RX ADMIN — CITALOPRAM 40 MILLIGRAM(S): 10 TABLET, FILM COATED ORAL at 11:07

## 2020-12-17 RX ADMIN — Medication 8: at 12:57

## 2020-12-17 RX ADMIN — Medication 40 MILLIGRAM(S): at 18:07

## 2020-12-17 RX ADMIN — AMIODARONE HYDROCHLORIDE 200 MILLIGRAM(S): 400 TABLET ORAL at 06:04

## 2020-12-17 RX ADMIN — LEVETIRACETAM 420 MILLIGRAM(S): 250 TABLET, FILM COATED ORAL at 06:04

## 2020-12-17 RX ADMIN — Medication 4: at 18:08

## 2020-12-17 RX ADMIN — Medication 3 MILLILITER(S): at 14:08

## 2020-12-17 RX ADMIN — Medication 9 UNIT(S): at 18:08

## 2020-12-17 RX ADMIN — Medication 25 MILLIGRAM(S): at 18:08

## 2020-12-17 RX ADMIN — Medication 3 MILLILITER(S): at 03:55

## 2020-12-17 RX ADMIN — PIPERACILLIN AND TAZOBACTAM 25 GRAM(S): 4; .5 INJECTION, POWDER, LYOPHILIZED, FOR SOLUTION INTRAVENOUS at 06:04

## 2020-12-17 RX ADMIN — Medication 60 MILLIGRAM(S): at 22:49

## 2020-12-17 RX ADMIN — Medication 9 UNIT(S): at 12:58

## 2020-12-17 RX ADMIN — Medication 4: at 22:50

## 2020-12-17 RX ADMIN — Medication 40 MILLIGRAM(S): at 06:05

## 2020-12-17 RX ADMIN — Medication 60 MILLIGRAM(S): at 00:35

## 2020-12-17 RX ADMIN — LEVETIRACETAM 420 MILLIGRAM(S): 250 TABLET, FILM COATED ORAL at 19:05

## 2020-12-17 RX ADMIN — FAMOTIDINE 20 MILLIGRAM(S): 10 INJECTION INTRAVENOUS at 06:05

## 2020-12-17 RX ADMIN — Medication 3 MILLILITER(S): at 08:41

## 2020-12-17 RX ADMIN — Medication 0.12 MILLIGRAM(S): at 06:05

## 2020-12-17 RX ADMIN — INSULIN GLARGINE 25 UNIT(S): 100 INJECTION, SOLUTION SUBCUTANEOUS at 10:02

## 2020-12-17 RX ADMIN — Medication 60 MILLIGRAM(S): at 11:08

## 2020-12-17 RX ADMIN — Medication 3 MILLILITER(S): at 20:22

## 2020-12-17 RX ADMIN — POLYETHYLENE GLYCOL 3350 17 GRAM(S): 17 POWDER, FOR SOLUTION ORAL at 11:08

## 2020-12-17 RX ADMIN — Medication 6: at 09:33

## 2020-12-17 RX ADMIN — AMIODARONE HYDROCHLORIDE 400 MILLIGRAM(S): 400 TABLET ORAL at 22:49

## 2020-12-17 RX ADMIN — Medication 60 MILLIGRAM(S): at 18:07

## 2020-12-17 RX ADMIN — Medication 25 MILLIGRAM(S): at 06:05

## 2020-12-17 RX ADMIN — FAMOTIDINE 20 MILLIGRAM(S): 10 INJECTION INTRAVENOUS at 18:08

## 2020-12-17 RX ADMIN — CLOPIDOGREL BISULFATE 75 MILLIGRAM(S): 75 TABLET, FILM COATED ORAL at 11:07

## 2020-12-17 RX ADMIN — Medication 250 MILLIGRAM(S): at 06:04

## 2020-12-17 NOTE — CONSULT NOTE ADULT - SUBJECTIVE AND OBJECTIVE BOX
HPI:  71 y/o M w/ a PMHx of COPD (noncompliant w/ CPAP), HTN, type 2 DM, afib (on Coumadin), and seizure disorder who presented unresponsive, hypoxic (SpO2 70s), and agonally breathing. Pt was subsequently intubated. Shortly after intubation, pt suffered from a brief PEA arrest. Pt received epi x2, bicarb, calcium, and mag. ROSC was achieved, found to be in v tach w/ a pulse. Pt was cardioverted at 100J, 200J x2, and 300J w/ conversion to sinus rhythm. Amio bolus and infusion initiated. Initially hypertensive (SBP 200s), subsequently became hypotensive and Levophed infusion was started. Labs significant for leukocytosis (23K), hyperkalemia (K 5.8), uremia (BUN 45), and hyperglycemia. ABG revealing of a respiratory acidosis. CXR w/ small bibasilar infiltrates. S/p Zosyn and Vanco in ED. CT head negative for acute intracranial event. Admit to MICU for continuation of care.    Of note, pt was admitted to ICU on 12/1 w/ acute hypercapnic respiratory failure requiring BiPAP. Discharged from [facility] yesterday 12/9. (10 Dec 2020 16:52)  Pt. referred due to uncontrolled diabetes. Recently started on IV solu-medrol 40 bid.  Past h/o DM type 2, on metformin. Pt. also uses insulin, unclear duration. Only limited history obtainable now, as pt. is vague on details and also dyspneic.      PAST MEDICAL & SURGICAL HISTORY:  Seizure    Depression, unspecified depression type    Diabetes    Hypertension    Hyperlipidemia    Afib    COPD (chronic obstructive pulmonary disease)    Abdominal hernia    H/O carotid endarterectomy        FAMILY HISTORY:  No pertinent family history in first degree relatives        SOCIAL HISTORY:    REVIEW OF SYSTEMS:  as noted above    MEDICATIONS  (STANDING):  albuterol/ipratropium for Nebulization 3 milliLiter(s) Nebulizer every 6 hours  aMIOdarone    Tablet 400 milliGRAM(s) Oral every 12 hours  aMIOdarone    Tablet 200 milliGRAM(s) Oral daily  aspirin  chewable 81 milliGRAM(s) Oral daily  atorvastatin 40 milliGRAM(s) Oral at bedtime  chlorhexidine 2% Cloths 1 Application(s) Topical <User Schedule>  citalopram 40 milliGRAM(s) Oral daily  clopidogrel Tablet 75 milliGRAM(s) Oral daily  dextrose 40% Gel 15 Gram(s) Oral once  dextrose 5%. 1000 milliLiter(s) (50 mL/Hr) IV Continuous <Continuous>  dextrose 5%. 1000 milliLiter(s) (100 mL/Hr) IV Continuous <Continuous>  dextrose 50% Injectable 25 Gram(s) IV Push once  dextrose 50% Injectable 12.5 Gram(s) IV Push once  dextrose 50% Injectable 25 Gram(s) IV Push once  digoxin     Tablet 0.125 milliGRAM(s) Oral daily  diltiazem    Tablet 60 milliGRAM(s) Oral four times a day  enoxaparin Injectable 40 milliGRAM(s) SubCutaneous daily  famotidine    Tablet 20 milliGRAM(s) Oral two times a day  furosemide   Injectable 40 milliGRAM(s) IV Push daily  glucagon  Injectable 1 milliGRAM(s) IntraMuscular once  influenza   Vaccine 0.5 milliLiter(s) IntraMuscular once  insulin glargine Injectable (LANTUS) 25 Unit(s) SubCutaneous two times a day  insulin lispro (ADMELOG) corrective regimen sliding scale   SubCutaneous Before meals and at bedtime  insulin lispro Injectable (ADMELOG) 9 Unit(s) SubCutaneous three times a day with meals  levETIRAcetam  IVPB 500 milliGRAM(s) IV Intermittent every 12 hours  methylPREDNISolone sodium succinate Injectable 40 milliGRAM(s) IV Push every 12 hours  metoprolol tartrate 25 milliGRAM(s) Oral two times a day  piperacillin/tazobactam IVPB.. 3.375 Gram(s) IV Intermittent every 8 hours  polyethylene glycol 3350 17 Gram(s) Oral daily  saccharomyces boulardii 250 milliGRAM(s) Oral two times a day    MEDICATIONS  (PRN):  aluminum hydroxide/magnesium hydroxide/simethicone Suspension 30 milliLiter(s) Oral every 4 hours PRN Dyspepsia      Allergies    No Known Allergies    Intolerances          PHYSICAL EXAM:    Vital Signs Last 24 Hrs  T(C): 37 (17 Dec 2020 04:49), Max: 37.2 (16 Dec 2020 22:06)  T(F): 98.6 (17 Dec 2020 04:49), Max: 98.9 (16 Dec 2020 22:06)  HR: 100 (17 Dec 2020 14:09) (78 - 114)  BP: 112/57 (17 Dec 2020 10:50) (112/57 - 134/81)  BP(mean): 94 (16 Dec 2020 20:00) (91 - 94)  RR: 18 (17 Dec 2020 10:50) (17 - 29)  SpO2: 92% (17 Dec 2020 14:09) (88% - 99%)    General appearance: Well developed, generalized obesity    Eyes: Pupils equal  EOM full. No exophthalmos.    Neck: Trachea midline. No thyroid enlargement.    Lungs: decreased breath sounds    CV: Regular cardiac rhythm.     Abdomen: Soft, non tender, no organomegaly or mass.    Musculoskeletal: No cyanosis, bilateral pitting edema    Skin: Warm and dry. THin skin with eccyhmoses    Neuro: Cranial nerves intact. Normal motor function.     Psych: Normal affect, fair judgement.            LABS:                        7.9    22.06 )-----------( 188      ( 17 Dec 2020 09:42 )             25.2     12-17    140  |  89<L>  |  37.0<H>  ----------------------------<  246<H>  4.4   |  44.0<H>  |  0.97    Ca    8.5<L>      17 Dec 2020 09:42  Phos  3.3     12-17  Mg     2.0     12-17    TPro  5.8<L>  /  Alb  3.1<L>  /  TBili  0.3<L>  /  DBili  x   /  AST  34  /  ALT  52<H>  /  AlkPhos  109  12-15      LIVER FUNCTIONS - ( 15 Dec 2020 18:14 )  Alb: 3.1 g/dL / Pro: 5.8 g/dL / ALK PHOS: 109 U/L / ALT: 52 U/L / AST: 34 U/L / GGT: x                 POCT Blood Glucose.: 324 mg/dL (12-17-20 @ 12:56)  POCT Blood Glucose.: 274 mg/dL (12-17-20 @ 08:46)  POCT Blood Glucose.: 378 mg/dL (12-16-20 @ 21:17)  POCT Blood Glucose.: 411 mg/dL (12-16-20 @ 18:07)  POCT Blood Glucose.: 287 mg/dL (12-16-20 @ 16:39)  POCT Blood Glucose.: 148 mg/dL (12-16-20 @ 12:04)  POCT Blood Glucose.: 129 mg/dL (12-16-20 @ 09:29)  POCT Blood Glucose.: 138 mg/dL (12-16-20 @ 08:27)  POCT Blood Glucose.: 156 mg/dL (12-16-20 @ 07:04)  POCT Blood Glucose.: 160 mg/dL (12-16-20 @ 06:05)  POCT Blood Glucose.: 151 mg/dL (12-16-20 @ 05:00)  POCT Blood Glucose.: 140 mg/dL (12-16-20 @ 04:20)  POCT Blood Glucose.: 160 mg/dL (12-16-20 @ 02:25)  POCT Blood Glucose.: 153 mg/dL (12-16-20 @ 01:17)  POCT Blood Glucose.: 153 mg/dL (12-16-20 @ 00:13)  POCT Blood Glucose.: 149 mg/dL (12-15-20 @ 23:21)  POCT Blood Glucose.: 223 mg/dL (12-15-20 @ 22:05)  POCT Blood Glucose.: 246 mg/dL (12-15-20 @ 21:22)  POCT Blood Glucose.: 233 mg/dL (12-15-20 @ 20:12)  POCT Blood Glucose.: 262 mg/dL (12-15-20 @ 12:49)  POCT Blood Glucose.: 282 mg/dL (12-15-20 @ 06:12)  POCT Blood Glucose.: 183 mg/dL (12-14-20 @ 21:49)  POCT Blood Glucose.: 210 mg/dL (12-14-20 @ 16:34)

## 2020-12-17 NOTE — CONSULT NOTE ADULT - ASSESSMENT
DM type 2, acute loss of control due to illness and IV solu-medrol. ALready on basal insulin, and prandial insulin just started. Will observe response and modify accordingly, as prandial insulin may need to be increased.

## 2020-12-17 NOTE — PROGRESS NOTE ADULT - ASSESSMENT
70y Male with past medical history significant for COPD, HTN, DM, pAF on coumadin, seizure d/o with recent hospitalization for hypercapnic respiratory failure presents with HTN emergency and subsequent PEA/VT arrest on 12/10 this admission     CAD   - s/p cath with Severe RCA and LAD disease on 12/15. The RCA was treated with PTCA and STENT (THI-Resolute) with IVUS with residual LAD lesion  - Plan for intervention on LAD on Friday. Will need to be done with anesthesia   - Post cath will have EP evaluation  - Pt noted to have large right groin hematoma, and noted hgb drop (8.7-->7.9). Discussed with Cardiology, who is aware of hematoma, patient to go for CATH on 12/18.  - Will STAT CT RLE and RLE duplex to assess the extend of hematoma. AC held for now. SCD boots ordered  - Continue ASA with Plavix, BB, Statin     HTN  - c/w metoprolol and cardizem and digoxin,     AFib  - Rate controlled with diltiazem/Metoprolol  -coumadin held at this time  - Hold coumadin at this time    VT  - amiodarone decreased to 200 mg daily per Dr Arroyo recommendations     Blood loss anemia  - ppx lovenox held due to groin hematoma.   - STAT H/H ordered  - RLE/groin US and CT ordered stat  - Monitor H/H    Shortness of breath,   - improved, currently on 4LNC  - Likely multifactorial including CAD, AF, ELISSA-OHS  - LVEDP was elevated on cath.  - Use of diuretics with worsening alkalosis     Dyslipidemia- statin    COPD exacerbation  -nocturnal NIV  - Iv solumedrol 40mg q12   - abx, bronchodilators   -will consult pulm    DVT prophylaxis: scd    DISPO: cath planned for 12/18. monitor H/H. groin hematoma scans pending. LOS uncertain as patient is still acute.          70y Male with past medical history significant for COPD, HTN, DM, pAF on coumadin, seizure d/o with recent hospitalization for hypercapnic respiratory failure presents with HTN emergency and subsequent PEA/VT arrest on 12/10 this admission     CAD   - s/p cath with Severe RCA and LAD disease on 12/15. The RCA was treated with PTCA and STENT (THI-Resolute) with IVUS with residual LAD lesion  - Plan for intervention on LAD on Friday. Will need to be done with anesthesia   - Post cath will have EP evaluation  - Pt noted to have large right groin hematoma, and noted hgb drop (8.7-->7.9). Discussed with Cardiology, who is aware of hematoma, patient to go for CATH on 12/18.  - Will STAT CT RLE and RLE duplex to assess the extend of hematoma. AC held for now. SCD boots ordered  - Continue ASA with Plavix, BB, Statin     HTN  - c/w metoprolol and cardizem and digoxin,     AFib  - Rate controlled with diltiazem/Metoprolol  -coumadin held at this time  - Hold coumadin at this time    VT  - amiodarone decreased to 200 mg daily per Dr Arroyo recommendations     Blood loss anemia  - ppx lovenox held due to groin hematoma.   - STAT H/H ordered  - RLE/groin US and CT ordered stat  - Monitor H/H    Shortness of breath,   - improved, currently on 4LNC  - Likely multifactorial including CAD, AF, ELISSA-OHS  - LVEDP was elevated on cath.  - Use of diuretics with worsening alkalosis     Dyslipidemia- statin    COPD exacerbation  -nocturnal NIV  - Iv solumedrol 40mg q12   - abx, bronchodilators   -will consult pulm    DM2, uncontrolled  -A1c 9.1  - started 9 units premeal  - lantus 25 BID  - will consult endocrine for tight glycemic control    DVT prophylaxis: scd    DISPO: cath planned for 12/18. monitor H/H. groin hematoma scans pending. LOS uncertain as patient is still acute.          70y Male with past medical history significant for COPD, HTN, DM, pAF on coumadin, seizure d/o with recent hospitalization for hypercapnic respiratory failure presents with HTN emergency and subsequent PEA/VT arrest on 12/10 this admission     CAD   - s/p cath with Severe RCA and LAD disease on 12/15. The RCA was treated with PTCA and STENT (THI-Resolute) with IVUS with residual LAD lesion  - Plan for intervention on LAD on Friday. Will need to be done with anesthesia   - Pt noted to have large right groin hematoma, and noted hgb drop (8.7-->7.9). Discussed with Cardiology, who is aware of hematoma, patient to go for CATH on 12/18.  - Will STAT CT RLE and RLE arterial duplex to assess the extend of hematoma. AC held for now. SCD boots ordered  - Continue ASA with Plavix, BB, Statin     HTN  - c/w metoprolol and cardizem and digoxin,     AFib  - Rate controlled with diltiazem/Metoprolol  -coumadin held at this time  - Hold coumadin at this time    Cardiac arrest from/ VT  - amiodarone decreased to 200 mg daily per Dr Arroyo recommendations    Will have EP evaluation for AICD    Blood loss anemia  - ppx lovenox held due to groin hematoma.   - STAT H/H ordered  - RLE/groin US and CT ordered stat  - Monitor H/H    Shortness of breath due to COPD exacerbation,  acute on chronic CHF, possible gram positive/gram negative pneumonia  - improved, currently on 4LNC  - LVEDP was elevated on cath.  - Use of diuretics with worsening alkalosis   c/w IV lasix as of now as discussed with Dr. Arroyo. Add acetazolamide  Strict I & Os  Daily weight  -nocturnal NIV  - Iv solumedrol 40mg q12   - abx, bronchodilators   -will consult pulm  completed 7 days of zosyn per ID  Blood cx NGTD      DM2, uncontrolled  -A1c 9.1  - started 9 units premeal  - lantus 25 BID  - will consult endocrine for tight glycemic control    DVT prophylaxis: scd    DISPO: cath planned for 12/18. monitor H/H. groin hematoma scans pending. LOS uncertain as patient is still acute.

## 2020-12-17 NOTE — CHART NOTE - NSCHARTNOTEFT_GEN_A_CORE
Source: Patient [ x]  Family [ ]   other [x ]EMR    Current Diet: Consistent CHO, Dash    Patient reports [ ] nausea  [ ] vomiting [ ] diarrhea [ ] constipation  [ ]chewing problems [ ] swallowing issues  [ ] other:     PO intake:  < 50% [ ]   50-75%  [ x]   %  [ ]  other :    Source for PO intake [x ] Patient [ ] family [x ] chart [ ] staff [ ] other    Enteral /Parenteral Nutrition:     Current Weight: 12/16 222#, 12/10 239.4#  generalized 1+, 3+ left lg and right leg    % Weight Change     Pertinent Medications: MEDICATIONS  (STANDING):  albuterol/ipratropium for Nebulization 3 milliLiter(s) Nebulizer every 6 hours  aMIOdarone    Tablet 400 milliGRAM(s) Oral every 12 hours  aMIOdarone    Tablet 200 milliGRAM(s) Oral daily  aspirin  chewable 81 milliGRAM(s) Oral daily  atorvastatin 40 milliGRAM(s) Oral at bedtime  chlorhexidine 2% Cloths 1 Application(s) Topical <User Schedule>  citalopram 40 milliGRAM(s) Oral daily  clopidogrel Tablet 75 milliGRAM(s) Oral daily  dextrose 40% Gel 15 Gram(s) Oral once  dextrose 5%. 1000 milliLiter(s) (50 mL/Hr) IV Continuous <Continuous>  dextrose 5%. 1000 milliLiter(s) (100 mL/Hr) IV Continuous <Continuous>  dextrose 50% Injectable 25 Gram(s) IV Push once  dextrose 50% Injectable 12.5 Gram(s) IV Push once  dextrose 50% Injectable 25 Gram(s) IV Push once  digoxin     Tablet 0.125 milliGRAM(s) Oral daily  diltiazem    Tablet 60 milliGRAM(s) Oral four times a day  enoxaparin Injectable 40 milliGRAM(s) SubCutaneous daily  famotidine    Tablet 20 milliGRAM(s) Oral two times a day  furosemide   Injectable 40 milliGRAM(s) IV Push daily  glucagon  Injectable 1 milliGRAM(s) IntraMuscular once  influenza   Vaccine 0.5 milliLiter(s) IntraMuscular once  insulin glargine Injectable (LANTUS) 25 Unit(s) SubCutaneous two times a day  insulin lispro (ADMELOG) corrective regimen sliding scale   SubCutaneous Before meals and at bedtime  insulin lispro Injectable (ADMELOG) 9 Unit(s) SubCutaneous three times a day with meals  levETIRAcetam  IVPB 500 milliGRAM(s) IV Intermittent every 12 hours  methylPREDNISolone sodium succinate Injectable 40 milliGRAM(s) IV Push every 12 hours  metoprolol tartrate 25 milliGRAM(s) Oral two times a day  piperacillin/tazobactam IVPB.. 3.375 Gram(s) IV Intermittent every 8 hours  polyethylene glycol 3350 17 Gram(s) Oral daily  saccharomyces boulardii 250 milliGRAM(s) Oral two times a day    MEDICATIONS  (PRN):  aluminum hydroxide/magnesium hydroxide/simethicone Suspension 30 milliLiter(s) Oral every 4 hours PRN Dyspepsia    Pertinent Labs: CBC Full  -  ( 17 Dec 2020 09:42 )  WBC Count : 22.06 K/uL  RBC Count : 2.90 M/uL  Hemoglobin : 7.9 g/dL  Hematocrit : 25.2 %  Platelet Count - Automated : 188 K/uL  Mean Cell Volume : 86.9 fl  Mean Cell Hemoglobin : 27.2 pg  Mean Cell Hemoglobin Concentration : 31.3 gm/dL  Auto Neutrophil # : x  Auto Lymphocyte # : x  Auto Monocyte # : x  Auto Eosinophil # : x  Auto Basophil # : x  Auto Neutrophil % : x  Auto Lymphocyte % : x  Auto Monocyte % : x  Auto Eosinophil % : x  Auto Basophil % : x      12-16 Na146 mmol/L<H> Glu 142 mg/dL<H> K+ 4.5 mmol/L Cr  0.90 mg/dL BUN 28.0 mg/dL<H> Phos 2.9 mg/dL Alb n/a   PAB n/a           Skin:     Nutrition focused physical exam conducted - found signs of malnutrition [ ]absent [ ]present    Subcutaneous fat loss: [ ] Orbital fat pads region, [ ]Buccal fat region, [ ]Triceps region,  [ ]Ribs region    Muscle wasting: [ ]Temples region, [ ]Clavicle region, [ ]Shoulder region, [ ]Scapula region, [ ]Interosseous region,  [ ]thigh region, [ ]Calf region    Estimated Needs:   [x ] no change since previous assessment  [ ] recalculated:     Current Nutrition Diagnosis:  Increased Nutrient Needs related to increased physiological demand for nutrient as evidenced by COPD, resp failure requiring intubation, cardiac arrest/anoxic encephalopathy. Pt now extubated on 5 twr. Pt scheduled for cath tomorrow and having high blood sugars. Pre meal insulin added as per PA. HGB A1c 9.1 noted. Pt states he eats well. Reviewed diabetic diet with pt. Last bowel movement 12/16 as per documentation. Pt with 3+ left leg and right leg and generalized 1 + edema.       Recommendations: Continue diet as ordered    Monitoring and Evaluation:   [x ] PO intake [x ] Tolerance to diet prescription [X] Weights  [X] Follow up per protocol [X] Labs:

## 2020-12-17 NOTE — CONSULT NOTE ADULT - SUBJECTIVE AND OBJECTIVE BOX
Vascular Surgery Consult    Consulting attending: Dr. Rodney Hill     Vascular surgery consulted for finding of right groin complex pseudoaneurysm. HPI reviewed as below, patient seen at bedside. Underwent LHC on 12/15 where THI x 2 placed in RCA. Right groin access site developed hematoma. In setting of dropping hemoglobin, ultrasound was performed with findings of either a single right groin pseudoaneurysm with dilatation of a portion of the neck or 2 adjacent pseudoaneurysms.       HPI:  71 y/o M w/ a PMHx of COPD (noncompliant w/ CPAP), HTN, type 2 DM, afib (on Coumadin), and seizure disorder who presented unresponsive, hypoxic (SpO2 70s), and agonally breathing. Pt was subsequently intubated. Shortly after intubation, pt suffered from a brief PEA arrest. Pt received epi x2, bicarb, calcium, and mag. ROSC was achieved, found to be in v tach w/ a pulse. Pt was cardioverted at 100J, 200J x2, and 300J w/ conversion to sinus rhythm. Amio bolus and infusion initiated. Initially hypertensive (SBP 200s), subsequently became hypotensive and Levophed infusion was started. Labs significant for leukocytosis (23K), hyperkalemia (K 5.8), uremia (BUN 45), and hyperglycemia. ABG revealing of a respiratory acidosis. CXR w/ small bibasilar infiltrates. S/p Zosyn and Vanco in ED. CT head negative for acute intracranial event. Admit to MICU for continuation of care.    Of note, pt was admitted to ICU on 12/1 w/ acute hypercapnic respiratory failure requiring BiPAP. Discharged from [facility] yesterday 12/9. (10 Dec 2020 16:52)        PAST MEDICAL HISTORY:  Seizure    Depression, unspecified depression type    Diabetes    Hypertension    Hyperlipidemia    Afib    COPD (chronic obstructive pulmonary disease)          PAST SURGICAL HISTORY:  Abdominal hernia    H/O carotid endarterectomy    No significant past surgical history          MEDICATIONS:  albuterol/ipratropium for Nebulization 3 milliLiter(s) Nebulizer every 6 hours  aluminum hydroxide/magnesium hydroxide/simethicone Suspension 30 milliLiter(s) Oral every 4 hours PRN  aMIOdarone    Tablet 400 milliGRAM(s) Oral every 12 hours  aMIOdarone    Tablet 200 milliGRAM(s) Oral daily  aspirin  chewable 81 milliGRAM(s) Oral daily  atorvastatin 40 milliGRAM(s) Oral at bedtime  chlorhexidine 2% Cloths 1 Application(s) Topical <User Schedule>  citalopram 40 milliGRAM(s) Oral daily  clopidogrel Tablet 75 milliGRAM(s) Oral daily  dextrose 40% Gel 15 Gram(s) Oral once  dextrose 5%. 1000 milliLiter(s) IV Continuous <Continuous>  dextrose 5%. 1000 milliLiter(s) IV Continuous <Continuous>  dextrose 50% Injectable 25 Gram(s) IV Push once  dextrose 50% Injectable 12.5 Gram(s) IV Push once  dextrose 50% Injectable 25 Gram(s) IV Push once  digoxin     Tablet 0.125 milliGRAM(s) Oral daily  diltiazem    Tablet 60 milliGRAM(s) Oral four times a day  famotidine    Tablet 20 milliGRAM(s) Oral two times a day  furosemide   Injectable 40 milliGRAM(s) IV Push daily  glucagon  Injectable 1 milliGRAM(s) IntraMuscular once  influenza   Vaccine 0.5 milliLiter(s) IntraMuscular once  insulin glargine Injectable (LANTUS) 25 Unit(s) SubCutaneous two times a day  insulin lispro (ADMELOG) corrective regimen sliding scale   SubCutaneous Before meals and at bedtime  insulin lispro Injectable (ADMELOG) 9 Unit(s) SubCutaneous three times a day with meals  levETIRAcetam  IVPB 500 milliGRAM(s) IV Intermittent every 12 hours  methylPREDNISolone sodium succinate Injectable 40 milliGRAM(s) IV Push every 12 hours  metoprolol tartrate 25 milliGRAM(s) Oral two times a day  polyethylene glycol 3350 17 Gram(s) Oral daily  saccharomyces boulardii 250 milliGRAM(s) Oral two times a day        ALLERGIES:  No Known Allergies        VITALS & I/Os:  Vital Signs Last 24 Hrs  T(C): 37 (17 Dec 2020 04:49), Max: 37 (17 Dec 2020 04:49)  T(F): 98.6 (17 Dec 2020 04:49), Max: 98.6 (17 Dec 2020 04:49)  HR: 92 (17 Dec 2020 20:22) (78 - 118)  BP: 150/75 (17 Dec 2020 18:04) (112/57 - 150/75)  BP(mean): --  RR: 18 (17 Dec 2020 18:04) (18 - 18)  SpO2: 94% (17 Dec 2020 20:22) (81% - 99%)    I&O's Summary    16 Dec 2020 07:01  -  17 Dec 2020 07:00  --------------------------------------------------------  IN: 58 mL / OUT: 853 mL / NET: -795 mL    17 Dec 2020 07:01  -  17 Dec 2020 22:33  --------------------------------------------------------  IN: 116 mL / OUT: 300 mL / NET: -184 mL          PHYSICAL EXAM:  General: No acute distress  Respiratory: Nonlabored  Cardiovascular: RRR  Abdominal: Soft, nondistended, nontender. No rebound or guarding. No organomegaly, no palpable mass.  Extremities: Warm  Vascular:  - RUE:  - LUE:  - RLE:  - LLE:       LABS:                        8.5    x     )-----------( x        ( 17 Dec 2020 18:51 )             27.3     12-17    140  |  89<L>  |  37.0<H>  ----------------------------<  246<H>  4.4   |  44.0<H>  |  0.97    Ca    8.5<L>      17 Dec 2020 09:42  Phos  3.3     12-17  Mg     2.0     12-17      Lactate:    PT/INR - ( 17 Dec 2020 09:42 )   PT: 21.1 sec;   INR: 1.87 ratio         PTT - ( 16 Dec 2020 04:48 )  PTT:27.6 sec                IMAGING:                                                                                               Vascular Surgery Consult    Consulting attending: Dr. Rodney Hill     Vascular surgery consulted for finding of right groin complex pseudoaneurysm. HPI reviewed as below, patient seen at bedside. Underwent LHC on 12/15 where THI x 2 placed in RCA. Right groin access site developed hematoma. In setting of dropping hemoglobin, ultrasound was performed with findings of either a single right groin pseudoaneurysm with dilatation of a portion of the neck or 2 adjacent pseudoaneurysms. Hemoglobin stable at today. Plan for another heart cath tomorrow,  for severe LAD lesion.       HPI:  71 y/o M w/ a PMHx of COPD (noncompliant w/ CPAP), HTN, type 2 DM, afib (on Coumadin), and seizure disorder who presented unresponsive, hypoxic (SpO2 70s), and agonally breathing. Pt was subsequently intubated. Shortly after intubation, pt suffered from a brief PEA arrest. Pt received epi x2, bicarb, calcium, and mag. ROSC was achieved, found to be in v tach w/ a pulse. Pt was cardioverted at 100J, 200J x2, and 300J w/ conversion to sinus rhythm. Amio bolus and infusion initiated. Initially hypertensive (SBP 200s), subsequently became hypotensive and Levophed infusion was started. Labs significant for leukocytosis (23K), hyperkalemia (K 5.8), uremia (BUN 45), and hyperglycemia. ABG revealing of a respiratory acidosis. CXR w/ small bibasilar infiltrates. S/p Zosyn and Vanco in ED. CT head negative for acute intracranial event. Admit to MICU for continuation of care.    Of note, pt was admitted to ICU on  w/ acute hypercapnic respiratory failure requiring BiPAP. Discharged from [facility] yesterday . (10 Dec 2020 16:52)        PAST MEDICAL HISTORY:  Seizure    Depression, unspecified depression type    Diabetes    Hypertension    Hyperlipidemia    Afib    COPD (chronic obstructive pulmonary disease)          PAST SURGICAL HISTORY:  Abdominal hernia    H/O carotid endarterectomy    No significant past surgical history          MEDICATIONS:  albuterol/ipratropium for Nebulization 3 milliLiter(s) Nebulizer every 6 hours  aluminum hydroxide/magnesium hydroxide/simethicone Suspension 30 milliLiter(s) Oral every 4 hours PRN  aMIOdarone    Tablet 400 milliGRAM(s) Oral every 12 hours  aMIOdarone    Tablet 200 milliGRAM(s) Oral daily  aspirin  chewable 81 milliGRAM(s) Oral daily  atorvastatin 40 milliGRAM(s) Oral at bedtime  chlorhexidine 2% Cloths 1 Application(s) Topical <User Schedule>  citalopram 40 milliGRAM(s) Oral daily  clopidogrel Tablet 75 milliGRAM(s) Oral daily  dextrose 40% Gel 15 Gram(s) Oral once  dextrose 5%. 1000 milliLiter(s) IV Continuous <Continuous>  dextrose 5%. 1000 milliLiter(s) IV Continuous <Continuous>  dextrose 50% Injectable 25 Gram(s) IV Push once  dextrose 50% Injectable 12.5 Gram(s) IV Push once  dextrose 50% Injectable 25 Gram(s) IV Push once  digoxin     Tablet 0.125 milliGRAM(s) Oral daily  diltiazem    Tablet 60 milliGRAM(s) Oral four times a day  famotidine    Tablet 20 milliGRAM(s) Oral two times a day  furosemide   Injectable 40 milliGRAM(s) IV Push daily  glucagon  Injectable 1 milliGRAM(s) IntraMuscular once  influenza   Vaccine 0.5 milliLiter(s) IntraMuscular once  insulin glargine Injectable (LANTUS) 25 Unit(s) SubCutaneous two times a day  insulin lispro (ADMELOG) corrective regimen sliding scale   SubCutaneous Before meals and at bedtime  insulin lispro Injectable (ADMELOG) 9 Unit(s) SubCutaneous three times a day with meals  levETIRAcetam  IVPB 500 milliGRAM(s) IV Intermittent every 12 hours  methylPREDNISolone sodium succinate Injectable 40 milliGRAM(s) IV Push every 12 hours  metoprolol tartrate 25 milliGRAM(s) Oral two times a day  polyethylene glycol 3350 17 Gram(s) Oral daily  saccharomyces boulardii 250 milliGRAM(s) Oral two times a day        ALLERGIES:  No Known Allergies        VITALS & I/Os:  Vital Signs Last 24 Hrs  T(C): 37 (17 Dec 2020 04:49), Max: 37 (17 Dec 2020 04:49)  T(F): 98.6 (17 Dec 2020 04:49), Max: 98.6 (17 Dec 2020 04:49)  HR: 92 (17 Dec 2020 20:22) (78 - 118)  BP: 150/75 (17 Dec 2020 18:04) (112/57 - 150/75)  BP(mean): --  RR: 18 (17 Dec 2020 18:04) (18 - 18)  SpO2: 94% (17 Dec 2020 20:22) (81% - 99%)    I&O's Summary    16 Dec 2020 07:01  -  17 Dec 2020 07:00  --------------------------------------------------------  IN: 58 mL / OUT: 853 mL / NET: -795 mL    17 Dec 2020 07:01  -  17 Dec 2020 22:33  --------------------------------------------------------  IN: 116 mL / OUT: 300 mL / NET: -184 mL          PHYSICAL EXAM:  General: No acute distress  Respiratory: Nonlabored  Cardiovascular: RRR  Abdominal: Soft, nondistended, nontender. No rebound or guarding. No organomegaly, no palpable mass.  Extremities: Warm  Vascular:  - RUE:  - LUE:  - RLE:  - LLE:       LABS:                        8.5    x     )-----------( x        ( 17 Dec 2020 18:51 )             27.3     12-17    140  |  89<L>  |  37.0<H>  ----------------------------<  246<H>  4.4   |  44.0<H>  |  0.97    Ca    8.5<L>      17 Dec 2020 09:42  Phos  3.3       Mg     2.0     17      Lactate:    PT/INR - ( 17 Dec 2020 09:42 )   PT: 21.1 sec;   INR: 1.87 ratio         PTT - ( 16 Dec 2020 04:48 )  PTT:27.6 sec        IMAGIN/17: Right Groin Ultrasound  Findings:    There is either a single right groin pseudoaneurysm measuring 1.6 x 1.5 x 1.4 cm with dilatation of a portion of the neck measuring up to 0.9 cm transverse or 2 adjacent pseudoaneurysms with the more proximal pseudoaneurysm measuring 0.9 x 0.7 cm and the more distal pseudoaneurysm measuring 1.6 x 1.5 x 1.4 cm.    There is 0.2 cm distance between the more proximal aspect of the pseudoaneurysm and common femoral artery and 0.4 cm between the more proximal portion of the pseudoaneurysm and distal aspect of the pseudoaneurysm. The neck between the proximal and distal aspect of the pseudoaneurysm measures 0.4 cm transverse.    Impression:    Right groin pseudoaneurysm as described above, either a single pseudoaneurysm with dilatation of a portion of the neck or 2 adjacent communicating pseudoaneurysms measuring up to 1.6 cm distally and 0.9 cm proximally.    The findings were discussed with DEMETRIA Pavon at 7:12 PM on 2020.    ALEX TILLMAN MD; Attending Radiologist  This document has been electronically signed. Dec 17 2020 7:13PM                                                                                               Vascular Surgery Consult    Consulting attending: Dr. Rodney Hill     Vascular surgery consulted for finding of right groin complex pseudoaneurysm. HPI reviewed as below, patient seen at bedside. Underwent LHC on 12/15 where THI x 2 placed in RCA. Right groin access site developed hematoma. In setting of dropping hemoglobin, ultrasound was performed with findings of either a single right groin pseudoaneurysm with dilatation of a portion of the neck or 2 adjacent pseudoaneurysms. Hemoglobin stable today. Plan for another heart cath tomorrow,  for severe LAD lesion. Patient is poor historian, but in no acute distress and following commands.       HPI:  69 y/o M w/ a PMHx of COPD (noncompliant w/ CPAP), HTN, type 2 DM, afib (on Coumadin), and seizure disorder who presented unresponsive, hypoxic (SpO2 70s), and agonally breathing. Pt was subsequently intubated. Shortly after intubation, pt suffered from a brief PEA arrest. Pt received epi x2, bicarb, calcium, and mag. ROSC was achieved, found to be in v tach w/ a pulse. Pt was cardioverted at 100J, 200J x2, and 300J w/ conversion to sinus rhythm. Amio bolus and infusion initiated. Initially hypertensive (SBP 200s), subsequently became hypotensive and Levophed infusion was started. Labs significant for leukocytosis (23K), hyperkalemia (K 5.8), uremia (BUN 45), and hyperglycemia. ABG revealing of a respiratory acidosis. CXR w/ small bibasilar infiltrates. S/p Zosyn and Vanco in ED. CT head negative for acute intracranial event. Admit to MICU for continuation of care.    Of note, pt was admitted to ICU on  w/ acute hypercapnic respiratory failure requiring BiPAP. Discharged from [facility] yesterday . (10 Dec 2020 16:52)        PAST MEDICAL HISTORY:  Seizure    Depression, unspecified depression type    Diabetes    Hypertension    Hyperlipidemia    Afib    COPD (chronic obstructive pulmonary disease)          PAST SURGICAL HISTORY:  Abdominal hernia    H/O carotid endarterectomy    No significant past surgical history          MEDICATIONS:  albuterol/ipratropium for Nebulization 3 milliLiter(s) Nebulizer every 6 hours  aluminum hydroxide/magnesium hydroxide/simethicone Suspension 30 milliLiter(s) Oral every 4 hours PRN  aMIOdarone    Tablet 400 milliGRAM(s) Oral every 12 hours  aMIOdarone    Tablet 200 milliGRAM(s) Oral daily  aspirin  chewable 81 milliGRAM(s) Oral daily  atorvastatin 40 milliGRAM(s) Oral at bedtime  chlorhexidine 2% Cloths 1 Application(s) Topical <User Schedule>  citalopram 40 milliGRAM(s) Oral daily  clopidogrel Tablet 75 milliGRAM(s) Oral daily  dextrose 40% Gel 15 Gram(s) Oral once  dextrose 5%. 1000 milliLiter(s) IV Continuous <Continuous>  dextrose 5%. 1000 milliLiter(s) IV Continuous <Continuous>  dextrose 50% Injectable 25 Gram(s) IV Push once  dextrose 50% Injectable 12.5 Gram(s) IV Push once  dextrose 50% Injectable 25 Gram(s) IV Push once  digoxin     Tablet 0.125 milliGRAM(s) Oral daily  diltiazem    Tablet 60 milliGRAM(s) Oral four times a day  famotidine    Tablet 20 milliGRAM(s) Oral two times a day  furosemide   Injectable 40 milliGRAM(s) IV Push daily  glucagon  Injectable 1 milliGRAM(s) IntraMuscular once  influenza   Vaccine 0.5 milliLiter(s) IntraMuscular once  insulin glargine Injectable (LANTUS) 25 Unit(s) SubCutaneous two times a day  insulin lispro (ADMELOG) corrective regimen sliding scale   SubCutaneous Before meals and at bedtime  insulin lispro Injectable (ADMELOG) 9 Unit(s) SubCutaneous three times a day with meals  levETIRAcetam  IVPB 500 milliGRAM(s) IV Intermittent every 12 hours  methylPREDNISolone sodium succinate Injectable 40 milliGRAM(s) IV Push every 12 hours  metoprolol tartrate 25 milliGRAM(s) Oral two times a day  polyethylene glycol 3350 17 Gram(s) Oral daily  saccharomyces boulardii 250 milliGRAM(s) Oral two times a day        ALLERGIES:  No Known Allergies        VITALS & I/Os:  Vital Signs Last 24 Hrs  T(C): 37 (17 Dec 2020 04:49), Max: 37 (17 Dec 2020 04:49)  T(F): 98.6 (17 Dec 2020 04:49), Max: 98.6 (17 Dec 2020 04:49)  HR: 92 (17 Dec 2020 20:22) (78 - 118)  BP: 150/75 (17 Dec 2020 18:04) (112/57 - 150/75)  BP(mean): --  RR: 18 (17 Dec 2020 18:04) (18 - 18)  SpO2: 94% (17 Dec 2020 20:22) (81% - 99%)    I&O's Summary    16 Dec 2020 07:01  -  17 Dec 2020 07:00  --------------------------------------------------------  IN: 58 mL / OUT: 853 mL / NET: -795 mL    17 Dec 2020 07:01  -  17 Dec 2020 22:33  --------------------------------------------------------  IN: 116 mL / OUT: 300 mL / NET: -184 mL          PHYSICAL EXAM:  General: No acute distress, following commands  Respiratory: Nonlabored, equal chest rise  Cardiovascular: RRR  Abdominal: Soft, nondistended, nontender. No rebound or guarding.   Extremities: Right groin ecchymosis down to medial thigh, Otherwise warm and well-perfused bilaterally.          LABS:                        8.5    x     )-----------( x        ( 17 Dec 2020 18:51 )             27.3     12-17    140  |  89<L>  |  37.0<H>  ----------------------------<  246<H>  4.4   |  44.0<H>  |  0.97    Ca    8.5<L>      17 Dec 2020 09:42  Phos  3.3     17  Mg     2.0           Lactate:    PT/INR - ( 17 Dec 2020 09:42 )   PT: 21.1 sec;   INR: 1.87 ratio         PTT - ( 16 Dec 2020 04:48 )  PTT:27.6 sec        IMAGIN/17: Right Groin Ultrasound  Findings:    There is either a single right groin pseudoaneurysm measuring 1.6 x 1.5 x 1.4 cm with dilatation of a portion of the neck measuring up to 0.9 cm transverse or 2 adjacent pseudoaneurysms with the more proximal pseudoaneurysm measuring 0.9 x 0.7 cm and the more distal pseudoaneurysm measuring 1.6 x 1.5 x 1.4 cm.    There is 0.2 cm distance between the more proximal aspect of the pseudoaneurysm and common femoral artery and 0.4 cm between the more proximal portion of the pseudoaneurysm and distal aspect of the pseudoaneurysm. The neck between the proximal and distal aspect of the pseudoaneurysm measures 0.4 cm transverse.    Impression:    Right groin pseudoaneurysm as described above, either a single pseudoaneurysm with dilatation of a portion of the neck or 2 adjacent communicating pseudoaneurysms measuring up to 1.6 cm distally and 0.9 cm proximally.    The findings were discussed with DEMETRIA Pavon at 7:12 PM on 2020.    ALEX TILLMAN MD; Attending Radiologist  This document has been electronically signed. Dec 17 2020 7:13PM

## 2020-12-17 NOTE — PROGRESS NOTE ADULT - SUBJECTIVE AND OBJECTIVE BOX
CLAUDIA MABRY  70y  Male    Interval/overnight events/pt complaints:  Patient seen and examined at bedside  Per patient no acute complaints at this time  currently satting 93% on 4LNC  tachycardic, otherwise VSS    MEDICATIONS  (STANDING):  albuterol/ipratropium for Nebulization 3 milliLiter(s) Nebulizer every 6 hours  aMIOdarone    Tablet 400 milliGRAM(s) Oral every 12 hours  aMIOdarone    Tablet 200 milliGRAM(s) Oral daily  aspirin  chewable 81 milliGRAM(s) Oral daily  atorvastatin 40 milliGRAM(s) Oral at bedtime  chlorhexidine 2% Cloths 1 Application(s) Topical <User Schedule>  citalopram 40 milliGRAM(s) Oral daily  clopidogrel Tablet 75 milliGRAM(s) Oral daily  dextrose 40% Gel 15 Gram(s) Oral once  dextrose 5%. 1000 milliLiter(s) (50 mL/Hr) IV Continuous <Continuous>  dextrose 5%. 1000 milliLiter(s) (100 mL/Hr) IV Continuous <Continuous>  dextrose 50% Injectable 25 Gram(s) IV Push once  dextrose 50% Injectable 12.5 Gram(s) IV Push once  dextrose 50% Injectable 25 Gram(s) IV Push once  digoxin     Tablet 0.125 milliGRAM(s) Oral daily  diltiazem    Tablet 60 milliGRAM(s) Oral four times a day  famotidine    Tablet 20 milliGRAM(s) Oral two times a day  furosemide   Injectable 40 milliGRAM(s) IV Push daily  glucagon  Injectable 1 milliGRAM(s) IntraMuscular once  influenza   Vaccine 0.5 milliLiter(s) IntraMuscular once  insulin glargine Injectable (LANTUS) 25 Unit(s) SubCutaneous two times a day  insulin lispro (ADMELOG) corrective regimen sliding scale   SubCutaneous Before meals and at bedtime  insulin lispro Injectable (ADMELOG) 9 Unit(s) SubCutaneous three times a day with meals  levETIRAcetam  IVPB 500 milliGRAM(s) IV Intermittent every 12 hours  methylPREDNISolone sodium succinate Injectable 40 milliGRAM(s) IV Push every 12 hours  metoprolol tartrate 25 milliGRAM(s) Oral two times a day  piperacillin/tazobactam IVPB.. 3.375 Gram(s) IV Intermittent every 8 hours  polyethylene glycol 3350 17 Gram(s) Oral daily  saccharomyces boulardii 250 milliGRAM(s) Oral two times a day    MEDICATIONS  (PRN):  aluminum hydroxide/magnesium hydroxide/simethicone Suspension 30 milliLiter(s) Oral every 4 hours PRN Dyspepsia    AllergiesNo Known Allergies      Vital Signs Last 24 Hrs  T(C): 37 (17 Dec 2020 04:49), Max: 37.2 (16 Dec 2020 22:06)  T(F): 98.6 (17 Dec 2020 04:49), Max: 98.9 (16 Dec 2020 22:06)  HR: 100 (17 Dec 2020 14:09) (78 - 118)  BP: 142/82 (17 Dec 2020 13:46) (112/57 - 142/82)  BP(mean): 94 (16 Dec 2020 20:00) (91 - 94)  RR: 18 (17 Dec 2020 13:46) (17 - 29)  SpO2: 92% (17 Dec 2020 14:09) (81% - 99%)  I&O's Summary    16 Dec 2020 07:01  -  17 Dec 2020 07:00  --------------------------------------------------------  IN: 58 mL / OUT: 853 mL / NET: -795 mL    17 Dec 2020 07:01  -  17 Dec 2020 15:06  --------------------------------------------------------  IN: 116 mL / OUT: 300 mL / NET: -184 mL        PHYSICAL EXAM:  GENERAL: NAD, nontoxic appearing  HEENT - EOMI, pupils equal and reactive to light;  Moist mucous membranes, Good dentition, No lesions  NECK: Supple, No JVD, no adenopathy  CHEST/LUNG: Clear to auscultation bilaterally; No rales, rhonchi, wheezing  HEART: Regular rate and rhythm; No murmurs, rubs, or gallops  ABDOMEN: Soft, Nontender, Nondistended; Bowel sounds present  EXTREMITIES: 2+ Peripheral Pulses, +LE edema  NEURO:  No Focal deficits, sensory and motor intact  SKIN: Large R groin hematoma with ecchymosis; Warm and dry    CAPILLARY BLOOD GLUCOSE      POCT Blood Glucose.: 324 mg/dL (17 Dec 2020 12:56)  POCT Blood Glucose.: 274 mg/dL (17 Dec 2020 08:46)  POCT Blood Glucose.: 378 mg/dL (16 Dec 2020 21:17)  POCT Blood Glucose.: 411 mg/dL (16 Dec 2020 18:07)  POCT Blood Glucose.: 287 mg/dL (16 Dec 2020 16:39)      LABS    (12-17 @ 09:42)                      7.9  22.06 )-----------( 188                 25.2    12-17    140  |  89<L>  |  37.0<H>  ----------------------------<  246<H>  4.4   |  44.0<H>  |  0.97    Ca    8.5<L>      17 Dec 2020 09:42  Phos  3.3     12-17  Mg     2.0     12-17    TPro  5.8<L>  /  Alb  3.1<L>  /  TBili  0.3<L>  /  DBili  x   /  AST  34  /  ALT  52<H>  /  AlkPhos  109  12-15    ( 17 Dec 2020 09:42 )   PT: 21.1 sec;   INR: 1.87 ratio; PTT:27.6 sec    IMAGING reviewed    Consultant(s) Notes Reviewed:  [x ] YES  [ ] NO  Care Discussed with Consultants/Other Providers [ x] YES  [ ] NO

## 2020-12-17 NOTE — CONSULT NOTE ADULT - ASSESSMENT
69 y/o M w/ a PMHx of COPD, HTN, type 2 DM, afib (on Coumadin), and seizure disorder who presented unresponsive, hypoxic, and agonally breathing. Pt was subsequently intubated, and shortly after, suffered from a brief PEA arrest and later achieved ROSC. Underwent LHC on 12/15 where 2 THI were placed in RCA, after found with hematoma at right groin. Further evaluation revealed complex pseudoaneurysm at access site. Patient with stable Hgb and no urgent need for intervention at this time.     Right Groin Pseudoaneurysm  - No urgent intervention indicated at this time.  - Monitor H/H  - Plan for another heart cath with cards tomorrow.   - Can consider thrombin injection with IR  - Will continue to follow     Plan discussed with Dr. Hill who agrees.  71 y/o M w/ a PMHx of COPD, HTN, type 2 DM, afib (on Coumadin), and seizure disorder who presented unresponsive, hypoxic, and agonally breathing. Pt was subsequently intubated, and shortly after, suffered from a brief PEA arrest and later achieved ROSC. Underwent LHC on 12/15 where 2 THI were placed in RCA, after found with hematoma at right groin. Further evaluation revealed complex pseudoaneurysm at access site. Patient with stable Hgb and no urgent need for intervention at this time.     Right Groin Pseudoaneurysm  - No urgent intervention indicated at this time.  - Monitor H/H  - Plan for another heart cath with cards tomorrow.   - Can consider thrombin injection per IR  - Will continue to follow     Plan discussed with Dr. Hill who agrees.

## 2020-12-17 NOTE — PROGRESS NOTE ADULT - SUBJECTIVE AND OBJECTIVE BOX
[facility]                              [address] [address]                                                 PHONE: [phone]                                                 FAX: [phone]  -----------------------------------------------------------------------------------------------  Pt seen and examined. FU for  CAD    Overnight events/Complaints: Pt on nasal O2. Hematoma stable. Extensive skin bruising. Does desat off O2. Now on 5L.    Vital Signs Last 24 Hrs  T(C): 37 (17 Dec 2020 04:49), Max: 37.2 (16 Dec 2020 22:06)  T(F): 98.6 (17 Dec 2020 04:49), Max: 98.9 (16 Dec 2020 22:06)  HR: 86 (17 Dec 2020 10:50) (78 - 123)  BP: 112/57 (17 Dec 2020 10:50) (112/57 - 134/81)  BP(mean): 94 (16 Dec 2020 20:00) (91 - 94)  RR: 18 (17 Dec 2020 10:50) (17 - 29)  SpO2: 93% (17 Dec 2020 10:50) (88% - 99%)  I&O's Summary    16 Dec 2020 07:01  -  17 Dec 2020 07:00  --------------------------------------------------------  IN: 58 mL / OUT: 853 mL / NET: -795 mL      PHYSICAL EXAM:  Constitutional: Obese male on NC  HEENT:     Head: Normocephalic and atraumatic  Neck: supple. No JVD  Cardiovascular: regular S1 S2  Respiratory: Lungs clear to auscultation; exp wheeze  Gastrointestinal:  Soft, Non-tender, + BS	  Musculoskeletal: Normal range of motion. + edema  Skin: Warm and dry. No cyanosis . No diaphoresis.  Neurologic: Alert oriented to time place and person. Normal strength and no tremor.        LABS:                        7.9    22.06 )-----------( 188      ( 17 Dec 2020 09:42 )             25.2     12-17    140  |  89<L>  |  37.0<H>  ----------------------------<  246<H>  4.4   |  44.0<H>  |  0.97    Ca    8.5<L>      17 Dec 2020 09:42  Phos  3.3     12-17  Mg     2.0     12-17    TPro  5.8<L>  /  Alb  3.1<L>  /  TBili  0.3<L>  /  DBili  x   /  AST  34  /  ALT  52<H>  /  AlkPhos  109  12-15        PT/INR - ( 17 Dec 2020 09:42 )   PT: 21.1 sec;   INR: 1.87 ratio         PTT - ( 16 Dec 2020 04:48 )  PTT:27.6 sec    RADIOLOGY & ADDITIONAL STUDIES: (reviewed)  CXR was independently visualized/reviewed  and demonstrated: small basilar infiltrates    CARDIOLOGY TESTING:(reviewed)     ECHOCARDIOGRAM independently visualized/reviewed and demonstrated :    1. Technically difficult study.   2. Endocardial visualization was enhanced with intravenous echo contrast.   3. Left ventricular ejection fraction, by visual estimation, is 60 to 65%.   4. Normal global left ventricular systolic function.   5. The left ventricular diastolic function could not be assessed in this study.   6. Normal left ventricular internal cavity size.   7. Moderately enlarged right ventricle. Moderately reduced RV systolic function.   8. The left atrium is normal in size.   9. Moderately enlarged right atrium.  10. Mild thickening and calcification of the anterior and posterior mitral valve leaflets.  11. Mild mitral valve regurgitation.  12. Mild tricuspid regurgitation.  13. There is no evidence of pericardial effusion.    CARDIAC CATH:  CORONARY VESSELS: The coronary circulation is right dominant.  LM:   --  LM: Normal.  LAD:   --  Mid LAD: There was a 80 % stenosis.  CX:   --  Circumflex: Angiography showed minor luminal irregularities with  no flow limiting lesions. The vessel arose anomalously from the proximal  RCA.  RCA:   --  RCA: iFR index of 0.82  --  Mid RCA: There was a 80 % stenosis.  --  Distal RCA: There was a 90 % stenosis.  COMPLICATIONS: There were no complications. No complications occurred  during the cath lab visit.  DIAGNOSTIC IMPRESSIONS: Severe RCA and LAD disease. The RCA was treated  with PTCA and STENT (THI-Resolute) with IVUS  DIAGNOSTIC RECOMMENDATIONS: Plavix and coumadin Will need PCI of LAD in  future  INTERVENTIONAL IMPRESSIONS: Severe RCA and LAD disease. The RCA was treated  with PTCA and STENT (THI-Resolute) with IVUS  INTERVENTIONAL RECOMMENDATIONS: Plavix and coumadin Will need PCI of LAD in  future    TELEMETRY independently visualized/reviewed and demonstrated : AF 70-80    MEDICATIONS:(reviewed)  MEDICATIONS  (STANDING):  albuterol/ipratropium for Nebulization 3 milliLiter(s) Nebulizer every 6 hours  aMIOdarone    Tablet 400 milliGRAM(s) Oral every 12 hours  aMIOdarone    Tablet 200 milliGRAM(s) Oral daily  aspirin  chewable 81 milliGRAM(s) Oral daily  atorvastatin 40 milliGRAM(s) Oral at bedtime  chlorhexidine 2% Cloths 1 Application(s) Topical <User Schedule>  citalopram 40 milliGRAM(s) Oral daily  clopidogrel Tablet 75 milliGRAM(s) Oral daily  dextrose 40% Gel 15 Gram(s) Oral once  dextrose 5%. 1000 milliLiter(s) (100 mL/Hr) IV Continuous <Continuous>  dextrose 5%. 1000 milliLiter(s) (50 mL/Hr) IV Continuous <Continuous>  dextrose 50% Injectable 25 Gram(s) IV Push once  dextrose 50% Injectable 12.5 Gram(s) IV Push once  dextrose 50% Injectable 25 Gram(s) IV Push once  digoxin     Tablet 0.125 milliGRAM(s) Oral daily  diltiazem    Tablet 60 milliGRAM(s) Oral four times a day  enoxaparin Injectable 40 milliGRAM(s) SubCutaneous daily  famotidine    Tablet 20 milliGRAM(s) Oral two times a day  furosemide   Injectable 40 milliGRAM(s) IV Push daily  glucagon  Injectable 1 milliGRAM(s) IntraMuscular once  influenza   Vaccine 0.5 milliLiter(s) IntraMuscular once  insulin glargine Injectable (LANTUS) 25 Unit(s) SubCutaneous two times a day  insulin lispro (ADMELOG) corrective regimen sliding scale   SubCutaneous Before meals and at bedtime  insulin lispro Injectable (ADMELOG) 9 Unit(s) SubCutaneous three times a day with meals  levETIRAcetam  IVPB 500 milliGRAM(s) IV Intermittent every 12 hours  methylPREDNISolone sodium succinate Injectable 40 milliGRAM(s) IV Push every 12 hours  metoprolol tartrate 25 milliGRAM(s) Oral two times a day  piperacillin/tazobactam IVPB.. 3.375 Gram(s) IV Intermittent every 8 hours  polyethylene glycol 3350 17 Gram(s) Oral daily  saccharomyces boulardii 250 milliGRAM(s) Oral two times a day

## 2020-12-17 NOTE — PROGRESS NOTE ADULT - ASSESSMENT
70y Male with past medical history significant for COPD, HTN, DM, pAF on coumadin, seizure d/o with recent hospitalization for hypercapnic respiratory failure presents with HTN emergency and subsequent PEA/VT arrest.  s/p cath with Severe RCA and LAD disease. The RCA was treated with PTCA and STENT (THI-Resolute) with IVUS with residual LAD lesion    CAD  - Continue ASA with Plavix  - Plan for intervention on LAD on Friday. Will need to be done with anesthesia   - Will have EP evaluation for AICD    HTN  - BP controlled    Dyslipidemia  - Continue statin    AF  - Rates controlled with diltiazem  - Hold coumadin at this time    Blood loss anemia  - Hold Lovenox at this time given groin hematoma and anemia   - Monitor H/H closely    Shortness of breath, Hypoxia on high flow  - Likely multifactorial including CAD, AF, ELISSA-OHS  - LVEDP was elevated on cath. On lasix now  - Use of diuretics with worsening alkalosis but will continue given edema     VT  - On amiodarone 200 mg daily    s/w son Samuel over the phone and updated him on plan.

## 2020-12-18 LAB
ANION GAP SERPL CALC-SCNC: 8 MMOL/L — SIGNIFICANT CHANGE UP (ref 5–17)
BASE EXCESS BLDA CALC-SCNC: 23 MMOL/L — HIGH (ref -2–2)
BLD GP AB SCN SERPL QL: SIGNIFICANT CHANGE UP
BLOOD GAS COMMENTS ARTERIAL: SIGNIFICANT CHANGE UP
BUN SERPL-MCNC: 39 MG/DL — HIGH (ref 8–20)
CALCIUM SERPL-MCNC: 8.5 MG/DL — LOW (ref 8.6–10.2)
CHLORIDE SERPL-SCNC: 91 MMOL/L — LOW (ref 98–107)
CO2 SERPL-SCNC: 46 MMOL/L — CRITICAL HIGH (ref 22–29)
CREAT SERPL-MCNC: 0.89 MG/DL — SIGNIFICANT CHANGE UP (ref 0.5–1.3)
GAS PNL BLDA: SIGNIFICANT CHANGE UP
GLUCOSE BLDC GLUCOMTR-MCNC: 130 MG/DL — HIGH (ref 70–99)
GLUCOSE BLDC GLUCOMTR-MCNC: 213 MG/DL — HIGH (ref 70–99)
GLUCOSE BLDC GLUCOMTR-MCNC: 248 MG/DL — HIGH (ref 70–99)
GLUCOSE BLDC GLUCOMTR-MCNC: 282 MG/DL — HIGH (ref 70–99)
GLUCOSE SERPL-MCNC: 104 MG/DL — HIGH (ref 70–99)
HCO3 BLDA-SCNC: 48 MMOL/L — HIGH (ref 20–26)
HCT VFR BLD CALC: 24.8 % — LOW (ref 39–50)
HGB BLD-MCNC: 7.8 G/DL — LOW (ref 13–17)
HOROWITZ INDEX BLDA+IHG-RTO: 6 — SIGNIFICANT CHANGE UP
MCHC RBC-ENTMCNC: 27.6 PG — SIGNIFICANT CHANGE UP (ref 27–34)
MCHC RBC-ENTMCNC: 31.5 GM/DL — LOW (ref 32–36)
MCV RBC AUTO: 87.6 FL — SIGNIFICANT CHANGE UP (ref 80–100)
PCO2 BLDA: 89 MMHG — CRITICAL HIGH (ref 35–45)
PH BLDA: 7.37 — SIGNIFICANT CHANGE UP (ref 7.35–7.45)
PLATELET # BLD AUTO: 210 K/UL — SIGNIFICANT CHANGE UP (ref 150–400)
PO2 BLDA: 110 MMHG — HIGH (ref 83–108)
POTASSIUM SERPL-MCNC: 4.1 MMOL/L — SIGNIFICANT CHANGE UP (ref 3.5–5.3)
POTASSIUM SERPL-SCNC: 4.1 MMOL/L — SIGNIFICANT CHANGE UP (ref 3.5–5.3)
RBC # BLD: 2.83 M/UL — LOW (ref 4.2–5.8)
RBC # FLD: 14.6 % — HIGH (ref 10.3–14.5)
SAO2 % BLDA: 99 % — SIGNIFICANT CHANGE UP (ref 95–99)
SODIUM SERPL-SCNC: 145 MMOL/L — SIGNIFICANT CHANGE UP (ref 135–145)
WBC # BLD: 20.59 K/UL — HIGH (ref 3.8–10.5)
WBC # FLD AUTO: 20.59 K/UL — HIGH (ref 3.8–10.5)

## 2020-12-18 PROCEDURE — 93926 LOWER EXTREMITY STUDY: CPT | Mod: 26,RT

## 2020-12-18 PROCEDURE — 36002 PSEUDOANEURYSM INJECTION TRT: CPT | Mod: RT

## 2020-12-18 PROCEDURE — 99231 SBSQ HOSP IP/OBS SF/LOW 25: CPT

## 2020-12-18 PROCEDURE — 76942 ECHO GUIDE FOR BIOPSY: CPT | Mod: 26

## 2020-12-18 PROCEDURE — 99232 SBSQ HOSP IP/OBS MODERATE 35: CPT

## 2020-12-18 PROCEDURE — 99222 1ST HOSP IP/OBS MODERATE 55: CPT

## 2020-12-18 RX ORDER — FUROSEMIDE 40 MG
40 TABLET ORAL ONCE
Refills: 0 | Status: COMPLETED | OUTPATIENT
Start: 2020-12-18 | End: 2020-12-18

## 2020-12-18 RX ORDER — INSULIN GLARGINE 100 [IU]/ML
12 INJECTION, SOLUTION SUBCUTANEOUS ONCE
Refills: 0 | Status: COMPLETED | OUTPATIENT
Start: 2020-12-18 | End: 2020-12-18

## 2020-12-18 RX ORDER — ACETAZOLAMIDE 250 MG/1
250 TABLET ORAL DAILY
Refills: 0 | Status: DISCONTINUED | OUTPATIENT
Start: 2020-12-18 | End: 2020-12-21

## 2020-12-18 RX ADMIN — Medication 250 MILLIGRAM(S): at 05:36

## 2020-12-18 RX ADMIN — Medication 40 MILLIGRAM(S): at 05:35

## 2020-12-18 RX ADMIN — Medication 60 MILLIGRAM(S): at 17:04

## 2020-12-18 RX ADMIN — Medication 40 MILLIGRAM(S): at 05:36

## 2020-12-18 RX ADMIN — Medication 81 MILLIGRAM(S): at 11:52

## 2020-12-18 RX ADMIN — CLOPIDOGREL BISULFATE 75 MILLIGRAM(S): 75 TABLET, FILM COATED ORAL at 11:52

## 2020-12-18 RX ADMIN — Medication 4: at 11:53

## 2020-12-18 RX ADMIN — Medication 60 MILLIGRAM(S): at 11:53

## 2020-12-18 RX ADMIN — FAMOTIDINE 20 MILLIGRAM(S): 10 INJECTION INTRAVENOUS at 05:36

## 2020-12-18 RX ADMIN — Medication 60 MILLIGRAM(S): at 23:00

## 2020-12-18 RX ADMIN — AMIODARONE HYDROCHLORIDE 400 MILLIGRAM(S): 400 TABLET ORAL at 10:05

## 2020-12-18 RX ADMIN — CITALOPRAM 40 MILLIGRAM(S): 10 TABLET, FILM COATED ORAL at 11:52

## 2020-12-18 RX ADMIN — LEVETIRACETAM 420 MILLIGRAM(S): 250 TABLET, FILM COATED ORAL at 05:35

## 2020-12-18 RX ADMIN — INSULIN GLARGINE 25 UNIT(S): 100 INJECTION, SOLUTION SUBCUTANEOUS at 22:58

## 2020-12-18 RX ADMIN — Medication 3 MILLILITER(S): at 09:03

## 2020-12-18 RX ADMIN — AMIODARONE HYDROCHLORIDE 200 MILLIGRAM(S): 400 TABLET ORAL at 05:35

## 2020-12-18 RX ADMIN — Medication 0.12 MILLIGRAM(S): at 05:35

## 2020-12-18 RX ADMIN — Medication 60 MILLIGRAM(S): at 05:35

## 2020-12-18 RX ADMIN — Medication 4: at 22:58

## 2020-12-18 RX ADMIN — Medication 40 MILLIGRAM(S): at 20:13

## 2020-12-18 RX ADMIN — Medication 250 MILLIGRAM(S): at 17:04

## 2020-12-18 RX ADMIN — Medication 3 MILLILITER(S): at 20:29

## 2020-12-18 RX ADMIN — ATORVASTATIN CALCIUM 40 MILLIGRAM(S): 80 TABLET, FILM COATED ORAL at 22:05

## 2020-12-18 RX ADMIN — POLYETHYLENE GLYCOL 3350 17 GRAM(S): 17 POWDER, FOR SOLUTION ORAL at 11:52

## 2020-12-18 RX ADMIN — Medication 25 MILLIGRAM(S): at 17:04

## 2020-12-18 RX ADMIN — INSULIN GLARGINE 12 UNIT(S): 100 INJECTION, SOLUTION SUBCUTANEOUS at 08:44

## 2020-12-18 RX ADMIN — Medication 9 UNIT(S): at 11:53

## 2020-12-18 RX ADMIN — AMIODARONE HYDROCHLORIDE 400 MILLIGRAM(S): 400 TABLET ORAL at 22:05

## 2020-12-18 RX ADMIN — Medication 3 MILLILITER(S): at 03:02

## 2020-12-18 RX ADMIN — Medication 40 MILLIGRAM(S): at 17:04

## 2020-12-18 RX ADMIN — CHLORHEXIDINE GLUCONATE 1 APPLICATION(S): 213 SOLUTION TOPICAL at 05:36

## 2020-12-18 RX ADMIN — Medication 25 MILLIGRAM(S): at 05:36

## 2020-12-18 RX ADMIN — FAMOTIDINE 20 MILLIGRAM(S): 10 INJECTION INTRAVENOUS at 17:04

## 2020-12-18 RX ADMIN — Medication 6: at 16:50

## 2020-12-18 RX ADMIN — Medication 9 UNIT(S): at 16:50

## 2020-12-18 RX ADMIN — LEVETIRACETAM 420 MILLIGRAM(S): 250 TABLET, FILM COATED ORAL at 17:03

## 2020-12-18 RX ADMIN — ACETAZOLAMIDE 250 MILLIGRAM(S): 250 TABLET ORAL at 17:04

## 2020-12-18 NOTE — PROGRESS NOTE ADULT - SUBJECTIVE AND OBJECTIVE BOX
Patient is a 70y old  Male who presents with a chief complaint of Acute hypoxic/hypercapnic respiratory failure, cardiac arrest (17 Dec 2020 21:31)  S/P cardiac cath with THI to RCA c/b R groin pseudoaneurysm and hematoma  Pt without complaints of R groin or leg pain. On BiPAP overnight which he uses at home. Denies any CP, numbness or weakness to RLE.    Allergies  No Known Allergies    MEDICATIONS  (STANDING):  albuterol/ipratropium for Nebulization 3 milliLiter(s) Nebulizer every 6 hours  aMIOdarone    Tablet 400 milliGRAM(s) Oral every 12 hours  aMIOdarone    Tablet 200 milliGRAM(s) Oral daily  aspirin  chewable 81 milliGRAM(s) Oral daily  atorvastatin 40 milliGRAM(s) Oral at bedtime  chlorhexidine 2% Cloths 1 Application(s) Topical <User Schedule>  citalopram 40 milliGRAM(s) Oral daily  clopidogrel Tablet 75 milliGRAM(s) Oral daily  dextrose 40% Gel 15 Gram(s) Oral once  dextrose 5%. 1000 milliLiter(s) (50 mL/Hr) IV Continuous <Continuous>  dextrose 5%. 1000 milliLiter(s) (100 mL/Hr) IV Continuous <Continuous>  dextrose 50% Injectable 25 Gram(s) IV Push once  dextrose 50% Injectable 12.5 Gram(s) IV Push once  dextrose 50% Injectable 25 Gram(s) IV Push once  digoxin     Tablet 0.125 milliGRAM(s) Oral daily  diltiazem    Tablet 60 milliGRAM(s) Oral four times a day  famotidine    Tablet 20 milliGRAM(s) Oral two times a day  furosemide   Injectable 40 milliGRAM(s) IV Push daily  glucagon  Injectable 1 milliGRAM(s) IntraMuscular once  influenza   Vaccine 0.5 milliLiter(s) IntraMuscular once  insulin glargine Injectable (LANTUS) 25 Unit(s) SubCutaneous two times a day  insulin glargine Injectable (LANTUS) 12 Unit(s) SubCutaneous once  insulin lispro (ADMELOG) corrective regimen sliding scale   SubCutaneous Before meals and at bedtime  insulin lispro Injectable (ADMELOG) 9 Unit(s) SubCutaneous three times a day with meals  levETIRAcetam  IVPB 500 milliGRAM(s) IV Intermittent every 12 hours  methylPREDNISolone sodium succinate Injectable 40 milliGRAM(s) IV Push every 12 hours  metoprolol tartrate 25 milliGRAM(s) Oral two times a day  polyethylene glycol 3350 17 Gram(s) Oral daily  saccharomyces boulardii 250 milliGRAM(s) Oral two times a day    MEDICATIONS  (PRN):  aluminum hydroxide/magnesium hydroxide/simethicone Suspension 30 milliLiter(s) Oral every 4 hours PRN Dyspepsia          Vital Signs Last 24 Hrs  T(C): 37.3 (18 Dec 2020 04:59), Max: 37.3 (18 Dec 2020 04:59)  T(F): 99.1 (18 Dec 2020 04:59), Max: 99.1 (18 Dec 2020 04:59)  HR: 75 (18 Dec 2020 04:59) (75 - 118)  BP: 116/79 (18 Dec 2020 04:59) (112/57 - 150/75)  BP(mean): --  RR: 18 (18 Dec 2020 04:59) (18 - 18)  SpO2: 96% (18 Dec 2020 04:59) (81% - 96%)  I&O's Detail    17 Dec 2020 07:01  -  18 Dec 2020 07:00  --------------------------------------------------------  IN:    IV PiggyBack: 100 mL    IV PiggyBack: 200 mL    Oral Fluid: 116 mL  Total IN: 416 mL    OUT:    Voided (mL): 800 mL  Total OUT: 800 mL    Total NET: -384 mL      Physical Exam:  General: NAD  Pulmonary: On BiPAP  Extremities: R groin without hematoma. Ecchymosis extends from groin access site to ant thigh. Soft. BLE with 1-2+ edema, WWP. + DP signal R      LABS:                        8.5    x     )-----------( x        ( 17 Dec 2020 18:51 )             27.3     12-17    140  |  89<L>  |  37.0<H>  ----------------------------<  246<H>  4.4   |  44.0<H>  |  0.97    Ca    8.5<L>      17 Dec 2020 09:42  Phos  3.3     12-17  Mg     2.0     12-17      PT/INR - ( 17 Dec 2020 09:42 )   PT: 21.1 sec;   INR: 1.87 ratio           CAPILLARY BLOOD GLUCOSE  POCT Blood Glucose.: 130 mg/dL (18 Dec 2020 07:48)  POCT Blood Glucose.: 222 mg/dL (17 Dec 2020 22:47)  POCT Blood Glucose.: 221 mg/dL (17 Dec 2020 17:50)  POCT Blood Glucose.: 324 mg/dL (17 Dec 2020 12:56)  POCT Blood Glucose.: 274 mg/dL (17 Dec 2020 08:46)    Radiology and Additional Studies:  < from: US Duplex Arterial Lower Ext Ltd, Right (12.17.20 @ 18:01) >  EXAM:  US DPLX LWR EXT ARTS LTD RT                          PROCEDURE DATE:  12/17/2020          INTERPRETATION:  US LOWER EXTREMITY ARTERIAL DUPLEX LIMITED RIGHT    History: Right groin hematoma status post catheterization last week. Dropping hemoglobin.    Technique: Ultrasound of the right groin was performed utilizing grayscale, color, and spectral Doppler.    Comparison:  Right lower extremity CT 12/17/2020    Findings:    There is either a single right groin pseudoaneurysm measuring 1.6 x 1.5 x 1.4 cm with dilatation of a portion of the neck measuring up to 0.9 cm transverse or 2 adjacent pseudoaneurysms with the more proximal pseudoaneurysm measuring 0.9 x 0.7 cm and the more distal pseudoaneurysm measuring 1.6 x 1.5 x 1.4 cm.    There is 0.2 cm distance between the more proximal aspect of the pseudoaneurysm and common femoral artery and 0.4 cm between the more proximal portion of the pseudoaneurysm and distal aspect of the pseudoaneurysm. The neck between the proximal and distal aspect of the pseudoaneurysm measures 0.4 cm transverse.    Impression:    Right groin pseudoaneurysm as described above, either a single pseudoaneurysm with dilatation of a portion of the neck or 2 adjacent communicating pseudoaneurysms measuring up to 1.6 cm distally and 0.9 cm proximally.    < end of copied text >    Assessment and Plan: 70y M Acute on chronic respiratory failure with hypoxia  S/P cardiac cath and THI to RCA  R groin pseudoaneurysm post procedure  H/H stable  Will need intervention to LAD as per cardiology  Currently on DAPT and will need AC in future for AF  No acute vascular surgical intervention at this time.  US reviewed with Dr Sergio Murphy to monitor H/H  Cont vasc checks  Reconsult as needed

## 2020-12-18 NOTE — PROGRESS NOTE ADULT - SUBJECTIVE AND OBJECTIVE BOX
IR PRE-PROCEDURE NOTE  DIAGNOSIS: right groin PSA  PROCEDURE: thrombin injectino  RECENT CHANGES: None  PERTINENT LABS: Within acceptable limits.  IMAGING: Reviewed  CONSENT: On chart

## 2020-12-18 NOTE — PROCEDURE NOTE - PROCEDURE FINDINGS AND DETAILS
200 U thrombin injected under US into right CFA pseudoaneurysm.      good flow pre and post in the right lower extremity arteries

## 2020-12-18 NOTE — PROGRESS NOTE ADULT - SUBJECTIVE AND OBJECTIVE BOX
CC: Acute hypoxic/hypercapnic respiratory failure, cardiac arrest (18 Dec 2020 13:54)    HPI:  70 year old male with PMH HTN, Dyslipidemia, T2DM, COPD on home O2 3L, Chronic AF on Coumadin and seizure disordered was admitted for acute on chronic respiratory failure from 12/1-12/9/2020 and was discharged home. Brought in the following day unresponsive, hypoxic (SpO2 70s), and agonally breathing. Subsequently intubated and developed brief PEA arrest which was treated with epi x2, bicarb, calcium, and mag. ROSC was achieved, found to be in v tach w/ a pulse. Pt was cardioverted at 100J, 200J x2, and 300J w/ conversion to sinus rhythm. Amio bolus and infusion initiated. Initially hypertensive (SBP 200s), subsequently became hypotensive and Levophed infusion was started. Labs significant for leukocytosis (23K), hyperkalemia (K 5.8), uremia (BUN 45), and hyperglycemia. ABG revealing of a respiratory acidosis. CXR w/ small bibasilar infiltrates. S/p Zosyn and Vanco in ED. CT head negative for acute intracranial event. He was admitted to MICU; subsequently extubated and downgraded to medical service.    INTERVAL HPI/OVERNIGHT EVENTS: Patient seen and examined lying in bed.  Patient complaining of SILVEIRA and right groin soreness.  Patient denies any headache, dizziness, CP, abdominal pain, nausea, vomiting, dysuria.  Other ROS reviewed and are negative.    Vital Signs Last 24 Hrs  T(C): 36.7 (18 Dec 2020 10:00), Max: 37.3 (18 Dec 2020 04:59)  T(F): 98 (18 Dec 2020 10:00), Max: 99.1 (18 Dec 2020 04:59)  HR: 90 (18 Dec 2020 10:35) (73 - 102)  BP: 114/68 (18 Dec 2020 10:00) (114/68 - 150/75)  BP(mean): --  RR: 18 (18 Dec 2020 10:00) (18 - 18)  SpO2: 95% (18 Dec 2020 10:00) (92% - 96%)  I&O's Detail    17 Dec 2020 07:01  -  18 Dec 2020 07:00  --------------------------------------------------------  IN:    IV PiggyBack: 100 mL    IV PiggyBack: 200 mL    Oral Fluid: 116 mL  Total IN: 416 mL    OUT:    Voided (mL): 800 mL  Total OUT: 800 mL    Total NET: -384 mL    PHYSICAL EXAM:  GENERAL: NAD  HEAD:  Atraumatic, Normocephalic  NECK: Supple, No JVD, Normal thyroid  NERVOUS SYSTEM:  Alert & Oriented X3, Good concentration; Motor Strength 5/5 B/L upper and lower extremities  CHEST/LUNG: Bibasilar crackles  HEART: Regular rate and rhythm; No murmurs, rubs, or gallops  ABDOMEN: Soft, Nontender, Nondistended; Bowel sounds present  EXTREMITIES:  2+ Peripheral Pulses, Right groin with ecchymosis and tenderness; LE Edema                                  7.8    20.59 )-----------( 210      ( 18 Dec 2020 08:35 )             24.8     18 Dec 2020 08:31    145    |  91     |  39.0   ----------------------------<  104    4.1     |  46.0   |  0.89     Ca    8.5        18 Dec 2020 08:31  Phos  3.3       17 Dec 2020 09:42  Mg     2.0       17 Dec 2020 09:42      PT/INR - ( 17 Dec 2020 09:42 )   PT: 21.1 sec;   INR: 1.87 ratio           CAPILLARY BLOOD GLUCOSE  POCT Blood Glucose.: 248 mg/dL (18 Dec 2020 11:44)  POCT Blood Glucose.: 130 mg/dL (18 Dec 2020 07:48)  POCT Blood Glucose.: 222 mg/dL (17 Dec 2020 22:47)  POCT Blood Glucose.: 221 mg/dL (17 Dec 2020 17:50)          MEDICATIONS  (STANDING):  acetaZOLAMIDE    Tablet 250 milliGRAM(s) Oral daily  albuterol/ipratropium for Nebulization 3 milliLiter(s) Nebulizer every 6 hours  aMIOdarone    Tablet 400 milliGRAM(s) Oral every 12 hours  aMIOdarone    Tablet 200 milliGRAM(s) Oral daily  aspirin  chewable 81 milliGRAM(s) Oral daily  atorvastatin 40 milliGRAM(s) Oral at bedtime  chlorhexidine 2% Cloths 1 Application(s) Topical <User Schedule>  citalopram 40 milliGRAM(s) Oral daily  clopidogrel Tablet 75 milliGRAM(s) Oral daily  dextrose 40% Gel 15 Gram(s) Oral once  dextrose 5%. 1000 milliLiter(s) (50 mL/Hr) IV Continuous <Continuous>  dextrose 5%. 1000 milliLiter(s) (100 mL/Hr) IV Continuous <Continuous>  dextrose 50% Injectable 25 Gram(s) IV Push once  dextrose 50% Injectable 12.5 Gram(s) IV Push once  dextrose 50% Injectable 25 Gram(s) IV Push once  digoxin     Tablet 0.125 milliGRAM(s) Oral daily  diltiazem    Tablet 60 milliGRAM(s) Oral four times a day  famotidine    Tablet 20 milliGRAM(s) Oral two times a day  furosemide   Injectable 40 milliGRAM(s) IV Push daily  furosemide   Injectable 40 milliGRAM(s) IV Push once  glucagon  Injectable 1 milliGRAM(s) IntraMuscular once  influenza   Vaccine 0.5 milliLiter(s) IntraMuscular once  insulin glargine Injectable (LANTUS) 25 Unit(s) SubCutaneous two times a day  insulin lispro (ADMELOG) corrective regimen sliding scale   SubCutaneous Before meals and at bedtime  insulin lispro Injectable (ADMELOG) 9 Unit(s) SubCutaneous three times a day with meals  levETIRAcetam  IVPB 500 milliGRAM(s) IV Intermittent every 12 hours  methylPREDNISolone sodium succinate Injectable 40 milliGRAM(s) IV Push every 12 hours  metoprolol tartrate 25 milliGRAM(s) Oral two times a day  polyethylene glycol 3350 17 Gram(s) Oral daily  saccharomyces boulardii 250 milliGRAM(s) Oral two times a day    MEDICATIONS  (PRN):  aluminum hydroxide/magnesium hydroxide/simethicone Suspension 30 milliLiter(s) Oral every 4 hours PRN Dyspepsia      RADIOLOGY & ADDITIONAL TESTS:  < from: US Duplex Arterial Lower Ext Ltd, Right (12.17.20 @ 18:01) >   EXAM:  US DPLX LWR EXT ARTS LTD RT                          PROCEDURE DATE:  12/17/2020          INTERPRETATION:  US LOWER EXTREMITY ARTERIAL DUPLEX LIMITED RIGHT    History: Right groin hematoma status post catheterization last week. Dropping hemoglobin.    Technique: Ultrasound of the right groin was performed utilizing grayscale, color, and spectral Doppler.    Comparison:  Right lower extremity CT 12/17/2020    Findings:    There is either a single right groin pseudoaneurysm measuring 1.6 x 1.5 x 1.4 cm with dilatation of a portion of the neck measuring up to 0.9 cm transverse or 2 adjacent pseudoaneurysms with the more proximal pseudoaneurysm measuring 0.9 x 0.7 cm and the more distal pseudoaneurysm measuring 1.6 x 1.5 x 1.4 cm.    There is 0.2 cm distance between the more proximal aspect of the pseudoaneurysm and common femoral artery and 0.4 cm between the more proximal portion of the pseudoaneurysm and distal aspect of the pseudoaneurysm. The neck between the proximal and distal aspect of the pseudoaneurysm measures 0.4 cm transverse.    Impression:    Right groin pseudoaneurysm as described above, either a single pseudoaneurysm with dilatation of a portion of the neck or 2 adjacent communicating pseudoaneurysms measuring up to 1.6 cm distally and 0.9 cm proximally.    The findings were discussed with DEMETRIA Pavon at 7:12 PM on 12/17/2020.            ALEX TILLMAN MD; Attending Radiologist  This document has been electronically signed. Dec 17 2020  7:13PM    < end of copied text >

## 2020-12-18 NOTE — CHART NOTE - NSCHARTNOTEFT_GEN_A_CORE
Pt noted with decreased H/H  Discussed again with Dr Hill  Consulted with Dr Maldonado from IR who feels it is amenable to thrombin injection  Order placed  Will follow up post thrombin injection  Discussed with Dr Dalal

## 2020-12-18 NOTE — CONSULT NOTE ADULT - SUBJECTIVE AND OBJECTIVE BOX
[facility], [facility]                                   [address] [place] [address]                                                      PHONE: [phone]                                                         FAX: [phone]  [email or web address]  ---------------------------------------------------------------------------------------------------------------------------------    Reason for Consult: VT    HPI:  CLAUDIA MABRY is an 70y Male with hx severe COPD, on home O2, DM type 2, HTN, permanent AF, admitted with recurrent hypoxic/hypercapneic respiratory failure and agonal respiration 12/10.  Intubated emergently in ER, then developed PEA and was given meds including epinephrine for resuscitation, then developed VT with a pulse, was cardioverted electrically and amiodarone was started.  Ultimately he underwent cath which revealed severe RCA and LAD disease. Elevated LVEDP noted as well.  THI was done to RCA, but pt became agitated and procedure was stopped.  Plan is to intervene on LAD, but was postponed due to R femoral artery pseudoaneurysm.  Pt just underwent thrombin injection RFA by IR.  Appears comfortable but mildly tachypneic.  Does not remember what led to admission.  Denies CP, SOB, palpitations, dizziness.    PAST MEDICAL & SURGICAL HISTORY:  Seizure    Depression, unspecified depression type    Diabetes    Hypertension    Hyperlipidemia    Afib    COPD (chronic obstructive pulmonary disease)    Abdominal hernia    H/O carotid endarterectomy        No Known Allergies      MEDICATIONS  (STANDING):  acetaZOLAMIDE    Tablet 250 milliGRAM(s) Oral daily  albuterol/ipratropium for Nebulization 3 milliLiter(s) Nebulizer every 6 hours  aMIOdarone    Tablet 400 milliGRAM(s) Oral every 12 hours  aMIOdarone    Tablet 200 milliGRAM(s) Oral daily  aspirin  chewable 81 milliGRAM(s) Oral daily  atorvastatin 40 milliGRAM(s) Oral at bedtime  chlorhexidine 2% Cloths 1 Application(s) Topical <User Schedule>  citalopram 40 milliGRAM(s) Oral daily  clopidogrel Tablet 75 milliGRAM(s) Oral daily  dextrose 40% Gel 15 Gram(s) Oral once  dextrose 5%. 1000 milliLiter(s) (50 mL/Hr) IV Continuous <Continuous>  dextrose 5%. 1000 milliLiter(s) (100 mL/Hr) IV Continuous <Continuous>  dextrose 50% Injectable 25 Gram(s) IV Push once  dextrose 50% Injectable 12.5 Gram(s) IV Push once  dextrose 50% Injectable 25 Gram(s) IV Push once  digoxin     Tablet 0.125 milliGRAM(s) Oral daily  diltiazem    Tablet 60 milliGRAM(s) Oral four times a day  famotidine    Tablet 20 milliGRAM(s) Oral two times a day  furosemide   Injectable 40 milliGRAM(s) IV Push daily  furosemide   Injectable 40 milliGRAM(s) IV Push once  glucagon  Injectable 1 milliGRAM(s) IntraMuscular once  influenza   Vaccine 0.5 milliLiter(s) IntraMuscular once  insulin glargine Injectable (LANTUS) 25 Unit(s) SubCutaneous two times a day  insulin lispro (ADMELOG) corrective regimen sliding scale   SubCutaneous Before meals and at bedtime  insulin lispro Injectable (ADMELOG) 9 Unit(s) SubCutaneous three times a day with meals  levETIRAcetam  IVPB 500 milliGRAM(s) IV Intermittent every 12 hours  methylPREDNISolone sodium succinate Injectable 40 milliGRAM(s) IV Push every 12 hours  metoprolol tartrate 25 milliGRAM(s) Oral two times a day  polyethylene glycol 3350 17 Gram(s) Oral daily  saccharomyces boulardii 250 milliGRAM(s) Oral two times a day    MEDICATIONS  (PRN):  aluminum hydroxide/magnesium hydroxide/simethicone Suspension 30 milliLiter(s) Oral every 4 hours PRN Dyspepsia      Social History:  Cigarettes:      former              Alchohol:      denies           Illicit Drug Abuse:  denies    ROS: Negative other than as mentioned in HPI.    Vital Signs Last 24 Hrs  T(C): 36.7 (18 Dec 2020 10:00), Max: 37.3 (18 Dec 2020 04:59)  T(F): 98 (18 Dec 2020 10:00), Max: 99.1 (18 Dec 2020 04:59)  HR: 90 (18 Dec 2020 10:35) (73 - 102)  BP: 114/68 (18 Dec 2020 10:00) (114/68 - 150/75)  BP(mean): --  RR: 18 (18 Dec 2020 10:00) (18 - 18)  SpO2: 95% (18 Dec 2020 10:00) (92% - 96%)  ICU Vital Signs Last 24 Hrs  CLAUDIA MABRY  I&O's Detail    17 Dec 2020 07:01  -  18 Dec 2020 07:00  --------------------------------------------------------  IN:    IV PiggyBack: 100 mL    IV PiggyBack: 200 mL    Oral Fluid: 116 mL  Total IN: 416 mL    OUT:    Voided (mL): 800 mL  Total OUT: 800 mL    Total NET: -384 mL        I&O's Summary    17 Dec 2020 07:01  -  18 Dec 2020 07:00  --------------------------------------------------------  IN: 416 mL / OUT: 800 mL / NET: -384 mL      Drug Dosing Weight  CLAUDIA MABRY      PHYSICAL EXAM:  General: Appears well developed, well nourished alert and cooperative.  HEENT: Head; normocephalic, atraumatic.  Eyes: Pupils reactive, cornea wnl.  Neck: Supple, no nodes adenopathy, no NVD or carotid bruit or thyromegaly.  CARDIOVASCULAR: irregularly irregular, No murmur, rub, gallop or lift.   LUNGS: bibasilar crackles.  ABDOMEN: Soft, nontender without mass or organomegaly. bowel sounds normoactive.  EXTREMITIES: No clubbing, cyanosis or edema. Distal pulses wnl.   SKIN: warm and dry with normal turgor.  NEURO: Alert/oriented x 3/normal motor exam. No pathologic reflexes.    PSYCH: normal affect.        LABS:                        7.8    20.59 )-----------( 210      ( 18 Dec 2020 08:35 )             24.8     12-18    145  |  91<L>  |  39.0<H>  ----------------------------<  104<H>  4.1   |  46.0<HH>  |  0.89    Ca    8.5<L>      18 Dec 2020 08:31  Phos  3.3     12-17  Mg     2.0     12-17      CLAUDIA MABRY      PT/INR - ( 17 Dec 2020 09:42 )   PT: 21.1 sec;   INR: 1.87 ratio               RADIOLOGY & ADDITIONAL STUDIES:    INTERPRETATION OF TELEMETRY (personally reviewed):    ECG: SR, blocked APCs in bigeminy pattern    Tele- AF    ECHO: normal LVEF.  Enlarged right heard with reduced RV systolic fxn.  Mild MR, mild TR    CARDIAC CATHETERIZATION: as above    Assessment and Plan:  VT- after receiving epinephrine for respiratory arrest/PEA in ER.  Cardioverted electrically. Would stop amiodarone at this point.  No indication for ICD given clear provocative circumstances.    AF- permanent.  Continue B-blocker, Cardizem, digoxin.  Restart anticoagulation when feasible    CAD- needs PCI to LAD, probably early next week.  RFA pseudoaneuysm injected today.  Repeat U/S 6 hours ordered. To receive PRBC transfusion for anemia.  Continue ASA, plavix.    COPD, on steroid taper.  Nocutrnal BiPAP.  Bronchodilators.  Completed abx.    Acute on chronic diastolic CHF- on IV lasix, acetazolamide    DM 2, poorly controlled, on insulin

## 2020-12-18 NOTE — CONSULT NOTE ADULT - ASSESSMENT
Assess    OHS/ ELISSA / COPD with right heart strain  Pneumonia  Acute on Chronic Hypoxic and Hypercarbic respiratory Failure  Pseudoaneurysm right groin  CAD post RCA Stent - atrial fibrillation     Rec    Complete ABX  AC  Rate control  DuoNeb to LABA/ICS and LAMA on DC  Steroid with taper  02  Nocturnal Bipap critical (Must assure has at home)  [facility] or [facility] per patient wishes

## 2020-12-18 NOTE — CONSULT NOTE ADULT - SUBJECTIVE AND OBJECTIVE BOX
PULMONARY CONSULT NOTE      CLAUDIA MABRYN-[id number]    Patient is a 70y old  Male who presents with a chief complaint of Acute hypoxic/hypercapnic respiratory failure, cardiac arrest (18 Dec 2020 08:33)      HISTORY OF PRESENT ILLNESS:  COPD, Obesity , HTN, DM2, afib on coumadin and Sz disorder  Post LOC, Vent , brief PEA  Says he gets 02 and Bipap at [facility]   Non-compliant with Bipap at home       MEDICATIONS  (STANDING):  acetaZOLAMIDE    Tablet 250 milliGRAM(s) Oral daily  albuterol/ipratropium for Nebulization 3 milliLiter(s) Nebulizer every 6 hours  aMIOdarone    Tablet 400 milliGRAM(s) Oral every 12 hours  aMIOdarone    Tablet 200 milliGRAM(s) Oral daily  aspirin  chewable 81 milliGRAM(s) Oral daily  atorvastatin 40 milliGRAM(s) Oral at bedtime  chlorhexidine 2% Cloths 1 Application(s) Topical <User Schedule>  citalopram 40 milliGRAM(s) Oral daily  clopidogrel Tablet 75 milliGRAM(s) Oral daily  dextrose 40% Gel 15 Gram(s) Oral once  dextrose 5%. 1000 milliLiter(s) (50 mL/Hr) IV Continuous <Continuous>  dextrose 5%. 1000 milliLiter(s) (100 mL/Hr) IV Continuous <Continuous>  dextrose 50% Injectable 25 Gram(s) IV Push once  dextrose 50% Injectable 12.5 Gram(s) IV Push once  dextrose 50% Injectable 25 Gram(s) IV Push once  digoxin     Tablet 0.125 milliGRAM(s) Oral daily  diltiazem    Tablet 60 milliGRAM(s) Oral four times a day  famotidine    Tablet 20 milliGRAM(s) Oral two times a day  furosemide   Injectable 40 milliGRAM(s) IV Push daily  furosemide   Injectable 40 milliGRAM(s) IV Push once  glucagon  Injectable 1 milliGRAM(s) IntraMuscular once  influenza   Vaccine 0.5 milliLiter(s) IntraMuscular once  insulin glargine Injectable (LANTUS) 25 Unit(s) SubCutaneous two times a day  insulin lispro (ADMELOG) corrective regimen sliding scale   SubCutaneous Before meals and at bedtime  insulin lispro Injectable (ADMELOG) 9 Unit(s) SubCutaneous three times a day with meals  levETIRAcetam  IVPB 500 milliGRAM(s) IV Intermittent every 12 hours  methylPREDNISolone sodium succinate Injectable 40 milliGRAM(s) IV Push every 12 hours  metoprolol tartrate 25 milliGRAM(s) Oral two times a day  polyethylene glycol 3350 17 Gram(s) Oral daily  saccharomyces boulardii 250 milliGRAM(s) Oral two times a day      MEDICATIONS  (PRN):  aluminum hydroxide/magnesium hydroxide/simethicone Suspension 30 milliLiter(s) Oral every 4 hours PRN Dyspepsia      Allergies    No Known Allergies    Intolerances        PAST MEDICAL & SURGICAL HISTORY:  Seizure    Depression, unspecified depression type    Diabetes    Hypertension    Hyperlipidemia    Afib    COPD (chronic obstructive pulmonary disease)    Abdominal hernia    H/O carotid endarterectomy        FAMILY HISTORY:  No pertinent family history in first degree relatives        SOCIAL HISTORY  Smoking History:     REVIEW OF SYSTEMS:    CONSTITUTIONAL:  No fevers, chills, sweats    HEENT:  Eyes:  No diplopia or blurred vision. ENT:  No earache, sore throat or runny nose.    CARDIOVASCULAR:  No pressure, squeezing, tightness, or heaviness about the chest; no palpitations.    RESPIRATORY:  No cough, shortness of breath, PND or orthopnea. Mild SOBOE    GASTROINTESTINAL:  No abdominal pain, nausea, vomiting or diarrhea.    GENITOURINARY:  No dysuria, frequency or urgency.    NEUROLOGIC:  No paresthesias, fasciculations, seizures or weakness.    PSYCHIATRIC:  No disorder of thought or mood.    Vital Signs Last 24 Hrs  T(C): 37.3 (18 Dec 2020 04:59), Max: 37.3 (18 Dec 2020 04:59)  T(F): 99.1 (18 Dec 2020 04:59), Max: 99.1 (18 Dec 2020 04:59)  HR: 75 (18 Dec 2020 04:59) (75 - 118)  BP: 116/79 (18 Dec 2020 04:59) (112/57 - 150/75)  BP(mean): --  RR: 18 (18 Dec 2020 04:59) (18 - 18)  SpO2: 96% (18 Dec 2020 04:59) (81% - 96%)    PHYSICAL EXAMINATION:    GENERAL: The patient is a well-developed, well-nourished _____in no apparent distress.     HEENT: Head is normocephalic and atraumatic. Extraocular muscles are intact. Mucous membranes are moist.     NECK: Supple.     LUNGS: Clear to auscultation without wheezing, rales, or rhonchi. Respirations unlabored    HEART: Regular rate and rhythm without murmur.    ABDOMEN: Soft, nontender, and nondistended.  No hepatosplenomegaly is noted.    EXTREMITIES: Without any cyanosis, clubbing, rash, lesions or edema.    NEUROLOGIC: Grossly intact.      LABS:                        7.8    20.59 )-----------( 210      ( 18 Dec 2020 08:35 )             24.8     12-18    145  |  91<L>  |  39.0<H>  ----------------------------<  104<H>  4.1   |  46.0<HH>  |  0.89    Ca    8.5<L>      18 Dec 2020 08:31  Phos  3.3     12-17  Mg     2.0     12-17      PT/INR - ( 17 Dec 2020 09:42 )   PT: 21.1 sec;   INR: 1.87 ratio          Procalcitonin, Serum: 0.13 ng/mL (12-17-20 @ 12:55)  Procalcitonin, Serum: 0.13 ng/mL (12-15-20 @ 18:14)  Procalcitonin, Serum: 0.14 ng/mL (12-15-20 @ 12:04)      MICROBIOLOGY: Non-contrib ; COVID neg    RADIOLOGY & ADDITIONAL STUDIES:   EXAM:  XR CHEST PORTABLE URGENT 1V                          PROCEDURE DATE:  12/10/2020          INTERPRETATION:  AP chest on December 10, 2020 at 2:36 PM. 2 images. Patient had central line placement.    Heart magnified by technique.    There is an increasing left base infiltrate compared to earlier in the day. Endotracheal tube and nasogastric tube remain.    Present film shows a right jugular line with the tip in the superior vena caval area.    There may also be developing right base effusion rather small.    IMPRESSION: Increasing small basilar infiltrates left greater than right. Right jugular line inserted.    OLMAN PUCKETT MD; Attending Radiologist  This document has been electronically signed. Dec 10 2020  3:43PM    Echo on 12/14/20   1. Technically difficult study.   2. Endocardial visualization was enhanced with intravenous echo contrast.   3. Left ventricular ejection fraction, by visual estimation, is 60 to 65%.   4. Normal global left ventricular systolic function.   5. The left ventricular diastolic function could not be assessed in this study.   6. Normal left ventricular internal cavity size.   7. Moderately enlarged right ventricle. Moderately reduced RV systolic function.   8. The left atrium is normal in size.   9. Moderately enlarged right atrium.  10. Mild thickening and calcification of the anterior and posterior mitral valve leaflets.  11. Mild mitral valve regurgitation.  12. Mild tricuspid regurgitation.  13. There is no evidence of pericardial effusion.    MD [place] Electronically signed on 12/14/2020 at 4:23:18 PM

## 2020-12-18 NOTE — PROGRESS NOTE ADULT - ASSESSMENT
70y Male with past medical history significant for COPD, HTN, DM, pAF on coumadin, seizure d/o with recent hospitalization for hypercapnic respiratory failure presents with HTN emergency and subsequent PEA/VT arrest on 12/10 this admission.     Right groin pseudoaneurysm  - CT RLE and RLE arterial duplex with either a single right groin pseudoaneurysm measuring 1.6 x 1.5 x 1.4 cm with dilatation of a portion of the neck measuring up to 0.9 cm transverse or 2 adjacent pseudoaneurysms with the more proximal pseudoaneurysm measuring 0.9 x 0.7 cm and the more distal pseudoaneurysm measuring 1.6 x 1.5 x 1.4 cm.  - Vascular following.  - IR for thrombin injection today.  - AC on hold.  - Transfuse 2units PRBC today.    CAD   - s/p cath with Severe RCA and LAD disease on 12/15. The RCA was treated with PTCA and STENT (THI-Resolute) with IVUS with residual LAD lesion  - Plan for intervention on LAD 12/18, to be done with anesthesia - cancelled secondary to hgb 7.8.   - Pt noted to have large right groin hematoma, and noted hgb drop (8.7-->7.8). Discussed with Cardiology, who is aware of hematoma.  - CT RLE and RLE arterial duplex with either a single right groin pseudoaneurysm measuring 1.6 x 1.5 x 1.4 cm with dilatation of a portion of the neck measuring up to 0.9 cm transverse or 2 adjacent pseudoaneurysms with the more proximal pseudoaneurysm measuring 0.9 x 0.7 cm and the more distal pseudoaneurysm measuring 1.6 x 1.5 x 1.4 cm.  - AC held for now. SCD boots ordered  - Continue ASA with Plavix, BB, Statin     HTN  - c/w metoprolol and cardizem and digoxin    AFib  - Rate controlled with diltiazem/Metoprolol  - coumadin held at this time    Cardiac arrest from/ VT  - amiodarone decreased to 200 mg daily per Dr Arroyo recommendations   - Will have EP evaluation for AICD    Blood loss anemia  - ppx lovenox held due to groin hematoma.   - Hgb 7.8 - will transfuse 2units PRBC with Lasix 40mg IV between  - RLE/groin US and CT with pseudoaneurysm  - Monitor H/H    Shortness of breath due to COPD exacerbation,  acute on chronic CHF, possible gram positive/gram negative pneumonia  - improved, currently on 4LNC  - LVEDP was elevated on cath.  - Use of diuretics with worsening alkalosis   - c/w IV lasix as of now as discussed with Dr. Arroyo. Add acetazolamide  - Strict I & Os  - Daily weight  - nocturnal NIV  - Iv solumedrol 40mg q12   - abx, bronchodilators   - pulm consult appreciated  - completed 7 days of zosyn per ID  - Blood cx NGTD      DM2, uncontrolled  -  A1c 9.1  - continue 9 units premeal  - lantus 25 BID  - endocrine following    DVT prophylaxis: scd    DISPO: cath to be rescheduled. monitor H/H. groin hematoma, awaiting thrombin injections. LOS uncertain as patient is still acute.          70y Male with past medical history significant for COPD, HTN, DM, pAF on coumadin, seizure d/o with recent hospitalization for hypercapnic respiratory failure presents with HTN emergency and subsequent PEA/VT arrest on 12/10 this admission.     Right groin pseudoaneurysm  - CT RLE and RLE arterial duplex with either a single right groin pseudoaneurysm measuring 1.6 x 1.5 x 1.4 cm with dilatation of a portion of the neck measuring up to 0.9 cm transverse or 2 adjacent pseudoaneurysms with the more proximal pseudoaneurysm measuring 0.9 x 0.7 cm and the more distal pseudoaneurysm measuring 1.6 x 1.5 x 1.4 cm.  - Vascular following.  - IR for thrombin injection today.  - AC on hold.  - Transfuse 2units PRBC today.    CAD   - s/p cath with Severe RCA and LAD disease on 12/15. The RCA was treated with PTCA and STENT (THI-Resolute) with IVUS with residual LAD lesion  - Plan for intervention on LAD 12/18, to be done with anesthesia - cancelled secondary to hgb 7.8.   - Pt noted to have large right groin hematoma, and noted hgb drop (8.7-->7.8). Discussed with Cardiology, who is aware of hematoma.  - CT RLE and RLE arterial duplex with either a single right groin pseudoaneurysm measuring 1.6 x 1.5 x 1.4 cm with dilatation of a portion of the neck measuring up to 0.9 cm transverse or 2 adjacent pseudoaneurysms with the more proximal pseudoaneurysm measuring 0.9 x 0.7 cm and the more distal pseudoaneurysm measuring 1.6 x 1.5 x 1.4 cm.  - AC held for now. SCD boots ordered  - Continue ASA with Plavix, BB, Statin     HTN  - c/w metoprolol and cardizem and digoxin    AFib  - Rate controlled with diltiazem/Metoprolol  - coumadin held at this time    Cardiac arrest from/ VT  - amiodarone decreased to 200 mg daily per Dr Arroyo recommendations   - Will have EP evaluation for AICD    Blood loss anemia  - ppx lovenox held due to groin hematoma.   - Hgb 7.8 - will transfuse 2units PRBC with Lasix 40mg IV between  - RLE/groin US and CT with pseudoaneurysm  - Monitor H/H    Shortness of breath due to COPD exacerbation,  acute on chronic CHF, possible gram positive/gram negative pneumonia  - improved, currently on 4LNC  - LVEDP was elevated on cath.  - Use of diuretics with worsening contraction alkalosis   - c/w IV lasix as of now as discussed with Dr. Arroyo. Add acetazolamide. f/u BMP  - Strict I & Os  - Daily weight  - nocturnal NIV  - Iv solumedrol 40mg q12   - abx, bronchodilators   - pulm consult appreciated  - completed 7 days of zosyn per ID  - Blood cx NGTD      DM2, uncontrolled  -  A1c 9.1  - continue 9 units premeal  - lantus 25 BID  - endocrine following      Morbid obesity-  Diet & weight loss recommended      DVT prophylaxis: scd    DISPO: cath to be rescheduled. monitor H/H. groin hematoma, awaiting thrombin injections. LOS uncertain as patient is still acute.

## 2020-12-18 NOTE — PROGRESS NOTE ADULT - SUBJECTIVE AND OBJECTIVE BOX
INTERVAL HPI/OVERNIGHT EVENTS: follow up of diabetes    MEDICATIONS  (STANDING):  acetaZOLAMIDE    Tablet 250 milliGRAM(s) Oral daily  albuterol/ipratropium for Nebulization 3 milliLiter(s) Nebulizer every 6 hours  aMIOdarone    Tablet 400 milliGRAM(s) Oral every 12 hours  aMIOdarone    Tablet 200 milliGRAM(s) Oral daily  aspirin  chewable 81 milliGRAM(s) Oral daily  atorvastatin 40 milliGRAM(s) Oral at bedtime  chlorhexidine 2% Cloths 1 Application(s) Topical <User Schedule>  citalopram 40 milliGRAM(s) Oral daily  clopidogrel Tablet 75 milliGRAM(s) Oral daily  dextrose 40% Gel 15 Gram(s) Oral once  dextrose 5%. 1000 milliLiter(s) (50 mL/Hr) IV Continuous <Continuous>  dextrose 5%. 1000 milliLiter(s) (100 mL/Hr) IV Continuous <Continuous>  dextrose 50% Injectable 25 Gram(s) IV Push once  dextrose 50% Injectable 12.5 Gram(s) IV Push once  dextrose 50% Injectable 25 Gram(s) IV Push once  digoxin     Tablet 0.125 milliGRAM(s) Oral daily  diltiazem    Tablet 60 milliGRAM(s) Oral four times a day  famotidine    Tablet 20 milliGRAM(s) Oral two times a day  furosemide   Injectable 40 milliGRAM(s) IV Push daily  furosemide   Injectable 40 milliGRAM(s) IV Push once  glucagon  Injectable 1 milliGRAM(s) IntraMuscular once  influenza   Vaccine 0.5 milliLiter(s) IntraMuscular once  insulin glargine Injectable (LANTUS) 25 Unit(s) SubCutaneous two times a day  insulin lispro (ADMELOG) corrective regimen sliding scale   SubCutaneous Before meals and at bedtime  insulin lispro Injectable (ADMELOG) 9 Unit(s) SubCutaneous three times a day with meals  levETIRAcetam  IVPB 500 milliGRAM(s) IV Intermittent every 12 hours  methylPREDNISolone sodium succinate Injectable 40 milliGRAM(s) IV Push every 12 hours  metoprolol tartrate 25 milliGRAM(s) Oral two times a day  polyethylene glycol 3350 17 Gram(s) Oral daily  saccharomyces boulardii 250 milliGRAM(s) Oral two times a day    MEDICATIONS  (PRN):  aluminum hydroxide/magnesium hydroxide/simethicone Suspension 30 milliLiter(s) Oral every 4 hours PRN Dyspepsia      Allergies    No Known Allergies    Intolerances      Vital Signs Last 24 Hrs  T(C): 36.7 (18 Dec 2020 10:00), Max: 37.3 (18 Dec 2020 04:59)  T(F): 98 (18 Dec 2020 10:00), Max: 99.1 (18 Dec 2020 04:59)  HR: 90 (18 Dec 2020 10:35) (73 - 102)  BP: 114/68 (18 Dec 2020 10:00) (114/68 - 150/75)  BP(mean): --  RR: 18 (18 Dec 2020 10:00) (18 - 18)  SpO2: 95% (18 Dec 2020 10:00) (92% - 96%)      LABS:                        7.8    20.59 )-----------( 210      ( 18 Dec 2020 08:35 )             24.8     12-18    145  |  91<L>  |  39.0<H>  ----------------------------<  104<H>  4.1   |  46.0<HH>  |  0.89    Ca    8.5<L>      18 Dec 2020 08:31  Phos  3.3     12-17  Mg     2.0     12-17            POCT Blood Glucose.: 248 mg/dL (12-18-20 @ 11:44)  POCT Blood Glucose.: 130 mg/dL (12-18-20 @ 07:48)  POCT Blood Glucose.: 222 mg/dL (12-17-20 @ 22:47)  POCT Blood Glucose.: 221 mg/dL (12-17-20 @ 17:50)  POCT Blood Glucose.: 324 mg/dL (12-17-20 @ 12:56)  POCT Blood Glucose.: 274 mg/dL (12-17-20 @ 08:46)  POCT Blood Glucose.: 378 mg/dL (12-16-20 @ 21:17)  POCT Blood Glucose.: 411 mg/dL (12-16-20 @ 18:07)  POCT Blood Glucose.: 287 mg/dL (12-16-20 @ 16:39)  POCT Blood Glucose.: 148 mg/dL (12-16-20 @ 12:04)  POCT Blood Glucose.: 129 mg/dL (12-16-20 @ 09:29)  POCT Blood Glucose.: 138 mg/dL (12-16-20 @ 08:27)  POCT Blood Glucose.: 156 mg/dL (12-16-20 @ 07:04)  POCT Blood Glucose.: 160 mg/dL (12-16-20 @ 06:05)  POCT Blood Glucose.: 151 mg/dL (12-16-20 @ 05:00)  POCT Blood Glucose.: 140 mg/dL (12-16-20 @ 04:20)  POCT Blood Glucose.: 160 mg/dL (12-16-20 @ 02:25)  POCT Blood Glucose.: 153 mg/dL (12-16-20 @ 01:17)  POCT Blood Glucose.: 153 mg/dL (12-16-20 @ 00:13)  POCT Blood Glucose.: 149 mg/dL (12-15-20 @ 23:21)  POCT Blood Glucose.: 223 mg/dL (12-15-20 @ 22:05)  POCT Blood Glucose.: 246 mg/dL (12-15-20 @ 21:22)  POCT Blood Glucose.: 233 mg/dL (12-15-20 @ 20:12)

## 2020-12-19 LAB
ALBUMIN SERPL ELPH-MCNC: 3.2 G/DL — LOW (ref 3.3–5.2)
ALP SERPL-CCNC: 94 U/L — SIGNIFICANT CHANGE UP (ref 40–120)
ALT FLD-CCNC: 48 U/L — HIGH
ANION GAP SERPL CALC-SCNC: 5 MMOL/L — SIGNIFICANT CHANGE UP (ref 5–17)
APTT BLD: 24.4 SEC — LOW (ref 27.5–35.5)
AST SERPL-CCNC: 35 U/L — SIGNIFICANT CHANGE UP
BILIRUB SERPL-MCNC: 0.5 MG/DL — SIGNIFICANT CHANGE UP (ref 0.4–2)
BUN SERPL-MCNC: 37 MG/DL — HIGH (ref 8–20)
CALCIUM SERPL-MCNC: 8.6 MG/DL — SIGNIFICANT CHANGE UP (ref 8.6–10.2)
CHLORIDE SERPL-SCNC: 89 MMOL/L — LOW (ref 98–107)
CO2 SERPL-SCNC: 50 MMOL/L — CRITICAL HIGH (ref 22–29)
CREAT SERPL-MCNC: 0.89 MG/DL — SIGNIFICANT CHANGE UP (ref 0.5–1.3)
GLUCOSE BLDC GLUCOMTR-MCNC: 192 MG/DL — HIGH (ref 70–99)
GLUCOSE BLDC GLUCOMTR-MCNC: 233 MG/DL — HIGH (ref 70–99)
GLUCOSE BLDC GLUCOMTR-MCNC: 264 MG/DL — HIGH (ref 70–99)
GLUCOSE BLDC GLUCOMTR-MCNC: 86 MG/DL — SIGNIFICANT CHANGE UP (ref 70–99)
GLUCOSE SERPL-MCNC: 109 MG/DL — HIGH (ref 70–99)
HCT VFR BLD CALC: 32.7 % — LOW (ref 39–50)
HGB BLD-MCNC: 10.4 G/DL — LOW (ref 13–17)
INR BLD: 1.18 RATIO — HIGH (ref 0.88–1.16)
MCHC RBC-ENTMCNC: 28.6 PG — SIGNIFICANT CHANGE UP (ref 27–34)
MCHC RBC-ENTMCNC: 31.8 GM/DL — LOW (ref 32–36)
MCV RBC AUTO: 89.8 FL — SIGNIFICANT CHANGE UP (ref 80–100)
PLATELET # BLD AUTO: 185 K/UL — SIGNIFICANT CHANGE UP (ref 150–400)
POTASSIUM SERPL-MCNC: 4.1 MMOL/L — SIGNIFICANT CHANGE UP (ref 3.5–5.3)
POTASSIUM SERPL-SCNC: 4.1 MMOL/L — SIGNIFICANT CHANGE UP (ref 3.5–5.3)
PROT SERPL-MCNC: 5.5 G/DL — LOW (ref 6.6–8.7)
PROTHROM AB SERPL-ACNC: 13.6 SEC — SIGNIFICANT CHANGE UP (ref 10.6–13.6)
RBC # BLD: 3.64 M/UL — LOW (ref 4.2–5.8)
RBC # FLD: 14.5 % — SIGNIFICANT CHANGE UP (ref 10.3–14.5)
SODIUM SERPL-SCNC: 144 MMOL/L — SIGNIFICANT CHANGE UP (ref 135–145)
WBC # BLD: 16.37 K/UL — HIGH (ref 3.8–10.5)
WBC # FLD AUTO: 16.37 K/UL — HIGH (ref 3.8–10.5)

## 2020-12-19 PROCEDURE — 93926 LOWER EXTREMITY STUDY: CPT | Mod: 26,RT

## 2020-12-19 PROCEDURE — 99232 SBSQ HOSP IP/OBS MODERATE 35: CPT

## 2020-12-19 PROCEDURE — 99233 SBSQ HOSP IP/OBS HIGH 50: CPT

## 2020-12-19 RX ORDER — INSULIN GLARGINE 100 [IU]/ML
22 INJECTION, SOLUTION SUBCUTANEOUS
Refills: 0 | Status: DISCONTINUED | OUTPATIENT
Start: 2020-12-19 | End: 2020-12-20

## 2020-12-19 RX ADMIN — Medication 60 MILLIGRAM(S): at 06:38

## 2020-12-19 RX ADMIN — Medication 250 MILLIGRAM(S): at 06:38

## 2020-12-19 RX ADMIN — LEVETIRACETAM 420 MILLIGRAM(S): 250 TABLET, FILM COATED ORAL at 17:51

## 2020-12-19 RX ADMIN — Medication 2: at 13:13

## 2020-12-19 RX ADMIN — Medication 6: at 21:19

## 2020-12-19 RX ADMIN — Medication 25 MILLIGRAM(S): at 06:38

## 2020-12-19 RX ADMIN — Medication 0.12 MILLIGRAM(S): at 06:39

## 2020-12-19 RX ADMIN — Medication 4: at 17:50

## 2020-12-19 RX ADMIN — Medication 3 MILLILITER(S): at 14:40

## 2020-12-19 RX ADMIN — INSULIN GLARGINE 25 UNIT(S): 100 INJECTION, SOLUTION SUBCUTANEOUS at 09:27

## 2020-12-19 RX ADMIN — Medication 81 MILLIGRAM(S): at 13:12

## 2020-12-19 RX ADMIN — CITALOPRAM 40 MILLIGRAM(S): 10 TABLET, FILM COATED ORAL at 13:12

## 2020-12-19 RX ADMIN — INSULIN GLARGINE 22 UNIT(S): 100 INJECTION, SOLUTION SUBCUTANEOUS at 21:19

## 2020-12-19 RX ADMIN — ACETAZOLAMIDE 250 MILLIGRAM(S): 250 TABLET ORAL at 14:02

## 2020-12-19 RX ADMIN — Medication 3 MILLILITER(S): at 20:59

## 2020-12-19 RX ADMIN — Medication 25 MILLIGRAM(S): at 17:49

## 2020-12-19 RX ADMIN — Medication 40 MILLIGRAM(S): at 17:49

## 2020-12-19 RX ADMIN — Medication 40 MILLIGRAM(S): at 06:38

## 2020-12-19 RX ADMIN — Medication 60 MILLIGRAM(S): at 17:49

## 2020-12-19 RX ADMIN — Medication 9 UNIT(S): at 13:13

## 2020-12-19 RX ADMIN — FAMOTIDINE 20 MILLIGRAM(S): 10 INJECTION INTRAVENOUS at 17:49

## 2020-12-19 RX ADMIN — AMIODARONE HYDROCHLORIDE 200 MILLIGRAM(S): 400 TABLET ORAL at 06:38

## 2020-12-19 RX ADMIN — LEVETIRACETAM 420 MILLIGRAM(S): 250 TABLET, FILM COATED ORAL at 06:39

## 2020-12-19 RX ADMIN — Medication 9 UNIT(S): at 09:28

## 2020-12-19 RX ADMIN — Medication 9 UNIT(S): at 17:50

## 2020-12-19 RX ADMIN — Medication 3 MILLILITER(S): at 08:06

## 2020-12-19 RX ADMIN — Medication 60 MILLIGRAM(S): at 13:12

## 2020-12-19 RX ADMIN — FAMOTIDINE 20 MILLIGRAM(S): 10 INJECTION INTRAVENOUS at 06:38

## 2020-12-19 RX ADMIN — Medication 3 MILLILITER(S): at 03:49

## 2020-12-19 RX ADMIN — Medication 40 MILLIGRAM(S): at 06:45

## 2020-12-19 RX ADMIN — ATORVASTATIN CALCIUM 40 MILLIGRAM(S): 80 TABLET, FILM COATED ORAL at 21:19

## 2020-12-19 RX ADMIN — CLOPIDOGREL BISULFATE 75 MILLIGRAM(S): 75 TABLET, FILM COATED ORAL at 13:12

## 2020-12-19 RX ADMIN — POLYETHYLENE GLYCOL 3350 17 GRAM(S): 17 POWDER, FOR SOLUTION ORAL at 13:14

## 2020-12-19 RX ADMIN — CHLORHEXIDINE GLUCONATE 1 APPLICATION(S): 213 SOLUTION TOPICAL at 06:38

## 2020-12-19 RX ADMIN — Medication 60 MILLIGRAM(S): at 23:30

## 2020-12-19 RX ADMIN — Medication 250 MILLIGRAM(S): at 17:49

## 2020-12-19 NOTE — PROGRESS NOTE ADULT - ASSESSMENT
70y Male with past medical history significant for COPD, HTN, DM, pAF on coumadin, seizure d/o with recent hospitalization for hypercapnic respiratory failure presents with HTN emergency and subsequent PEA/VT arrest on 12/10 this admission.     Right groin pseudoaneurysm s/p thrombin injection   -  US Duplex Arterial Lower Ext Ltd, Right (12.19.20 @ 11:24) >  The larger of the 2 pseudoaneurysms seen on the prior study remains thrombosed and measures 2.5 x 2.8 x 0.9 cm.  The smaller pseudoaneurysm demonstrating residual flow on the prior study adjacent to the common femoral artery is not well seen on the current study, possibly related to technical factors.       - CT RLE and RLE arterial duplex with either a single right groin pseudoaneurysm measuring 1.6 x 1.5 x 1.4 cm with dilatation of a portion of the neck measuring up to 0.9 cm transverse or 2 adjacent pseudoaneurysms with the more proximal pseudoaneurysm measuring 0.9 x 0.7 cm and the more distal pseudoaneurysm measuring 1.6 x 1.5 x 1.4 cm.  - Vascular following.  - IR for thrombin injection 12/18  - AC on hold.will resume when okay with vascular  -s/p  Transfuse 2units PRBC 12/18    CAD   - s/p cath with Severe RCA and LAD disease on 12/15. The RCA was treated with PTCA and STENT (THI-Resolute) with IVUS with residual LAD lesion  - Plan for intervention on LAD 12/18, to be done with anesthesia - cancelled secondary to hgb 7.8.   - Large right groin hematoma, and noted hgb drop (8.7-->7.8). Discussed with Cardiology, who is aware of hematoma.  - CT RLE and RLE arterial duplex with either a single right groin pseudoaneurysm measuring 1.6 x 1.5 x 1.4 cm with dilatation of a portion of the neck measuring up to 0.9 cm transverse or 2 adjacent pseudoaneurysms with the more proximal pseudoaneurysm measuring 0.9 x 0.7 cm and the more distal pseudoaneurysm measuring 1.6 x 1.5 x 1.4 cm.  - AC held for now. SCD boots ordered  - Continue ASA with Plavix, BB, Statin     HTN  - c/w metoprolol and cardizem and digoxin    AFib  - Rate controlled with diltiazem/Metoprolol  - coumadin held at this time    Cardiac arrest from/ VT  - amiodarone decreased to 200 mg daily per Dr Arroyo recommendations   - Will have EP evaluation for AICD    Blood loss anemia  - ppx lovenox held due to groin hematoma.   - S/P  transfuse 2units PRBC  - Monitor H/H    Shortness of breath due to COPD exacerbation,  acute on chronic CHF, possible gram positive/gram negative pneumonia  - improved, currently on NC  - LVEDP was elevated on cath.  - Use of diuretics with worsening contraction alkalosis   - F/U RENAL REC. f/u BMP  - Strict I & Os  - Daily weight  - nocturnal NIV  - Iv solumedrol 40mg q12 Taper  - bronchodilators   - f/u pulm rec  - completed 7 days of zosyn per ID  - Blood cx NGTD      DM2, uncontrolled  -  A1c 9.1  - continue 9 units premeal  - lantus 25 BID  - endocrine following      Morbid obesity-  Diet & weight loss recommended      DVT prophylaxis: scd    DISPO: cath to be rescheduled. monitor H/H. groin hematoma. LOS uncertain as patient is still acute.   C[employer] OF PLAN DW PT  DW RN

## 2020-12-19 NOTE — PROGRESS NOTE ADULT - SUBJECTIVE AND OBJECTIVE BOX
PULMONARY Progress NOTE      [facility]Jeff[id number]    Patient is a 70y old  Male who presents with a chief complaint of Acute hypoxic/hypercapnic respiratory failure, cardiac arrest (18 Dec 2020 08:33)      HISTORY OF PRESENT ILLNESS:  COPD, Obesity , HTN, DM2, afib on coumadin and Sz disorder  Post LOC, Vent , brief PEA  Says he gets 02 and Bipap at [facility]   Non-compliant with Bipap at home     Interval HIstory   Better post vascular procedure (thrombin injection of pseudoaneurysm) and post a night using Bipap      MEDICATIONS  (STANDING):  acetaZOLAMIDE    Tablet 250 milliGRAM(s) Oral daily  albuterol/ipratropium for Nebulization 3 milliLiter(s) Nebulizer every 6 hours  aMIOdarone    Tablet 400 milliGRAM(s) Oral every 12 hours  aMIOdarone    Tablet 200 milliGRAM(s) Oral daily  aspirin  chewable 81 milliGRAM(s) Oral daily  atorvastatin 40 milliGRAM(s) Oral at bedtime  chlorhexidine 2% Cloths 1 Application(s) Topical <User Schedule>  citalopram 40 milliGRAM(s) Oral daily  clopidogrel Tablet 75 milliGRAM(s) Oral daily  dextrose 40% Gel 15 Gram(s) Oral once  dextrose 5%. 1000 milliLiter(s) (50 mL/Hr) IV Continuous <Continuous>  dextrose 5%. 1000 milliLiter(s) (100 mL/Hr) IV Continuous <Continuous>  dextrose 50% Injectable 25 Gram(s) IV Push once  dextrose 50% Injectable 12.5 Gram(s) IV Push once  dextrose 50% Injectable 25 Gram(s) IV Push once  digoxin     Tablet 0.125 milliGRAM(s) Oral daily  diltiazem    Tablet 60 milliGRAM(s) Oral four times a day  famotidine    Tablet 20 milliGRAM(s) Oral two times a day  furosemide   Injectable 40 milliGRAM(s) IV Push daily  furosemide   Injectable 40 milliGRAM(s) IV Push once  glucagon  Injectable 1 milliGRAM(s) IntraMuscular once  influenza   Vaccine 0.5 milliLiter(s) IntraMuscular once  insulin glargine Injectable (LANTUS) 25 Unit(s) SubCutaneous two times a day  insulin lispro (ADMELOG) corrective regimen sliding scale   SubCutaneous Before meals and at bedtime  insulin lispro Injectable (ADMELOG) 9 Unit(s) SubCutaneous three times a day with meals  levETIRAcetam  IVPB 500 milliGRAM(s) IV Intermittent every 12 hours  methylPREDNISolone sodium succinate Injectable 40 milliGRAM(s) IV Push every 12 hours  metoprolol tartrate 25 milliGRAM(s) Oral two times a day  polyethylene glycol 3350 17 Gram(s) Oral daily  saccharomyces boulardii 250 milliGRAM(s) Oral two times a day      MEDICATIONS  (PRN):  aluminum hydroxide/magnesium hydroxide/simethicone Suspension 30 milliLiter(s) Oral every 4 hours PRN Dyspepsia      Allergies    No Known Allergies    Intolerances        PAST MEDICAL & SURGICAL HISTORY:  Seizure    Depression, unspecified depression type    Diabetes    Hypertension    Hyperlipidemia    Afib    COPD (chronic obstructive pulmonary disease)    Abdominal hernia    H/O carotid endarterectomy        FAMILY HISTORY:  No pertinent family history in first degree relatives        SOCIAL HISTORY  Smoking History:     REVIEW OF SYSTEMS:    CONSTITUTIONAL:  No fevers, chills, sweats    HEENT:  Eyes:  No diplopia or blurred vision. ENT:  No earache, sore throat or runny nose.    CARDIOVASCULAR:  No pressure, squeezing, tightness, or heaviness about the chest; no palpitations.    RESPIRATORY:  No cough, shortness of breath, PND or orthopnea. Mild SOBOE    GASTROINTESTINAL:  No abdominal pain, nausea, vomiting or diarrhea.    GENITOURINARY:  No dysuria, frequency or urgency.    NEUROLOGIC:  No paresthesias, fasciculations, seizures or weakness.    PSYCHIATRIC:  No disorder of thought or mood.    Vital Signs Last 24 Hrs  T(C): 37.3 (18 Dec 2020 04:59), Max: 37.3 (18 Dec 2020 04:59)  T(F): 99.1 (18 Dec 2020 04:59), Max: 99.1 (18 Dec 2020 04:59)  HR: 75 (18 Dec 2020 04:59) (75 - 118)  BP: 116/79 (18 Dec 2020 04:59) (112/57 - 150/75)  BP(mean): --  RR: 18 (18 Dec 2020 04:59) (18 - 18)  SpO2: 96% (18 Dec 2020 04:59) (81% - 96%)    PHYSICAL EXAMINATION:    GENERAL: The patient is a well-developed, well-nourished _____in no apparent distress.     HEENT: Head is normocephalic and atraumatic. Extraocular muscles are intact. Mucous membranes are moist.     NECK: Supple.     LUNGS: Clear to auscultation without wheezing, rales, or rhonchi. Respirations unlabored    HEART: Regular rate and rhythm without murmur.    ABDOMEN: Soft, nontender, and nondistended.  No hepatosplenomegaly is noted.    EXTREMITIES: Without any cyanosis, clubbing, rash, lesions or edema.    NEUROLOGIC: Grossly intact.      LABS:                        7.8    20.59 )-----------( 210      ( 18 Dec 2020 08:35 )             24.8     12-18    145  |  91<L>  |  39.0<H>  ----------------------------<  104<H>  4.1   |  46.0<HH>  |  0.89    Ca    8.5<L>      18 Dec 2020 08:31  Phos  3.3     12-17  Mg     2.0     12-17      PT/INR - ( 17 Dec 2020 09:42 )   PT: 21.1 sec;   INR: 1.87 ratio          Procalcitonin, Serum: 0.13 ng/mL (12-17-20 @ 12:55)  Procalcitonin, Serum: 0.13 ng/mL (12-15-20 @ 18:14)  Procalcitonin, Serum: 0.14 ng/mL (12-15-20 @ 12:04)      MICROBIOLOGY: Non-contrib ; COVID neg    RADIOLOGY & ADDITIONAL STUDIES:   EXAM:  XR CHEST PORTABLE URGENT 1V                          PROCEDURE DATE:  12/10/2020          INTERPRETATION:  AP chest on December 10, 2020 at 2:36 PM. 2 images. Patient had central line placement.    Heart magnified by technique.    There is an increasing left base infiltrate compared to earlier in the day. Endotracheal tube and nasogastric tube remain.    Present film shows a right jugular line with the tip in the superior vena caval area.    There may also be developing right base effusion rather small.    IMPRESSION: Increasing small basilar infiltrates left greater than right. Right jugular line inserted.    OLMAN PUCKETT MD; Attending Radiologist  This document has been electronically signed. Dec 10 2020  3:43PM    Echo on 12/14/20   1. Technically difficult study.   2. Endocardial visualization was enhanced with intravenous echo contrast.   3. Left ventricular ejection fraction, by visual estimation, is 60 to 65%.   4. Normal global left ventricular systolic function.   5. The left ventricular diastolic function could not be assessed in this study.   6. Normal left ventricular internal cavity size.   7. Moderately enlarged right ventricle. Moderately reduced RV systolic function.   8. The left atrium is normal in size.   9. Moderately enlarged right atrium.  10. Mild thickening and calcification of the anterior and posterior mitral valve leaflets.  11. Mild mitral valve regurgitation.  12. Mild tricuspid regurgitation.  13. There is no evidence of pericardial effusion.    MD [place] Electronically signed on 12/14/2020 at 4:23:18 PM

## 2020-12-19 NOTE — PROGRESS NOTE ADULT - SUBJECTIVE AND OBJECTIVE BOX
INTERVAL HPI/OVERNIGHT EVENTS: follow up of diabetes    MEDICATIONS  (STANDING):  acetaZOLAMIDE    Tablet 250 milliGRAM(s) Oral daily  albuterol/ipratropium for Nebulization 3 milliLiter(s) Nebulizer every 6 hours  aspirin  chewable 81 milliGRAM(s) Oral daily  atorvastatin 40 milliGRAM(s) Oral at bedtime  chlorhexidine 2% Cloths 1 Application(s) Topical <User Schedule>  citalopram 40 milliGRAM(s) Oral daily  clopidogrel Tablet 75 milliGRAM(s) Oral daily  dextrose 40% Gel 15 Gram(s) Oral once  dextrose 5%. 1000 milliLiter(s) (50 mL/Hr) IV Continuous <Continuous>  dextrose 5%. 1000 milliLiter(s) (100 mL/Hr) IV Continuous <Continuous>  dextrose 50% Injectable 25 Gram(s) IV Push once  dextrose 50% Injectable 12.5 Gram(s) IV Push once  dextrose 50% Injectable 25 Gram(s) IV Push once  digoxin     Tablet 0.125 milliGRAM(s) Oral daily  diltiazem    Tablet 60 milliGRAM(s) Oral four times a day  famotidine    Tablet 20 milliGRAM(s) Oral two times a day  glucagon  Injectable 1 milliGRAM(s) IntraMuscular once  influenza   Vaccine 0.5 milliLiter(s) IntraMuscular once  insulin glargine Injectable (LANTUS) 25 Unit(s) SubCutaneous two times a day  insulin lispro (ADMELOG) corrective regimen sliding scale   SubCutaneous Before meals and at bedtime  insulin lispro Injectable (ADMELOG) 9 Unit(s) SubCutaneous three times a day with meals  levETIRAcetam  IVPB 500 milliGRAM(s) IV Intermittent every 12 hours  methylPREDNISolone sodium succinate Injectable 40 milliGRAM(s) IV Push every 12 hours  metoprolol tartrate 25 milliGRAM(s) Oral two times a day  polyethylene glycol 3350 17 Gram(s) Oral daily  saccharomyces boulardii 250 milliGRAM(s) Oral two times a day    MEDICATIONS  (PRN):  aluminum hydroxide/magnesium hydroxide/simethicone Suspension 30 milliLiter(s) Oral every 4 hours PRN Dyspepsia      Allergies    No Known Allergies    Intolerances      Vital Signs Last 24 Hrs  T(C): 36.8 (19 Dec 2020 10:25), Max: 37.1 (19 Dec 2020 04:55)  T(F): 98.2 (19 Dec 2020 10:25), Max: 98.8 (19 Dec 2020 04:55)  HR: 105 (19 Dec 2020 10:25) (71 - 106)  BP: 120/61 (19 Dec 2020 10:25) (117/67 - 142/78)  BP(mean): --  RR: 18 (19 Dec 2020 10:25) (18 - 19)  SpO2: 97% (19 Dec 2020 10:25) (94% - 99%)        LABS:                        10.4   16.37 )-----------( 185      ( 19 Dec 2020 09:20 )             32.7     12-19    144  |  89<L>  |  37.0<H>  ----------------------------<  109<H>  4.1   |  50.0<HH>  |  0.89    Ca    8.6      19 Dec 2020 09:20    TPro  5.5<L>  /  Alb  3.2<L>  /  TBili  0.5  /  DBili  x   /  AST  35  /  ALT  48<H>  /  AlkPhos  94  12-19          POCT Blood Glucose.: 192 mg/dL (12-19-20 @ 13:09)  POCT Blood Glucose.: 86 mg/dL (12-19-20 @ 08:38)  POCT Blood Glucose.: 213 mg/dL (12-18-20 @ 22:56)  POCT Blood Glucose.: 282 mg/dL (12-18-20 @ 16:33)  POCT Blood Glucose.: 248 mg/dL (12-18-20 @ 11:44)  POCT Blood Glucose.: 130 mg/dL (12-18-20 @ 07:48)  POCT Blood Glucose.: 222 mg/dL (12-17-20 @ 22:47)  POCT Blood Glucose.: 221 mg/dL (12-17-20 @ 17:50)  POCT Blood Glucose.: 324 mg/dL (12-17-20 @ 12:56)  POCT Blood Glucose.: 274 mg/dL (12-17-20 @ 08:46)  POCT Blood Glucose.: 378 mg/dL (12-16-20 @ 21:17)  POCT Blood Glucose.: 411 mg/dL (12-16-20 @ 18:07)  POCT Blood Glucose.: 287 mg/dL (12-16-20 @ 16:39)

## 2020-12-19 NOTE — PROGRESS NOTE ADULT - ASSESSMENT
Assess    OHS/ ELISSA / COPD with right heart strain  Pneumonia  Acute on Chronic Hypoxic and Hypercarbic respiratory Failure - compensated  Pseudoaneurysm right groin - addressed   CAD post RCA Stent - atrial fibrillation     Rec    Complete ABX  AC  Rate control  DuoNeb to LABA/ICS and LAMA on DC  Steroid with taper  02  Nocturnal Bipap critical (Must assure has at home)  [facility] or [facility] per patient wishes

## 2020-12-19 NOTE — PROGRESS NOTE ADULT - SUBJECTIVE AND OBJECTIVE BOX
VASCULAR SURGERY PROGRESS NOTE    Interval: s/p IR R. groin thrombin injection for R. fem artery pseudoaneurysm Tolerated procedure well.     Subjective: Patient seen and examined at bedside. No complains. Decreased pain in groin. Feels area of ecchymosis is "hardening." Tolerating diet, voiding spontaneously.    Objective:   T(C): 36.9 (12-19-20 @ 03:21), Max: 37.3 (12-18-20 @ 04:59)  HR: 90 (12-19-20 @ 03:49) (71 - 106)  BP: 139/82 (12-19-20 @ 03:21) (114/68 - 139/82)  RR: 18 (12-19-20 @ 03:21) (18 - 19)  SpO2: 98% (12-19-20 @ 03:50) (94% - 98%)    PHYSICAL EXAM  Appearance: Normal, alert and cooperative	  Cardiovascular: Regular rate/rhythm   Respiratory: No additional work of breathing  Gastrointestinal:  Soft, Non-tender, Non-distended  Skin: No rashes, No ecchymoses, No cyanosis  Extremities: Normal range of motion, No clubbing, cyanosis or edema  Vascular:    LLE DP/PT palpable  RLE DP/PT w/ signal R groin without hematoma. Ecchymosis extends from groin access site to ant thigh. Soft. BLE with 1-2+ edema, WWP.

## 2020-12-19 NOTE — PROGRESS NOTE ADULT - ASSESSMENT
70y Male with past medical history significant for COPD, HTN, DM, pAF on coumadin, seizure d/o with recent hospitalization for hypercapnic respiratory failure presents with HTN emergency and subsequent PEA/VT arrest.  s/p cath with Severe RCA and LAD disease. The RCA was treated with PTCA and STENT (THI-Resolute) with IVUS with residual LAD lesion    R groin pseudoaneurysm post procedure  - s/p R. groin thrombin injection by IR 12/18 in pm  - H/H stable, continue to monitor H/H  - Currently on DAPT and will need AC in future for AF  - No acute vascular surgical intervention at this time.    Care per primary team   70y Male with past medical history significant for COPD, HTN, DM, pAF on coumadin, seizure d/o with recent hospitalization for hypercapnic respiratory failure presents with HTN emergency and subsequent PEA/VT arrest.  s/p cath with Severe RCA and LAD disease. The RCA was treated with PTCA and STENT (THI-Resolute) with IVUS with residual LAD lesion    R groin pseudoaneurysm post procedure  - s/p R. groin thrombin injection by IR 12/18 in pm  - H/H stable, continue to monitor H/H  - Currently on DAPT and will need AC in future for AF  - No acute vascular surgical intervention at this time.  - obtained a repeat arterial duplex which was unchanged, so vascular surgery will sign off at this time  - please re-consult as needed    Care per primary team

## 2020-12-19 NOTE — PROGRESS NOTE ADULT - SUBJECTIVE AND OBJECTIVE BOX
Patient is a 70y old  Male who presents with a chief complaint of Acute hypoxic/hypercapnic respiratory failure, cardiac arrest (19 Dec 2020 09:30)    pt seen and exam. Pt states he is feeling better then yesterday  rt groin swelling present, pain stable  No cp,sob, fever/chill      SUBJECTIVE / OVERNIGHT EVENTS:  REVIEW OF SYSTEMS: All systems are reviewed and found to be negative except above    MEDICATIONS  (STANDING):  acetaZOLAMIDE    Tablet 250 milliGRAM(s) Oral daily  albuterol/ipratropium for Nebulization 3 milliLiter(s) Nebulizer every 6 hours  aspirin  chewable 81 milliGRAM(s) Oral daily  atorvastatin 40 milliGRAM(s) Oral at bedtime  chlorhexidine 2% Cloths 1 Application(s) Topical <User Schedule>  citalopram 40 milliGRAM(s) Oral daily  clopidogrel Tablet 75 milliGRAM(s) Oral daily  dextrose 40% Gel 15 Gram(s) Oral once  dextrose 5%. 1000 milliLiter(s) (50 mL/Hr) IV Continuous <Continuous>  dextrose 5%. 1000 milliLiter(s) (100 mL/Hr) IV Continuous <Continuous>  dextrose 50% Injectable 25 Gram(s) IV Push once  dextrose 50% Injectable 12.5 Gram(s) IV Push once  dextrose 50% Injectable 25 Gram(s) IV Push once  digoxin     Tablet 0.125 milliGRAM(s) Oral daily  diltiazem    Tablet 60 milliGRAM(s) Oral four times a day  famotidine    Tablet 20 milliGRAM(s) Oral two times a day  glucagon  Injectable 1 milliGRAM(s) IntraMuscular once  influenza   Vaccine 0.5 milliLiter(s) IntraMuscular once  insulin glargine Injectable (LANTUS) 25 Unit(s) SubCutaneous two times a day  insulin lispro (ADMELOG) corrective regimen sliding scale   SubCutaneous Before meals and at bedtime  insulin lispro Injectable (ADMELOG) 9 Unit(s) SubCutaneous three times a day with meals  levETIRAcetam  IVPB 500 milliGRAM(s) IV Intermittent every 12 hours  methylPREDNISolone sodium succinate Injectable 40 milliGRAM(s) IV Push every 12 hours  metoprolol tartrate 25 milliGRAM(s) Oral two times a day  polyethylene glycol 3350 17 Gram(s) Oral daily  saccharomyces boulardii 250 milliGRAM(s) Oral two times a day    MEDICATIONS  (PRN):  aluminum hydroxide/magnesium hydroxide/simethicone Suspension 30 milliLiter(s) Oral every 4 hours PRN Dyspepsia      CAPILLARY BLOOD GLUCOSE      POCT Blood Glucose.: 192 mg/dL (19 Dec 2020 13:09)  POCT Blood Glucose.: 86 mg/dL (19 Dec 2020 08:38)  POCT Blood Glucose.: 213 mg/dL (18 Dec 2020 22:56)  POCT Blood Glucose.: 282 mg/dL (18 Dec 2020 16:33)    I&O's Summary    18 Dec 2020 07:01  -  19 Dec 2020 07:00  --------------------------------------------------------  IN: 455 mL / OUT: 575 mL / NET: -120 mL        PHYSICAL EXAM:  Vital Signs Last 24 Hrs  T(C): 36.8 (19 Dec 2020 10:25), Max: 37.1 (19 Dec 2020 04:55)  T(F): 98.2 (19 Dec 2020 10:25), Max: 98.8 (19 Dec 2020 04:55)  HR: 105 (19 Dec 2020 10:25) (71 - 106)  BP: 120/61 (19 Dec 2020 10:25) (117/67 - 142/78)  BP(mean): --  RR: 18 (19 Dec 2020 10:25) (18 - 19)  SpO2: 97% (19 Dec 2020 10:25) (94% - 99%)    CONSTITUTIONAL: NAD,  EYES: PERRLA; conjunctiva and sclera clear  RESPIRATORY: Normal respiratory effort; lungs are clear to auscultation bilaterally  CARDIOVASCULAR: Regular rate and rhythm, normal S1 and S2, no murmur   EXTS: rt lower extremity edema-mild tender upper thigh  ; Peripheral pulses are 2+ bilaterally  ABDOMEN: Nontender to palpation, normoactive bowel sounds, no rebound/guarding;   MUSCLOSKELETAL:   no clubbing or cyanosis of digits; no joint swelling or tenderness to palpation  PSYCH: affect appropriate  NEUROLOGY: A+O to person, place, and time; CN 2-12 are intact and symmetric; no gross sensory deficits;       LABS:                        10.4   16.37 )-----------( 185      ( 19 Dec 2020 09:20 )             32.7     12-19    144  |  89<L>  |  37.0<H>  ----------------------------<  109<H>  4.1   |  50.0<HH>  |  0.89    Ca    8.6      19 Dec 2020 09:20    TPro  5.5<L>  /  Alb  3.2<L>  /  TBili  0.5  /  DBili  x   /  AST  35  /  ALT  48<H>  /  AlkPhos  94  12-19    PT/INR - ( 19 Dec 2020 09:20 )   PT: 13.6 sec;   INR: 1.18 ratio         PTT - ( 19 Dec 2020 09:20 )  PTT:24.4 sec          Culture - Blood (collected 17 Dec 2020 12:56)  Source: .Blood Blood-Peripheral  Preliminary Report (19 Dec 2020 13:01):    No growth at 48 hours    Culture - Blood (collected 17 Dec 2020 12:56)  Source: .Blood Blood-Peripheral  Preliminary Report (19 Dec 2020 13:01):    No growth at 48 hours        RADIOLOGY & ADDITIONAL TESTS:  Results Reviewed:   Imaging Personally Reviewed:  Electrocardiogram Personally Reviewed:    COORDINATION OF CARE:  Care Discussed with Consultants/Other Providers [Y/N]:  Prior or Outpatient Records Reviewed [Y/N]:

## 2020-12-19 NOTE — PROGRESS NOTE ADULT - SUBJECTIVE AND OBJECTIVE BOX
[place] [facility] - [facility], THE HEART CENTER                                   [address] [place] [address]                                                      PHONE: [phone]                                                         FAX: [phone]  [email or web address]  ---------------------------------------------------------------------------------------------------------------------------------    Overnight events/patient complaints:  NAD feeling ok today     No Known Allergies    MEDICATIONS  (STANDING):  acetaZOLAMIDE    Tablet 250 milliGRAM(s) Oral daily  albuterol/ipratropium for Nebulization 3 milliLiter(s) Nebulizer every 6 hours  aspirin  chewable 81 milliGRAM(s) Oral daily  atorvastatin 40 milliGRAM(s) Oral at bedtime  chlorhexidine 2% Cloths 1 Application(s) Topical <User Schedule>  citalopram 40 milliGRAM(s) Oral daily  clopidogrel Tablet 75 milliGRAM(s) Oral daily  dextrose 40% Gel 15 Gram(s) Oral once  dextrose 5%. 1000 milliLiter(s) (50 mL/Hr) IV Continuous <Continuous>  dextrose 5%. 1000 milliLiter(s) (100 mL/Hr) IV Continuous <Continuous>  dextrose 50% Injectable 25 Gram(s) IV Push once  dextrose 50% Injectable 12.5 Gram(s) IV Push once  dextrose 50% Injectable 25 Gram(s) IV Push once  digoxin     Tablet 0.125 milliGRAM(s) Oral daily  diltiazem    Tablet 60 milliGRAM(s) Oral four times a day  famotidine    Tablet 20 milliGRAM(s) Oral two times a day  glucagon  Injectable 1 milliGRAM(s) IntraMuscular once  influenza   Vaccine 0.5 milliLiter(s) IntraMuscular once  insulin glargine Injectable (LANTUS) 25 Unit(s) SubCutaneous two times a day  insulin lispro (ADMELOG) corrective regimen sliding scale   SubCutaneous Before meals and at bedtime  insulin lispro Injectable (ADMELOG) 9 Unit(s) SubCutaneous three times a day with meals  levETIRAcetam  IVPB 500 milliGRAM(s) IV Intermittent every 12 hours  methylPREDNISolone sodium succinate Injectable 40 milliGRAM(s) IV Push every 12 hours  metoprolol tartrate 25 milliGRAM(s) Oral two times a day  polyethylene glycol 3350 17 Gram(s) Oral daily  saccharomyces boulardii 250 milliGRAM(s) Oral two times a day    MEDICATIONS  (PRN):  aluminum hydroxide/magnesium hydroxide/simethicone Suspension 30 milliLiter(s) Oral every 4 hours PRN Dyspepsia      Vital Signs Last 24 Hrs  T(C): 37.1 (19 Dec 2020 04:55), Max: 37.1 (19 Dec 2020 04:55)  T(F): 98.8 (19 Dec 2020 04:55), Max: 98.8 (19 Dec 2020 04:55)  HR: 90 (19 Dec 2020 08:08) (71 - 106)  BP: 142/78 (19 Dec 2020 04:55) (114/68 - 142/78)  BP(mean): --  RR: 18 (19 Dec 2020 04:55) (18 - 19)  SpO2: 99% (19 Dec 2020 04:55) (94% - 99%)  ICU Vital Signs Last 24 Hrs  CLAUDIA MABRY  I&O's Detail    18 Dec 2020 07:01  -  19 Dec 2020 07:00  --------------------------------------------------------  IN:    IV PiggyBack: 100 mL    PRBCs (Packed Red Blood Cells): 355 mL  Total IN: 455 mL    OUT:    Voided (mL): 575 mL  Total OUT: 575 mL    Total NET: -120 mL        I&O's Summary    18 Dec 2020 07:01  -  19 Dec 2020 07:00  --------------------------------------------------------  IN: 455 mL / OUT: 575 mL / NET: -120 mL      Drug Dosing Weight  CLAUDIA MABRY      PHYSICAL EXAM:  General: Appears well developed, well nourished alert and cooperative.  HEENT: Head; normocephalic, atraumatic.  Eyes: Pupils reactive, cornea wnl.  Neck: Supple, no nodes adenopathy, no NVD or carotid bruit or thyromegaly.  CARDIOVASCULAR: Normal S1 and S2, 2/6 murmur, rub, gallop or lift.   LUNGS: rhonchi noted B/L   ABDOMEN: Soft, nontender without mass or organomegaly. bowel sounds normoactive.  EXTREMITIES: No clubbing, cyanosis + edema. Distal pulses wnl.   SKIN: warm and dry with normal turgor.  NEURO: Alert/oriented x 3/normal motor exam. No pathologic reflexes.    PSYCH: normal affect.        LABS:                        10.4   16.37 )-----------( 185      ( 19 Dec 2020 09:20 )             32.7     12-18    145  |  91<L>  |  39.0<H>  ----------------------------<  104<H>  4.1   |  46.0<HH>  |  0.89    Ca    8.5<L>      18 Dec 2020 08:31  Phos  3.3     12-17  Mg     2.0     12-17      CLAUDIA MABRY      PT/INR - ( 17 Dec 2020 09:42 )   PT: 21.1 sec;   INR: 1.87 ratio               RADIOLOGY & ADDITIONAL STUDIES:    INTERPRETATION OF TELEMETRY (personally reviewed):        Summary:   1. Technically difficult study.   2. Endocardial visualization was enhanced with intravenous echo contrast.   3. Left ventricular ejection fraction, by visual estimation, is 60 to 65%.   4. Normal global left ventricular systolic function.   5. The left ventricular diastolic function could not be assessed in this study.   6. Normal left ventricular internal cavity size.   7. Moderately enlarged right ventricle. Moderately reduced RV systolic function.   8. The left atrium is normal in size.   9. Moderately enlarged right atrium.  10. Mild thickening and calcification of the anterior and posterior mitral valve leaflets.  11. Mild mitral valve regurgitation.  12. Mild tricuspid regurgitation.  13. There is no evidence of pericardial effusion.    MD [place] Electronically signed on 12/14/2020 at 4:23:18 PM      CARDIAC CATHETERIZATION:  VENTRICLES: LVEF 55%  CORONARY VESSELS: The coronary circulation is right dominant.  LM:   --  LM: Normal.  LAD:   --  Mid LAD: There was a 80 % stenosis.  CX:   --  Circumflex: Angiography showed minor luminal irregularities with  no flow limiting lesions. The vessel arose anomalously from the proximal  RCA.  RCA:   --  RCA: iFR index of 0.82  --  Mid RCA: There was a 80 % stenosis.  --  Distal RCA: There was a 90 % stenosis.  COMPLICATIONS: There were no complications. No complications occurred  during the cath lab visit.  DIAGNOSTIC IMPRESSIONS: Severe RCA and LAD disease. The RCA was treated  with PTCA and STENT (THI-Resolute) with IVUS  DIAGNOSTIC RECOMMENDATIONS: Plavix and coumadin Will need PCI of LAD in  future  INTERVENTIONAL IMPRESSIONS: Severe RCA and LAD disease. The RCA was treated  with PTCA and STENT (THI-Resolute) with IVUS  INTERVENTIONAL RECOMMENDATIONS: Plavix and coumadin Will need PCI of LAD in  future  Prepared and signed by  Jacob Benitez MD      Assessment and Plan:   · Assessment	  70y Male with past medical history significant for COPD, HTN, DM, pAF on coumadin, seizure d/o with recent hospitalization for hypercapnic respiratory failure presents with HTN emergency and subsequent PEA/VT arrest.  s/p cath with Severe RCA and LAD disease. The RCA was treated with PTCA and STENT (THI-Resolute) with IVUS with residual LAD lesion awaiting PCI next week if stable; Patient evaluated be EP and no indications for AICD given a clear provocative circumstances and normal EF on recent TTE see above RV failure due to COPD    Plan  1.  Awaiting LAD PCI next week when stable   2.  HOLD full dose AC at this time due to bleeding risk for AF management   3.  DAPT therapy and monitor H/H   4.  No indication for Amiodarone therapy as per EP; Discontinued today   5.  DC Lasix therapy IV for now and agree with Acetazolamide  6.  RFA pseudoaneurysm management as per vascular team

## 2020-12-20 LAB
ALBUMIN SERPL ELPH-MCNC: 3.1 G/DL — LOW (ref 3.3–5.2)
ALP SERPL-CCNC: 89 U/L — SIGNIFICANT CHANGE UP (ref 40–120)
ALT FLD-CCNC: 43 U/L — HIGH
ANION GAP SERPL CALC-SCNC: 5 MMOL/L — SIGNIFICANT CHANGE UP (ref 5–17)
APTT BLD: 23.7 SEC — LOW (ref 27.5–35.5)
AST SERPL-CCNC: 25 U/L — SIGNIFICANT CHANGE UP
BASOPHILS # BLD AUTO: 0.01 K/UL — SIGNIFICANT CHANGE UP (ref 0–0.2)
BASOPHILS NFR BLD AUTO: 0.1 % — SIGNIFICANT CHANGE UP (ref 0–2)
BILIRUB SERPL-MCNC: 0.5 MG/DL — SIGNIFICANT CHANGE UP (ref 0.4–2)
BUN SERPL-MCNC: 35 MG/DL — HIGH (ref 8–20)
CALCIUM SERPL-MCNC: 8.7 MG/DL — SIGNIFICANT CHANGE UP (ref 8.6–10.2)
CHLORIDE SERPL-SCNC: 93 MMOL/L — LOW (ref 98–107)
CO2 SERPL-SCNC: 46 MMOL/L — CRITICAL HIGH (ref 22–29)
CREAT SERPL-MCNC: 0.87 MG/DL — SIGNIFICANT CHANGE UP (ref 0.5–1.3)
CULTURE RESULTS: SIGNIFICANT CHANGE UP
CULTURE RESULTS: SIGNIFICANT CHANGE UP
EOSINOPHIL # BLD AUTO: 0 K/UL — SIGNIFICANT CHANGE UP (ref 0–0.5)
EOSINOPHIL NFR BLD AUTO: 0 % — SIGNIFICANT CHANGE UP (ref 0–6)
GLUCOSE BLDC GLUCOMTR-MCNC: 145 MG/DL — HIGH (ref 70–99)
GLUCOSE BLDC GLUCOMTR-MCNC: 204 MG/DL — HIGH (ref 70–99)
GLUCOSE BLDC GLUCOMTR-MCNC: 221 MG/DL — HIGH (ref 70–99)
GLUCOSE BLDC GLUCOMTR-MCNC: 96 MG/DL — SIGNIFICANT CHANGE UP (ref 70–99)
GLUCOSE SERPL-MCNC: 207 MG/DL — HIGH (ref 70–99)
HCT VFR BLD CALC: 32.4 % — LOW (ref 39–50)
HGB BLD-MCNC: 10.1 G/DL — LOW (ref 13–17)
IMM GRANULOCYTES NFR BLD AUTO: 0.7 % — SIGNIFICANT CHANGE UP (ref 0–1.5)
INR BLD: 1.09 RATIO — SIGNIFICANT CHANGE UP (ref 0.88–1.16)
LYMPHOCYTES # BLD AUTO: 0.31 K/UL — LOW (ref 1–3.3)
LYMPHOCYTES # BLD AUTO: 2 % — LOW (ref 13–44)
MCHC RBC-ENTMCNC: 28.5 PG — SIGNIFICANT CHANGE UP (ref 27–34)
MCHC RBC-ENTMCNC: 31.2 GM/DL — LOW (ref 32–36)
MCV RBC AUTO: 91.3 FL — SIGNIFICANT CHANGE UP (ref 80–100)
MONOCYTES # BLD AUTO: 0.88 K/UL — SIGNIFICANT CHANGE UP (ref 0–0.9)
MONOCYTES NFR BLD AUTO: 5.8 % — SIGNIFICANT CHANGE UP (ref 2–14)
NEUTROPHILS # BLD AUTO: 13.9 K/UL — HIGH (ref 1.8–7.4)
NEUTROPHILS NFR BLD AUTO: 91.4 % — HIGH (ref 43–77)
PLATELET # BLD AUTO: 185 K/UL — SIGNIFICANT CHANGE UP (ref 150–400)
POTASSIUM SERPL-MCNC: 4.5 MMOL/L — SIGNIFICANT CHANGE UP (ref 3.5–5.3)
POTASSIUM SERPL-SCNC: 4.5 MMOL/L — SIGNIFICANT CHANGE UP (ref 3.5–5.3)
PROT SERPL-MCNC: 5.3 G/DL — LOW (ref 6.6–8.7)
PROTHROM AB SERPL-ACNC: 12.6 SEC — SIGNIFICANT CHANGE UP (ref 10.6–13.6)
RBC # BLD: 3.55 M/UL — LOW (ref 4.2–5.8)
RBC # FLD: 14.4 % — SIGNIFICANT CHANGE UP (ref 10.3–14.5)
SODIUM SERPL-SCNC: 144 MMOL/L — SIGNIFICANT CHANGE UP (ref 135–145)
SPECIMEN SOURCE: SIGNIFICANT CHANGE UP
SPECIMEN SOURCE: SIGNIFICANT CHANGE UP
WBC # BLD: 15.2 K/UL — HIGH (ref 3.8–10.5)
WBC # FLD AUTO: 15.2 K/UL — HIGH (ref 3.8–10.5)

## 2020-12-20 PROCEDURE — 99232 SBSQ HOSP IP/OBS MODERATE 35: CPT

## 2020-12-20 PROCEDURE — 99233 SBSQ HOSP IP/OBS HIGH 50: CPT

## 2020-12-20 RX ORDER — INSULIN LISPRO 100/ML
12 VIAL (ML) SUBCUTANEOUS
Refills: 0 | Status: DISCONTINUED | OUTPATIENT
Start: 2020-12-20 | End: 2020-12-23

## 2020-12-20 RX ORDER — INSULIN GLARGINE 100 [IU]/ML
25 INJECTION, SOLUTION SUBCUTANEOUS
Refills: 0 | Status: DISCONTINUED | OUTPATIENT
Start: 2020-12-20 | End: 2020-12-21

## 2020-12-20 RX ADMIN — Medication 60 MILLIGRAM(S): at 05:17

## 2020-12-20 RX ADMIN — Medication 9 UNIT(S): at 09:38

## 2020-12-20 RX ADMIN — LEVETIRACETAM 420 MILLIGRAM(S): 250 TABLET, FILM COATED ORAL at 05:17

## 2020-12-20 RX ADMIN — Medication 3 MILLILITER(S): at 08:01

## 2020-12-20 RX ADMIN — Medication 81 MILLIGRAM(S): at 11:35

## 2020-12-20 RX ADMIN — Medication 4: at 13:21

## 2020-12-20 RX ADMIN — Medication 3 MILLILITER(S): at 14:27

## 2020-12-20 RX ADMIN — Medication 250 MILLIGRAM(S): at 17:34

## 2020-12-20 RX ADMIN — INSULIN GLARGINE 25 UNIT(S): 100 INJECTION, SOLUTION SUBCUTANEOUS at 21:27

## 2020-12-20 RX ADMIN — CHLORHEXIDINE GLUCONATE 1 APPLICATION(S): 213 SOLUTION TOPICAL at 05:16

## 2020-12-20 RX ADMIN — CLOPIDOGREL BISULFATE 75 MILLIGRAM(S): 75 TABLET, FILM COATED ORAL at 11:35

## 2020-12-20 RX ADMIN — FAMOTIDINE 20 MILLIGRAM(S): 10 INJECTION INTRAVENOUS at 05:17

## 2020-12-20 RX ADMIN — Medication 40 MILLIGRAM(S): at 17:35

## 2020-12-20 RX ADMIN — INSULIN GLARGINE 22 UNIT(S): 100 INJECTION, SOLUTION SUBCUTANEOUS at 09:37

## 2020-12-20 RX ADMIN — Medication 9 UNIT(S): at 13:21

## 2020-12-20 RX ADMIN — Medication 250 MILLIGRAM(S): at 05:17

## 2020-12-20 RX ADMIN — Medication 60 MILLIGRAM(S): at 23:22

## 2020-12-20 RX ADMIN — POLYETHYLENE GLYCOL 3350 17 GRAM(S): 17 POWDER, FOR SOLUTION ORAL at 11:35

## 2020-12-20 RX ADMIN — Medication 0.12 MILLIGRAM(S): at 05:17

## 2020-12-20 RX ADMIN — LEVETIRACETAM 420 MILLIGRAM(S): 250 TABLET, FILM COATED ORAL at 17:34

## 2020-12-20 RX ADMIN — Medication 3 MILLILITER(S): at 21:25

## 2020-12-20 RX ADMIN — Medication 25 MILLIGRAM(S): at 17:34

## 2020-12-20 RX ADMIN — Medication 4: at 09:38

## 2020-12-20 RX ADMIN — CITALOPRAM 40 MILLIGRAM(S): 10 TABLET, FILM COATED ORAL at 11:35

## 2020-12-20 RX ADMIN — ATORVASTATIN CALCIUM 40 MILLIGRAM(S): 80 TABLET, FILM COATED ORAL at 21:27

## 2020-12-20 RX ADMIN — Medication 60 MILLIGRAM(S): at 11:35

## 2020-12-20 RX ADMIN — Medication 40 MILLIGRAM(S): at 05:18

## 2020-12-20 RX ADMIN — FAMOTIDINE 20 MILLIGRAM(S): 10 INJECTION INTRAVENOUS at 17:34

## 2020-12-20 RX ADMIN — Medication 3 MILLILITER(S): at 04:59

## 2020-12-20 RX ADMIN — Medication 25 MILLIGRAM(S): at 05:17

## 2020-12-20 RX ADMIN — Medication 60 MILLIGRAM(S): at 17:34

## 2020-12-20 RX ADMIN — ACETAZOLAMIDE 250 MILLIGRAM(S): 250 TABLET ORAL at 11:35

## 2020-12-20 NOTE — PROGRESS NOTE ADULT - SUBJECTIVE AND OBJECTIVE BOX
PULMONARY PROGRESS NOTE      [id number]    Patient is a 70y old  Male who presents with a chief complaint of Acute hypoxic/hypercapnic respiratory failure, cardiac arrest (19 Dec 2020 13:23)  COPD, Obesity , HTN, DM2, afib on coumadin and Sz disorder  Post LOC, Vent , brief PEA  Says he gets 02 and Bipap at [facility]   Non-compliant with Bipap at home       INTERVAL HPI/OVERNIGHT EVENTS:  Better post vascular procedure (thrombin injection of pseudoaneurysm) and post 2 nights using Bipap        MEDICATIONS  (STANDING):  acetaZOLAMIDE    Tablet 250 milliGRAM(s) Oral daily  albuterol/ipratropium for Nebulization 3 milliLiter(s) Nebulizer every 6 hours  aspirin  chewable 81 milliGRAM(s) Oral daily  atorvastatin 40 milliGRAM(s) Oral at bedtime  chlorhexidine 2% Cloths 1 Application(s) Topical <User Schedule>  citalopram 40 milliGRAM(s) Oral daily  clopidogrel Tablet 75 milliGRAM(s) Oral daily  dextrose 40% Gel 15 Gram(s) Oral once  dextrose 5%. 1000 milliLiter(s) (50 mL/Hr) IV Continuous <Continuous>  dextrose 5%. 1000 milliLiter(s) (100 mL/Hr) IV Continuous <Continuous>  dextrose 50% Injectable 25 Gram(s) IV Push once  dextrose 50% Injectable 12.5 Gram(s) IV Push once  dextrose 50% Injectable 25 Gram(s) IV Push once  digoxin     Tablet 0.125 milliGRAM(s) Oral daily  diltiazem    Tablet 60 milliGRAM(s) Oral four times a day  famotidine    Tablet 20 milliGRAM(s) Oral two times a day  glucagon  Injectable 1 milliGRAM(s) IntraMuscular once  influenza   Vaccine 0.5 milliLiter(s) IntraMuscular once  insulin glargine Injectable (LANTUS) 22 Unit(s) SubCutaneous two times a day  insulin lispro (ADMELOG) corrective regimen sliding scale   SubCutaneous Before meals and at bedtime  insulin lispro Injectable (ADMELOG) 9 Unit(s) SubCutaneous three times a day with meals  levETIRAcetam  IVPB 500 milliGRAM(s) IV Intermittent every 12 hours  methylPREDNISolone sodium succinate Injectable 40 milliGRAM(s) IV Push every 12 hours  metoprolol tartrate 25 milliGRAM(s) Oral two times a day  polyethylene glycol 3350 17 Gram(s) Oral daily  saccharomyces boulardii 250 milliGRAM(s) Oral two times a day      MEDICATIONS  (PRN):  aluminum hydroxide/magnesium hydroxide/simethicone Suspension 30 milliLiter(s) Oral every 4 hours PRN Dyspepsia      Allergies    No Known Allergies    Intolerances        PAST MEDICAL & SURGICAL HISTORY:  Seizure    Depression, unspecified depression type    Diabetes    Hypertension    Hyperlipidemia    Afib    COPD (chronic obstructive pulmonary disease)    Abdominal hernia    H/O carotid endarterectomy        SOCIAL HISTORY  Smoking History:       REVIEW OF SYSTEMS:    CONSTITUTIONAL:  No distress    HEENT:  Eyes:  No diplopia or blurred vision. ENT:  No earache, sore throat or runny nose.    CARDIOVASCULAR:  No pressure, squeezing, tightness, heaviness or aching about the chest; no palpitations.    RESPIRATORY:  No cough, shortness of breath, PND or orthopnea. Mild SOBOE    GASTROINTESTINAL:  No nausea, vomiting or diarrhea.    GENITOURINARY:  No dysuria, frequency or urgency.    NEUROLOGIC:  No paresthesias, fasciculations, seizures or weakness.    PSYCHIATRIC:  No disorder of thought or mood.    Vital Signs Last 24 Hrs  T(C): 36.4 (20 Dec 2020 05:14), Max: 36.8 (19 Dec 2020 10:25)  T(F): 97.5 (20 Dec 2020 05:14), Max: 98.2 (19 Dec 2020 10:25)  HR: 90 (20 Dec 2020 08:03) (76 - 105)  BP: 144/74 (20 Dec 2020 05:14) (113/61 - 144/74)  BP(mean): --  RR: 18 (19 Dec 2020 16:20) (18 - 18)  SpO2: 100% (20 Dec 2020 05:14) (97% - 100%)    PHYSICAL EXAMINATION:    GENERAL: The patient is awake and alert in no apparent distress.     HEENT: Head is normocephalic and atraumatic. Extraocular muscles are intact. Mucous membranes are moist.    NECK: Supple.    LUNGS: Clear to auscultation without wheezing, rales or rhonchi; respirations unlabored    HEART: Regular rate and rhythm without murmur.    ABDOMEN: Soft, nontender, and nondistended.      EXTREMITIES: Without any cyanosis, clubbing, rash, lesions or edema.    NEUROLOGIC: Grossly intact.    LABS:                        10.1   15.20 )-----------( 185      ( 20 Dec 2020 07:25 )             32.4     12-20    144  |  93<L>  |  35.0<H>  ----------------------------<  207<H>  4.5   |  46.0<HH>  |  0.87    Ca    8.7      20 Dec 2020 07:25    TPro  5.3<L>  /  Alb  3.1<L>  /  TBili  0.5  /  DBili  x   /  AST  25  /  ALT  43<H>  /  AlkPhos  89  12-20    PT/INR - ( 20 Dec 2020 07:25 )   PT: 12.6 sec;   INR: 1.09 ratio         PTT - ( 20 Dec 2020 07:25 )  PTT:23.7 sec    ABG - ( 18 Dec 2020 11:06 )  pH, Arterial: 7.37  pH, Blood: x     /  pCO2: 89    /  pO2: 110   / HCO3: 48    / Base Excess: 23.0  /  SaO2: 99            Procalcitonin, Serum: 0.13 ng/mL (12-17-20 @ 12:55)      MICROBIOLOGY:  Non-contrib ; COVID neg    RADIOLOGY & ADDITIONAL STUDIES:       EXAM:  XR CHEST PORTABLE URGENT 1V                          PROCEDURE DATE:  12/10/2020      INTERPRETATION:  AP chest on December 10, 2020 at 2:36 PM. 2 images. Patient had central line placement.    Heart magnified by technique.    There is an increasing left base infiltrate compared to earlier in the day. Endotracheal tube and nasogastric tube remain.    Present film shows a right jugular line with the tip in the superior vena caval area.    There may also be developing right base effusion rather small.    IMPRESSION: Increasing small basilar infiltrates left greater than right. Right jugular line inserted.    OLMAN PUCKETT MD; Attending Radiologist  This document has been electronically signed. Dec 10 2020  3:43PM    Echo on 12/14/20   1. Technically difficult study.   2. Endocardial visualization was enhanced with intravenous echo contrast.   3. Left ventricular ejection fraction, by visual estimation, is 60 to 65%.   4. Normal global left ventricular systolic function.   5. The left ventricular diastolic function could not be assessed in this study.   6. Normal left ventricular internal cavity size.   7. Moderately enlarged right ventricle. Moderately reduced RV systolic function.   8. The left atrium is normal in size.   9. Moderately enlarged right atrium.  10. Mild thickening and calcification of the anterior and posterior mitral valve leaflets.  11. Mild mitral valve regurgitation.  12. Mild tricuspid regurgitation.  13. There is no evidence of pericardial effusion.    MD [place] Electronically signed on 12/14/2020 at 4:23:18 PM

## 2020-12-20 NOTE — PROGRESS NOTE ADULT - ASSESSMENT
Assess    OHS/ ELISSA / COPD with right heart strain  Pneumonia  Acute on Chronic Hypoxic and Hypercarbic respiratory Failure - compensated  Pseudoaneurysm right groin - addressed   CAD post RCA Stent - atrial fibrillation     Rec    Complete ABX  AC  Rate control  EP evaluation  DuoNeb to LABA/ICS and LAMA on DC  Steroid with taper  02  Nocturnal Bipap critical (Must assure has at home)  Repeat labs including ABG soon   [facility] or [facility] per patient wishes

## 2020-12-20 NOTE — PROGRESS NOTE ADULT - SUBJECTIVE AND OBJECTIVE BOX
[place] [facility] - [facility], THE [facility]ART C[facility]                                   [address] [place] [address]                                                      PHONE: [phone]                                                         FAX: [phone]  [email or web address]  ---------------------------------------------------------------------------------------------------------------------------------    Overnight events/patient complaints:  NAD feeling better today; + SILVEIRA     No Known Allergies    MEDICATIONS  (STANDING):  acetaZOLAMIDE    Tablet 250 milliGRAM(s) Oral daily  albuterol/ipratropium for Nebulization 3 milliLiter(s) Nebulizer every 6 hours  aspirin  chewable 81 milliGRAM(s) Oral daily  atorvastatin 40 milliGRAM(s) Oral at bedtime  chlorhexidine 2% Cloths 1 Application(s) Topical <User Schedule>  citalopram 40 milliGRAM(s) Oral daily  clopidogrel Tablet 75 milliGRAM(s) Oral daily  dextrose 40% Gel 15 Gram(s) Oral once  dextrose 5%. 1000 milliLiter(s) (50 mL/Hr) IV Continuous <Continuous>  dextrose 5%. 1000 milliLiter(s) (100 mL/Hr) IV Continuous <Continuous>  dextrose 50% Injectable 25 Gram(s) IV Push once  dextrose 50% Injectable 12.5 Gram(s) IV Push once  dextrose 50% Injectable 25 Gram(s) IV Push once  digoxin     Tablet 0.125 milliGRAM(s) Oral daily  diltiazem    Tablet 60 milliGRAM(s) Oral four times a day  famotidine    Tablet 20 milliGRAM(s) Oral two times a day  glucagon  Injectable 1 milliGRAM(s) IntraMuscular once  influenza   Vaccine 0.5 milliLiter(s) IntraMuscular once  insulin glargine Injectable (LANTUS) 22 Unit(s) SubCutaneous two times a day  insulin lispro (ADMELOG) corrective regimen sliding scale   SubCutaneous Before meals and at bedtime  insulin lispro Injectable (ADMELOG) 9 Unit(s) SubCutaneous three times a day with meals  levETIRAcetam  IVPB 500 milliGRAM(s) IV Intermittent every 12 hours  methylPREDNISolone sodium succinate Injectable 40 milliGRAM(s) IV Push every 12 hours  metoprolol tartrate 25 milliGRAM(s) Oral two times a day  polyethylene glycol 3350 17 Gram(s) Oral daily  saccharomyces boulardii 250 milliGRAM(s) Oral two times a day    MEDICATIONS  (PRN):  aluminum hydroxide/magnesium hydroxide/simethicone Suspension 30 milliLiter(s) Oral every 4 hours PRN Dyspepsia      Vital Signs Last 24 Hrs  T(C): 36.4 (20 Dec 2020 10:00), Max: 36.5 (19 Dec 2020 16:20)  T(F): 97.6 (20 Dec 2020 10:00), Max: 97.7 (19 Dec 2020 16:20)  HR: 92 (20 Dec 2020 10:00) (76 - 95)  BP: 128/74 (20 Dec 2020 10:00) (113/61 - 144/74)  BP(mean): --  RR: 20 (20 Dec 2020 10:00) (18 - 20)  SpO2: 95% (20 Dec 2020 10:00) (95% - 100%)  ICU Vital Signs Last 24 Hrs  CLAUDIA MABRY  I&O's Detail    I&O's Summary    Drug Dosing Weight  CLAUDIA MABRY      PHYSICAL EXAM:  General: Appears well developed, well nourished alert and cooperative.  HEENT: Head; normocephalic, atraumatic.  Eyes: Pupils reactive, cornea wnl.  Neck: Supple, no nodes adenopathy, no NVD or carotid bruit or thyromegaly.  CARDIOVASCULAR: Normal S1 and S2, 1/6 murmur, rub, gallop or lift.   LUNGS:  + rhonchi + wheeze  ABDOMEN: Soft, nontender without mass or organomegaly. bowel sounds normoactive.  EXTREMITIES: No clubbing, cyanosis or edema. Distal pulses wnl.   SKIN: warm and dry with normal turgor.  NEURO: Alert/oriented x 3/normal motor exam. No pathologic reflexes.    PSYCH: normal affect.        LABS:                        10.1   15.20 )-----------( 185      ( 20 Dec 2020 07:25 )             32.4     12-20    144  |  93<L>  |  35.0<H>  ----------------------------<  207<H>  4.5   |  46.0<HH>  |  0.87    Ca    8.7      20 Dec 2020 07:25    TPro  5.3<L>  /  Alb  3.1<L>  /  TBili  0.5  /  DBili  x   /  AST  25  /  ALT  43<H>  /  AlkPhos  89  12-20    CLAUDIA MABRY      PT/INR - ( 20 Dec 2020 07:25 )   PT: 12.6 sec;   INR: 1.09 ratio         PTT - ( 20 Dec 2020 07:25 )  PTT:23.7 sec      RADIOLOGY & ADDITIONAL STUDIES:        Summary:   1. Technically difficult study.   2. Endocardial visualization was enhanced with intravenous echo contrast.   3. Left ventricular ejection fraction, by visual estimation, is 60 to 65%.   4. Normal global left ventricular systolic function.   5. The left ventricular diastolic function could not be assessed in this study.   6. Normal left ventricular internal cavity size.   7. Moderately enlarged right ventricle. Moderately reduced RV systolic function.   8. The left atrium is normal in size.   9. Moderately enlarged right atrium.  10. Mild thickening and calcification of the anterior and posterior mitral valve leaflets.  11. Mild mitral valve regurgitation.  12. Mild tricuspid regurgitation.  13. There is no evidence of pericardial effusion.    MD [place] Electronically signed on 12/14/2020 at 4:23:18 PM      CARDIAC CATHETERIZATION:  VENTRICLES: LVEF 55%  CORONARY VESSELS: The coronary circulation is right dominant.  LM:   --  LM: Normal.  LAD:   --  Mid LAD: There was a 80 % stenosis.  CX:   --  Circumflex: Angiography showed minor luminal irregularities with  no flow limiting lesions. The vessel arose anomalously from the proximal  RCA.  RCA:   --  RCA: iFR index of 0.82  --  Mid RCA: There was a 80 % stenosis.  --  Distal RCA: There was a 90 % stenosis.  COMPLICATIONS: There were no complications. No complications occurred  during the cath lab visit.  DIAGNOSTIC IMPRESSIONS: Severe RCA and LAD disease. The RCA was treated  with PTCA and STENT (THI-Resolute) with IVUS  DIAGNOSTIC RECOMMENDATIONS: Plavix and coumadin Will need PCI of LAD in  future  INTERVENTIONAL IMPRESSIONS: Severe RCA and LAD disease. The RCA was treated  with PTCA and STENT (THI-Resolute) with IVUS  INTERVENTIONAL RECOMMENDATIONS: Plavix and coumadin Will need PCI of LAD in  future  Prepared and signed by  Jacob Benitez MD      Assessment and Plan:   · Assessment	  70y Male with past medical history significant for COPD, HTN, DM, pAF on coumadin, seizure d/o with recent hospitalization for hypercapnic respiratory failure presents with HTN emergency and subsequent PEA/VT arrest.  s/p cath with Severe RCA and LAD disease. The RCA was treated with PTCA and STENT (THI-Resolute) with IVUS with residual LAD lesion awaiting PCI next week if stable; Patient evaluated be EP and no indications for AICD given a clear provocative circumstances and normal EF on recent TTE see above RV failure due to COPD    Plan  1.  Awaiting LAD PCI next week when stable   2.  HOLD full dose AC at this time due to bleeding risk for AF management   3.  DAPT therapy and monitor H/H   4.  No indication for Amiodarone therapy as per EP; Discontinued today   5.  DC Lasix therapy IV for now and agree with Acetazolamide  6.  RFA pseudoaneurysm management as per vascular team

## 2020-12-20 NOTE — PROGRESS NOTE ADULT - SUBJECTIVE AND OBJECTIVE BOX
Patient is a 70y old  Male who presents with a chief complaint of Acute hypoxic/hypercapnic respiratory failure, cardiac arrest (20 Dec 2020 11:39)    pt is breathing well. Denies cp,sob stable with NC am  c/o mild pain right thigh  NO FEVER,CHILL  SUBJECTIVE / OVERNIGHT EVENTS:  REVIEW OF SYSTEMS: All systems are reviewed and found to be negative except above    MEDICATIONS  (STANDING):  acetaZOLAMIDE    Tablet 250 milliGRAM(s) Oral daily  albuterol/ipratropium for Nebulization 3 milliLiter(s) Nebulizer every 6 hours  aspirin  chewable 81 milliGRAM(s) Oral daily  atorvastatin 40 milliGRAM(s) Oral at bedtime  chlorhexidine 2% Cloths 1 Application(s) Topical <User Schedule>  citalopram 40 milliGRAM(s) Oral daily  clopidogrel Tablet 75 milliGRAM(s) Oral daily  dextrose 40% Gel 15 Gram(s) Oral once  dextrose 5%. 1000 milliLiter(s) (50 mL/Hr) IV Continuous <Continuous>  dextrose 5%. 1000 milliLiter(s) (100 mL/Hr) IV Continuous <Continuous>  dextrose 50% Injectable 25 Gram(s) IV Push once  dextrose 50% Injectable 12.5 Gram(s) IV Push once  dextrose 50% Injectable 25 Gram(s) IV Push once  digoxin     Tablet 0.125 milliGRAM(s) Oral daily  diltiazem    Tablet 60 milliGRAM(s) Oral four times a day  famotidine    Tablet 20 milliGRAM(s) Oral two times a day  glucagon  Injectable 1 milliGRAM(s) IntraMuscular once  influenza   Vaccine 0.5 milliLiter(s) IntraMuscular once  insulin glargine Injectable (LANTUS) 22 Unit(s) SubCutaneous two times a day  insulin lispro (ADMELOG) corrective regimen sliding scale   SubCutaneous Before meals and at bedtime  insulin lispro Injectable (ADMELOG) 9 Unit(s) SubCutaneous three times a day with meals  levETIRAcetam  IVPB 500 milliGRAM(s) IV Intermittent every 12 hours  methylPREDNISolone sodium succinate Injectable 40 milliGRAM(s) IV Push every 12 hours  metoprolol tartrate 25 milliGRAM(s) Oral two times a day  polyethylene glycol 3350 17 Gram(s) Oral daily  saccharomyces boulardii 250 milliGRAM(s) Oral two times a day    MEDICATIONS  (PRN):  aluminum hydroxide/magnesium hydroxide/simethicone Suspension 30 milliLiter(s) Oral every 4 hours PRN Dyspepsia      CAPILLARY BLOOD GLUCOSE      POCT Blood Glucose.: 221 mg/dL (20 Dec 2020 12:48)  POCT Blood Glucose.: 204 mg/dL (20 Dec 2020 09:36)  POCT Blood Glucose.: 264 mg/dL (19 Dec 2020 20:48)  POCT Blood Glucose.: 233 mg/dL (19 Dec 2020 17:45)    I&O's Summary      PHYSICAL EXAM:  Vital Signs Last 24 Hrs  T(C): 36.4 (20 Dec 2020 10:00), Max: 36.5 (19 Dec 2020 16:20)  T(F): 97.6 (20 Dec 2020 10:00), Max: 97.7 (19 Dec 2020 16:20)  HR: 92 (20 Dec 2020 10:00) (76 - 95)  BP: 128/74 (20 Dec 2020 10:00) (113/61 - 144/74)  BP(mean): --  RR: 20 (20 Dec 2020 10:00) (18 - 20)  SpO2: 95% (20 Dec 2020 10:00) (95% - 100%)    CONSTITUTIONAL: NAD,   EYES: PERRLA; conjunctiva and sclera clear  ENMT: Moist oral mucosa,  RESPIRATORY: Normal respiratory effort; Rhonchi to auscultation bilaterally  CARDIOVASCULAR: Regular rate and rhythm, normal S1 and S2,    EXTS: RT lower extremity edema-TENDER upper thigh, erythema; Peripheral pulses are 2+ bilaterally  ABDOMEN: Nontender to palpation, normoactive bowel sounds, no rebound/guarding;   MUSCLOSKELETAL:   no clubbing or cyanosis of digits; no joint swelling or tenderness to palpation  PSYCH: affect appropriate  NEUROLOGY: A+O to person, place, and time; CN 2-12 are intact and symmetric; no gross sensory deficits;       LABS:                        10.1   15.20 )-----------( 185      ( 20 Dec 2020 07:25 )             32.4     12-20    144  |  93<L>  |  35.0<H>  ----------------------------<  207<H>  4.5   |  46.0<HH>  |  0.87    Ca    8.7      20 Dec 2020 07:25    TPro  5.3<L>  /  Alb  3.1<L>  /  TBili  0.5  /  DBili  x   /  AST  25  /  ALT  43<H>  /  AlkPhos  89  12-20    PT/INR - ( 20 Dec 2020 07:25 )   PT: 12.6 sec;   INR: 1.09 ratio         PTT - ( 20 Dec 2020 07:25 )  PTT:23.7 sec            RADIOLOGY & ADDITIONAL TESTS:  Results Reviewed:   Imaging Personally Reviewed:  Electrocardiogram Personally Reviewed:    COORDINATION OF CARE:  Care Discussed with Consultants/Other Providers [Y/N]:  Prior or Outpatient Records Reviewed [Y/N]:

## 2020-12-20 NOTE — PROGRESS NOTE ADULT - ASSESSMENT
70y Male with past medical history significant for COPD, HTN, DM, pAF on coumadin, seizure d/o with recent hospitalization for hypercapnic respiratory failure presents with HTN emergency and subsequent PEA/VT arrest on 12/10 this admission.     Right groin pseudoaneurysm s/p thrombin injection   -  US Duplex Arterial Lower Ext Ltd, Right (12.19.20 @ 11:24) >  The larger of the 2 pseudoaneurysms seen on the prior study remains thrombosed and measures 2.5 x 2.8 x 0.9 cm.  The smaller pseudoaneurysm demonstrating residual flow on the prior study adjacent to the common femoral artery is not well seen on the current study, possibly related to technical factors.       - CT RLE and RLE arterial duplex with either a single right groin pseudoaneurysm measuring 1.6 x 1.5 x 1.4 cm with dilatation of a portion of the neck measuring up to 0.9 cm transverse or 2 adjacent pseudoaneurysms with the more proximal pseudoaneurysm measuring 0.9 x 0.7 cm and the more distal pseudoaneurysm measuring 1.6 x 1.5 x 1.4 cm.  - Vascular following.  - IR for thrombin injection 12/18  - AC on hold.will resume when okay with vascular  -s/p  Transfuse 2units PRBC 12/18    CAD   - s/p cath with Severe RCA and LAD disease on 12/15. The RCA was treated with PTCA and STENT (THI-Resolute) with IVUS with residual LAD lesion  - Plan for intervention on LAD 12/18, to be done with anesthesia - cancelled secondary to hgb 7.8.   - Large right groin hematoma, and noted hgb drop (8.7-->7.8). Discussed with Cardiology, who is aware of hematoma.  - CT RLE and RLE arterial duplex with either a single right groin pseudoaneurysm measuring 1.6 x 1.5 x 1.4 cm with dilatation of a portion of the neck measuring up to 0.9 cm transverse or 2 adjacent pseudoaneurysms with the more proximal pseudoaneurysm measuring 0.9 x 0.7 cm and the more distal pseudoaneurysm measuring 1.6 x 1.5 x 1.4 cm.  - AC held for now. SCD boots ordered  - Continue ASA with Plavix, BB, Statin     HTN  - c/w metoprolol and cardizem and digoxin    AFib  - Rate controlled with diltiazem/Metoprolol  - coumadin held at this time    Cardiac arrest from/ VT  - amiodarone decreased to 200 mg daily per Dr Arroyo recommendations   - Will have EP evaluation for AICD    Blood loss anemia  - ppx lovenox held due to groin hematoma.   - S/P  transfuse 2units PRBC  - Monitor H/H    Shortness of breath due to COPD exacerbation,  acute on chronic CHF, possible gram positive/gram negative pneumonia  - improved, currently on NC  - LVEDP was elevated on cath.  - Use of diuretics with worsening contraction alkalosis   - F/U RENAL REC. f/u BMP  - Strict I & Os  - Daily weight  - nocturnal NIV  - Iv solumedrol 40mg q12 Taper  - bronchodilators   - f/u pulm rec  - completed 7 days of zosyn per ID  - Blood cx NGTD      DM2, uncontrolled  -  A1c 9.1  - continue 9 units premeal  - lantus increase as bg high, monitor closely  - endocrine following      Morbid obesity-  Diet & weight loss recommended      DVT prophylaxis: scd    DISPO: cath to be rescheduled. monitor H/H. groin hematoma. LOS uncertain as patient is still acute.   [facility] PT

## 2020-12-21 LAB
ALBUMIN SERPL ELPH-MCNC: 3.3 G/DL — SIGNIFICANT CHANGE UP (ref 3.3–5.2)
ALP SERPL-CCNC: 93 U/L — SIGNIFICANT CHANGE UP (ref 40–120)
ALT FLD-CCNC: 44 U/L — HIGH
ANION GAP SERPL CALC-SCNC: 6 MMOL/L — SIGNIFICANT CHANGE UP (ref 5–17)
AST SERPL-CCNC: 30 U/L — SIGNIFICANT CHANGE UP
BASE EXCESS BLDA CALC-SCNC: 21.4 MMOL/L — HIGH (ref -2–2)
BASE EXCESS BLDA CALC-SCNC: 22 MMOL/L — HIGH (ref -2–2)
BILIRUB SERPL-MCNC: 0.6 MG/DL — SIGNIFICANT CHANGE UP (ref 0.4–2)
BLOOD GAS COMMENTS ARTERIAL: SIGNIFICANT CHANGE UP
BLOOD GAS COMMENTS ARTERIAL: SIGNIFICANT CHANGE UP
BUN SERPL-MCNC: 34 MG/DL — HIGH (ref 8–20)
CALCIUM SERPL-MCNC: 8.9 MG/DL — SIGNIFICANT CHANGE UP (ref 8.6–10.2)
CHLORIDE SERPL-SCNC: 94 MMOL/L — LOW (ref 98–107)
CO2 SERPL-SCNC: 44 MMOL/L — HIGH (ref 22–29)
CREAT SERPL-MCNC: 0.76 MG/DL — SIGNIFICANT CHANGE UP (ref 0.5–1.3)
GAS PNL BLDA: SIGNIFICANT CHANGE UP
GLUCOSE BLDC GLUCOMTR-MCNC: 67 MG/DL — LOW (ref 70–99)
GLUCOSE BLDC GLUCOMTR-MCNC: 69 MG/DL — LOW (ref 70–99)
GLUCOSE BLDC GLUCOMTR-MCNC: 82 MG/DL — SIGNIFICANT CHANGE UP (ref 70–99)
GLUCOSE BLDC GLUCOMTR-MCNC: 82 MG/DL — SIGNIFICANT CHANGE UP (ref 70–99)
GLUCOSE BLDC GLUCOMTR-MCNC: 97 MG/DL — SIGNIFICANT CHANGE UP (ref 70–99)
GLUCOSE SERPL-MCNC: 56 MG/DL — LOW (ref 70–99)
HCO3 BLDA-SCNC: 46 MMOL/L — HIGH (ref 20–26)
HCO3 BLDA-SCNC: 47 MMOL/L — HIGH (ref 20–26)
HCT VFR BLD CALC: 36.9 % — LOW (ref 39–50)
HGB BLD-MCNC: 11 G/DL — LOW (ref 13–17)
HOROWITZ INDEX BLDA+IHG-RTO: 35 — SIGNIFICANT CHANGE UP
HOROWITZ INDEX BLDA+IHG-RTO: 5 — SIGNIFICANT CHANGE UP
MCHC RBC-ENTMCNC: 27.7 PG — SIGNIFICANT CHANGE UP (ref 27–34)
MCHC RBC-ENTMCNC: 29.8 GM/DL — LOW (ref 32–36)
MCV RBC AUTO: 92.9 FL — SIGNIFICANT CHANGE UP (ref 80–100)
NT-PROBNP SERPL-SCNC: 1662 PG/ML — HIGH (ref 0–300)
PCO2 BLDA: 85 MMHG — CRITICAL HIGH (ref 35–45)
PCO2 BLDA: 98 MMHG — CRITICAL HIGH (ref 35–45)
PH BLDA: 7.34 — LOW (ref 7.35–7.45)
PH BLDA: 7.38 — SIGNIFICANT CHANGE UP (ref 7.35–7.45)
PLATELET # BLD AUTO: 229 K/UL — SIGNIFICANT CHANGE UP (ref 150–400)
PO2 BLDA: 174 MMHG — HIGH (ref 83–108)
PO2 BLDA: 68 MMHG — LOW (ref 83–108)
POTASSIUM SERPL-MCNC: 4.7 MMOL/L — SIGNIFICANT CHANGE UP (ref 3.5–5.3)
POTASSIUM SERPL-SCNC: 4.7 MMOL/L — SIGNIFICANT CHANGE UP (ref 3.5–5.3)
PROCALCITONIN SERPL-MCNC: 0.11 NG/ML — HIGH (ref 0.02–0.1)
PROT SERPL-MCNC: 5.8 G/DL — LOW (ref 6.6–8.7)
RBC # BLD: 3.97 M/UL — LOW (ref 4.2–5.8)
RBC # FLD: 14.6 % — HIGH (ref 10.3–14.5)
SAO2 % BLDA: 100 % — HIGH (ref 95–99)
SAO2 % BLDA: 95 % — SIGNIFICANT CHANGE UP (ref 95–99)
SODIUM SERPL-SCNC: 144 MMOL/L — SIGNIFICANT CHANGE UP (ref 135–145)
WBC # BLD: 20.4 K/UL — HIGH (ref 3.8–10.5)
WBC # FLD AUTO: 20.4 K/UL — HIGH (ref 3.8–10.5)

## 2020-12-21 PROCEDURE — 99291 CRITICAL CARE FIRST HOUR: CPT

## 2020-12-21 PROCEDURE — 99233 SBSQ HOSP IP/OBS HIGH 50: CPT | Mod: GC

## 2020-12-21 PROCEDURE — 93010 ELECTROCARDIOGRAM REPORT: CPT

## 2020-12-21 PROCEDURE — 99232 SBSQ HOSP IP/OBS MODERATE 35: CPT

## 2020-12-21 PROCEDURE — 71045 X-RAY EXAM CHEST 1 VIEW: CPT | Mod: 26

## 2020-12-21 PROCEDURE — 99233 SBSQ HOSP IP/OBS HIGH 50: CPT

## 2020-12-21 RX ORDER — INSULIN GLARGINE 100 [IU]/ML
12 INJECTION, SOLUTION SUBCUTANEOUS ONCE
Refills: 0 | Status: DISCONTINUED | OUTPATIENT
Start: 2020-12-21 | End: 2020-12-21

## 2020-12-21 RX ORDER — DEXTROSE 50 % IN WATER 50 %
15 SYRINGE (ML) INTRAVENOUS ONCE
Refills: 0 | Status: COMPLETED | OUTPATIENT
Start: 2020-12-21 | End: 2020-12-21

## 2020-12-21 RX ORDER — FUROSEMIDE 40 MG
40 TABLET ORAL ONCE
Refills: 0 | Status: COMPLETED | OUTPATIENT
Start: 2020-12-21 | End: 2020-12-21

## 2020-12-21 RX ADMIN — Medication 25 MILLIGRAM(S): at 05:39

## 2020-12-21 RX ADMIN — Medication 40 MILLIGRAM(S): at 12:26

## 2020-12-21 RX ADMIN — Medication 40 MILLIGRAM(S): at 13:50

## 2020-12-21 RX ADMIN — LEVETIRACETAM 420 MILLIGRAM(S): 250 TABLET, FILM COATED ORAL at 17:18

## 2020-12-21 RX ADMIN — FAMOTIDINE 20 MILLIGRAM(S): 10 INJECTION INTRAVENOUS at 05:39

## 2020-12-21 RX ADMIN — Medication 250 MILLIGRAM(S): at 05:39

## 2020-12-21 RX ADMIN — Medication 3 MILLILITER(S): at 20:42

## 2020-12-21 RX ADMIN — Medication 25 MILLIGRAM(S): at 17:17

## 2020-12-21 RX ADMIN — Medication 81 MILLIGRAM(S): at 17:18

## 2020-12-21 RX ADMIN — Medication 60 MILLIGRAM(S): at 17:18

## 2020-12-21 RX ADMIN — Medication 3 MILLILITER(S): at 15:15

## 2020-12-21 RX ADMIN — CLOPIDOGREL BISULFATE 75 MILLIGRAM(S): 75 TABLET, FILM COATED ORAL at 17:17

## 2020-12-21 RX ADMIN — Medication 3 MILLILITER(S): at 03:58

## 2020-12-21 RX ADMIN — Medication 40 MILLIGRAM(S): at 21:00

## 2020-12-21 RX ADMIN — Medication 15 GRAM(S): at 17:35

## 2020-12-21 RX ADMIN — Medication 250 MILLIGRAM(S): at 17:17

## 2020-12-21 RX ADMIN — Medication 15 GRAM(S): at 08:38

## 2020-12-21 RX ADMIN — Medication 3 MILLILITER(S): at 08:03

## 2020-12-21 RX ADMIN — ATORVASTATIN CALCIUM 40 MILLIGRAM(S): 80 TABLET, FILM COATED ORAL at 20:54

## 2020-12-21 RX ADMIN — FAMOTIDINE 20 MILLIGRAM(S): 10 INJECTION INTRAVENOUS at 17:17

## 2020-12-21 RX ADMIN — Medication 60 MILLIGRAM(S): at 05:39

## 2020-12-21 RX ADMIN — Medication 40 MILLIGRAM(S): at 05:35

## 2020-12-21 RX ADMIN — CITALOPRAM 40 MILLIGRAM(S): 10 TABLET, FILM COATED ORAL at 17:16

## 2020-12-21 RX ADMIN — CHLORHEXIDINE GLUCONATE 1 APPLICATION(S): 213 SOLUTION TOPICAL at 05:39

## 2020-12-21 RX ADMIN — LEVETIRACETAM 420 MILLIGRAM(S): 250 TABLET, FILM COATED ORAL at 05:37

## 2020-12-21 RX ADMIN — Medication 0.12 MILLIGRAM(S): at 05:39

## 2020-12-21 NOTE — PROGRESS NOTE ADULT - ASSESSMENT
70y Male with past medical history significant for COPD, HTN, DM, pAF on coumadin, seizure d/o with recent hospitalization for hypercapnic respiratory failure presents with HTN emergency and subsequent PEA/VT arrest on 12/10 this admission.     Acute on chronic hypoxic hypercapnic respiratory failure sec copd exacerbation, ELISSA, Acute of chronic chf  - bipap. nebs  -f/u pulm rec  -cxr  - repeat ABG   - C/S icu  -hold cardiac cath  - iv solumetrol    Right groin pseudoaneurysm s/p thrombin injection   -  US Duplex Arterial Lower Ext Ltd, Right (12.19.20 @ 11:24) >  The larger of the 2 pseudoaneurysms seen on the prior study remains thrombosed and measures 2.5 x 2.8 x 0.9 cm.  The smaller pseudoaneurysm demonstrating residual flow on the prior study adjacent to the common femoral artery is not well seen on the current study, possibly related to technical factors.       - CT RLE and RLE arterial duplex with either a single right groin pseudoaneurysm measuring 1.6 x 1.5 x 1.4 cm with dilatation of a portion of the neck measuring up to 0.9 cm transverse or 2 adjacent pseudoaneurysms with the more proximal pseudoaneurysm measuring 0.9 x 0.7 cm and the more distal pseudoaneurysm measuring 1.6 x 1.5 x 1.4 cm.  - Vascular following.  - IR for thrombin injection 12/18  - AC on hold.will resume when okay with vascular  -s/p  Transfuse 2units PRBC 12/18    CAD   - s/p cath with Severe RCA and LAD disease on 12/15. The RCA was treated with PTCA and STENT (THI-Resolute) with IVUS with residual LAD lesion  - Plan for intervention on LAD 12/18, to be done with anesthesia - cancelled secondary to hgb 7.8.   - Large right groin hematoma, and noted hgb drop (8.7-->7.8). Discussed with Cardiology, who is aware of hematoma.  - CT RLE and RLE arterial duplex with either a single right groin pseudoaneurysm measuring 1.6 x 1.5 x 1.4 cm with dilatation of a portion of the neck measuring up to 0.9 cm transverse or 2 adjacent pseudoaneurysms with the more proximal pseudoaneurysm measuring 0.9 x 0.7 cm and the more distal pseudoaneurysm measuring 1.6 x 1.5 x 1.4 cm.  - AC held for now. SCD boots ordered  - Continue ASA with Plavix, BB, Statin     HTN  - c/w metoprolol and cardizem and digoxin    AFib  - Rate controlled with diltiazem/Metoprolol  - coumadin held at this time    Cardiac arrest from/ VT  - amiodarone decreased to 200 mg daily per Dr Arroyo recommendations   - Will have EP evaluation for AICD    Blood loss anemia  - ppx lovenox held due to groin hematoma.   - S/P  transfuse 2units PRBC  - Monitor H/H    Shortness of breath due to COPD exacerbation,  acute on chronic CHF, possible gram positive/gram negative pneumonia  - improved, currently on NC  - LVEDP was elevated on cath.  - Use of diuretics with worsening contraction alkalosis   - F/U RENAL REC. f/u BMP  - Strict I & Os  - Daily weight  - nocturnal NIV  - Iv solumedrol 40mg q12 Taper  - bronchodilators   - f/u pulm rec  - completed 7 days of zosyn per ID  - Blood cx NGTD      DM2, uncontrolled  -  A1c 9.1  - BG low am, hold am dose lantus  - close monitoring  - endocrine following      Morbid obesity-  Diet & weight loss recommended      DVT prophylaxis: scd    DISPO: cath to be rescheduled. monitor H/H. groin hematoma. LOS uncertain as patient is still acute.   [facility] PT  care of plan enzo daughter CATRACHITA [phone]

## 2020-12-21 NOTE — PROGRESS NOTE ADULT - SUBJECTIVE AND OBJECTIVE BOX
[facility], THE HEART CENTER                                   [address] [place] [address]                                                      PHONE: [phone]                                                         FAX: [phone]  [email or web address]  ---------------------------------------------------------------------------------------------------------------------------------    Reason for follow-up: hypoxic respiratory failure, CAD    Overnight events/patient complaints: hypoxic overnight requiring escalation of O2 support, now on BiPAP     INTERPRETATION OF TELEMETRY (personally reviewed)    ECG: < from: 12 Lead ECG (12.16.20 @ 07:35) >  Atrial fibrillation. Low voltage QRS. Nonspecific T wave abnormality    ECHO:< from: TTE Echo Complete w/ Contrast w/ Doppler (12.14.20 @ 14:42) >   1. Technically difficult study.   2. Endocardial visualization was enhanced with intravenous echo contrast.   3. Left ventricular ejection fraction, by visual estimation, is 60 to 65%.   4. Normal global left ventricular systolic function.   5. The left ventricular diastolic function could not be assessed in this study.   6. Normal left ventricular internal cavity size.   7. Moderately enlarged right ventricle. Moderately reduced RV systolic function.   8. The left atrium is normal in size.   9. Moderately enlarged right atrium.  10. Mild thickening and calcification of the anterior and posterior mitral valve leaflets.  11. Mild mitral valve regurgitation.  12. Mild tricuspid regurgitation.  13. There is no evidence of pericardial effusion.    CARDIAC CATHETERIZATION:  ca< from: Cardiac Cath Lab - Adult (12.15.20 @ 15:12) >  CORONARY VESSELS: The coronary circulation is right dominant.  LM:   --  LM: Normal.  LAD:   --  Mid LAD: There was a 80 % stenosis.  CX:   --  Circumflex: Angiography showed minor luminal irregularities with  no flow limiting lesions. The vessel arose anomalously from the proximal  RCA:   --  RCA: iFR index of 0.82  --  Mid RCA: There was a 80 % stenosis.  --  Distal RCA: There was a 90 % stenosis.  COMPLICATIONS: There were no complications. No complications occurred during the cath lab visit.  DIAGNOSTIC IMPRESSIONS: Severe RCA and LAD disease. The RCA was treated with PTCA and STENT (THI-Resolute) with IVUS      I&O's Summary    20 Dec 2020 07:01  -  21 Dec 2020 07:00  --------------------------------------------------------  IN: 0 mL / OUT: 300 mL / NET: -300 mL    PAST MEDICAL & SURGICAL HISTORY:  Seizure  Depression, unspecified depression type  Diabetes  Hypertension  Hyperlipidemia  Afib  COPD (chronic obstructive pulmonary disease)  Abdominal hernia  H/O carotid endarterectomy    No Known Allergies    MEDICATIONS  (STANDING):  albuterol/ipratropium for Nebulization 3 milliLiter(s) Nebulizer every 6 hours  aspirin  chewable 81 milliGRAM(s) Oral daily  atorvastatin 40 milliGRAM(s) Oral at bedtime  chlorhexidine 2% Cloths 1 Application(s) Topical <User Schedule>  citalopram 40 milliGRAM(s) Oral daily  clopidogrel Tablet 75 milliGRAM(s) Oral daily  dextrose 40% Gel 15 Gram(s) Oral once  dextrose 5%. 1000 milliLiter(s) (50 mL/Hr) IV Continuous <Continuous>  dextrose 5%. 1000 milliLiter(s) (100 mL/Hr) IV Continuous <Continuous>  dextrose 50% Injectable 25 Gram(s) IV Push once  dextrose 50% Injectable 12.5 Gram(s) IV Push once  dextrose 50% Injectable 25 Gram(s) IV Push once  digoxin     Tablet 0.125 milliGRAM(s) Oral daily  diltiazem    Tablet 60 milliGRAM(s) Oral four times a day  famotidine    Tablet 20 milliGRAM(s) Oral two times a day  glucagon  Injectable 1 milliGRAM(s) IntraMuscular once  influenza   Vaccine 0.5 milliLiter(s) IntraMuscular once  insulin glargine Injectable (LANTUS) 12 Unit(s) SubCutaneous once  insulin lispro (ADMELOG) corrective regimen sliding scale   SubCutaneous Before meals and at bedtime  insulin lispro Injectable (ADMELOG) 12 Unit(s) SubCutaneous three times a day before meals  levETIRAcetam  IVPB 500 milliGRAM(s) IV Intermittent every 12 hours  methylPREDNISolone sodium succinate Injectable 40 milliGRAM(s) IV Push every 8 hours  metoprolol tartrate 25 milliGRAM(s) Oral two times a day  polyethylene glycol 3350 17 Gram(s) Oral daily  saccharomyces boulardii 250 milliGRAM(s) Oral two times a day    MEDICATIONS  (PRN):  aluminum hydroxide/magnesium hydroxide/simethicone Suspension 30 milliLiter(s) Oral every 4 hours PRN Dyspepsia      Vital Signs Last 24 Hrs  T(C): 36.6 (21 Dec 2020 10:44), Max: 37.2 (20 Dec 2020 22:00)  T(F): 97.8 (21 Dec 2020 10:44), Max: 98.9 (20 Dec 2020 22:00)  HR: 74 (21 Dec 2020 10:50) (72 - 92)  BP: 145/75 (21 Dec 2020 10:44) (108/66 - 145/75)  BP(mean): --  RR: 16 (21 Dec 2020 10:44) (16 - 18)  SpO2: 95% (21 Dec 2020 10:50) (93% - 99%)  ICU Vital Signs Last 24 Hrs    PHYSICAL EXAM:  General: tachypneic   HEENT: Head; normocephalic, atraumatic.Pupils reactive, cornea wnl. Neck supple, no nodes adenopathy, no JVD  CARDIOVASCULAR: irregularly irregular, 2/6 IRINA  LUNGS: Poor air movement, no obvious rales appreciated   ABDOMEN: Soft, nontender without mass or organomegaly. bowel sounds normoactive.  EXTREMITIES: No clubbing, cyanosis, (+)  edema.   SKIN: warm and dry with normal turgor.  NEURO: Alert/oriented x 3/normal motor exam.   PSYCH: normal affect.        LABS:                        11.0   20.40 )-----------( 229      ( 21 Dec 2020 08:57 )             36.9     12-21    144  |  94<L>  |  34.0<H>  ----------------------------<  56<L>  4.7   |  44.0<H>  |  0.76    Ca    8.9      21 Dec 2020 08:57    TPro  5.8<L>  /  Alb  3.3  /  TBili  0.6  /  DBili  x   /  AST  30  /  ALT  44<H>  /  AlkPhos  93  12-21    CLAUDIA MABRY      PT/INR - ( 20 Dec 2020 07:25 )   PT: 12.6 sec;   INR: 1.09 ratio         PTT - ( 20 Dec 2020 07:25 )  PTT:23.7 sec      ASSESSMENT AND PLAN:  In summary, CLAUDIA MABRY is a 70y Male with past medical history significant for severe COPD, on home O2, DM type 2, HTN, permanent AF, admitted with recurrent hypoxic/hypercapneic respiratory failure and agonal respiration 12/10 s/p emergent intubation followed by PEA arrest, then developed VT with a pulse, s/p DCCV and initiation of amiodarone s/p cath which revealed severe RCA and LAD disease s/p THI to RCA however with agitation during the procedure resulting in R femoral artery pseudoaneurysm s/p thrombin injection RFA by IR, now pending staged intervention to the LAD. Patient now with increased work of breathing requiring escalation of O2 support, now on BiPAP     acute on chronic hypercapnic respiratory failure   - agree with ABG and STAT CXR   - hypervolemic on exam: lasix 40mg IV x1   - ICU eval pending, recommend upgrade to at least stepdown bed   - prognosis guarded     Acute on chronic HFpEF/severe CAD  - s/p PCI to the RCA, continue DAPT and statin therapy  - staged PCI pending respiratory status   - lasix as above  - close monitoring of renal indices, some azotemia will need to be tolerated to facilitate eovolemia   - continue O2 support     VT/permanent AF  - VT precipitated by epinephrine for respiratory arrest/PEA in ER. Cardioverted electrically.   - No indication for ICD given clear provocative circumstances.  - Continue B-blocker, Cardizem, digoxin.  Restart anticoagulation when feasible    Time spent 35 minutes including discussing case with BROOKLYNN and nursing staff. Thank you for allowing [facility] to participate in the care of this patient.  Please feel free to call with any questions or concerns.                  [facility], [facility]                                   [address] [place] [address]                                                      PHONE: [phone]                                                         FAX: [phone]  [email or web address]  ---------------------------------------------------------------------------------------------------------------------------------    Reason for follow-up: hypoxic respiratory failure, CAD    Overnight events/patient complaints: hypoxic overnight requiring escalation of O2 support, now on BiPAP     INTERPRETATION OF TELEMETRY (personally reviewed)    ECG: < from: 12 Lead ECG (12.16.20 @ 07:35) >  Atrial fibrillation. Low voltage QRS. Nonspecific T wave abnormality    ECHO:< from: TTE Echo Complete w/ Contrast w/ Doppler (12.14.20 @ 14:42) >   1. Technically difficult study.   2. Endocardial visualization was enhanced with intravenous echo contrast.   3. Left ventricular ejection fraction, by visual estimation, is 60 to 65%.   4. Normal global left ventricular systolic function.   5. The left ventricular diastolic function could not be assessed in this study.   6. Normal left ventricular internal cavity size.   7. Moderately enlarged right ventricle. Moderately reduced RV systolic function.   8. The left atrium is normal in size.   9. Moderately enlarged right atrium.  10. Mild thickening and calcification of the anterior and posterior mitral valve leaflets.  11. Mild mitral valve regurgitation.  12. Mild tricuspid regurgitation.  13. There is no evidence of pericardial effusion.    CARDIAC CATHETERIZATION:  ca< from: Cardiac Cath Lab - Adult (12.15.20 @ 15:12) >  CORONARY VESSELS: The coronary circulation is right dominant.  LM:   --  LM: Normal.  LAD:   --  Mid LAD: There was a 80 % stenosis.  CX:   --  Circumflex: Angiography showed minor luminal irregularities with  no flow limiting lesions. The vessel arose anomalously from the proximal  RCA:   --  RCA: iFR index of 0.82  --  Mid RCA: There was a 80 % stenosis.  --  Distal RCA: There was a 90 % stenosis.  COMPLICATIONS: There were no complications. No complications occurred during the cath lab visit.  DIAGNOSTIC IMPRESSIONS: Severe RCA and LAD disease. The RCA was treated with PTCA and STENT (THI-Resolute) with IVUS      I&O's Summary    20 Dec 2020 07:01  -  21 Dec 2020 07:00  --------------------------------------------------------  IN: 0 mL / OUT: 300 mL / NET: -300 mL    PAST MEDICAL & SURGICAL HISTORY:  Seizure  Depression, unspecified depression type  Diabetes  Hypertension  Hyperlipidemia  Afib  COPD (chronic obstructive pulmonary disease)  Abdominal hernia  H/O carotid endarterectomy    No Known Allergies    MEDICATIONS  (STANDING):  albuterol/ipratropium for Nebulization 3 milliLiter(s) Nebulizer every 6 hours  aspirin  chewable 81 milliGRAM(s) Oral daily  atorvastatin 40 milliGRAM(s) Oral at bedtime  chlorhexidine 2% Cloths 1 Application(s) Topical <User Schedule>  citalopram 40 milliGRAM(s) Oral daily  clopidogrel Tablet 75 milliGRAM(s) Oral daily  dextrose 40% Gel 15 Gram(s) Oral once  dextrose 5%. 1000 milliLiter(s) (50 mL/Hr) IV Continuous <Continuous>  dextrose 5%. 1000 milliLiter(s) (100 mL/Hr) IV Continuous <Continuous>  dextrose 50% Injectable 25 Gram(s) IV Push once  dextrose 50% Injectable 12.5 Gram(s) IV Push once  dextrose 50% Injectable 25 Gram(s) IV Push once  digoxin     Tablet 0.125 milliGRAM(s) Oral daily  diltiazem    Tablet 60 milliGRAM(s) Oral four times a day  famotidine    Tablet 20 milliGRAM(s) Oral two times a day  glucagon  Injectable 1 milliGRAM(s) IntraMuscular once  influenza   Vaccine 0.5 milliLiter(s) IntraMuscular once  insulin glargine Injectable (LANTUS) 12 Unit(s) SubCutaneous once  insulin lispro (ADMELOG) corrective regimen sliding scale   SubCutaneous Before meals and at bedtime  insulin lispro Injectable (ADMELOG) 12 Unit(s) SubCutaneous three times a day before meals  levETIRAcetam  IVPB 500 milliGRAM(s) IV Intermittent every 12 hours  methylPREDNISolone sodium succinate Injectable 40 milliGRAM(s) IV Push every 8 hours  metoprolol tartrate 25 milliGRAM(s) Oral two times a day  polyethylene glycol 3350 17 Gram(s) Oral daily  saccharomyces boulardii 250 milliGRAM(s) Oral two times a day    MEDICATIONS  (PRN):  aluminum hydroxide/magnesium hydroxide/simethicone Suspension 30 milliLiter(s) Oral every 4 hours PRN Dyspepsia      Vital Signs Last 24 Hrs  T(C): 36.6 (21 Dec 2020 10:44), Max: 37.2 (20 Dec 2020 22:00)  T(F): 97.8 (21 Dec 2020 10:44), Max: 98.9 (20 Dec 2020 22:00)  HR: 74 (21 Dec 2020 10:50) (72 - 92)  BP: 145/75 (21 Dec 2020 10:44) (108/66 - 145/75)  BP(mean): --  RR: 16 (21 Dec 2020 10:44) (16 - 18)  SpO2: 95% (21 Dec 2020 10:50) (93% - 99%)  ICU Vital Signs Last 24 Hrs    PHYSICAL EXAM:  General: tachypneic   HEENT: Head; normocephalic, atraumatic.Pupils reactive, cornea wnl. Neck supple, no nodes adenopathy, no JVD  CARDIOVASCULAR: irregularly irregular, 2/6 IRINA  LUNGS: Poor air movement, no obvious rales appreciated   ABDOMEN: Soft, nontender without mass or organomegaly. bowel sounds normoactive.  EXTREMITIES: No clubbing, cyanosis, (+)  edema.   SKIN: warm and dry with normal turgor.  NEURO: Alert/oriented x 3/normal motor exam.   PSYCH: normal affect.        LABS:                        11.0   20.40 )-----------( 229      ( 21 Dec 2020 08:57 )             36.9     12-21    144  |  94<L>  |  34.0<H>  ----------------------------<  56<L>  4.7   |  44.0<H>  |  0.76    Ca    8.9      21 Dec 2020 08:57    TPro  5.8<L>  /  Alb  3.3  /  TBili  0.6  /  DBili  x   /  AST  30  /  ALT  44<H>  /  AlkPhos  93  12-21    CLAUDIA MABRY      PT/INR - ( 20 Dec 2020 07:25 )   PT: 12.6 sec;   INR: 1.09 ratio         PTT - ( 20 Dec 2020 07:25 )  PTT:23.7 sec      ASSESSMENT AND PLAN:  In summary, CLAUDIA MABRY is a 70y Male with past medical history significant for severe COPD, on home O2, DM type 2, HTN, permanent AF, admitted with recurrent hypoxic/hypercapneic respiratory failure and agonal respiration 12/10 s/p emergent intubation followed by PEA arrest, then developed VT with a pulse, s/p DCCV and initiation of amiodarone s/p cath which revealed severe RCA and LAD disease s/p THI to RCA however with agitation during the procedure resulting in R femoral artery pseudoaneurysm s/p thrombin injection RFA by IR, now pending staged intervention to the LAD. Patient now with increased work of breathing requiring escalation of O2 support, now on BiPAP     acute on chronic hypercapnic respiratory failure: multifactorial including severe COPD, acute HFrEF and possible infiltrate    - agree with ABG and STAT CXR   - hypervolemic on exam: lasix 40mg IV x1   - ICU eval pending, recommend upgrade to at least stepdown bed   - check procalcitonin and empiric Abx initiated   - continue bronchodilator therapy   - prognosis guarded     Acute on chronic HFpEF/severe CAD  - s/p PCI to the RCA c/b pseudoaneursym, continue DAPT and statin therapy  - staged PCI pending respiratory status   - lasix as above  - close monitoring of renal indices, some azotemia will need to be tolerated to facilitate eovolemia   - continue O2 support     VT/permanent AF  - VT precipitated by epinephrine for respiratory arrest/PEA in ER. Cardioverted electrically.   - No indication for ICD given clear provocative circumstances.  - Continue B-blocker, Cardizem, digoxin.  Restart anticoagulation when feasible    Time spent 35 minutes including discussing case with BROOKLYNN and nursing staff. Thank you for allowing [facility] to participate in the care of this patient.  Please feel free to call with any questions or concerns.

## 2020-12-21 NOTE — CHART NOTE - NSCHARTNOTEFT_GEN_A_CORE
Called by RN earlier this AM that she received a call from cath lab that Pt is scheduled for [facility] today at 3pm. Pt seen at bedside and noted that Pt is on a 50% venti mask when otherwise documented on 6 L NC. Pt was placed on bipap overnight and per RN was placed on NRB mask at some point night as well for desating to 45% which was an unsustained episode and unclear if accurate reading. Admits to cough. Denies fever, chills, headaches, dizziness, chest pain/pressure, palpitations, shortness of breath, difficulty breathing, abdominal pain, n/v/d or any other complaints.   No other alarms on monitor, HR 70-80s afib. Vital Signs stable, HR 85 and O2 % on 50% venti.  PHYSICAL EXAM:  GENERAL: Pt lying in bed comfortably in NAD, wakes easily to voice   HEAD:  Atraumatic   EYES: EOMI, PERRL, conjunctiva and sclera clear  ENT: Moist mucous membranes  NECK: Supple, No JVD  CHEST/LUNG: minimal airway movement, No rales, rhonchi, wheezing or rubs. Unlabored respirations  HEART: Regular rate and rhythm; No murmurs, rubs, or gallops  ABDOMEN: Bowel sounds present; Soft, Nontender, obese. No guarding or rigidity    EXTREMITIES:  2+ Peripheral Pulses, brisk capillary refill. No clubbing or cyanosis, right groin ecchymosis noted, 1+ pitting edema B/L LE   NERVOUS SYSTEM:  Alert & Oriented X3, speech clear. Answers questions appropriately. No deficits   SKIN: No rashes or lesions    Spoke with Cardiologist Dr Dias and confirmed that Pt was scheduled for cath. Spoke with MD and Pulmonology as well. Rec follow up ABG. ABG noted and asked RT to place Pt back on bipap. Placed Pt back on bipap and MICU called for setting adjustment and recommendations regarding acute on chronic hypercapnic resp failure. Not a candidate for ICU admission at this time, and to c/w AVAPS. CXR and EKG also ordered. CXR with B/L pleural effusions, possible infiltrate. With inc in WBCs, recommended ID and to begin abx for possible pneumonia. EKG reviewed with cardiologist. EKG rate controlled Afib with HR 83bpm, no acute changes to EKG compared to previous. Dr Dias also recommended lasix 40mg IVP and to monitor improvement. Cath cancelled for today. Repeat ABG this afternoon. Transfer to stepdown. Pt, RN and MD aware, in agreement with plan. Continue to monitor closely. Called by RN earlier this AM that she received a call from cath lab that Pt is scheduled for LCH today at 3pm. Pt seen at bedside and noted that Pt is on a 50% venti mask when otherwise documented on 6 L NC. Pt was placed on bipap overnight and per RN was placed on NRB mask at some point night as well for desating to 45% which was an unsustained episode and unclear if accurate reading. Admits to cough. Denies fever, chills, headaches, dizziness, chest pain/pressure, palpitations, shortness of breath, difficulty breathing, abdominal pain, n/v/d or any other complaints.   No other alarms on monitor, HR 70-80s afib. Vital Signs stable, HR 85 and O2 % on 50% venti.  PHYSICAL EXAM:  GENERAL: Pt lying in bed comfortably in NAD, wakes easily to voice   HEAD:  Atraumatic   EYES: EOMI, PERRL, conjunctiva and sclera clear  ENT: Moist mucous membranes  NECK: Supple, No JVD  CHEST/LUNG: minimal airway movement, No rales, rhonchi, wheezing or rubs. Unlabored respirations  HEART: Regular rate and rhythm; No murmurs, rubs, or gallops  ABDOMEN: Bowel sounds present; Soft, Nontender, obese. No guarding or rigidity    EXTREMITIES:  2+ Peripheral Pulses, brisk capillary refill. No clubbing or cyanosis, right groin ecchymosis noted, 1+ pitting edema B/L LE   NERVOUS SYSTEM:  Alert & Oriented X3, speech clear. Answers questions appropriately. No deficits   SKIN: No rashes or lesions    Spoke with Cardiologist Dr Dias and confirmed that Pt was scheduled for cath. Spoke with MD and Pulmonology as well. Rec follow up ABG. ABG noted and asked RT to place Pt back on bipap. Placed Pt back on bipap and MICU called for setting adjustment and recommendations regarding acute on chronic hypercapnic resp failure. D/w ICU MD, not a candidate for ICU admission at this time and to c/w AVAPS. CXR and EKG also ordered. CXR with B/L pleural effusions, possible infiltrate. With inc in WBCs, recommended ID and to begin abx for possible pneumonia. f/u procal. EKG reviewed with cardiologist. EKG rate controlled Afib with HR 83bpm, no acute changes to EKG compared to previous. Dr Dias also recommended lasix 40mg IVP x1 and to monitor improvement. Cath cancelled for today. Repeat ABG this afternoon. Transfer to stepdown. Pt, RN and MD aware, in agreement with plan. Continue to monitor closely.

## 2020-12-21 NOTE — PROGRESS NOTE ADULT - ASSESSMENT
70y Male with past medical history significant for severe COPD, on home O2, DM type 2, HTN, permanent AF, admitted with recurrent hypoxic/hypercapneic respiratory failure and agonal respiration 12/10 s/p emergent intubation followed by PEA arrest, then developed VT with a pulse, s/p DCCV and initiation of amiodarone s/p cath which revealed severe RCA and LAD disease s/p THI to RCA however with agitation during the procedure resulting in R femoral artery pseudoaneurysm s/p thrombin injection RFA by IR, now pending staged intervention to the LAD. Patient now with increased work of breathing requiring escalation of O2 support, now on BiPAP   1. T2DM - recent sugars low and currently Lantus on hold  - cont to monitor sugars off Lantus, cont prandial insulin  - will follow    2. HLD - cont statin    3. severe CAD - planning staged PCI when resp status improved, management per Cardiology

## 2020-12-21 NOTE — PROGRESS NOTE ADULT - SUBJECTIVE AND OBJECTIVE BOX
[facility]  INFECTIOUS DISEASES AND INTERNAL MEDICINE at [place]  =======================================================  Everardo Koehler MD  Diplomates American Board of Internal Medicine and Infectious Diseases  Tel  [phone]  Fax [phone]  =======================================================    [id number]  CLAUDIA NELSON  called back for bilateral infiltrates    seen earlier in course for staph epi bacteremia and WBC elevation.     had been on antibotics with zosyn  12/10 - 12/25, with vanco 12/10 - 12/11    increased SOB recently  CXR with infilrates bilaterally. and effusion (see radiology section)    Concern for possible PNA  started on BIPAP      =======================================================    REVIEW OF SYSTEMS:  Limited due to medical condition  + SOB    =======================================================  Allergies  No Known Allergies       ======================================================  Physical Exam:  ============     General:  No acute distress.  OBESE  Eye: Pupils are equal, round and reactive to light, Extraocular movements are intact, Normal conjunctiva.  HENT: Normocephalic, Oral mucosa is moist, No pharyngeal erythema, No sinus tenderness.  Neck: Supple, No lymphadenopathy.   Respiratory: Lungs with diminished air entry bilaterally on posterior exam  Cardiovascular: Normal rate, Regular rhythm,  EDEMA of both legs  Gastrointestinal: Soft, Non-tender, Non-distended, Normal bowel sounds.  Genitourinary: + guerra with dilute urine  Lymphatics: No lymphadenopathy neck,   Musculoskeletal: Normal range of motion, Normal strength.  Integumentary: No rash.  Neurologic: Alert, Oriented, No focal deficits, Cranial Nerves II-XII are grossly intact.  Psychiatric: Appropriate mood & affect.    =======================================================   Vitals:  ============  T(F): 97.8 (21 Dec 2020 10:44), Max: 98.9 (20 Dec 2020 22:00)  HR: 74 (21 Dec 2020 10:50)  BP: 145/75 (21 Dec 2020 10:44)  RR: 16 (21 Dec 2020 10:44)  SpO2: 95% (21 Dec 2020 10:50) (93% - 99%)  temp max in last 48H T(F): , Max: 98.9 (12-20-20 @ 22:00)    =======================================================  Current Antibiotics:    Other medications:  albuterol/ipratropium for Nebulization 3 milliLiter(s) Nebulizer every 6 hours  aspirin  chewable 81 milliGRAM(s) Oral daily  atorvastatin 40 milliGRAM(s) Oral at bedtime  chlorhexidine 2% Cloths 1 Application(s) Topical <User Schedule>  citalopram 40 milliGRAM(s) Oral daily  clopidogrel Tablet 75 milliGRAM(s) Oral daily  dextrose 40% Gel 15 Gram(s) Oral once  dextrose 5%. 1000 milliLiter(s) IV Continuous <Continuous>  dextrose 5%. 1000 milliLiter(s) IV Continuous <Continuous>  dextrose 50% Injectable 25 Gram(s) IV Push once  dextrose 50% Injectable 12.5 Gram(s) IV Push once  dextrose 50% Injectable 25 Gram(s) IV Push once  digoxin     Tablet 0.125 milliGRAM(s) Oral daily  diltiazem    Tablet 60 milliGRAM(s) Oral four times a day  famotidine    Tablet 20 milliGRAM(s) Oral two times a day  glucagon  Injectable 1 milliGRAM(s) IntraMuscular once  influenza   Vaccine 0.5 milliLiter(s) IntraMuscular once  insulin lispro (ADMELOG) corrective regimen sliding scale   SubCutaneous Before meals and at bedtime  insulin lispro Injectable (ADMELOG) 12 Unit(s) SubCutaneous three times a day before meals  levETIRAcetam  IVPB 500 milliGRAM(s) IV Intermittent every 12 hours  methylPREDNISolone sodium succinate Injectable 40 milliGRAM(s) IV Push every 8 hours  metoprolol tartrate 25 milliGRAM(s) Oral two times a day  polyethylene glycol 3350 17 Gram(s) Oral daily  saccharomyces boulardii 250 milliGRAM(s) Oral two times a day      =======================================================  Labs:                        11.0   20.40 )-----------( 229      ( 21 Dec 2020 08:57 )             36.9      12-21    144  |  94<L>  |  34.0<H>  ----------------------------<  56<L>  4.7   |  44.0<H>  |  0.76    Ca    8.9      21 Dec 2020 08:57    TPro  5.8<L>  /  Alb  3.3  /  TBili  0.6  /  DBili  x   /  AST  30  /  ALT  44<H>  /  AlkPhos  93  12-21      Culture - Blood (collected 12-17-20 @ 12:56)  Source: .Blood Blood-Peripheral    Culture - Blood (collected 12-17-20 @ 12:56)  Source: .Blood Blood-Peripheral    Culture - Blood (collected 12-15-20 @ 12:21)  Source: .Blood Blood-Peripheral  Final Report (12-20-20 @ 13:00):    No growth at 5 days.    Culture - Blood (collected 12-15-20 @ 12:20)  Source: .Blood Blood-Peripheral  Final Report (12-20-20 @ 13:00):    No growth at 5 days.    Culture - Sputum (collected 12-11-20 @ 16:38)  Source: .Sputum Sputum  Gram Stain (12-11-20 @ 18:38):    Few polymorphonuclear leukocytes per low power field    No Squamous epithelial cells per low power field    Few Gram positive cocci in pairs per oil power field  Final Report (12-13-20 @ 17:27):    Normal Respiratory Britt present    Culture - Urine (collected 12-10-20 @ 22:15)  Source: .Urine Clean Catch (Midstream)  Final Report (12-11-20 @ 18:40):    No growth    Culture - Blood (collected 12-10-20 @ 15:10)  Source: .Blood Blood-Peripheral  Gram Stain (12-11-20 @ 10:04):    Growth in anaerobic bottle: Gram Positive Cocci in Clusters    Gram Stain performed by:    [facility]    [address] [place] [id number]    ,    TYPE: (C=Critical, N=Notification, A=Abnormal) c    TESTS:  _ gs    DATE/TIME CALLED: _ 12/11/2020 10:03:34    CALLED TO: _ efren kapadia rn    READ BACK (2 Patient Identifiers)(Y/N): _ y    READ BACK VALUES (Y/N): _ y    CALLED BY: Nani herrera  Final Report (12-12-20 @ 17:05):    Growth in anaerobic bottle: Staphylococcus epidermidis    Coag Negative Staphylococcus    Single set isolate, possible contaminant. Contact    Microbiology if susceptibility testing clinically    indicated.  Organism: Blood Culture PCR (12-12-20 @ 17:05)    Sensitivities:      -  Coagulase negative Staphylococcus: Detec      Method Type: PCR  Organism: Blood Culture PCR (12-11-20 @ 10:31)    Culture - Blood (collected 12-10-20 @ 15:09)  Source: .Blood Blood-Peripheral  Final Report (12-15-20 @ 17:00):    No growth at 5 days.      Creatinine, Serum: 0.76 mg/dL (12-21-20 @ 08:57)  Creatinine, Serum: 0.87 mg/dL (12-20-20 @ 07:25)  Creatinine, Serum: 0.89 mg/dL (12-19-20 @ 09:20)  Creatinine, Serum: 0.89 mg/dL (12-18-20 @ 08:31)  Creatinine, Serum: 0.97 mg/dL (12-17-20 @ 09:42)    Procalcitonin, Serum: 0.11 ng/mL (12-21-20 @ 12:17)  Procalcitonin, Serum: 0.13 ng/mL (12-17-20 @ 12:55)  Procalcitonin, Serum: 0.13 ng/mL (12-15-20 @ 18:14)  Procalcitonin, Serum: 0.14 ng/mL (12-15-20 @ 12:04)  Procalcitonin, Serum: 0.89 ng/mL (12-11-20 @ 05:05)       WBC Count: 20.40 K/uL (12-21-20 @ 08:57)  WBC Count: 15.20 K/uL (12-20-20 @ 07:25)  WBC Count: 16.37 K/uL (12-19-20 @ 09:20)  WBC Count: 20.59 K/uL (12-18-20 @ 08:35)  WBC Count: 22.06 K/uL (12-17-20 @ 09:42)    SARS-CoV-2: NotDetec (12-10-20 @ 12:27)  Rapid RVP Result: NotDetec (12-10-20 @ 12:27)    COVID-19 IgG Antibody Index: 0.07 Index (12-11-20 @ 03:24)  COVID-19 IgG Antibody Interpretation: Negative (12-11-20 @ 03:24)  COVID-19 IgG Antibody Index: <0.10 Index (12-02-20 @ 09:37)  COVID-19 IgG Antibody Interpretation: Negative (12-02-20 @ 09:37)  COVID-19 PCR: NotDetec (12-01-20 @ 13:13)      Alkaline Phosphatase, Serum: 93 U/L (12-21-20 @ 08:57)  Alkaline Phosphatase, Serum: 89 U/L (12-20-20 @ 07:25)  Alkaline Phosphatase, Serum: 94 U/L (12-19-20 @ 09:20)  Alanine Aminotransferase (ALT/SGPT): 44 U/L (12-21-20 @ 08:57)  Alanine Aminotransferase (ALT/SGPT): 43 U/L (12-20-20 @ 07:25)  Alanine Aminotransferase (ALT/SGPT): 48 U/L (12-19-20 @ 09:20)  Aspartate Aminotransferase (AST/SGOT): 30 U/L (12-21-20 @ 08:57)  Aspartate Aminotransferase (AST/SGOT): 25 U/L (12-20-20 @ 07:25)  Aspartate Aminotransferase (AST/SGOT): 35 U/L (12-19-20 @ 09:20)  Bilirubin Total, Serum: 0.6 mg/dL (12-21-20 @ 08:57)  Bilirubin Total, Serum: 0.5 mg/dL (12-20-20 @ 07:25)  Bilirubin Total, Serum: 0.5 mg/dL (12-19-20 @ 09:20)      < from: Xray Chest 1 View-PORTABLE IMMEDIATE (Xray Chest 1 View-PORTABLE IMMEDIATE .) (12.21.20 @ 12:10) >   EXAM:  XR CHEST PORTABLE IMMED 1V                          PROCEDURE DATE:  12/21/2020          INTERPRETATION:  AP chest on December 21, 2020 at 11:36 AM. 2 images. Patient is short of breath.    Heart magnified by technique.    Present film shows a mild right base infiltrate likely with pleural component new since December 1.    In addition there is a slight left base infiltrate also seen new since prior area    IMPRESSION: Basilar lung processes are now seen right greater than left.         OLMAN PUCKETT MD; Attending Radiologist  This document has been electronically signed. Dec 21 2020 12:11PM    < end of copied text >      < from: TTE Echo Complete w/ Contrast w/ Doppler (12.14.20 @ 14:42) >    Summary:   1. Technically difficult study.   2. Endocardial visualization was enhanced with intravenous echo contrast.   3. Left ventricular ejection fraction, by visual estimation, is 60 to 65%.   4. Normal global left ventricular systolic function.   5. The left ventricular diastolic function could not be assessed in this study.   6. Normal left ventricular internal cavity size.   7. Moderately enlarged right ventricle. Moderately reduced RV systolic function.   8. The left atrium is normal in size.   9. Moderately enlarged right atrium.  10. Mild thickening and calcification of the anterior and posterior mitral valve leaflets.  11. Mild mitral valve regurgitation.  12. Mild tricuspid regurgitation.  13. There is no evidence of pericardial effusion.    MD [place] Electronically signed on 12/14/2020 at 4:23:18 PM      < end of copied text >

## 2020-12-21 NOTE — CONSULT NOTE ADULT - SUBJECTIVE AND OBJECTIVE BOX
Patient is a 70y old  Male who presents with a chief complaint of Acute hypoxic/hypercapnic respiratory failure, cardiac arrest (20 Dec 2020 13:24)      BRIEF HOSPITAL COURSE: Pt is a 69yo M PMH COPD, CHF, on 4L NC continuous at home. Presented to [facility] on 12/10 with hypercapnic respiratory failure and cardiac arrest. ROSC achieved and patient downgraded from ICU on 12/13.     Events last 24 hours: Patient on 50% ventimask overnight. Due for cath today. Repeat ABG today found to have worsening CO2 retention. Patient placed on Bipap    PAST MEDICAL & SURGICAL HISTORY:  Seizure    Depression, unspecified depression type    Diabetes    Hypertension    Hyperlipidemia    Afib    COPD (chronic obstructive pulmonary disease)    Abdominal hernia    H/O carotid endarterectomy      Allergies    No Known Allergies    Intolerances      FAMILY HISTORY:  No pertinent family history in first degree relatives      Medications:    acetaZOLAMIDE    Tablet 250 milliGRAM(s) Oral daily  digoxin     Tablet 0.125 milliGRAM(s) Oral daily  diltiazem    Tablet 60 milliGRAM(s) Oral four times a day  metoprolol tartrate 25 milliGRAM(s) Oral two times a day    albuterol/ipratropium for Nebulization 3 milliLiter(s) Nebulizer every 6 hours    citalopram 40 milliGRAM(s) Oral daily  levETIRAcetam  IVPB 500 milliGRAM(s) IV Intermittent every 12 hours      aspirin  chewable 81 milliGRAM(s) Oral daily  clopidogrel Tablet 75 milliGRAM(s) Oral daily    aluminum hydroxide/magnesium hydroxide/simethicone Suspension 30 milliLiter(s) Oral every 4 hours PRN  famotidine    Tablet 20 milliGRAM(s) Oral two times a day  polyethylene glycol 3350 17 Gram(s) Oral daily      atorvastatin 40 milliGRAM(s) Oral at bedtime  dextrose 40% Gel 15 Gram(s) Oral once  dextrose 50% Injectable 25 Gram(s) IV Push once  dextrose 50% Injectable 12.5 Gram(s) IV Push once  dextrose 50% Injectable 25 Gram(s) IV Push once  glucagon  Injectable 1 milliGRAM(s) IntraMuscular once  insulin glargine Injectable (LANTUS) 12 Unit(s) SubCutaneous once  insulin glargine Injectable (LANTUS) 25 Unit(s) SubCutaneous two times a day  insulin lispro (ADMELOG) corrective regimen sliding scale   SubCutaneous Before meals and at bedtime  insulin lispro Injectable (ADMELOG) 12 Unit(s) SubCutaneous three times a day before meals  methylPREDNISolone sodium succinate Injectable 40 milliGRAM(s) IV Push every 12 hours    dextrose 5%. 1000 milliLiter(s) IV Continuous <Continuous>  dextrose 5%. 1000 milliLiter(s) IV Continuous <Continuous>    influenza   Vaccine 0.5 milliLiter(s) IntraMuscular once    chlorhexidine 2% Cloths 1 Application(s) Topical <User Schedule>    saccharomyces boulardii 250 milliGRAM(s) Oral two times a day          ICU Vital Signs Last 24 Hrs  T(C): 36.6 (21 Dec 2020 04:10), Max: 37.2 (20 Dec 2020 22:00)  T(F): 97.9 (21 Dec 2020 04:10), Max: 98.9 (20 Dec 2020 22:00)  HR: 73 (21 Dec 2020 08:13) (72 - 92)  BP: 129/72 (21 Dec 2020 04:10) (108/66 - 130/75)  BP(mean): --  ABP: --  ABP(mean): --  RR: 18 (21 Dec 2020 04:10) (18 - 18)  SpO2: 97% (21 Dec 2020 08:13) (93% - 99%)    Vital Signs Last 24 Hrs  T(C): 36.6 (21 Dec 2020 04:10), Max: 37.2 (20 Dec 2020 22:00)  T(F): 97.9 (21 Dec 2020 04:10), Max: 98.9 (20 Dec 2020 22:00)  HR: 73 (21 Dec 2020 08:13) (72 - 92)  BP: 129/72 (21 Dec 2020 04:10) (108/66 - 130/75)  BP(mean): --  RR: 18 (21 Dec 2020 04:10) (18 - 18)  SpO2: 97% (21 Dec 2020 08:13) (93% - 99%)    ABG - ( 21 Dec 2020 09:19 )  pH, Arterial: 7.28  pH, Blood: x     /  pCO2: 105   /  pO2: 111   / HCO3: 43    / Base Excess: 18.3  /  SaO2: 98                  I&O's Detail    20 Dec 2020 07:01  -  21 Dec 2020 07:00  --------------------------------------------------------  IN:  Total IN: 0 mL    OUT:    Voided (mL): 300 mL  Total OUT: 300 mL    Total NET: -300 mL          LABS:                        11.0   20.40 )-----------( 229      ( 21 Dec 2020 08:57 )             36.9     12-21    144  |  94<L>  |  34.0<H>  ----------------------------<  56<L>  4.7   |  44.0<H>  |  0.76    Ca    8.9      21 Dec 2020 08:57    TPro  5.8<L>  /  Alb  3.3  /  TBili  0.6  /  DBili  x   /  AST  30  /  ALT  44<H>  /  AlkPhos  93  12-21          CAPILLARY BLOOD GLUCOSE      POCT Blood Glucose.: 97 mg/dL (21 Dec 2020 09:05)    PT/INR - ( 20 Dec 2020 07:25 )   PT: 12.6 sec;   INR: 1.09 ratio         PTT - ( 20 Dec 2020 07:25 )  PTT:23.7 sec      CULTURES:  Culture Results:   No growth at 48 hours (12-17 @ 12:56)  Culture Results:   No growth at 48 hours (12-17 @ 12:56)  Culture Results:   No growth at 5 days. (12-15 @ 12:21)  Culture Results:   No growth at 5 days. (12-15 @ 12:20)      Physical Examination:    VITALS AT TIME OF EXAM:  HR:  BP:  RR:  SPO2:    General: Well appearing, lying in bed in NAD.    HEENT: Pupils equal, reactive to light. Symmetric. No scleral icterus or injection.    PULM: On Bipap. Lungs CTAB. No wheezing, cough. Noted SOB.     NECK: Supple, no lymphadenopathy, trachea midline.    CVS: Regular rate and rhythm, no murmurs appreciated, +s1/s2.    ABD: Soft, nondistended, nontender, normoactive bowel sounds.    EXT: No edema, nontender.    SKIN: Warm and well perfused, no rashes noted.    NEURO: Alert, oriented, interactive, nonfocal.

## 2020-12-21 NOTE — PROGRESS NOTE ADULT - SUBJECTIVE AND OBJECTIVE BOX
Patient is a 70y old  Male who presents with a chief complaint of Acute hypoxic/hypercapnic respiratory failure, cardiac arrest (21 Dec 2020 10:29)    pt was desating over night required VM. Pt is placed on bipap after ABG  He is comfortable with bipap. He is AAOX3.   DENIES CP,COUGH, HEADACHE      SUBJECTIVE / OVERNIGHT EVENTS:  REVIEW OF SYSTEMS: All systems are reviewed and found to be negative except above    MEDICATIONS  (STANDING):  albuterol/ipratropium for Nebulization 3 milliLiter(s) Nebulizer every 6 hours  aspirin  chewable 81 milliGRAM(s) Oral daily  atorvastatin 40 milliGRAM(s) Oral at bedtime  chlorhexidine 2% Cloths 1 Application(s) Topical <User Schedule>  citalopram 40 milliGRAM(s) Oral daily  clopidogrel Tablet 75 milliGRAM(s) Oral daily  dextrose 40% Gel 15 Gram(s) Oral once  dextrose 5%. 1000 milliLiter(s) (50 mL/Hr) IV Continuous <Continuous>  dextrose 5%. 1000 milliLiter(s) (100 mL/Hr) IV Continuous <Continuous>  dextrose 50% Injectable 25 Gram(s) IV Push once  dextrose 50% Injectable 12.5 Gram(s) IV Push once  dextrose 50% Injectable 25 Gram(s) IV Push once  digoxin     Tablet 0.125 milliGRAM(s) Oral daily  diltiazem    Tablet 60 milliGRAM(s) Oral four times a day  famotidine    Tablet 20 milliGRAM(s) Oral two times a day  glucagon  Injectable 1 milliGRAM(s) IntraMuscular once  influenza   Vaccine 0.5 milliLiter(s) IntraMuscular once  insulin glargine Injectable (LANTUS) 12 Unit(s) SubCutaneous once  insulin lispro (ADMELOG) corrective regimen sliding scale   SubCutaneous Before meals and at bedtime  insulin lispro Injectable (ADMELOG) 12 Unit(s) SubCutaneous three times a day before meals  levETIRAcetam  IVPB 500 milliGRAM(s) IV Intermittent every 12 hours  methylPREDNISolone sodium succinate Injectable 40 milliGRAM(s) IV Push every 8 hours  metoprolol tartrate 25 milliGRAM(s) Oral two times a day  polyethylene glycol 3350 17 Gram(s) Oral daily  saccharomyces boulardii 250 milliGRAM(s) Oral two times a day    MEDICATIONS  (PRN):  aluminum hydroxide/magnesium hydroxide/simethicone Suspension 30 milliLiter(s) Oral every 4 hours PRN Dyspepsia      CAPILLARY BLOOD GLUCOSE      POCT Blood Glucose.: 97 mg/dL (21 Dec 2020 09:05)  POCT Blood Glucose.: 69 mg/dL (21 Dec 2020 08:21)  POCT Blood Glucose.: 96 mg/dL (20 Dec 2020 21:23)  POCT Blood Glucose.: 145 mg/dL (20 Dec 2020 17:29)  POCT Blood Glucose.: 221 mg/dL (20 Dec 2020 12:48)    I&O's Summary    20 Dec 2020 07:01  -  21 Dec 2020 07:00  --------------------------------------------------------  IN: 0 mL / OUT: 300 mL / NET: -300 mL        PHYSICAL EXAM:  Vital Signs Last 24 Hrs  T(C): 36.6 (21 Dec 2020 10:44), Max: 37.2 (20 Dec 2020 22:00)  T(F): 97.8 (21 Dec 2020 10:44), Max: 98.9 (20 Dec 2020 22:00)  HR: 81 (21 Dec 2020 10:44) (72 - 92)  BP: 145/75 (21 Dec 2020 10:44) (108/66 - 145/75)  BP(mean): --  RR: 16 (21 Dec 2020 10:44) (16 - 18)  SpO2: 93% (21 Dec 2020 10:44) (93% - 99%)    CONSTITUTIONAL: NAD,ON BIPAP  EYES: PERRLA; conjunctiva and sclera clear  RESPIRATORY: Normal respiratory effort; CRACKLE  to auscultation bilaterally  CARDIOVASCULAR: IRR, normal S1 and S2,  EXTS: 1+ lower extremity edema; Peripheral pulses are 2+ bilaterally  ABDOMEN: Nontender to palpation, normoactive bowel sounds, no rebound/guarding;   MUSCLOSKELETAL:   no clubbing or cyanosis of digits; no joint swelling or tenderness to palpation  PSYCH: affect appropriate  NEUROLOGY: A+O to person, place, and time; CN 2-12 are intact and symmetric; no gross sensory deficits;       LABS:                        11.0   20.40 )-----------( 229      ( 21 Dec 2020 08:57 )             36.9     12-21    144  |  94<L>  |  34.0<H>  ----------------------------<  56<L>  4.7   |  44.0<H>  |  0.76    Ca    8.9      21 Dec 2020 08:57    TPro  5.8<L>  /  Alb  3.3  /  TBili  0.6  /  DBili  x   /  AST  30  /  ALT  44<H>  /  AlkPhos  93  12-21    PT/INR - ( 20 Dec 2020 07:25 )   PT: 12.6 sec;   INR: 1.09 ratio         PTT - ( 20 Dec 2020 07:25 )  PTT:23.7 sec            RADIOLOGY & ADDITIONAL TESTS:  Results Reviewed:   Imaging Personally Reviewed:  Electrocardiogram Personally Reviewed:    COORDINATION OF CARE:  Care Discussed with Consultants/Other Providers [Y/N]:  Prior or Outpatient Records Reviewed [Y/N]:

## 2020-12-21 NOTE — PROGRESS NOTE ADULT - SUBJECTIVE AND OBJECTIVE BOX
PULMONARY PROGRESS NOTE      CLAUDIA MABRYN-[id number]    Patient is a 70y old  Male who presents with a chief complaint of Acute hypoxic/hypercapnic respiratory failure, cardiac arrest (21 Dec 2020 10:08)  71 y/o M w/ a PMHx of COPD (noncompliant w/ CPAP), HTN, type 2 DM, afib (on Coumadin), and seizure disorder who presented unresponsive, hypoxic (SpO2 70s), and agonally breathing. Pt was subsequently intubated. Shortly after intubation, pt suffered from a brief PEA arrest. Pt received epi x2, bicarb, calcium, and mag. ROSC was achieved, found to be in v tach w/ a pulse. Pt was cardioverted at 100J, 200J x2, and 300J w/ conversion to sinus rhythm. Amio bolus and infusion initiated. Initially hypertensive (SBP 200s), subsequently became hypotensive and Levophed infusion was started. Labs significant for leukocytosis (23K), hyperkalemia (K 5.8), uremia (BUN 45), and hyperglycemia. ABG revealing of a respiratory acidosis. CXR w/ small bibasilar infiltrates. S/p Zosyn and Vanco in ED. CT head negative for acute intracranial event. Admit to MICU for continuation of care.    Of note, pt was admitted to ICU on 12/1 w/ acute hypercapnic respiratory failure requiring BiPAP. Discharged from [facility] yesterday 12/9.      INTERVAL HPI/OVERNIGHT EVENTS:  called this am, worsening 02 requirement  no CP or sputum  abg noted, placed on Bipap, ICU called    MEDICATIONS  (STANDING):  acetaZOLAMIDE    Tablet 250 milliGRAM(s) Oral daily  albuterol/ipratropium for Nebulization 3 milliLiter(s) Nebulizer every 6 hours  aspirin  chewable 81 milliGRAM(s) Oral daily  atorvastatin 40 milliGRAM(s) Oral at bedtime  chlorhexidine 2% Cloths 1 Application(s) Topical <User Schedule>  citalopram 40 milliGRAM(s) Oral daily  clopidogrel Tablet 75 milliGRAM(s) Oral daily  dextrose 40% Gel 15 Gram(s) Oral once  dextrose 5%. 1000 milliLiter(s) (50 mL/Hr) IV Continuous <Continuous>  dextrose 5%. 1000 milliLiter(s) (100 mL/Hr) IV Continuous <Continuous>  dextrose 50% Injectable 25 Gram(s) IV Push once  dextrose 50% Injectable 12.5 Gram(s) IV Push once  dextrose 50% Injectable 25 Gram(s) IV Push once  digoxin     Tablet 0.125 milliGRAM(s) Oral daily  diltiazem    Tablet 60 milliGRAM(s) Oral four times a day  famotidine    Tablet 20 milliGRAM(s) Oral two times a day  glucagon  Injectable 1 milliGRAM(s) IntraMuscular once  influenza   Vaccine 0.5 milliLiter(s) IntraMuscular once  insulin glargine Injectable (LANTUS) 12 Unit(s) SubCutaneous once  insulin glargine Injectable (LANTUS) 25 Unit(s) SubCutaneous two times a day  insulin lispro (ADMELOG) corrective regimen sliding scale   SubCutaneous Before meals and at bedtime  insulin lispro Injectable (ADMELOG) 12 Unit(s) SubCutaneous three times a day before meals  levETIRAcetam  IVPB 500 milliGRAM(s) IV Intermittent every 12 hours  methylPREDNISolone sodium succinate Injectable 40 milliGRAM(s) IV Push every 12 hours  metoprolol tartrate 25 milliGRAM(s) Oral two times a day  polyethylene glycol 3350 17 Gram(s) Oral daily  saccharomyces boulardii 250 milliGRAM(s) Oral two times a day      MEDICATIONS  (PRN):  aluminum hydroxide/magnesium hydroxide/simethicone Suspension 30 milliLiter(s) Oral every 4 hours PRN Dyspepsia      Allergies    No Known Allergies    Intolerances        PAST MEDICAL & SURGICAL HISTORY:  Seizure    Depression, unspecified depression type    Diabetes    Hypertension    Hyperlipidemia    Afib    COPD (chronic obstructive pulmonary disease)    Abdominal hernia    H/O carotid endarterectomy        SOCIAL HISTORY  Smoking History:       REVIEW OF SYSTEMS:    CONSTITUTIONAL:  No distress    HEENT:  Eyes:  No diplopia or blurred vision. ENT:  No earache, sore throat or runny nose.    CARDIOVASCULAR:  No pressure, squeezing, tightness, heaviness or aching about the chest; no palpitations.    RESPIRATORY:  see HPI    GASTROINTESTINAL:  No nausea, vomiting or diarrhea.    GENITOURINARY:  No dysuria, frequency or urgency.    NEUROLOGIC:  No paresthesias, fasciculations, seizures or weakness.    PSYCHIATRIC:  No disorder of thought or mood.    Vital Signs Last 24 Hrs  T(C): 36.6 (21 Dec 2020 04:10), Max: 37.2 (20 Dec 2020 22:00)  T(F): 97.9 (21 Dec 2020 04:10), Max: 98.9 (20 Dec 2020 22:00)  HR: 73 (21 Dec 2020 08:13) (72 - 92)  BP: 129/72 (21 Dec 2020 04:10) (108/66 - 130/75)  BP(mean): --  RR: 18 (21 Dec 2020 04:10) (18 - 18)  SpO2: 97% (21 Dec 2020 08:13) (93% - 99%)    PHYSICAL EXAMINATION:    GENERAL: The patient is awake and alert in no apparent distress.     HEENT: Head is normocephalic and atraumatic. Extraocular muscles are intact. Mucous membranes are moist.    NECK: Supple.    LUNGS: moderate air entry bilat  without wheezing, rales or rhonchi; respirations unlabored    HEART: Regular rate and rhythm without murmur.    ABDOMEN: Soft, nontender, and nondistended.      EXTREMITIES: Without any cyanosis, clubbing, rash, lesions or edema.    NEUROLOGIC: Grossly intact.    LABS:                        11.0   20.40 )-----------( 229      ( 21 Dec 2020 08:57 )             36.9     12-21    144  |  94<L>  |  34.0<H>  ----------------------------<  56<L>  4.7   |  44.0<H>  |  0.76    Ca    8.9      21 Dec 2020 08:57    TPro  5.8<L>  /  Alb  3.3  /  TBili  0.6  /  DBili  x   /  AST  30  /  ALT  44<H>  /  AlkPhos  93  12-21    PT/INR - ( 20 Dec 2020 07:25 )   PT: 12.6 sec;   INR: 1.09 ratio         PTT - ( 20 Dec 2020 07:25 )  PTT:23.7 sec    ABG - ( 21 Dec 2020 09:19 )  pH, Arterial: 7.28  pH, Blood: x     /  pCO2: 105   /  pO2: 111   / HCO3: 43    / Base Excess: 18.3  /  SaO2: 98                              MICROBIOLOGY:    RADIOLOGY & ADDITIONAL STUDIES:  prior CXrs, notes reviewed

## 2020-12-21 NOTE — PROGRESS NOTE ADULT - ASSESSMENT
71 y/o M w/ a PMHx of COPD (noncompliant w/ CPAP), HTN, type 2 DM, afib (on Coumadin), and seizure disorder who presented unresponsive, hypoxic (SpO2 70s), and agonally breathing. Pt was subsequently intubated. Shortly after intubation, pt suffered from a brief PEA arrest. Pt received epi x2, bicarb, calcium, and mag. ROSC was achieved, found to be in v tach w/ a pulse. Pt was cardioverted at 100J, 200J x2, and 300J w/ conversion to sinus rhythm. Amio bolus and infusion initiated. Initially hypertensive (SBP 200s), subsequently became hypotensive and Levophed infusion was started. Labs significant for leukocytosis (23K), hyperkalemia (K 5.8), uremia (BUN 45), and hyperglycemia. ABG revealing of a respiratory acidosis. CXR w/ small bibasilar infiltrates. S/p Zosyn and Vanco in ED. CT head negative for acute intracranial event. Admit to MICU for continuation of care.    Of note, pt was admitted to ICU on 12/1 w/ acute hypercapnic respiratory failure requiring BiPAP. Discharged from [facility] yesterday 12/9. (10 Dec 2020 16:52)    patient admitted to MICU, for Respiratory failure, acute on chronic, mixed due to COPD exacerbation with Respiratory acidosis - s/p extubation 12/12/2020.    For his PEA arrest and VT, he was s/p CPR and s/p Cardioversion (likely due to hypoxia from above).    He is pending cardiac  catheterization today.    He has been treated empirically for possible PNA, with Zosyn, 12/10 - 12/15     called back on 12/21 for right > left lung processes with effusion.       Impression:  SOB   pleural effusion   lung infiltrates  leg edema      Plan:    Cardiology note appreciated HFpEF  - cardiac cath pending, but not done due to clinical status change.   recent TTE on 12/14 unable to assess for diastolic function.  has good EF 60- 65%  edema of legs bilaterally not noted on prior exam (more than a week ago)  - check BNP - Added to lab as a STAT, please follow; prior value   Serum Pro-Brain Natriuretic Peptide: 947 pg/mL (12-10-20 @ 12:17)  - continue diuresis per medical team      WBC elevation  - likely reactive  - procalcitonin has remained down-trending at 0.11  Procalcitonin, Serum: 0.11 ng/mL (12-21-20 @ 12:17)  Procalcitonin, Serum: 0.13 ng/mL (12-17-20 @ 12:55)  Procalcitonin, Serum: 0.13 ng/mL (12-15-20 @ 18:14)  Procalcitonin, Serum: 0.14 ng/mL (12-15-20 @ 12:04)  Procalcitonin, Serum: 0.89 ng/mL (12-11-20 @ 05:05)  - SUGGEST to DEFER antibiotics  - unlikely to be from unrecognized infection    He has been on EXTENSIVE COURSE of Steroids:   methylPREDNISolone 12/1 - 12/4  predniSONE   Tablet 12/4-12/9   methylPREDNISolone 12/10   hydrocortisone 12/11- 12/12   methylPREDNISolone 40mg Q12H 12/14 - 12/21  and then changed to  methylPREDNISolone 40mg Q8H (current)   - this may explain the WBC count elevation

## 2020-12-21 NOTE — PROGRESS NOTE ADULT - ASSESSMENT
Severe COPD, CAD, Cor Pulmonale, 02 depd, post arrest  Acute on chronic hypercapnic respiratory failure, not worsening hypoxemia  Minimal air entry on exam , no paradox, accessory  muscle use or diaphoresis  Stat CXR/EKG pending, will discuss with cardiology, re cath  Changed to AVAPs, reepat ABG in 1 hr  d/c standing diamox  increase medrol, continue nebs  ICU contacted, critical, prognosis guarded Severe COPD, CAD/PAF, Cor Pulmonale, 02 depd, post arrest  Acute on chronic hypercapnic respiratory failure, not worsening hypoxemia  Minimal air entry on exam , no paradox, accessory  muscle use or diaphoresis  Stat CXR/EKG pending, will discuss with cardiology, re cath  Changed to AVAPs, reepat ABG in 1 hr  d/c standing diamox  increase medrol, continue nebs  ICU contacted, critical, prognosis guarded Severe COPD, CAD/PAF, Cor Pulmonale, 02 depd, post arrest  Acute on chronic hypercapnic respiratory failure, not worsening hypoxemia  Minimal air entry on exam , no paradox, accessory  muscle use or diaphoresis  Stat CXR/EKG pending, will discuss with cardiology, re cath  wbc increased, check cultures, procalcitonin  Changed to AVAPs, reepat ABG in 1 hr  d/c standing diamox  increase medrol, continue nebs  ICU contacted, critical, prognosis guarded

## 2020-12-21 NOTE — CONSULT NOTE ADULT - ASSESSMENT
Pt is a 69yo M with history of COPD, CHF who arrested on 12/10. Currently on Bipap. Patient not requiring ICU level of care at this time. To be managed on floor with Bipap.     Acute on chronic respiratory failure  - Maintain on Bipap with current settings, 16/6. Patient comfortable with tidal volumes ~400. No respiratory distress.   - Obtain repeat ABG after 3-4 hours on Bipap  - CO2 levels increased from previous ABG, continue to trend  - Use Bipap overnight, encourage compliance from patient  - Continue Solu-medrol   - Hold Duonebs, convert to MDI for patient when off Bipap if needed    Leukocytosis  - Consider blood culture if febrile    CAD   - s/p cath with Severe RCA and LAD disease on 12/15  - Planned Cath for today. Can proceed once respiratory compromise has improved.  - Large right groin hematoma, Cardio aware, Hgb stable today  - AC held for now. SCD boots ordered  - Continue ASA with Plavix, Lopressor    Afib  - Rate control with cardizem/Lopressor    DM  - Continue with Lantus and SS insulin  - Care as per endocrine/primary team

## 2020-12-22 LAB
ANION GAP SERPL CALC-SCNC: 7 MMOL/L — SIGNIFICANT CHANGE UP (ref 5–17)
BASOPHILS # BLD AUTO: 0.02 K/UL — SIGNIFICANT CHANGE UP (ref 0–0.2)
BASOPHILS NFR BLD AUTO: 0.1 % — SIGNIFICANT CHANGE UP (ref 0–2)
BUN SERPL-MCNC: 32 MG/DL — HIGH (ref 8–20)
CALCIUM SERPL-MCNC: 8.8 MG/DL — SIGNIFICANT CHANGE UP (ref 8.6–10.2)
CHLORIDE SERPL-SCNC: 92 MMOL/L — LOW (ref 98–107)
CO2 SERPL-SCNC: 46 MMOL/L — CRITICAL HIGH (ref 22–29)
CREAT SERPL-MCNC: 0.72 MG/DL — SIGNIFICANT CHANGE UP (ref 0.5–1.3)
CULTURE RESULTS: SIGNIFICANT CHANGE UP
CULTURE RESULTS: SIGNIFICANT CHANGE UP
EOSINOPHIL # BLD AUTO: 0 K/UL — SIGNIFICANT CHANGE UP (ref 0–0.5)
EOSINOPHIL NFR BLD AUTO: 0 % — SIGNIFICANT CHANGE UP (ref 0–6)
GLUCOSE BLDC GLUCOMTR-MCNC: 107 MG/DL — HIGH (ref 70–99)
GLUCOSE BLDC GLUCOMTR-MCNC: 109 MG/DL — HIGH (ref 70–99)
GLUCOSE BLDC GLUCOMTR-MCNC: 169 MG/DL — HIGH (ref 70–99)
GLUCOSE BLDC GLUCOMTR-MCNC: 193 MG/DL — HIGH (ref 70–99)
GLUCOSE BLDC GLUCOMTR-MCNC: 208 MG/DL — HIGH (ref 70–99)
GLUCOSE SERPL-MCNC: 192 MG/DL — HIGH (ref 70–99)
HCT VFR BLD CALC: 37.8 % — LOW (ref 39–50)
HGB BLD-MCNC: 11.7 G/DL — LOW (ref 13–17)
IMM GRANULOCYTES NFR BLD AUTO: 0.6 % — SIGNIFICANT CHANGE UP (ref 0–1.5)
LYMPHOCYTES # BLD AUTO: 0.28 K/UL — LOW (ref 1–3.3)
LYMPHOCYTES # BLD AUTO: 1.7 % — LOW (ref 13–44)
MCHC RBC-ENTMCNC: 28.5 PG — SIGNIFICANT CHANGE UP (ref 27–34)
MCHC RBC-ENTMCNC: 31 GM/DL — LOW (ref 32–36)
MCV RBC AUTO: 92.2 FL — SIGNIFICANT CHANGE UP (ref 80–100)
MONOCYTES # BLD AUTO: 0.72 K/UL — SIGNIFICANT CHANGE UP (ref 0–0.9)
MONOCYTES NFR BLD AUTO: 4.5 % — SIGNIFICANT CHANGE UP (ref 2–14)
NEUTROPHILS # BLD AUTO: 14.94 K/UL — HIGH (ref 1.8–7.4)
NEUTROPHILS NFR BLD AUTO: 93.1 % — HIGH (ref 43–77)
PLATELET # BLD AUTO: 220 K/UL — SIGNIFICANT CHANGE UP (ref 150–400)
POTASSIUM SERPL-MCNC: 4.5 MMOL/L — SIGNIFICANT CHANGE UP (ref 3.5–5.3)
POTASSIUM SERPL-SCNC: 4.5 MMOL/L — SIGNIFICANT CHANGE UP (ref 3.5–5.3)
RBC # BLD: 4.1 M/UL — LOW (ref 4.2–5.8)
RBC # FLD: 14.6 % — HIGH (ref 10.3–14.5)
SODIUM SERPL-SCNC: 145 MMOL/L — SIGNIFICANT CHANGE UP (ref 135–145)
SPECIMEN SOURCE: SIGNIFICANT CHANGE UP
SPECIMEN SOURCE: SIGNIFICANT CHANGE UP
WBC # BLD: 16.06 K/UL — HIGH (ref 3.8–10.5)
WBC # FLD AUTO: 16.06 K/UL — HIGH (ref 3.8–10.5)

## 2020-12-22 PROCEDURE — 99233 SBSQ HOSP IP/OBS HIGH 50: CPT

## 2020-12-22 PROCEDURE — 99232 SBSQ HOSP IP/OBS MODERATE 35: CPT

## 2020-12-22 RX ORDER — FUROSEMIDE 40 MG
40 TABLET ORAL ONCE
Refills: 0 | Status: COMPLETED | OUTPATIENT
Start: 2020-12-22 | End: 2020-12-22

## 2020-12-22 RX ORDER — INSULIN GLARGINE 100 [IU]/ML
20 INJECTION, SOLUTION SUBCUTANEOUS AT BEDTIME
Refills: 0 | Status: DISCONTINUED | OUTPATIENT
Start: 2020-12-22 | End: 2020-12-24

## 2020-12-22 RX ADMIN — Medication 12 UNIT(S): at 08:30

## 2020-12-22 RX ADMIN — Medication 60 MILLIGRAM(S): at 12:23

## 2020-12-22 RX ADMIN — Medication 250 MILLIGRAM(S): at 17:30

## 2020-12-22 RX ADMIN — Medication 12 UNIT(S): at 12:22

## 2020-12-22 RX ADMIN — Medication 81 MILLIGRAM(S): at 08:34

## 2020-12-22 RX ADMIN — Medication 40 MILLIGRAM(S): at 21:01

## 2020-12-22 RX ADMIN — Medication 12 UNIT(S): at 17:45

## 2020-12-22 RX ADMIN — Medication 0.12 MILLIGRAM(S): at 05:53

## 2020-12-22 RX ADMIN — FAMOTIDINE 20 MILLIGRAM(S): 10 INJECTION INTRAVENOUS at 17:30

## 2020-12-22 RX ADMIN — LEVETIRACETAM 420 MILLIGRAM(S): 250 TABLET, FILM COATED ORAL at 17:44

## 2020-12-22 RX ADMIN — Medication 40 MILLIGRAM(S): at 14:24

## 2020-12-22 RX ADMIN — Medication 3 MILLILITER(S): at 03:20

## 2020-12-22 RX ADMIN — Medication 40 MILLIGRAM(S): at 05:52

## 2020-12-22 RX ADMIN — Medication 25 MILLIGRAM(S): at 05:53

## 2020-12-22 RX ADMIN — Medication 3 MILLILITER(S): at 20:43

## 2020-12-22 RX ADMIN — FAMOTIDINE 20 MILLIGRAM(S): 10 INJECTION INTRAVENOUS at 05:52

## 2020-12-22 RX ADMIN — Medication 3 MILLILITER(S): at 14:46

## 2020-12-22 RX ADMIN — CITALOPRAM 40 MILLIGRAM(S): 10 TABLET, FILM COATED ORAL at 08:34

## 2020-12-22 RX ADMIN — LEVETIRACETAM 420 MILLIGRAM(S): 250 TABLET, FILM COATED ORAL at 05:21

## 2020-12-22 RX ADMIN — Medication 250 MILLIGRAM(S): at 05:52

## 2020-12-22 RX ADMIN — Medication 60 MILLIGRAM(S): at 05:52

## 2020-12-22 RX ADMIN — CLOPIDOGREL BISULFATE 75 MILLIGRAM(S): 75 TABLET, FILM COATED ORAL at 08:34

## 2020-12-22 RX ADMIN — POLYETHYLENE GLYCOL 3350 17 GRAM(S): 17 POWDER, FOR SOLUTION ORAL at 12:22

## 2020-12-22 RX ADMIN — CHLORHEXIDINE GLUCONATE 1 APPLICATION(S): 213 SOLUTION TOPICAL at 05:18

## 2020-12-22 RX ADMIN — ATORVASTATIN CALCIUM 40 MILLIGRAM(S): 80 TABLET, FILM COATED ORAL at 21:01

## 2020-12-22 RX ADMIN — Medication 3 MILLILITER(S): at 09:53

## 2020-12-22 RX ADMIN — INSULIN GLARGINE 20 UNIT(S): 100 INJECTION, SOLUTION SUBCUTANEOUS at 21:02

## 2020-12-22 RX ADMIN — Medication 2: at 08:30

## 2020-12-22 RX ADMIN — Medication 40 MILLIGRAM(S): at 17:30

## 2020-12-22 RX ADMIN — Medication 60 MILLIGRAM(S): at 17:30

## 2020-12-22 RX ADMIN — Medication 25 MILLIGRAM(S): at 17:30

## 2020-12-22 RX ADMIN — Medication 4: at 12:22

## 2020-12-22 NOTE — PROGRESS NOTE ADULT - ASSESSMENT
70y Male with past medical history significant for COPD, HTN, DM, pAF on coumadin, seizure d/o with recent hospitalization for hypercapnic respiratory failure presents with HTN emergency and subsequent PEA/VT arrest on 12/10 this admission.     Acute on chronic hypoxic hypercapnic respiratory failure sec copd exacerbation, ELISSA, Acute of chronic chf  - bipap. nebs. spoke with pulmonary changed to AVAPS,reeval tomorrow for cleance for cath  -hold cardiac cath  - iv solumetrol. F/U PULMONARY REC    Right groin pseudoaneurysm s/p thrombin injection   -  US Duplex Arterial Lower Ext Ltd, Right (12.19.20 @ 11:24) >  The larger of the 2 pseudoaneurysms seen on the prior study remains thrombosed and measures 2.5 x 2.8 x 0.9 cm.  The smaller pseudoaneurysm demonstrating residual flow on the prior study adjacent to the common femoral artery is not well seen on the current study, possibly related to technical factors.       - CT RLE and RLE arterial duplex with either a single right groin pseudoaneurysm measuring 1.6 x 1.5 x 1.4 cm with dilatation of a portion of the neck measuring up to 0.9 cm transverse or 2 adjacent pseudoaneurysms with the more proximal pseudoaneurysm measuring 0.9 x 0.7 cm and the more distal pseudoaneurysm measuring 1.6 x 1.5 x 1.4 cm.  - Vascular following.  - IR for thrombin injection 12/18  - AC on hold.will resume when okay with vascular  -s/p  Transfuse 2units PRBC 12/18    CAD   - s/p cath with Severe RCA and LAD disease on 12/15. The RCA was treated with PTCA and STENT (THI-Resolute) with IVUS with residual LAD lesion  - Plan for intervention on LAD 12/18, to be done with anesthesia - cancelled secondary to hgb 7.8.   - Large right groin hematoma, and noted hgb drop (8.7-->7.8). Discussed with Cardiology, who is aware of hematoma.  - CT RLE and RLE arterial duplex with either a single right groin pseudoaneurysm measuring 1.6 x 1.5 x 1.4 cm with dilatation of a portion of the neck measuring up to 0.9 cm transverse or 2 adjacent pseudoaneurysms with the more proximal pseudoaneurysm measuring 0.9 x 0.7 cm and the more distal pseudoaneurysm measuring 1.6 x 1.5 x 1.4 cm.  - AC held for now. SCD boots ordered  - Continue ASA with Plavix, BB, Statin     HTN  - c/w metoprolol and cardizem and digoxin    AFib  - Rate controlled with diltiazem/Metoprolol  - coumadin held at this time    Cardiac arrest from/ VT  - amiodarone decreased to 200 mg daily per Dr Arroyo recommendations   - Will have EP evaluation for AICD    Blood loss anemia  - ppx lovenox held due to groin hematoma.   - S/P  transfuse 2units PRBC  - Monitor H/H    Shortness of breath due to COPD exacerbation,  acute on chronic CHF, possible gram positive/gram negative pneumonia  - improved, currently on NC  - LVEDP was elevated on cath.  - Use of diuretics with worsening contraction alkalosis   - F/U RENAL REC. f/u BMP  - Strict I & Os  - Daily weight  - nocturnal NIV  - Iv solumedrol 40mg q12 Taper  - bronchodilators   - f/u pulm rec  - completed 7 days of zosyn per ID  - Blood cx NGTD      DM2, uncontrolled  -  A1c 9.1  - BG low am, hold am dose lantus  - close monitoring  - endocrine following      Morbid obesity-  Diet & weight loss recommended      DVT prophylaxis: scd    DISPO: cath to be rescheduled. monitor H/H. groin hematoma. LOS uncertain as patient is still acute.   CARE OF PLAN DW PT  Long  dw daughter CATRACHITA [phone]. Discussed above care of plan. over all prognosis guarded. Per Daughter -pt wants to be fullcode ,was intubated in past and recovered.  Pt wants to be full code.

## 2020-12-22 NOTE — PROGRESS NOTE ADULT - SUBJECTIVE AND OBJECTIVE BOX
PULMONARY PROGRESS NOTE      CLAUDIA MABRYN-[id number]    Patient is a 70y old  Male who presents with a chief complaint of Acute hypoxic/hypercapnic respiratory failure, cardiac arrest (22 Dec 2020 09:55)      INTERVAL HPI/OVERNIGHT EVENTS:  feels better  no cp or SOB  no sputum  tolerating nocturnal AVAPS, now on cannula  MEDICATIONS  (STANDING):  albuterol/ipratropium for Nebulization 3 milliLiter(s) Nebulizer every 6 hours  aspirin  chewable 81 milliGRAM(s) Oral daily  atorvastatin 40 milliGRAM(s) Oral at bedtime  chlorhexidine 2% Cloths 1 Application(s) Topical <User Schedule>  citalopram 40 milliGRAM(s) Oral daily  clopidogrel Tablet 75 milliGRAM(s) Oral daily  dextrose 40% Gel 15 Gram(s) Oral once  dextrose 5%. 1000 milliLiter(s) (50 mL/Hr) IV Continuous <Continuous>  dextrose 5%. 1000 milliLiter(s) (100 mL/Hr) IV Continuous <Continuous>  dextrose 50% Injectable 25 Gram(s) IV Push once  dextrose 50% Injectable 12.5 Gram(s) IV Push once  dextrose 50% Injectable 25 Gram(s) IV Push once  digoxin     Tablet 0.125 milliGRAM(s) Oral daily  diltiazem    Tablet 60 milliGRAM(s) Oral four times a day  famotidine    Tablet 20 milliGRAM(s) Oral two times a day  glucagon  Injectable 1 milliGRAM(s) IntraMuscular once  influenza   Vaccine 0.5 milliLiter(s) IntraMuscular once  insulin lispro (ADMELOG) corrective regimen sliding scale   SubCutaneous Before meals and at bedtime  insulin lispro Injectable (ADMELOG) 12 Unit(s) SubCutaneous three times a day before meals  levETIRAcetam  IVPB 500 milliGRAM(s) IV Intermittent every 12 hours  methylPREDNISolone sodium succinate Injectable 40 milliGRAM(s) IV Push every 8 hours  metoprolol tartrate 25 milliGRAM(s) Oral two times a day  polyethylene glycol 3350 17 Gram(s) Oral daily  saccharomyces boulardii 250 milliGRAM(s) Oral two times a day      MEDICATIONS  (PRN):  aluminum hydroxide/magnesium hydroxide/simethicone Suspension 30 milliLiter(s) Oral every 4 hours PRN Dyspepsia      Allergies    No Known Allergies    Intolerances        PAST MEDICAL & SURGICAL HISTORY:  Seizure    Depression, unspecified depression type    Diabetes    Hypertension    Hyperlipidemia    Afib    COPD (chronic obstructive pulmonary disease)    Abdominal hernia    H/O carotid endarterectomy        SOCIAL HISTORY  Smoking History:       REVIEW OF SYSTEMS:    CONSTITUTIONAL:  No distress    HEENT:  Eyes:  No diplopia or blurred vision. ENT:  No earache, sore throat or runny nose.    CARDIOVASCULAR:  No pressure, squeezing, tightness, heaviness or aching about the chest; no palpitations.    RESPIRATORY:  see hpi  GASTROINTESTINAL:  No nausea, vomiting or diarrhea.    GENITOURINARY:  No dysuria, frequency or urgency.    NEUROLOGIC:  No paresthesias, fasciculations, seizures or weakness.    PSYCHIATRIC:  No disorder of thought or mood.    Vital Signs Last 24 Hrs  T(C): 36.3 (22 Dec 2020 09:31), Max: 37 (21 Dec 2020 17:00)  T(F): 97.4 (22 Dec 2020 09:31), Max: 98.6 (21 Dec 2020 17:00)  HR: 90 (22 Dec 2020 10:01) (77 - 107)  BP: 142/66 (22 Dec 2020 09:31) (132/81 - 147/85)  BP(mean): 99 (22 Dec 2020 04:00) (93 - 99)  RR: 20 (22 Dec 2020 09:31) (18 - 20)  SpO2: 94% (22 Dec 2020 10:01) (94% - 100%)    PHYSICAL EXAMINATION:    GENERAL: The patient is awake and alert in no apparent distress.     HEENT: Head is normocephalic and atraumatic. Extraocular muscles are intact. Mucous membranes are moist.    NECK: Supple.    LUNGS: moderate air entry bilat  without wheezing, rales or rhonchi; respirations mildly increased    HEART: Regular rate and rhythm without murmur.    ABDOMEN: Soft, nontender, and nondistended.      EXTREMITIES: With 1 plus  edema.    NEUROLOGIC: Grossly intact.    LABS:                        11.7   16.06 )-----------( 220      ( 22 Dec 2020 07:34 )             37.8     12-22    145  |  92<L>  |  32.0<H>  ----------------------------<  192<H>  4.5   |  46.0<HH>  |  0.72    Ca    8.8      22 Dec 2020 07:34    TPro  5.8<L>  /  Alb  3.3  /  TBili  0.6  /  DBili  x   /  AST  30  /  ALT  44<H>  /  AlkPhos  93  12-21        ABG - ( 21 Dec 2020 22:19 )  pH, Arterial: 7.34  pH, Blood: x     /  pCO2: 98    /  pO2: 174   / HCO3: 47    / Base Excess: 22.0  /  SaO2: 100                     Serum Pro-Brain Natriuretic Peptide: 1662 pg/mL (12-21-20 @ 17:35)      Procalcitonin, Serum: 0.11 ng/mL (12-21-20 @ 12:17)      MICROBIOLOGY:    RADIOLOGY & ADDITIONAL STUDIES:  CXR reviewed

## 2020-12-22 NOTE — PROGRESS NOTE ADULT - SUBJECTIVE AND OBJECTIVE BOX
Patient is a 70y old  Male who presents with a chief complaint of Acute hypoxic/hypercapnic respiratory failure, cardiac arrest (22 Dec 2020 13:00)  pt seen and exam am . Pt was on bipap. co2 46.   Pt is AAOX3 . c/o fatigue  Pt states his breathing is stable. Not using neck muscle  No cp, palpitation  Ate 25% breakfast. Pt states he does not like the breakfast    SUBJECTIVE / OVERNIGHT EVENTS:  REVIEW OF SYSTEMS: All systems are reviewed and found to be negative except above    MEDICATIONS  (STANDING):  albuterol/ipratropium for Nebulization 3 milliLiter(s) Nebulizer every 6 hours  aspirin  chewable 81 milliGRAM(s) Oral daily  atorvastatin 40 milliGRAM(s) Oral at bedtime  chlorhexidine 2% Cloths 1 Application(s) Topical <User Schedule>  citalopram 40 milliGRAM(s) Oral daily  clopidogrel Tablet 75 milliGRAM(s) Oral daily  dextrose 40% Gel 15 Gram(s) Oral once  dextrose 5%. 1000 milliLiter(s) (50 mL/Hr) IV Continuous <Continuous>  dextrose 5%. 1000 milliLiter(s) (100 mL/Hr) IV Continuous <Continuous>  dextrose 50% Injectable 25 Gram(s) IV Push once  dextrose 50% Injectable 12.5 Gram(s) IV Push once  dextrose 50% Injectable 25 Gram(s) IV Push once  digoxin     Tablet 0.125 milliGRAM(s) Oral daily  diltiazem    Tablet 60 milliGRAM(s) Oral four times a day  famotidine    Tablet 20 milliGRAM(s) Oral two times a day  furosemide    Tablet 40 milliGRAM(s) Oral once  glucagon  Injectable 1 milliGRAM(s) IntraMuscular once  influenza   Vaccine 0.5 milliLiter(s) IntraMuscular once  insulin lispro (ADMELOG) corrective regimen sliding scale   SubCutaneous Before meals and at bedtime  insulin lispro Injectable (ADMELOG) 12 Unit(s) SubCutaneous three times a day before meals  levETIRAcetam  IVPB 500 milliGRAM(s) IV Intermittent every 12 hours  methylPREDNISolone sodium succinate Injectable 40 milliGRAM(s) IV Push every 8 hours  metoprolol tartrate 25 milliGRAM(s) Oral two times a day  polyethylene glycol 3350 17 Gram(s) Oral daily  saccharomyces boulardii 250 milliGRAM(s) Oral two times a day    MEDICATIONS  (PRN):  aluminum hydroxide/magnesium hydroxide/simethicone Suspension 30 milliLiter(s) Oral every 4 hours PRN Dyspepsia      CAPILLARY BLOOD GLUCOSE      POCT Blood Glucose.: 208 mg/dL (22 Dec 2020 12:20)  POCT Blood Glucose.: 193 mg/dL (22 Dec 2020 08:01)  POCT Blood Glucose.: 169 mg/dL (22 Dec 2020 00:10)  POCT Blood Glucose.: 82 mg/dL (21 Dec 2020 17:44)  POCT Blood Glucose.: 67 mg/dL (21 Dec 2020 17:25)    I&O's Summary    21 Dec 2020 07:01  -  22 Dec 2020 07:00  --------------------------------------------------------  IN: 100 mL / OUT: 500 mL / NET: -400 mL        PHYSICAL EXAM:  Vital Signs Last 24 Hrs  T(C): 36.3 (22 Dec 2020 09:31), Max: 37 (21 Dec 2020 17:00)  T(F): 97.4 (22 Dec 2020 09:31), Max: 98.6 (21 Dec 2020 17:00)  HR: 94 (22 Dec 2020 14:48) (77 - 98)  BP: 142/66 (22 Dec 2020 09:31) (132/81 - 147/85)  BP(mean): 99 (22 Dec 2020 04:00) (93 - 99)  RR: 20 (22 Dec 2020 09:31) (18 - 20)  SpO2: 100% (22 Dec 2020 14:48) (94% - 100%)    CONSTITUTIONAL: NAD,   EYES: PERRLA; conjunctiva and sclera clearly  RESPIRATORY: Normal respiratory effort; + air entry. no w/r/r  CARDIOVASCULAR: Regular rate and rhythm, normal S1 and S2, no murmur   EXTS: + lower extremity edema; Peripheral pulses are 2+ bilaterally  ABDOMEN: Nontender to palpation, normoactive bowel sounds, no rebound/guarding;   PSYCH: affect appropriate  NEUROLOGY: A+O to person, place, and time; CN 2-12 are intact and symmetric; no gross sensory deficits;     LABS:                        11.7   16.06 )-----------( 220      ( 22 Dec 2020 07:34 )             37.8     12-22    145  |  92<L>  |  32.0<H>  ----------------------------<  192<H>  4.5   |  46.0<HH>  |  0.72    Ca    8.8      22 Dec 2020 07:34    TPro  5.8<L>  /  Alb  3.3  /  TBili  0.6  /  DBili  x   /  AST  30  /  ALT  44<H>  /  AlkPhos  93  12-21                RADIOLOGY & ADDITIONAL TESTS:  Results Reviewed:   Imaging Personally Reviewed:  Electrocardiogram Personally Reviewed:    COORDINATION OF CARE:  Care Discussed with Consultants/Other Providers [Y/N]:  Prior or Outpatient Records Reviewed [Y/N]:

## 2020-12-22 NOTE — PROGRESS NOTE ADULT - ASSESSMENT
69 y/o M w/ a PMHx of COPD (noncompliant w/ CPAP), HTN, type 2 DM, afib (on Coumadin), and seizure disorder who presented unresponsive, hypoxic (SpO2 70s), and agonally breathing. Pt was subsequently intubated. Shortly after intubation, pt suffered from a brief PEA arrest. Pt received epi x2, bicarb, calcium, and mag. ROSC was achieved, found to be in v tach w/ a pulse. Pt was cardioverted at 100J, 200J x2, and 300J w/ conversion to sinus rhythm. Amio bolus and infusion initiated. Initially hypertensive (SBP 200s), subsequently became hypotensive and Levophed infusion was started. Labs significant for leukocytosis (23K), hyperkalemia (K 5.8), uremia (BUN 45), and hyperglycemia. ABG revealing of a respiratory acidosis. CXR w/ small bibasilar infiltrates. S/p Zosyn and Vanco in ED. CT head negative for acute intracranial event. Admit to MICU for continuation of care.    Of note, pt was admitted to ICU on 12/1 w/ acute hypercapnic respiratory failure requiring BiPAP. Discharged from [facility] yesterday 12/9. (10 Dec 2020 16:52)    patient admitted to MICU, for Respiratory failure, acute on chronic, mixed due to COPD exacerbation with Respiratory acidosis - s/p extubation 12/12/2020.    For his PEA arrest and VT, he was s/p CPR and s/p Cardioversion (likely due to hypoxia from above).    He is pending cardiac  catheterization today.    He has been treated empirically for possible PNA, with Zosyn, 12/10 - 12/15     called back on 12/21 for right > left lung processes with effusion.       Impression:  SOB   pleural effusion   lung infiltrates  leg edema      Plan:    Cardiology note appreciated HFpEF  - cardiac cath pending, but not done due to clinical status change.   recent TTE on 12/14 unable to assess for diastolic function.  has good EF 60- 65%  edema of legs bilaterally not noted on prior exam (more than a week ago)    BNP elevated from 947 to 1662  - continue diuresis per medical team      WBC elevation  - likely reactive  - procalcitonin has remained down-trending at 0.11  Procalcitonin, Serum: 0.11 ng/mL (12-21-20 @ 12:17)  Procalcitonin, Serum: 0.13 ng/mL (12-17-20 @ 12:55)  Procalcitonin, Serum: 0.13 ng/mL (12-15-20 @ 18:14)  Procalcitonin, Serum: 0.14 ng/mL (12-15-20 @ 12:04)  Procalcitonin, Serum: 0.89 ng/mL (12-11-20 @ 05:05)  - SUGGEST to DEFER antibiotics  - unlikely to be from unrecognized infection    He has been on EXTENSIVE COURSE of Steroids:   methylPREDNISolone 12/1 - 12/4  predniSONE   Tablet 12/4-12/9   methylPREDNISolone 12/10   hydrocortisone 12/11- 12/12   methylPREDNISolone 40mg Q12H 12/14 - 12/21  and then changed to  methylPREDNISolone 40mg Q8H (current)   - this may explain the WBC count elevation      NO clear evidence of active infection    continue management for heart failure    Will sign off.

## 2020-12-22 NOTE — PROGRESS NOTE ADULT - SUBJECTIVE AND OBJECTIVE BOX
[facility]  INFECTIOUS DISEASES AND INTERNAL MEDICINE at [place]  =======================================================  Everardo Koehler MD  Diplomates American Board of Internal Medicine and Infectious Diseases  Tel  [phone]  Fax [phone]  =======================================================    [id number]  CLAUDIA MABRY  follow up: shortness of breath    no fevers   was on BiPAP overnight  this AM in chair       =======================================================    REVIEW OF SYSTEMS:  Limited due to medical condition  + SOB    =======================================================  Allergies  No Known Allergies       ======================================================  Physical Exam:  ============     General:  No acute distress.  OBESE  Eye: Pupils are equal, round and reactive to light, Extraocular movements are intact, Normal conjunctiva.  HENT: Normocephalic, Oral mucosa is moist, No pharyngeal erythema, No sinus tenderness.  Neck: Supple, No lymphadenopathy.   Respiratory: Lungs with basal crackles  Cardiovascular: Normal rate, Regular rhythm,  EDEMA of both legs  Gastrointestinal: Soft, Non-tender, Non-distended, Normal bowel sounds.  Genitourinary: + guerra with dilute urine  Lymphatics: No lymphadenopathy neck,   Musculoskeletal: Normal range of motion, Normal strength.  Integumentary: No rash.  Neurologic: Alert, Oriented, No focal deficits, Cranial Nerves II-XII are grossly intact.  Psychiatric: Appropriate mood & affect.    =======================================================  Vitals:  ============  T(F): 97.4 (22 Dec 2020 09:31), Max: 98.6 (21 Dec 2020 17:00)  HR: 94 (22 Dec 2020 14:48)  BP: 142/66 (22 Dec 2020 09:31)  RR: 20 (22 Dec 2020 09:31)  SpO2: 100% (22 Dec 2020 14:48) (94% - 100%)  temp max in last 48H T(F): , Max: 98.9 (12-20-20 @ 22:00)    =======================================================  Current Antibiotics:    Other medications:  albuterol/ipratropium for Nebulization 3 milliLiter(s) Nebulizer every 6 hours  aspirin  chewable 81 milliGRAM(s) Oral daily  atorvastatin 40 milliGRAM(s) Oral at bedtime  chlorhexidine 2% Cloths 1 Application(s) Topical <User Schedule>  citalopram 40 milliGRAM(s) Oral daily  clopidogrel Tablet 75 milliGRAM(s) Oral daily  dextrose 40% Gel 15 Gram(s) Oral once  dextrose 5%. 1000 milliLiter(s) IV Continuous <Continuous>  dextrose 5%. 1000 milliLiter(s) IV Continuous <Continuous>  dextrose 50% Injectable 25 Gram(s) IV Push once  dextrose 50% Injectable 12.5 Gram(s) IV Push once  dextrose 50% Injectable 25 Gram(s) IV Push once  digoxin     Tablet 0.125 milliGRAM(s) Oral daily  diltiazem    Tablet 60 milliGRAM(s) Oral four times a day  famotidine    Tablet 20 milliGRAM(s) Oral two times a day  furosemide    Tablet 40 milliGRAM(s) Oral once  glucagon  Injectable 1 milliGRAM(s) IntraMuscular once  influenza   Vaccine 0.5 milliLiter(s) IntraMuscular once  insulin glargine Injectable (LANTUS) 20 Unit(s) SubCutaneous at bedtime  insulin lispro (ADMELOG) corrective regimen sliding scale   SubCutaneous Before meals and at bedtime  insulin lispro Injectable (ADMELOG) 12 Unit(s) SubCutaneous three times a day before meals  levETIRAcetam  IVPB 500 milliGRAM(s) IV Intermittent every 12 hours  methylPREDNISolone sodium succinate Injectable 40 milliGRAM(s) IV Push every 8 hours  metoprolol tartrate 25 milliGRAM(s) Oral two times a day  polyethylene glycol 3350 17 Gram(s) Oral daily  saccharomyces boulardii 250 milliGRAM(s) Oral two times a day      =======================================================  Labs:                        11.7   16.06 )-----------( 220      ( 22 Dec 2020 07:34 )             37.8      12-22    145  |  92<L>  |  32.0<H>  ----------------------------<  192<H>  4.5   |  46.0<HH>  |  0.72    Ca    8.8      22 Dec 2020 07:34    TPro  5.8<L>  /  Alb  3.3  /  TBili  0.6  /  DBili  x   /  AST  30  /  ALT  44<H>  /  AlkPhos  93  12-21      Culture - Blood (collected 12-17-20 @ 12:56)  Source: .Blood Blood-Peripheral  Final Report (12-22-20 @ 13:01):    No growth at 5 days.    Culture - Blood (collected 12-17-20 @ 12:56)  Source: .Blood Blood-Peripheral  Final Report (12-22-20 @ 13:01):    No growth at 5 days.    Culture - Blood (collected 12-15-20 @ 12:21)  Source: .Blood Blood-Peripheral  Final Report (12-20-20 @ 13:00):    No growth at 5 days.    Culture - Blood (collected 12-15-20 @ 12:20)  Source: .Blood Blood-Peripheral  Final Report (12-20-20 @ 13:00):    No growth at 5 days.    Culture - Sputum (collected 12-11-20 @ 16:38)  Source: .Sputum Sputum  Gram Stain (12-11-20 @ 18:38):    Few polymorphonuclear leukocytes per low power field    No Squamous epithelial cells per low power field    Few Gram positive cocci in pairs per oil power field  Final Report (12-13-20 @ 17:27):    Normal Respiratory Britt present    Culture - Urine (collected 12-10-20 @ 22:15)  Source: .Urine Clean Catch (Midstream)  Final Report (12-11-20 @ 18:40):    No growth    Culture - Blood (collected 12-10-20 @ 15:10)  Source: .Blood Blood-Peripheral  Gram Stain (12-11-20 @ 10:04):    Growth in anaerobic bottle: Gram Positive Cocci in Clusters    Gram Stain performed by:    [facility]    [address] [place] [id number]    ,    TYPE: (C=Critical, N=Notification, A=Abnormal) c    TESTS:  _ gs    DATE/TIME CALLED: _ 12/11/2020 10:03:34    CALLED TO: Nani kapadia rn    READ BACK (2 Patient Identifiers)(Y/N): _ y    READ BACK VALUES (Y/N): _ y    CALLED BY: Nani herrera  Final Report (12-12-20 @ 17:05):    Growth in anaerobic bottle: Staphylococcus epidermidis    Coag Negative Staphylococcus    Single set isolate, possible contaminant. Contact    Microbiology if susceptibility testing clinically    indicated.  Organism: Blood Culture PCR (12-12-20 @ 17:05)    Sensitivities:      -  Coagulase negative Staphylococcus: Detec      Method Type: PCR  Organism: Blood Culture PCR (12-11-20 @ 10:31)    Culture - Blood (collected 12-10-20 @ 15:09)  Source: .Blood Blood-Peripheral  Final Report (12-15-20 @ 17:00):    No growth at 5 days.    Procalcitonin, Serum: 0.11 ng/mL (12-21-20 @ 12:17)  Procalcitonin, Serum: 0.13 ng/mL (12-17-20 @ 12:55)  Procalcitonin, Serum: 0.13 ng/mL (12-15-20 @ 18:14)  Procalcitonin, Serum: 0.14 ng/mL (12-15-20 @ 12:04)  Procalcitonin, Serum: 0.89 ng/mL (12-11-20 @ 05:05)      Creatinine, Serum: 0.72 mg/dL (12-22-20 @ 07:34)  Creatinine, Serum: 0.76 mg/dL (12-21-20 @ 08:57)  Creatinine, Serum: 0.87 mg/dL (12-20-20 @ 07:25)  Creatinine, Serum: 0.89 mg/dL (12-19-20 @ 09:20)  Creatinine, Serum: 0.89 mg/dL (12-18-20 @ 08:31)     WBC Count: 16.06 K/uL (12-22-20 @ 07:34)  WBC Count: 20.40 K/uL (12-21-20 @ 08:57)  WBC Count: 15.20 K/uL (12-20-20 @ 07:25)  WBC Count: 16.37 K/uL (12-19-20 @ 09:20)  WBC Count: 20.59 K/uL (12-18-20 @ 08:35)    SARS-CoV-2: NotDetec (12-10-20 @ 12:27)  Rapid RVP Result: NotDetec (12-10-20 @ 12:27)    COVID-19 IgG Antibody Index: 0.07 Index (12-11-20 @ 03:24)  COVID-19 IgG Antibody Interpretation: Negative (12-11-20 @ 03:24)  COVID-19 IgG Antibody Index: <0.10 Index (12-02-20 @ 09:37)  COVID-19 IgG Antibody Interpretation: Negative (12-02-20 @ 09:37)  COVID-19 PCR: NotDetec (12-01-20 @ 13:13)      Alkaline Phosphatase, Serum: 93 U/L (12-21-20 @ 08:57)  Alkaline Phosphatase, Serum: 89 U/L (12-20-20 @ 07:25)  Alkaline Phosphatase, Serum: 94 U/L (12-19-20 @ 09:20)  Alanine Aminotransferase (ALT/SGPT): 44 U/L (12-21-20 @ 08:57)  Alanine Aminotransferase (ALT/SGPT): 43 U/L (12-20-20 @ 07:25)  Alanine Aminotransferase (ALT/SGPT): 48 U/L (12-19-20 @ 09:20)  Aspartate Aminotransferase (AST/SGOT): 30 U/L (12-21-20 @ 08:57)  Aspartate Aminotransferase (AST/SGOT): 25 U/L (12-20-20 @ 07:25)  Aspartate Aminotransferase (AST/SGOT): 35 U/L (12-19-20 @ 09:20)  Bilirubin Total, Serum: 0.6 mg/dL (12-21-20 @ 08:57)  Bilirubin Total, Serum: 0.5 mg/dL (12-20-20 @ 07:25)  Bilirubin Total, Serum: 0.5 mg/dL (12-19-20 @ 09:20)    Serum Pro-Brain Natriuretic Peptide: 1662 pg/mL (12-21-20 @ 17:35)  Serum Pro-Brain Natriuretic Peptide: 947 pg/mL (12-10-20 @ 12:17)

## 2020-12-22 NOTE — PROGRESS NOTE ADULT - SUBJECTIVE AND OBJECTIVE BOX
Follow up: diabetes  Interval Hx/Events: now on AVAPS for chronic hypercapnic respiratory failure    MEDICATIONS  (STANDING):  albuterol/ipratropium for Nebulization 3 milliLiter(s) Nebulizer every 6 hours  aspirin  chewable 81 milliGRAM(s) Oral daily  atorvastatin 40 milliGRAM(s) Oral at bedtime  chlorhexidine 2% Cloths 1 Application(s) Topical <User Schedule>  citalopram 40 milliGRAM(s) Oral daily  clopidogrel Tablet 75 milliGRAM(s) Oral daily  dextrose 40% Gel 15 Gram(s) Oral once  dextrose 5%. 1000 milliLiter(s) (50 mL/Hr) IV Continuous <Continuous>  dextrose 5%. 1000 milliLiter(s) (100 mL/Hr) IV Continuous <Continuous>  dextrose 50% Injectable 25 Gram(s) IV Push once  dextrose 50% Injectable 12.5 Gram(s) IV Push once  dextrose 50% Injectable 25 Gram(s) IV Push once  digoxin     Tablet 0.125 milliGRAM(s) Oral daily  diltiazem    Tablet 60 milliGRAM(s) Oral four times a day  famotidine    Tablet 20 milliGRAM(s) Oral two times a day  furosemide    Tablet 40 milliGRAM(s) Oral once  glucagon  Injectable 1 milliGRAM(s) IntraMuscular once  influenza   Vaccine 0.5 milliLiter(s) IntraMuscular once  insulin lispro (ADMELOG) corrective regimen sliding scale   SubCutaneous Before meals and at bedtime  insulin lispro Injectable (ADMELOG) 12 Unit(s) SubCutaneous three times a day before meals  levETIRAcetam  IVPB 500 milliGRAM(s) IV Intermittent every 12 hours  methylPREDNISolone sodium succinate Injectable 40 milliGRAM(s) IV Push every 8 hours  metoprolol tartrate 25 milliGRAM(s) Oral two times a day  polyethylene glycol 3350 17 Gram(s) Oral daily  saccharomyces boulardii 250 milliGRAM(s) Oral two times a day    MEDICATIONS  (PRN):  aluminum hydroxide/magnesium hydroxide/simethicone Suspension 30 milliLiter(s) Oral every 4 hours PRN Dyspepsia      ALLERGIES: No Known Allergies      REVIEW OF SYSTEMS:  CONSTITUTIONAL: No fever  RESPIRATORY: (+) shortness of breath  CARDIOVASCULAR: No chest pain  GASTROINTESTINAL: No abdominal  pain. No nausea or vomiting  GENITOURINARY: No dysuria  PSYCHIATRIC: No depression or anxiety    Vital Signs Last 24 Hrs  T(C): 36.3 (22 Dec 2020 09:31), Max: 37 (21 Dec 2020 17:00)  T(F): 97.4 (22 Dec 2020 09:31), Max: 98.6 (21 Dec 2020 17:00)  HR: 94 (22 Dec 2020 14:48) (77 - 98)  BP: 142/66 (22 Dec 2020 09:31) (132/81 - 147/85)  BP(mean): 99 (22 Dec 2020 04:00) (93 - 99)  RR: 20 (22 Dec 2020 09:31) (18 - 20)  SpO2: 100% (22 Dec 2020 14:48) (94% - 100%)    Physical Exam:  General appearance: Well developed, well nourished. Obese  Eyes: Pupils equal. EOMI  Lungs: Normal respiratory excursion. Lungs clear no w/r/r  CV: Normal S1S2, regular. No m/r/g.    Abdomen: Soft, nontender, nondistended, (+) BS  Skin: Warm and moist.   Neuro: Cranial nerves intact.  Psych: Normal affect, good judgement/insight      LABS:                        11.7   16.06 )-----------( 220      ( 22 Dec 2020 07:34 )             37.8     12-22    145  |  92<L>  |  32.0<H>  ----------------------------<  192<H>  4.5   |  46.0<HH>  |  0.72    Ca    8.8      22 Dec 2020 07:34    TPro  5.8<L>  /  Alb  3.3  /  TBili  0.6  /  DBili  x   /  AST  30  /  ALT  44<H>  /  AlkPhos  93  12-21    LIVER FUNCTIONS - ( 21 Dec 2020 08:57 )  Alb: 3.3 g/dL / Pro: 5.8 g/dL / ALK PHOS: 93 U/L / ALT: 44 U/L / AST: 30 U/L / GGT: x             A1C with Estimated Average Glucose Result: 9.1 % (12-11-20 @ 05:05)  A1C with Estimated Average Glucose Result: 8.7 % (12-02-20 @ 04:55)    CAPILLARY BLOOD GLUCOSE  POCT Blood Glucose.: 208 mg/dL (22 Dec 2020 12:20)  POCT Blood Glucose.: 193 mg/dL (22 Dec 2020 08:01)  POCT Blood Glucose.: 169 mg/dL (22 Dec 2020 00:10)  POCT Blood Glucose.: 82 mg/dL (21 Dec 2020 17:44)  POCT Blood Glucose.: 67 mg/dL (21 Dec 2020 17:25)  POCT Blood Glucose.: 82 mg/dL (21 Dec 2020 12:20)  POCT Blood Glucose.: 97 mg/dL (21 Dec 2020 09:05)  POCT Blood Glucose.: 69 mg/dL (21 Dec 2020 08:21)  POCT Blood Glucose.: 96 mg/dL (20 Dec 2020 21:23)  POCT Blood Glucose.: 145 mg/dL (20 Dec 2020 17:29)      Thyroid Stimulating Hormone, Serum: 0.38 uIU/mL [0.27 - 4.20] (12-02-20)

## 2020-12-22 NOTE — PROGRESS NOTE ADULT - SUBJECTIVE AND OBJECTIVE BOX
[place] [facility] - [facility]                                   [address] [place] [address]                                                      PHONE: [phone]                                                         FAX: [phone]  [email or web address]  ---------------------------------------------------------------------------------------------------------------------------------    Overnight events/patient complaints:  NAD feeling well today     No Known Allergies    MEDICATIONS  (STANDING):  albuterol/ipratropium for Nebulization 3 milliLiter(s) Nebulizer every 6 hours  aspirin  chewable 81 milliGRAM(s) Oral daily  atorvastatin 40 milliGRAM(s) Oral at bedtime  chlorhexidine 2% Cloths 1 Application(s) Topical <User Schedule>  citalopram 40 milliGRAM(s) Oral daily  clopidogrel Tablet 75 milliGRAM(s) Oral daily  dextrose 40% Gel 15 Gram(s) Oral once  dextrose 5%. 1000 milliLiter(s) (50 mL/Hr) IV Continuous <Continuous>  dextrose 5%. 1000 milliLiter(s) (100 mL/Hr) IV Continuous <Continuous>  dextrose 50% Injectable 25 Gram(s) IV Push once  dextrose 50% Injectable 12.5 Gram(s) IV Push once  dextrose 50% Injectable 25 Gram(s) IV Push once  digoxin     Tablet 0.125 milliGRAM(s) Oral daily  diltiazem    Tablet 60 milliGRAM(s) Oral four times a day  famotidine    Tablet 20 milliGRAM(s) Oral two times a day  glucagon  Injectable 1 milliGRAM(s) IntraMuscular once  influenza   Vaccine 0.5 milliLiter(s) IntraMuscular once  insulin lispro (ADMELOG) corrective regimen sliding scale   SubCutaneous Before meals and at bedtime  insulin lispro Injectable (ADMELOG) 12 Unit(s) SubCutaneous three times a day before meals  levETIRAcetam  IVPB 500 milliGRAM(s) IV Intermittent every 12 hours  methylPREDNISolone sodium succinate Injectable 40 milliGRAM(s) IV Push every 8 hours  metoprolol tartrate 25 milliGRAM(s) Oral two times a day  polyethylene glycol 3350 17 Gram(s) Oral daily  saccharomyces boulardii 250 milliGRAM(s) Oral two times a day    MEDICATIONS  (PRN):  aluminum hydroxide/magnesium hydroxide/simethicone Suspension 30 milliLiter(s) Oral every 4 hours PRN Dyspepsia      Vital Signs Last 24 Hrs  T(C): 36.3 (22 Dec 2020 09:31), Max: 37 (21 Dec 2020 17:00)  T(F): 97.4 (22 Dec 2020 09:31), Max: 98.6 (21 Dec 2020 17:00)  HR: 83 (22 Dec 2020 09:31) (74 - 107)  BP: 142/66 (22 Dec 2020 09:31) (132/81 - 147/85)  BP(mean): 99 (22 Dec 2020 04:00) (93 - 99)  RR: 20 (22 Dec 2020 09:31) (16 - 20)  SpO2: 95% (22 Dec 2020 09:31) (93% - 100%)  ICU Vital Signs Last 24 Hrs  CLAUDIA MABRY  I&O's Detail    21 Dec 2020 07:01  -  22 Dec 2020 07:00  --------------------------------------------------------  IN:    IV PiggyBack: 100 mL  Total IN: 100 mL    OUT:    Incontinent per Condom Catheter (mL): 500 mL  Total OUT: 500 mL    Total NET: -400 mL        I&O's Summary    21 Dec 2020 07:01  -  22 Dec 2020 07:00  --------------------------------------------------------  IN: 100 mL / OUT: 500 mL / NET: -400 mL      Drug Dosing Weight  CLAUDIA MABRY      PHYSICAL EXAM:  General: Appears well developed, well nourished alert and cooperative.  HEENT: Head; normocephalic, atraumatic.  Eyes: Pupils reactive, cornea wnl.  Neck: Supple, no nodes adenopathy, no NVD or carotid bruit or thyromegaly.  CARDIOVASCULAR: Normal S1 and S2, No murmur, rub, gallop or lift.   LUNGS: + rhonchi  and mild  wheezing   ABDOMEN: Soft, nontender without mass or organomegaly. bowel sounds normoactive.  EXTREMITIES: No clubbing, cyanosis + edema. Distal pulses wnl.   SKIN: warm and dry with normal turgor.  NEURO: Alert/oriented x 3/normal motor exam. No pathologic reflexes.    PSYCH: normal affect.        LABS:                        11.7   16.06 )-----------( 220      ( 22 Dec 2020 07:34 )             37.8     12-22    145  |  92<L>  |  32.0<H>  ----------------------------<  192<H>  4.5   |  46.0<HH>  |  0.72    Ca    8.8      22 Dec 2020 07:34    TPro  5.8<L>  /  Alb  3.3  /  TBili  0.6  /  DBili  x   /  AST  30  /  ALT  44<H>  /  AlkPhos  93  12-21    CLAUDIA MABRY        Summary:   1. Technically difficult study.   2. Endocardial visualization was enhanced with intravenous echo contrast.   3. Left ventricular ejection fraction, by visual estimation, is 60 to 65%.   4. Normal global left ventricular systolic function.   5. The left ventricular diastolic function could not be assessed in this study.   6. Normal left ventricular internal cavity size.   7. Moderately enlarged right ventricle. Moderately reduced RV systolic function.   8. The left atrium is normal in size.   9. Moderately enlarged right atrium.  10. Mild thickening and calcification of the anterior and posterior mitral valve leaflets.  11. Mild mitral valve regurgitation.  12. Mild tricuspid regurgitation.  13. There is no evidence of pericardial effusion.    MD [place] Electronically signed on 12/14/2020 at 4:23:18 PM      CARDIAC CATHETERIZATION:  VENTRICLES: LVEF 55%  CORONARY VESSELS: The coronary circulation is right dominant.  LM:   --  LM: Normal.  LAD:   --  Mid LAD: There was a 80 % stenosis.  CX:   --  Circumflex: Angiography showed minor luminal irregularities with  no flow limiting lesions. The vessel arose anomalously from the proximal  RCA.  RCA:   --  RCA: iFR index of 0.82  --  Mid RCA: There was a 80 % stenosis.  --  Distal RCA: There was a 90 % stenosis.  COMPLICATIONS: There were no complications. No complications occurred  during the cath lab visit.  DIAGNOSTIC IMPRESSIONS: Severe RCA and LAD disease. The RCA was treated  with PTCA and STENT (THI-Resolute) with IVUS  DIAGNOSTIC RECOMMENDATIONS: Plavix and coumadin Will need PCI of LAD in  future  INTERVENTIONAL IMPRESSIONS: Severe RCA and LAD disease. The RCA was treated  with PTCA and STENT (THI-Resolute) with IVUS  INTERVENTIONAL RECOMMENDATIONS: Plavix and coumadin Will need PCI of LAD in  future  Prepared and signed by  Jacob Benitez MD            Assessment and Plan:   · Assessment	  70y Male with past medical history significant for COPD, HTN, DM, pAF on coumadin, seizure d/o with recent hospitalization for hypercapnic respiratory failure presents with HTN emergency and subsequent PEA/VT arrest.  s/p cath with Severe RCA and LAD disease. The RCA was treated with PTCA and STENT (THI-Resolute) with IVUS with residual LAD lesion awaiting PCI next week if stable; Patient evaluated be EP and no indications for AICD given a clear provocative circumstances and normal EF on recent TTE see above RV failure due to COPD    Plan  1.  Awaiting LAD PCI when his acute on chronic respiratory failure is stable    2.  HOLD full dose AC at this time due to bleeding risk for AF management   3.  DAPT therapy and statin for now   4.  Pulmonary follow up       Hold diuretic therapy for now

## 2020-12-22 NOTE — PROGRESS NOTE ADULT - ASSESSMENT
Severe COPD, CAD/PAF, Cor Pulmonale, 02 depd, post arrest   chronic hypercapnic respiratory failure, tolerating AVAPS  Minimal air entry on exam , no paradox, accessory  muscle use or diaphoresis  CXR bilat infiltrates /eff, BNP increased, minimal procalcitonin, ID input noted  spoke with Cardiology, prn diuresis, to use AVAPS for procedure  Needs AVAPS as out pt to decrease re admissions and improve mortality has failed [facility] ( [facility])  decrease   medrol, lasix x 1, AVAPs nocturnal and prn  prognosis extremely  guarded

## 2020-12-22 NOTE — PROGRESS NOTE ADULT - ASSESSMENT
70y Male with past medical history significant for severe COPD, on home O2, DM type 2, HTN, permanent AF, admitted with recurrent hypoxic/hypercapneic respiratory failure and agonal respiration 12/10 s/p emergent intubation followed by PEA arrest, then developed VT with a pulse, s/p DCCV and initiation of amiodarone s/p cath which revealed severe RCA and LAD disease s/p THI to RCA however with agitation during the procedure resulting in R femoral artery pseudoaneurysm s/p thrombin injection RFA by IR, now pending staged intervention to the LAD. Patient now with increased work of breathing requiring escalation of O2 support, now on AVAPS  1. T2DM - recent sugars low and currently Lantus on hold, today sugars rising and still on steroids  - resume Lantus but at lower dose, 20 units at bedtime  - cont Lispro 12 units w meals, cont Lispro scale  - will follow    2. HLD - cont statin    3. severe CAD - planning staged PCI when resp status improved, management per cardio    4. hypercapnic resp failure - on AVAPS, management per pulmonary

## 2020-12-23 LAB
ANION GAP SERPL CALC-SCNC: 8 MMOL/L — SIGNIFICANT CHANGE UP (ref 5–17)
BUN SERPL-MCNC: 30 MG/DL — HIGH (ref 8–20)
CALCIUM SERPL-MCNC: 8.8 MG/DL — SIGNIFICANT CHANGE UP (ref 8.6–10.2)
CHLORIDE SERPL-SCNC: 91 MMOL/L — LOW (ref 98–107)
CO2 SERPL-SCNC: 46 MMOL/L — CRITICAL HIGH (ref 22–29)
CREAT SERPL-MCNC: 0.58 MG/DL — SIGNIFICANT CHANGE UP (ref 0.5–1.3)
GAS PNL BLDA: SIGNIFICANT CHANGE UP
GLUCOSE BLDC GLUCOMTR-MCNC: 127 MG/DL — HIGH (ref 70–99)
GLUCOSE BLDC GLUCOMTR-MCNC: 187 MG/DL — HIGH (ref 70–99)
GLUCOSE BLDC GLUCOMTR-MCNC: 242 MG/DL — HIGH (ref 70–99)
GLUCOSE BLDC GLUCOMTR-MCNC: 286 MG/DL — HIGH (ref 70–99)
GLUCOSE BLDC GLUCOMTR-MCNC: 345 MG/DL — HIGH (ref 70–99)
GLUCOSE SERPL-MCNC: 179 MG/DL — HIGH (ref 70–99)
HCT VFR BLD CALC: 37 % — LOW (ref 39–50)
HGB BLD-MCNC: 11.5 G/DL — LOW (ref 13–17)
MCHC RBC-ENTMCNC: 28.5 PG — SIGNIFICANT CHANGE UP (ref 27–34)
MCHC RBC-ENTMCNC: 31.1 GM/DL — LOW (ref 32–36)
MCV RBC AUTO: 91.6 FL — SIGNIFICANT CHANGE UP (ref 80–100)
PLATELET # BLD AUTO: 233 K/UL — SIGNIFICANT CHANGE UP (ref 150–400)
POTASSIUM SERPL-MCNC: 5 MMOL/L — SIGNIFICANT CHANGE UP (ref 3.5–5.3)
POTASSIUM SERPL-SCNC: 5 MMOL/L — SIGNIFICANT CHANGE UP (ref 3.5–5.3)
RBC # BLD: 4.04 M/UL — LOW (ref 4.2–5.8)
RBC # FLD: 14.7 % — HIGH (ref 10.3–14.5)
SODIUM SERPL-SCNC: 145 MMOL/L — SIGNIFICANT CHANGE UP (ref 135–145)
WBC # BLD: 21.3 K/UL — HIGH (ref 3.8–10.5)
WBC # FLD AUTO: 21.3 K/UL — HIGH (ref 3.8–10.5)

## 2020-12-23 PROCEDURE — 99232 SBSQ HOSP IP/OBS MODERATE 35: CPT

## 2020-12-23 PROCEDURE — 99497 ADVNCD CARE PLAN 30 MIN: CPT | Mod: 25

## 2020-12-23 PROCEDURE — 99233 SBSQ HOSP IP/OBS HIGH 50: CPT

## 2020-12-23 RX ORDER — INSULIN LISPRO 100/ML
16 VIAL (ML) SUBCUTANEOUS
Refills: 0 | Status: DISCONTINUED | OUTPATIENT
Start: 2020-12-23 | End: 2020-12-24

## 2020-12-23 RX ADMIN — FAMOTIDINE 20 MILLIGRAM(S): 10 INJECTION INTRAVENOUS at 18:27

## 2020-12-23 RX ADMIN — Medication 8: at 12:22

## 2020-12-23 RX ADMIN — Medication 3 MILLILITER(S): at 21:33

## 2020-12-23 RX ADMIN — Medication 4: at 08:53

## 2020-12-23 RX ADMIN — CLOPIDOGREL BISULFATE 75 MILLIGRAM(S): 75 TABLET, FILM COATED ORAL at 12:21

## 2020-12-23 RX ADMIN — LEVETIRACETAM 420 MILLIGRAM(S): 250 TABLET, FILM COATED ORAL at 05:35

## 2020-12-23 RX ADMIN — POLYETHYLENE GLYCOL 3350 17 GRAM(S): 17 POWDER, FOR SOLUTION ORAL at 12:22

## 2020-12-23 RX ADMIN — Medication 3 MILLILITER(S): at 03:50

## 2020-12-23 RX ADMIN — FAMOTIDINE 20 MILLIGRAM(S): 10 INJECTION INTRAVENOUS at 05:34

## 2020-12-23 RX ADMIN — Medication 81 MILLIGRAM(S): at 12:21

## 2020-12-23 RX ADMIN — Medication 12 UNIT(S): at 08:53

## 2020-12-23 RX ADMIN — Medication 3 MILLILITER(S): at 08:16

## 2020-12-23 RX ADMIN — Medication 0.12 MILLIGRAM(S): at 05:38

## 2020-12-23 RX ADMIN — Medication 3 MILLILITER(S): at 15:05

## 2020-12-23 RX ADMIN — Medication 40 MILLIGRAM(S): at 15:25

## 2020-12-23 RX ADMIN — Medication 60 MILLIGRAM(S): at 18:27

## 2020-12-23 RX ADMIN — Medication 250 MILLIGRAM(S): at 18:27

## 2020-12-23 RX ADMIN — Medication 40 MILLIGRAM(S): at 05:33

## 2020-12-23 RX ADMIN — Medication 16 UNIT(S): at 17:53

## 2020-12-23 RX ADMIN — Medication 60 MILLIGRAM(S): at 05:34

## 2020-12-23 RX ADMIN — Medication 6: at 17:52

## 2020-12-23 RX ADMIN — CHLORHEXIDINE GLUCONATE 1 APPLICATION(S): 213 SOLUTION TOPICAL at 05:31

## 2020-12-23 RX ADMIN — Medication 60 MILLIGRAM(S): at 12:23

## 2020-12-23 RX ADMIN — LEVETIRACETAM 420 MILLIGRAM(S): 250 TABLET, FILM COATED ORAL at 18:28

## 2020-12-23 RX ADMIN — CITALOPRAM 40 MILLIGRAM(S): 10 TABLET, FILM COATED ORAL at 12:21

## 2020-12-23 RX ADMIN — Medication 12 UNIT(S): at 12:22

## 2020-12-23 RX ADMIN — Medication 2: at 21:01

## 2020-12-23 RX ADMIN — INSULIN GLARGINE 20 UNIT(S): 100 INJECTION, SOLUTION SUBCUTANEOUS at 21:00

## 2020-12-23 RX ADMIN — Medication 25 MILLIGRAM(S): at 05:34

## 2020-12-23 RX ADMIN — Medication 25 MILLIGRAM(S): at 18:28

## 2020-12-23 RX ADMIN — Medication 40 MILLIGRAM(S): at 21:00

## 2020-12-23 RX ADMIN — ATORVASTATIN CALCIUM 40 MILLIGRAM(S): 80 TABLET, FILM COATED ORAL at 21:00

## 2020-12-23 RX ADMIN — Medication 250 MILLIGRAM(S): at 05:34

## 2020-12-23 RX ADMIN — Medication 60 MILLIGRAM(S): at 00:31

## 2020-12-23 NOTE — GOALS OF CARE CONVERSATION - ADVANCED CARE PLANNING - WHAT MATTERS MOST
both pt and daughter wants to continue all medical care include intubation and resuscitation  Per Daughter pt wants to fullcode   both pt and daughter understood risk of intubation, recurrent hospitatization

## 2020-12-23 NOTE — PROGRESS NOTE ADULT - SUBJECTIVE AND OBJECTIVE BOX
PULMONARY PROGRESS NOTE      [id number]    Patient is a 70y old  Male who presents with a chief complaint of Acute hypoxic/hypercapnic respiratory failure, cardiac arrest (23 Dec 2020 14:16)  70y Male with past medical history significant for COPD, HTN, DM, pAF on coumadin, seizure d/o with recent hospitalization for hypercapnic respiratory failure presents with HTN emergency and subsequent PEA/VT arrest on 12/10 this admission.     INTERVAL HPI/OVERNIGHT EVENTS:    Pt is  feeling better today. On 3 L nc so2 95% but desat  with movement and became TACHY  Pt denies any cp, n/v/d.  No Groin pain  Good appetite today  +bm      MEDICATIONS  (STANDING):  albuterol/ipratropium for Nebulization 3 milliLiter(s) Nebulizer every 6 hours  aspirin  chewable 81 milliGRAM(s) Oral daily  atorvastatin 40 milliGRAM(s) Oral at bedtime  chlorhexidine 2% Cloths 1 Application(s) Topical <User Schedule>  citalopram 40 milliGRAM(s) Oral daily  clopidogrel Tablet 75 milliGRAM(s) Oral daily  dextrose 40% Gel 15 Gram(s) Oral once  dextrose 5%. 1000 milliLiter(s) (50 mL/Hr) IV Continuous <Continuous>  dextrose 5%. 1000 milliLiter(s) (100 mL/Hr) IV Continuous <Continuous>  dextrose 50% Injectable 25 Gram(s) IV Push once  dextrose 50% Injectable 12.5 Gram(s) IV Push once  dextrose 50% Injectable 25 Gram(s) IV Push once  digoxin     Tablet 0.125 milliGRAM(s) Oral daily  diltiazem    Tablet 60 milliGRAM(s) Oral four times a day  famotidine    Tablet 20 milliGRAM(s) Oral two times a day  glucagon  Injectable 1 milliGRAM(s) IntraMuscular once  influenza   Vaccine 0.5 milliLiter(s) IntraMuscular once  insulin glargine Injectable (LANTUS) 20 Unit(s) SubCutaneous at bedtime  insulin lispro (ADMELOG) corrective regimen sliding scale   SubCutaneous Before meals and at bedtime  insulin lispro Injectable (ADMELOG) 12 Unit(s) SubCutaneous three times a day before meals  levETIRAcetam  IVPB 500 milliGRAM(s) IV Intermittent every 12 hours  methylPREDNISolone sodium succinate Injectable 40 milliGRAM(s) IV Push every 8 hours  metoprolol tartrate 25 milliGRAM(s) Oral two times a day  polyethylene glycol 3350 17 Gram(s) Oral daily  saccharomyces boulardii 250 milliGRAM(s) Oral two times a day      MEDICATIONS  (PRN):  aluminum hydroxide/magnesium hydroxide/simethicone Suspension 30 milliLiter(s) Oral every 4 hours PRN Dyspepsia      Allergies    No Known Allergies    Intolerances        PAST MEDICAL & SURGICAL HISTORY:  Seizure    Depression, unspecified depression type    Diabetes    Hypertension    Hyperlipidemia    Afib    COPD (chronic obstructive pulmonary disease)    Abdominal hernia    H/O carotid endarterectomy        SOCIAL HISTORY  Smoking History:       REVIEW OF SYSTEMS:    CONSTITUTIONAL:  No distress    HEENT:  Eyes:  No diplopia or blurred vision. ENT:  No earache, sore throat or runny nose.    CARDIOVASCULAR:  No pressure, squeezing, tightness, heaviness or aching about the chest; no palpitations.    RESPIRATORY:  No cough, shortness of breath, PND or orthopnea. Mild SOBOE    GASTROINTESTINAL:  No nausea, vomiting or diarrhea.    GENITOURINARY:  No dysuria, frequency or urgency.    NEUROLOGIC:  No paresthesias, fasciculations, seizures or weakness.    PSYCHIATRIC:  No disorder of thought or mood.    Vital Signs Last 24 Hrs  T(C): 36.6 (23 Dec 2020 12:00), Max: 36.8 (22 Dec 2020 20:55)  T(F): 97.9 (23 Dec 2020 12:00), Max: 98.3 (22 Dec 2020 20:55)  HR: 108 (23 Dec 2020 12:00) (81 - 108)  BP: 147/105 (23 Dec 2020 12:00) (121/68 - 151/77)  BP(mean): 115 (23 Dec 2020 12:00) (74 - 115)  RR: 22 (23 Dec 2020 12:00) (16 - 22)  SpO2: 95% (23 Dec 2020 12:00) (94% - 100%)    PHYSICAL EXAMINATION:    GENERAL: The patient is awake and alert in no apparent distress.     HEENT: Head is normocephalic and atraumatic. Extraocular muscles are intact. Mucous membranes are moist.    NECK: Supple.    LUNGS: Clear to auscultation without wheezing, rales or rhonchi; respirations unlabored    HEART: Regular rate and rhythm without murmur.    ABDOMEN: Soft, nontender, and nondistended.      EXTREMITIES: Without any cyanosis, clubbing, rash, lesions or edema.    NEUROLOGIC: Grossly intact.    LABS:                        11.5   21.30 )-----------( 233      ( 23 Dec 2020 08:39 )             37.0     12-23    145  |  91<L>  |  30.0<H>  ----------------------------<  179<H>  5.0   |  46.0<HH>  |  0.58    Ca    8.8      23 Dec 2020 08:39          ABG - ( 21 Dec 2020 22:19 )  pH, Arterial: 7.34  pH, Blood: x     /  pCO2: 98    /  pO2: 174   / HCO3: 47    / Base Excess: 22.0  /  SaO2: 100                     Serum Pro-Brain Natriuretic Peptide: 1662 pg/mL (12-21-20 @ 17:35)      Procalcitonin, Serum: 0.11 ng/mL (12-21-20 @ 12:17)        RADIOLOGY & ADDITIONAL STUDIES:     EXAM:  XR CHEST PORTABLE IMMED 1V                          PROCEDURE DATE:  12/21/2020          INTERPRETATION:  AP chest on December 21, 2020 at 11:36 AM. 2 images. Patient is short of breath.    Heart magnified by technique.    Present film shows a mild right base infiltrate likely with pleural component new since December 1.    In addition there is a slight left base infiltrate also seen new since prior area    IMPRESSION: Basilar lung processes are now seen right greater than left.    OLMAN PUCKETT MD; Attending Radiologist        Echo on 12/14/20   1. Technically difficult study.   2. Endocardial visualization was enhanced with intravenous echo contrast.   3. Left ventricular ejection fraction, by visual estimation, is 60 to 65%.   4. Normal global left ventricular systolic function.   5. The left ventricular diastolic function could not be assessed in this study.   6. Normal left ventricular internal cavity size.   7. Moderately enlarged right ventricle. Moderately reduced RV systolic function.   8. The left atrium is normal in size.   9. Moderately enlarged right atrium.  10. Mild thickening and calcification of the anterior and posterior mitral valve leaflets.  11. Mild mitral valve regurgitation.  12. Mild tricuspid regurgitation.  13. There is no evidence of pericardial effusion.    MD [place] Electronically signed on 12/14/2020 at 4:23:18 PM

## 2020-12-23 NOTE — PROGRESS NOTE ADULT - ASSESSMENT
70y Male with past medical history significant for severe COPD, on home O2, DM type 2, HTN, permanent AF, admitted with recurrent hypoxic/hypercapneic respiratory failure and agonal respiration 12/10 s/p emergent intubation followed by PEA arrest, then developed VT with a pulse, s/p DCCV and initiation of amiodarone s/p cath which revealed severe RCA and LAD disease s/p THI to RCA however with agitation during the procedure resulting in R femoral artery pseudoaneurysm s/p thrombin injection RFA by IR, now pending staged intervention to the LAD. Patient now with increased work of breathing requiring escalation of O2 support, now on AVAPS  1. T2DM - hyperglycemia today, fasting number okay  - cont Lantus 20 units, may need dose increase  - increase Lispro 16 units w meals, cont Lispro scale  - will follow    2. HLD - cont statin    3. severe CAD - planning staged PCI when resp status improved, management per cardio    4. hypercapnic resp failure - on AVAPS, management per pulmonary

## 2020-12-23 NOTE — GOALS OF CARE CONVERSATION - ADVANCED CARE PLANNING - CONVERSATION DETAILS
advance copd, respiratory failure, CAD  on home oxygen  Pt wants to be full code   Pt has multiple comorbidities and recurrent respiratory failure , high risk of intubation.  dw pt at bedside and daughter over phone

## 2020-12-23 NOTE — PROGRESS NOTE ADULT - SUBJECTIVE AND OBJECTIVE BOX
[facility]                                   [address] [place] [address]                                                      PHONE: [phone]                                                         FAX: [phone]  [email or web address]  ---------------------------------------------------------------------------------------------------------------------------------    Overnight events/patient complaints:  NAD feeling ok today     No Known Allergies    MEDICATIONS  (STANDING):  albuterol/ipratropium for Nebulization 3 milliLiter(s) Nebulizer every 6 hours  aspirin  chewable 81 milliGRAM(s) Oral daily  atorvastatin 40 milliGRAM(s) Oral at bedtime  chlorhexidine 2% Cloths 1 Application(s) Topical <User Schedule>  citalopram 40 milliGRAM(s) Oral daily  clopidogrel Tablet 75 milliGRAM(s) Oral daily  dextrose 40% Gel 15 Gram(s) Oral once  dextrose 5%. 1000 milliLiter(s) (50 mL/Hr) IV Continuous <Continuous>  dextrose 5%. 1000 milliLiter(s) (100 mL/Hr) IV Continuous <Continuous>  dextrose 50% Injectable 25 Gram(s) IV Push once  dextrose 50% Injectable 12.5 Gram(s) IV Push once  dextrose 50% Injectable 25 Gram(s) IV Push once  digoxin     Tablet 0.125 milliGRAM(s) Oral daily  diltiazem    Tablet 60 milliGRAM(s) Oral four times a day  famotidine    Tablet 20 milliGRAM(s) Oral two times a day  glucagon  Injectable 1 milliGRAM(s) IntraMuscular once  influenza   Vaccine 0.5 milliLiter(s) IntraMuscular once  insulin glargine Injectable (LANTUS) 20 Unit(s) SubCutaneous at bedtime  insulin lispro (ADMELOG) corrective regimen sliding scale   SubCutaneous Before meals and at bedtime  insulin lispro Injectable (ADMELOG) 12 Unit(s) SubCutaneous three times a day before meals  levETIRAcetam  IVPB 500 milliGRAM(s) IV Intermittent every 12 hours  methylPREDNISolone sodium succinate Injectable 40 milliGRAM(s) IV Push every 8 hours  metoprolol tartrate 25 milliGRAM(s) Oral two times a day  polyethylene glycol 3350 17 Gram(s) Oral daily  saccharomyces boulardii 250 milliGRAM(s) Oral two times a day    MEDICATIONS  (PRN):  aluminum hydroxide/magnesium hydroxide/simethicone Suspension 30 milliLiter(s) Oral every 4 hours PRN Dyspepsia      Vital Signs Last 24 Hrs  T(C): 36.5 (23 Dec 2020 08:00), Max: 36.8 (22 Dec 2020 20:55)  T(F): 97.7 (23 Dec 2020 08:00), Max: 98.3 (22 Dec 2020 20:55)  HR: 82 (23 Dec 2020 08:18) (81 - 100)  BP: 135/74 (23 Dec 2020 08:00) (121/68 - 151/77)  BP(mean): 88 (23 Dec 2020 08:00) (74 - 95)  RR: 20 (23 Dec 2020 08:00) (16 - 20)  SpO2: 95% (23 Dec 2020 08:18) (94% - 100%)  ICU Vital Signs Last 24 Hrs  CLAUDIA MABRY  I&O's Detail    22 Dec 2020 07:01  -  23 Dec 2020 07:00  --------------------------------------------------------  IN:    Oral Fluid: 300 mL  Total IN: 300 mL    OUT:    Voided (mL): 2450 mL  Total OUT: 2450 mL    Total NET: -2150 mL        I&O's Summary    22 Dec 2020 07:01  -  23 Dec 2020 07:00  --------------------------------------------------------  IN: 300 mL / OUT: 2450 mL / NET: -2150 mL      Drug Dosing Weight  CLAUDIA MABRY      PHYSICAL EXAM:  General: Appears well developed, well nourished alert and cooperative.  HEENT: Head; normocephalic, atraumatic.  Eyes: Pupils reactive, cornea wnl.  Neck: Supple, no nodes adenopathy, no NVD or carotid bruit or thyromegaly.  CARDIOVASCULAR: Normal S1 and S2, 2/6 murmur, rub, gallop or lift.   LUNGS: Decrease BS B/L   ABDOMEN: Soft, nontender without mass or organomegaly. bowel sounds normoactive.  EXTREMITIES: No clubbing, cyanosis + edema. Distal pulses wnl.   SKIN: warm and dry with normal turgor.  NEURO: Alert/oriented x 3/normal motor exam. No pathologic reflexes.    PSYCH: normal affect.        LABS:                        11.5   21.30 )-----------( 233      ( 23 Dec 2020 08:39 )             37.0     12-23    145  |  91<L>  |  30.0<H>  ----------------------------<  179<H>  5.0   |  46.0<HH>  |  0.58    Ca    8.8      23 Dec 2020 08:39      CLAUDIA MABRY        Summary:   1. Technically difficult study.   2. Endocardial visualization was enhanced with intravenous echo contrast.   3. Left ventricular ejection fraction, by visual estimation, is 60 to 65%.   4. Normal global left ventricular systolic function.   5. The left ventricular diastolic function could not be assessed in this study.   6. Normal left ventricular internal cavity size.   7. Moderately enlarged right ventricle. Moderately reduced RV systolic function.   8. The left atrium is normal in size.   9. Moderately enlarged right atrium.  10. Mild thickening and calcification of the anterior and posterior mitral valve leaflets.  11. Mild mitral valve regurgitation.  12. Mild tricuspid regurgitation.  13. There is no evidence of pericardial effusion.    MD [place] Electronically signed on 12/14/2020 at 4:23:18 PM      CARDIAC CATHETERIZATION:  VENTRICLES: LVEF 55%  CORONARY VESSELS: The coronary circulation is right dominant.  LM:   --  LM: Normal.  LAD:   --  Mid LAD: There was a 80 % stenosis.  CX:   --  Circumflex: Angiography showed minor luminal irregularities with  no flow limiting lesions. The vessel arose anomalously from the proximal  RCA.  RCA:   --  RCA: iFR index of 0.82  --  Mid RCA: There was a 80 % stenosis.  --  Distal RCA: There was a 90 % stenosis.  COMPLICATIONS: There were no complications. No complications occurred  during the cath lab visit.  DIAGNOSTIC IMPRESSIONS: Severe RCA and LAD disease. The RCA was treated  with PTCA and STENT (THI-Resolute) with IVUS  DIAGNOSTIC RECOMMENDATIONS: Plavix and coumadin Will need PCI of LAD in  future  INTERVENTIONAL IMPRESSIONS: Severe RCA and LAD disease. The RCA was treated  with PTCA and STENT (THI-Resolute) with IVUS  INTERVENTIONAL RECOMMENDATIONS: Plavix and coumadin Will need PCI of LAD in  future  Prepared and signed by  Jacob Benitez MD            Assessment and Plan:   · Assessment	  70y Male with past medical history significant for COPD, HTN, DM, pAF on coumadin, seizure d/o with recent hospitalization for hypercapnic respiratory failure presents with HTN emergency and subsequent PEA/VT arrest.  s/p cath with Severe RCA and LAD disease. The RCA was treated with PTCA and STENT (THI-Resolute) with IVUS with residual LAD lesion awaiting PCI next week if stable; Patient evaluated be EP and no indications for AICD given a clear provocative circumstances and normal EF on recent TTE see above RV failure due to COPD    Plan  1.  LAD PCI on 2/24/20  2.  HOLD full dose AC at this time due to bleeding risk for AF management   3.  DAPT therapy and statin for now   4.  Pulmonary follow up     Prn BIPAP for left heart cath for tomorrow

## 2020-12-23 NOTE — PROGRESS NOTE ADULT - ASSESSMENT
Assess    Severe COPD, ,   Cor Pulmonale, 02 depd, post arrest  CAD/PAF   chronic hypercapnic respiratory failure, tolerating AVAPS  Minimal air entry on exam , no paradox, accessory  muscle use or diaphoresis  CXR bilat infiltrates /eff, BNP increased, minimal procalcitonin, ID input noted    Rec    prn diuresis  AVAPS for procedures  Needs AVAPS as out pt to decrease re admissions and improve mortality has failed BIPAP ( [facility])  Steroid taper   AVAPs nocturnal and prn  prognosis extremely  guarded

## 2020-12-23 NOTE — PROGRESS NOTE ADULT - SUBJECTIVE AND OBJECTIVE BOX
Follow up: diabetes  Interval Hx/Events: no issues    MEDICATIONS  (STANDING):  albuterol/ipratropium for Nebulization 3 milliLiter(s) Nebulizer every 6 hours  aspirin  chewable 81 milliGRAM(s) Oral daily  atorvastatin 40 milliGRAM(s) Oral at bedtime  chlorhexidine 2% Cloths 1 Application(s) Topical <User Schedule>  citalopram 40 milliGRAM(s) Oral daily  clopidogrel Tablet 75 milliGRAM(s) Oral daily  dextrose 40% Gel 15 Gram(s) Oral once  dextrose 5%. 1000 milliLiter(s) (50 mL/Hr) IV Continuous <Continuous>  dextrose 5%. 1000 milliLiter(s) (100 mL/Hr) IV Continuous <Continuous>  dextrose 50% Injectable 25 Gram(s) IV Push once  dextrose 50% Injectable 12.5 Gram(s) IV Push once  dextrose 50% Injectable 25 Gram(s) IV Push once  digoxin     Tablet 0.125 milliGRAM(s) Oral daily  diltiazem    Tablet 60 milliGRAM(s) Oral four times a day  famotidine    Tablet 20 milliGRAM(s) Oral two times a day  glucagon  Injectable 1 milliGRAM(s) IntraMuscular once  influenza   Vaccine 0.5 milliLiter(s) IntraMuscular once  insulin glargine Injectable (LANTUS) 20 Unit(s) SubCutaneous at bedtime  insulin lispro (ADMELOG) corrective regimen sliding scale   SubCutaneous Before meals and at bedtime  insulin lispro Injectable (ADMELOG) 12 Unit(s) SubCutaneous three times a day before meals  levETIRAcetam  IVPB 500 milliGRAM(s) IV Intermittent every 12 hours  methylPREDNISolone sodium succinate Injectable 40 milliGRAM(s) IV Push every 8 hours  metoprolol tartrate 25 milliGRAM(s) Oral two times a day  polyethylene glycol 3350 17 Gram(s) Oral daily  saccharomyces boulardii 250 milliGRAM(s) Oral two times a day    MEDICATIONS  (PRN):  aluminum hydroxide/magnesium hydroxide/simethicone Suspension 30 milliLiter(s) Oral every 4 hours PRN Dyspepsia      ALLERGIES: No Known Allergies      REVIEW OF SYSTEMS:  CONSTITUTIONAL: No fever, weight loss, or fatigue  RESPIRATORY: No cough, No shortness of breath - improved  CARDIOVASCULAR: No chest pain  GASTROINTESTINAL: No abdominal or epigastric pain. No nausea or vomiting  GENITOURINARY: No dysuria  PSYCHIATRIC: No depression or anxiety    Vital Signs Last 24 Hrs  T(C): 36.6 (23 Dec 2020 12:00), Max: 36.8 (22 Dec 2020 20:55)  T(F): 97.9 (23 Dec 2020 12:00), Max: 98.3 (22 Dec 2020 20:55)  HR: 84 (23 Dec 2020 15:05) (81 - 108)  BP: 147/105 (23 Dec 2020 12:00) (121/68 - 151/77)  BP(mean): 115 (23 Dec 2020 12:00) (74 - 115)  RR: 22 (23 Dec 2020 12:00) (16 - 22)  SpO2: 99% (23 Dec 2020 15:05) (94% - 100%)    Physical Exam:  General appearance: Well developed, well nourished. Obese  Eyes: Pupils equal. EOMI  Lungs: Normal respiratory excursion. Lungs clear no w/r/r  CV: Normal S1S2, regular. No m/r/g.    Abdomen: Soft, nontender  Skin: Warm and moist.   Neuro: Cranial nerves intact.  Psych: Normal affect, good judgement/insight      LABS:                        11.5   21.30 )-----------( 233      ( 23 Dec 2020 08:39 )             37.0     12-23    145  |  91<L>  |  30.0<H>  ----------------------------<  179<H>  5.0   |  46.0<HH>  |  0.58    Ca    8.8      23 Dec 2020 08:39    A1C with Estimated Average Glucose Result: 9.1 % (12-11-20 @ 05:05)  A1C with Estimated Average Glucose Result: 8.7 % (12-02-20 @ 04:55)    CAPILLARY BLOOD GLUCOSE  POCT Blood Glucose.: 345 mg/dL (23 Dec 2020 12:06)  POCT Blood Glucose.: 242 mg/dL (23 Dec 2020 08:14)  POCT Blood Glucose.: 127 mg/dL (23 Dec 2020 05:33)  POCT Blood Glucose.: 107 mg/dL (22 Dec 2020 21:00)  POCT Blood Glucose.: 109 mg/dL (22 Dec 2020 17:06)  POCT Blood Glucose.: 208 mg/dL (22 Dec 2020 12:20)  POCT Blood Glucose.: 193 mg/dL (22 Dec 2020 08:01)  POCT Blood Glucose.: 169 mg/dL (22 Dec 2020 00:10)  POCT Blood Glucose.: 82 mg/dL (21 Dec 2020 17:44)  POCT Blood Glucose.: 67 mg/dL (21 Dec 2020 17:25)      Thyroid Stimulating Hormone, Serum: 0.38 uIU/mL [0.27 - 4.20] (12-02-20)

## 2020-12-23 NOTE — PROGRESS NOTE ADULT - SUBJECTIVE AND OBJECTIVE BOX
Patient is a 70y old  Male who presents with a chief complaint of Acute hypoxic/hypercapnic respiratory failure, cardiac arrest (23 Dec 2020 10:33)    Pt is  feeling better today. On 3 L nc so2 95% but desat  with movement and became TACHY  Pt denies any cp, n/v/d.  No Groin pain  Good appetite today  +bm    SUBJECTIVE / OVERNIGHT EVENTS:  REVIEW OF SYSTEMS: All systems are reviewed and found to be negative except above    MEDICATIONS  (STANDING):  albuterol/ipratropium for Nebulization 3 milliLiter(s) Nebulizer every 6 hours  aspirin  chewable 81 milliGRAM(s) Oral daily  atorvastatin 40 milliGRAM(s) Oral at bedtime  chlorhexidine 2% Cloths 1 Application(s) Topical <User Schedule>  citalopram 40 milliGRAM(s) Oral daily  clopidogrel Tablet 75 milliGRAM(s) Oral daily  dextrose 40% Gel 15 Gram(s) Oral once  dextrose 5%. 1000 milliLiter(s) (50 mL/Hr) IV Continuous <Continuous>  dextrose 5%. 1000 milliLiter(s) (100 mL/Hr) IV Continuous <Continuous>  dextrose 50% Injectable 25 Gram(s) IV Push once  dextrose 50% Injectable 12.5 Gram(s) IV Push once  dextrose 50% Injectable 25 Gram(s) IV Push once  digoxin     Tablet 0.125 milliGRAM(s) Oral daily  diltiazem    Tablet 60 milliGRAM(s) Oral four times a day  famotidine    Tablet 20 milliGRAM(s) Oral two times a day  glucagon  Injectable 1 milliGRAM(s) IntraMuscular once  influenza   Vaccine 0.5 milliLiter(s) IntraMuscular once  insulin glargine Injectable (LANTUS) 20 Unit(s) SubCutaneous at bedtime  insulin lispro (ADMELOG) corrective regimen sliding scale   SubCutaneous Before meals and at bedtime  insulin lispro Injectable (ADMELOG) 12 Unit(s) SubCutaneous three times a day before meals  levETIRAcetam  IVPB 500 milliGRAM(s) IV Intermittent every 12 hours  methylPREDNISolone sodium succinate Injectable 40 milliGRAM(s) IV Push every 8 hours  metoprolol tartrate 25 milliGRAM(s) Oral two times a day  polyethylene glycol 3350 17 Gram(s) Oral daily  saccharomyces boulardii 250 milliGRAM(s) Oral two times a day    MEDICATIONS  (PRN):  aluminum hydroxide/magnesium hydroxide/simethicone Suspension 30 milliLiter(s) Oral every 4 hours PRN Dyspepsia      CAPILLARY BLOOD GLUCOSE      POCT Blood Glucose.: 345 mg/dL (23 Dec 2020 12:06)  POCT Blood Glucose.: 242 mg/dL (23 Dec 2020 08:14)  POCT Blood Glucose.: 127 mg/dL (23 Dec 2020 05:33)  POCT Blood Glucose.: 107 mg/dL (22 Dec 2020 21:00)  POCT Blood Glucose.: 109 mg/dL (22 Dec 2020 17:06)    I&O's Summary    22 Dec 2020 07:01  -  23 Dec 2020 07:00  --------------------------------------------------------  IN: 300 mL / OUT: 2450 mL / NET: -2150 mL        PHYSICAL EXAM:  Vital Signs Last 24 Hrs  T(C): 36.6 (23 Dec 2020 12:00), Max: 36.8 (22 Dec 2020 20:55)  T(F): 97.9 (23 Dec 2020 12:00), Max: 98.3 (22 Dec 2020 20:55)  HR: 108 (23 Dec 2020 12:00) (81 - 108)  BP: 147/105 (23 Dec 2020 12:00) (121/68 - 151/77)  BP(mean): 115 (23 Dec 2020 12:00) (74 - 115)  RR: 22 (23 Dec 2020 12:00) (16 - 22)  SpO2: 95% (23 Dec 2020 12:00) (94% - 100%)    CONSTITUTIONAL: NAD, obese  EYES: PERRLA; conjunctiva and sclera clear  ENMT: Moist oral mucosa,   RESPIRATORY: Normal respiratory effort; mild crackle  to auscultation bilaterally. no wheeze, positive air entry-mild improvemt comare to yesterday  CARDIOVASCULAR: Regular rate and rhythm, normal S1 and S2, no murmur   EXTS: rt lower extremity edema; Peripheral pulses are 2+ bilaterally  ABDOMEN: Nontender to palpation, normoactive bowel sounds, no rebound/guarding;   MUSCLOSKELETAL:   no clubbing or cyanosis of digits; no joint swelling or tenderness to palpation  PSYCH: affect appropriate  NEUROLOGY: A+O to person, place, and time; CN 2-12 are intact and symmetric; no gross sensory deficits;       LABS:                        11.5   21.30 )-----------( 233      ( 23 Dec 2020 08:39 )             37.0     12-23    145  |  91<L>  |  30.0<H>  ----------------------------<  179<H>  5.0   |  46.0<HH>  |  0.58    Ca    8.8      23 Dec 2020 08:39                Culture - Blood (collected 21 Dec 2020 12:19)  Source: .Blood Blood-Venous  Preliminary Report (23 Dec 2020 13:00):    No growth at 48 hours        RADIOLOGY & ADDITIONAL TESTS:  Results Reviewed:   Imaging Personally Reviewed:  Electrocardiogram Personally Reviewed:    COORDINATION OF CARE:  Care Discussed with Consultants/Other Providers [Y/N]:  Prior or Outpatient Records Reviewed [Y/N]:

## 2020-12-23 NOTE — PROGRESS NOTE ADULT - ASSESSMENT
70y Male with past medical history significant for COPD, HTN, DM, pAF on coumadin, seizure d/o with recent hospitalization for hypercapnic respiratory failure presents with HTN emergency and subsequent PEA/VT arrest on 12/10 this admission.     Acute on chronic hypoxic hypercapnic respiratory failure sec copd exacerbation, ELISSA, Acute of chronic chf  - Pt is on 3 l nc now, off bipap. nebs. spoke with pulmonary changed to Noct/prn AVAPS, reeval  for cleance for cath  -hold cardiac cath until clear by pulmonary  - iv solumetrol. F/U PULMONARY REC    Right groin pseudoaneurysm s/p thrombin injection   -  US Duplex Arterial Lower Ext Ltd, Right (12.19.20 @ 11:24) >  The larger of the 2 pseudoaneurysms seen on the prior study remains thrombosed and measures 2.5 x 2.8 x 0.9 cm.  The smaller pseudoaneurysm demonstrating residual flow on the prior study adjacent to the common femoral artery is not well seen on the current study, possibly related to technical factors.       - CT RLE and RLE arterial duplex with either a single right groin pseudoaneurysm measuring 1.6 x 1.5 x 1.4 cm with dilatation of a portion of the neck measuring up to 0.9 cm transverse or 2 adjacent pseudoaneurysms with the more proximal pseudoaneurysm measuring 0.9 x 0.7 cm and the more distal pseudoaneurysm measuring 1.6 x 1.5 x 1.4 cm.  - Vascular following.  - IR for thrombin injection 12/18  - AC on hold.will resume when okay with vascular  -s/p  Transfuse 2units PRBC 12/18    CAD   - s/p cath with Severe RCA and LAD disease on 12/15. The RCA was treated with PTCA and STENT (THI-Resolute) with IVUS with residual LAD lesion  - Plan for intervention on LAD 12/18, to be done with anesthesia - cancelled secondary to hgb 7.8.   - Large right groin hematoma, and noted hgb drop (8.7-->7.8). Discussed with Cardiology, who is aware of hematoma.  - CT RLE and RLE arterial duplex with either a single right groin pseudoaneurysm measuring 1.6 x 1.5 x 1.4 cm with dilatation of a portion of the neck measuring up to 0.9 cm transverse or 2 adjacent pseudoaneurysms with the more proximal pseudoaneurysm measuring 0.9 x 0.7 cm and the more distal pseudoaneurysm measuring 1.6 x 1.5 x 1.4 cm.  - AC held for now. SCD boots ordered  - Continue ASA with Plavix, BB, Statin     HTN  - c/w metoprolol and cardizem and digoxin    AFib  - Rate controlled with diltiazem/Metoprolol  - coumadin held at this time    Cardiac arrest from/ VT  - amiodarone decreased to 200 mg daily per Dr Arroyo recommendations   - Will have EP evaluation for AICD    Blood loss anemia  - ppx lovenox held due to groin hematoma.   - S/P  transfuse 2units PRBC  - Monitor H/H    Shortness of breath due to COPD exacerbation,  acute on chronic CHF, possible gram positive/gram negative pneumonia  - improved, currently on NC  - LVEDP was elevated on cath.  - Use of diuretics with worsening contraction alkalosis   - F/U RENAL REC. f/u BMP  - Strict I & Os  - Daily weight  - nocturnal NIV  - Iv solumedrol 40mg q12 Taper  - bronchodilators   - f/u pulm rec  - completed 7 days of zosyn per ID  - Blood cx NGTD      DM2, uncontrolled  -  A1c 9.1  - lantus with meal coverage   - close monitoring  - endocrine following      Morbid obesity-  Diet & weight loss recommended      DVT prophylaxis: scd    DISPO: cath to be rescheduled. monitor H/H. groin hematoma. LOS uncertain as patient is still acute.   CARE OF PLAN DW PT  Long  dw daughter CATRACHITA [phone]. Discussed above care of plan. over all prognosis guarded. Per Daughter -pt wants to be fullcode ,was intubated in past and recovered.  Pt wants to be full code.

## 2020-12-24 LAB
ANION GAP SERPL CALC-SCNC: 5 MMOL/L — SIGNIFICANT CHANGE UP (ref 5–17)
BUN SERPL-MCNC: 29 MG/DL — HIGH (ref 8–20)
CALCIUM SERPL-MCNC: 8.9 MG/DL — SIGNIFICANT CHANGE UP (ref 8.6–10.2)
CHLORIDE SERPL-SCNC: 90 MMOL/L — LOW (ref 98–107)
CO2 SERPL-SCNC: 48 MMOL/L — CRITICAL HIGH (ref 22–29)
CREAT SERPL-MCNC: 0.65 MG/DL — SIGNIFICANT CHANGE UP (ref 0.5–1.3)
GLUCOSE BLDC GLUCOMTR-MCNC: 191 MG/DL — HIGH (ref 70–99)
GLUCOSE BLDC GLUCOMTR-MCNC: 263 MG/DL — HIGH (ref 70–99)
GLUCOSE BLDC GLUCOMTR-MCNC: 290 MG/DL — HIGH (ref 70–99)
GLUCOSE BLDC GLUCOMTR-MCNC: 311 MG/DL — HIGH (ref 70–99)
GLUCOSE SERPL-MCNC: 226 MG/DL — HIGH (ref 70–99)
HCT VFR BLD CALC: 38.6 % — LOW (ref 39–50)
HGB BLD-MCNC: 11.9 G/DL — LOW (ref 13–17)
MCHC RBC-ENTMCNC: 28.6 PG — SIGNIFICANT CHANGE UP (ref 27–34)
MCHC RBC-ENTMCNC: 30.8 GM/DL — LOW (ref 32–36)
MCV RBC AUTO: 92.8 FL — SIGNIFICANT CHANGE UP (ref 80–100)
PLATELET # BLD AUTO: 244 K/UL — SIGNIFICANT CHANGE UP (ref 150–400)
POTASSIUM SERPL-MCNC: 4.5 MMOL/L — SIGNIFICANT CHANGE UP (ref 3.5–5.3)
POTASSIUM SERPL-SCNC: 4.5 MMOL/L — SIGNIFICANT CHANGE UP (ref 3.5–5.3)
RBC # BLD: 4.16 M/UL — LOW (ref 4.2–5.8)
RBC # FLD: 15 % — HIGH (ref 10.3–14.5)
SODIUM SERPL-SCNC: 143 MMOL/L — SIGNIFICANT CHANGE UP (ref 135–145)
WBC # BLD: 23.02 K/UL — HIGH (ref 3.8–10.5)
WBC # FLD AUTO: 23.02 K/UL — HIGH (ref 3.8–10.5)

## 2020-12-24 PROCEDURE — 99232 SBSQ HOSP IP/OBS MODERATE 35: CPT

## 2020-12-24 PROCEDURE — 99233 SBSQ HOSP IP/OBS HIGH 50: CPT

## 2020-12-24 RX ORDER — INSULIN LISPRO 100/ML
18 VIAL (ML) SUBCUTANEOUS
Refills: 0 | Status: DISCONTINUED | OUTPATIENT
Start: 2020-12-24 | End: 2020-12-26

## 2020-12-24 RX ORDER — INSULIN GLARGINE 100 [IU]/ML
30 INJECTION, SOLUTION SUBCUTANEOUS AT BEDTIME
Refills: 0 | Status: DISCONTINUED | OUTPATIENT
Start: 2020-12-24 | End: 2020-12-31

## 2020-12-24 RX ADMIN — Medication 2: at 23:08

## 2020-12-24 RX ADMIN — Medication 3 MILLILITER(S): at 14:01

## 2020-12-24 RX ADMIN — Medication 60 MILLIGRAM(S): at 23:09

## 2020-12-24 RX ADMIN — Medication 16 UNIT(S): at 12:25

## 2020-12-24 RX ADMIN — Medication 16 UNIT(S): at 08:58

## 2020-12-24 RX ADMIN — LEVETIRACETAM 420 MILLIGRAM(S): 250 TABLET, FILM COATED ORAL at 05:27

## 2020-12-24 RX ADMIN — Medication 3 MILLILITER(S): at 02:51

## 2020-12-24 RX ADMIN — LEVETIRACETAM 420 MILLIGRAM(S): 250 TABLET, FILM COATED ORAL at 18:17

## 2020-12-24 RX ADMIN — FAMOTIDINE 20 MILLIGRAM(S): 10 INJECTION INTRAVENOUS at 17:26

## 2020-12-24 RX ADMIN — Medication 40 MILLIGRAM(S): at 23:05

## 2020-12-24 RX ADMIN — Medication 25 MILLIGRAM(S): at 17:29

## 2020-12-24 RX ADMIN — Medication 60 MILLIGRAM(S): at 12:28

## 2020-12-24 RX ADMIN — ATORVASTATIN CALCIUM 40 MILLIGRAM(S): 80 TABLET, FILM COATED ORAL at 23:08

## 2020-12-24 RX ADMIN — Medication 25 MILLIGRAM(S): at 05:27

## 2020-12-24 RX ADMIN — Medication 60 MILLIGRAM(S): at 17:26

## 2020-12-24 RX ADMIN — INSULIN GLARGINE 30 UNIT(S): 100 INJECTION, SOLUTION SUBCUTANEOUS at 23:05

## 2020-12-24 RX ADMIN — Medication 8: at 12:25

## 2020-12-24 RX ADMIN — Medication 3 MILLILITER(S): at 07:54

## 2020-12-24 RX ADMIN — FAMOTIDINE 20 MILLIGRAM(S): 10 INJECTION INTRAVENOUS at 05:27

## 2020-12-24 RX ADMIN — Medication 60 MILLIGRAM(S): at 00:31

## 2020-12-24 RX ADMIN — Medication 18 UNIT(S): at 17:25

## 2020-12-24 RX ADMIN — CITALOPRAM 40 MILLIGRAM(S): 10 TABLET, FILM COATED ORAL at 12:27

## 2020-12-24 RX ADMIN — Medication 60 MILLIGRAM(S): at 05:27

## 2020-12-24 RX ADMIN — Medication 250 MILLIGRAM(S): at 05:27

## 2020-12-24 RX ADMIN — Medication 81 MILLIGRAM(S): at 12:27

## 2020-12-24 RX ADMIN — Medication 6: at 08:58

## 2020-12-24 RX ADMIN — Medication 0.12 MILLIGRAM(S): at 05:27

## 2020-12-24 RX ADMIN — Medication 40 MILLIGRAM(S): at 13:18

## 2020-12-24 RX ADMIN — Medication 250 MILLIGRAM(S): at 17:26

## 2020-12-24 RX ADMIN — Medication 40 MILLIGRAM(S): at 05:27

## 2020-12-24 RX ADMIN — Medication 6: at 17:25

## 2020-12-24 RX ADMIN — CLOPIDOGREL BISULFATE 75 MILLIGRAM(S): 75 TABLET, FILM COATED ORAL at 12:28

## 2020-12-24 RX ADMIN — Medication 3 MILLILITER(S): at 20:21

## 2020-12-24 RX ADMIN — CHLORHEXIDINE GLUCONATE 1 APPLICATION(S): 213 SOLUTION TOPICAL at 06:04

## 2020-12-24 NOTE — PROGRESS NOTE ADULT - SUBJECTIVE AND OBJECTIVE BOX
Patient is a 70y old  Male who presents with a chief complaint of Acute hypoxic/hypercapnic respiratory failure, cardiac arrest (24 Dec 2020 08:15)    Pt seen and exam. Pt is on 3 l nc currently and stable breathing and so2  No cp,palpitation,fever, chill      SUBJECTIVE / OVERNIGHT EVENTS:  REVIEW OF SYSTEMS: All systems are reviewed and found to be negative except above    MEDICATIONS  (STANDING):  albuterol/ipratropium for Nebulization 3 milliLiter(s) Nebulizer every 6 hours  aspirin  chewable 81 milliGRAM(s) Oral daily  atorvastatin 40 milliGRAM(s) Oral at bedtime  chlorhexidine 2% Cloths 1 Application(s) Topical <User Schedule>  citalopram 40 milliGRAM(s) Oral daily  clopidogrel Tablet 75 milliGRAM(s) Oral daily  dextrose 40% Gel 15 Gram(s) Oral once  dextrose 5%. 1000 milliLiter(s) (50 mL/Hr) IV Continuous <Continuous>  dextrose 5%. 1000 milliLiter(s) (100 mL/Hr) IV Continuous <Continuous>  dextrose 50% Injectable 25 Gram(s) IV Push once  dextrose 50% Injectable 12.5 Gram(s) IV Push once  dextrose 50% Injectable 25 Gram(s) IV Push once  digoxin     Tablet 0.125 milliGRAM(s) Oral daily  diltiazem    Tablet 60 milliGRAM(s) Oral four times a day  famotidine    Tablet 20 milliGRAM(s) Oral two times a day  glucagon  Injectable 1 milliGRAM(s) IntraMuscular once  influenza   Vaccine 0.5 milliLiter(s) IntraMuscular once  insulin glargine Injectable (LANTUS) 20 Unit(s) SubCutaneous at bedtime  insulin lispro (ADMELOG) corrective regimen sliding scale   SubCutaneous Before meals and at bedtime  insulin lispro Injectable (ADMELOG) 18 Unit(s) SubCutaneous three times a day before meals  levETIRAcetam  IVPB 500 milliGRAM(s) IV Intermittent every 12 hours  methylPREDNISolone sodium succinate Injectable 40 milliGRAM(s) IV Push every 8 hours  metoprolol tartrate 25 milliGRAM(s) Oral two times a day  polyethylene glycol 3350 17 Gram(s) Oral daily  saccharomyces boulardii 250 milliGRAM(s) Oral two times a day    MEDICATIONS  (PRN):  aluminum hydroxide/magnesium hydroxide/simethicone Suspension 30 milliLiter(s) Oral every 4 hours PRN Dyspepsia      CAPILLARY BLOOD GLUCOSE      POCT Blood Glucose.: 311 mg/dL (24 Dec 2020 12:13)  POCT Blood Glucose.: 290 mg/dL (24 Dec 2020 08:00)  POCT Blood Glucose.: 187 mg/dL (23 Dec 2020 20:49)  POCT Blood Glucose.: 286 mg/dL (23 Dec 2020 17:12)    I&O's Summary    23 Dec 2020 07:01  -  24 Dec 2020 07:00  --------------------------------------------------------  IN: 1300 mL / OUT: 1200 mL / NET: 100 mL    24 Dec 2020 07:01  -  24 Dec 2020 13:10  --------------------------------------------------------  IN: 250 mL / OUT: 450 mL / NET: -200 mL        PHYSICAL EXAM:  Vital Signs Last 24 Hrs  T(C): 36.8 (24 Dec 2020 12:00), Max: 36.8 (24 Dec 2020 12:00)  T(F): 98.2 (24 Dec 2020 12:00), Max: 98.2 (24 Dec 2020 12:00)  HR: 117 (24 Dec 2020 12:00) (76 - 117)  BP: 127/50 (24 Dec 2020 12:00) (122/67 - 146/69)  BP(mean): 66 (24 Dec 2020 12:00) (66 - 93)  RR: 24 (24 Dec 2020 12:00) (18 - 24)  SpO2: 89% (24 Dec 2020 12:00) (89% - 100%)    CONSTITUTIONAL: NAD,   EYES: PERRLA; conjunctiva and sclera clear  ENMT: Moist oral mucosa,   RESPIRATORY: Normal respiratory effort; mild rhonchi to auscultation bilaterally  CARDIOVASCULAR: normal S1 and S2, no murmur   EXTS: +rt lower extremity edema; Peripheral pulses are 2+ bilaterally  ABDOMEN: Nontender to palpation, normoactive bowel sounds, no rebound/guarding;   PSYCH: affect appropriate  NEUROLOGY: A+O to person, place, and time; CN 2-12 are intact and symmetric; no gross sensory deficits;       LABS:                        11.9   23.02 )-----------( 244      ( 24 Dec 2020 07:31 )             38.6     12-24    143  |  90<L>  |  29.0<H>  ----------------------------<  226<H>  4.5   |  48.0<HH>  |  0.65    Ca    8.9      24 Dec 2020 07:31                  RADIOLOGY & ADDITIONAL TESTS:  Results Reviewed:   Imaging Personally Reviewed:  Electrocardiogram Personally Reviewed:    COORDINATION OF CARE:  Care Discussed with Consultants/Other Providers [Y/N]:  Prior or Outpatient Records Reviewed [Y/N]:

## 2020-12-24 NOTE — PROGRESS NOTE ADULT - ASSESSMENT
70y Male with past medical history significant for COPD, HTN, DM, pAF on coumadin, seizure d/o with recent hospitalization for hypercapnic respiratory failure presents with HTN emergency and subsequent PEA/VT arrest on 12/10 this admission.     Acute on chronic hypoxic hypercapnic respiratory failure sec copd exacerbation, ELISSA, Acute of chronic chf  - Pt is on 3 l nc now, off bipap. nebs.  Noct/prn AVAPS, reeval  for cleance for cath  -hold cardiac cath until clear by pulmonary  - iv solumetrol. F/U PULMONARY REC  per pulm Needs AVAPS as out pt to decrease re admissions and improve mortality has failed BIPAP ( [facility])    Right groin pseudoaneurysm s/p thrombin injection   -  US Duplex Arterial Lower Ext Ltd, Right (12.19.20 @ 11:24) >  The larger of the 2 pseudoaneurysms seen on the prior study remains thrombosed and measures 2.5 x 2.8 x 0.9 cm.  The smaller pseudoaneurysm demonstrating residual flow on the prior study adjacent to the common femoral artery is not well seen on the current study, possibly related to technical factors.       - CT RLE and RLE arterial duplex with either a single right groin pseudoaneurysm measuring 1.6 x 1.5 x 1.4 cm with dilatation of a portion of the neck measuring up to 0.9 cm transverse or 2 adjacent pseudoaneurysms with the more proximal pseudoaneurysm measuring 0.9 x 0.7 cm and the more distal pseudoaneurysm measuring 1.6 x 1.5 x 1.4 cm.  - Vascular following.  - IR for thrombin injection 12/18  - AC on hold.will resume when okay with vascular  -s/p  Transfuse 2units PRBC 12/18    CAD   - s/p cath with Severe RCA and LAD disease on 12/15. The RCA was treated with PTCA and STENT (THI-Resolute) with IVUS with residual LAD lesion  - Plan for intervention on LAD 12/18, to be done with anesthesia - cancelled secondary to hgb 7.8.   - Large right groin hematoma, and noted hgb drop (8.7-->7.8). Discussed with Cardiology, who is aware of hematoma.  - CT RLE and RLE arterial duplex with either a single right groin pseudoaneurysm measuring 1.6 x 1.5 x 1.4 cm with dilatation of a portion of the neck measuring up to 0.9 cm transverse or 2 adjacent pseudoaneurysms with the more proximal pseudoaneurysm measuring 0.9 x 0.7 cm and the more distal pseudoaneurysm measuring 1.6 x 1.5 x 1.4 cm.  - AC held for now. SCD boots ordered  - Continue ASA with Plavix, BB, Statin     HTN  - c/w metoprolol and cardizem and digoxin    AFib  - Rate controlled with diltiazem/Metoprolol  - coumadin held at this time    Cardiac arrest from/ VT  - amiodarone decreased to 200 mg daily per Dr Arroyo recommendations   - Will have EP evaluation for AICD    Blood loss anemia  - ppx lovenox held due to groin hematoma.   - S/P  transfuse 2units PRBC  - Monitor H/H    Shortness of breath due to COPD exacerbation,  acute on chronic CHF, possible gram positive/gram negative pneumonia  - improved, currently on NC  - LVEDP was elevated on cath.  - Use of diuretics with worsening contraction alkalosis   - F/U RENAL REC. f/u BMP  - Strict I & Os  - Daily weight  - nocturnal NIV  - Iv solumedrol 40mg q12 Taper  - bronchodilators   - f/u pulm rec  - completed 7 days of zosyn per ID  - Blood cx NGTD      DM2, uncontrolled-high  -  A1c 9.1  - lantus with increase  meal coverage   - close monitoring  - endocrine following      Morbid obesity-  Diet & weight loss recommended      DVT prophylaxis: scd    DISPO: cath to be rescheduled. monitor H/H. groin hematoma. LOS uncertain as patient is still acute.   CARE OF PLAN DW PT  Long  dw daughter CATRACHITA [phone]. Discussed above care of plan. over all prognosis guarded. Per Daughter -pt wants to be fullcode ,was intubated in past and recovered.  Pt wants to be full code.  plan for cath when clear by pulmonary

## 2020-12-24 NOTE — PROGRESS NOTE ADULT - ASSESSMENT
70y Male with past medical history significant for severe COPD, on home O2, DM type 2, HTN, permanent AF, admitted with recurrent hypoxic/hypercapneic respiratory failure and agonal respiration 12/10 s/p emergent intubation followed by PEA arrest, then developed VT with a pulse, s/p DCCV and initiation of amiodarone s/p cath which revealed severe RCA and LAD disease s/p THI to RCA however with agitation during the procedure resulting in R femoral artery pseudoaneurysm s/p thrombin injection RFA by IR, now pending staged intervention to the LAD. Patient now with increased work of breathing requiring escalation of O2 support, now on AVAPS  1. T2DM - hyperglycemia today, worse with sugar free candies at bedside  - advised pt to stop eating sugar free candies which still has significant amount of carbs which can raise blood sugar  - increase Lantus 30 units  - increase Lispro 18 units w meals, cont Lispro scale  - will follow    2. HLD - cont statin    3. severe CAD - planning staged PCI when resp status improved, management per cardio, cont asa, statin, plavix, metoprolol    4. hypercapnic resp failure - improved w AVAPS, management per pulmonary

## 2020-12-24 NOTE — PROGRESS NOTE ADULT - SUBJECTIVE AND OBJECTIVE BOX
PULMONARY PROGRESS NOTE      [id number]    Patient is a 70y old  Male who presents with a chief complaint of Acute hypoxic/hypercapnic respiratory failure, cardiac arrest (23 Dec 2020 14:16)  70y Male with past medical history significant for COPD, HTN, DM, pAF on coumadin, seizure d/o with recent hospitalization for hypercapnic respiratory failure presents with HTN emergency and subsequent PEA/VT arrest on 12/10 this admission.     INTERVAL HPI/OVERNIGHT EVENTS:    Weak  SILVEIRA      MEDICATIONS  (STANDING):  albuterol/ipratropium for Nebulization 3 milliLiter(s) Nebulizer every 6 hours  aspirin  chewable 81 milliGRAM(s) Oral daily  atorvastatin 40 milliGRAM(s) Oral at bedtime  chlorhexidine 2% Cloths 1 Application(s) Topical <User Schedule>  citalopram 40 milliGRAM(s) Oral daily  clopidogrel Tablet 75 milliGRAM(s) Oral daily  dextrose 40% Gel 15 Gram(s) Oral once  dextrose 5%. 1000 milliLiter(s) (50 mL/Hr) IV Continuous <Continuous>  dextrose 5%. 1000 milliLiter(s) (100 mL/Hr) IV Continuous <Continuous>  dextrose 50% Injectable 25 Gram(s) IV Push once  dextrose 50% Injectable 12.5 Gram(s) IV Push once  dextrose 50% Injectable 25 Gram(s) IV Push once  digoxin     Tablet 0.125 milliGRAM(s) Oral daily  diltiazem    Tablet 60 milliGRAM(s) Oral four times a day  famotidine    Tablet 20 milliGRAM(s) Oral two times a day  glucagon  Injectable 1 milliGRAM(s) IntraMuscular once  influenza   Vaccine 0.5 milliLiter(s) IntraMuscular once  insulin glargine Injectable (LANTUS) 20 Unit(s) SubCutaneous at bedtime  insulin lispro (ADMELOG) corrective regimen sliding scale   SubCutaneous Before meals and at bedtime  insulin lispro Injectable (ADMELOG) 12 Unit(s) SubCutaneous three times a day before meals  levETIRAcetam  IVPB 500 milliGRAM(s) IV Intermittent every 12 hours  methylPREDNISolone sodium succinate Injectable 40 milliGRAM(s) IV Push every 8 hours  metoprolol tartrate 25 milliGRAM(s) Oral two times a day  polyethylene glycol 3350 17 Gram(s) Oral daily  saccharomyces boulardii 250 milliGRAM(s) Oral two times a day      MEDICATIONS  (PRN):  aluminum hydroxide/magnesium hydroxide/simethicone Suspension 30 milliLiter(s) Oral every 4 hours PRN Dyspepsia      Allergies    No Known Allergies    Intolerances        PAST MEDICAL & SURGICAL HISTORY:  Seizure    Depression, unspecified depression type    Diabetes    Hypertension    Hyperlipidemia    Afib    COPD (chronic obstructive pulmonary disease)    Abdominal hernia    H/O carotid endarterectomy        SOCIAL HISTORY  Smoking History:       REVIEW OF SYSTEMS:    CONSTITUTIONAL:  No distress    HEENT:  Eyes:  No diplopia or blurred vision. ENT:  No earache, sore throat or runny nose.    CARDIOVASCULAR:  No pressure, squeezing, tightness, heaviness or aching about the chest; no palpitations.    RESPIRATORY:  No cough, shortness of breath, PND or orthopnea. Mild SOBOE    GASTROINTESTINAL:  No nausea, vomiting or diarrhea.    GENITOURINARY:  No dysuria, frequency or urgency.    NEUROLOGIC:  No paresthesias, fasciculations, seizures or weakness.    PSYCHIATRIC:  No disorder of thought or mood.    Vital Signs Last 24 Hrs  T(C): 36.6 (23 Dec 2020 12:00), Max: 36.8 (22 Dec 2020 20:55)  T(F): 97.9 (23 Dec 2020 12:00), Max: 98.3 (22 Dec 2020 20:55)  HR: 108 (23 Dec 2020 12:00) (81 - 108)  BP: 147/105 (23 Dec 2020 12:00) (121/68 - 151/77)  BP(mean): 115 (23 Dec 2020 12:00) (74 - 115)  RR: 22 (23 Dec 2020 12:00) (16 - 22)  SpO2: 95% (23 Dec 2020 12:00) (94% - 100%)    PHYSICAL EXAMINATION:    GENERAL: The patient is awake and alert in no apparent distress.     HEENT: Head is normocephalic and atraumatic. Extraocular muscles are intact. Mucous membranes are moist.    NECK: Supple.    LUNGS: Clear to auscultation without wheezing, rales or rhonchi; respirations unlabored    HEART: Regular rate and rhythm without murmur.    ABDOMEN: Soft, nontender, and nondistended.      EXTREMITIES: Without any cyanosis, clubbing, rash, lesions or edema.    NEUROLOGIC: Grossly intact.    LABS:                        11.5   21.30 )-----------( 233      ( 23 Dec 2020 08:39 )             37.0     12-23    145  |  91<L>  |  30.0<H>  ----------------------------<  179<H>  5.0   |  46.0<HH>  |  0.58    Ca    8.8      23 Dec 2020 08:39          ABG - ( 21 Dec 2020 22:19 )  pH, Arterial: 7.34  pH, Blood: x     /  pCO2: 98    /  pO2: 174   / HCO3: 47    / Base Excess: 22.0  /  SaO2: 100                     Serum Pro-Brain Natriuretic Peptide: 1662 pg/mL (12-21-20 @ 17:35)      Procalcitonin, Serum: 0.11 ng/mL (12-21-20 @ 12:17)    12/24/20  Incr glucose and WBC        RADIOLOGY & ADDITIONAL STUDIES:     EXAM:  XR CHEST PORTABLE IMMED 1V                          PROCEDURE DATE:  12/21/2020          INTERPRETATION:  AP chest on December 21, 2020 at 11:36 AM. 2 images. Patient is short of breath.    Heart magnified by technique.    Present film shows a mild right base infiltrate likely with pleural component new since December 1.    In addition there is a slight left base infiltrate also seen new since prior area    IMPRESSION: Basilar lung processes are now seen right greater than left.    OLMAN PUCKETT MD; Attending Radiologist        Echo on 12/14/20   1. Technically difficult study.   2. Endocardial visualization was enhanced with intravenous echo contrast.   3. Left ventricular ejection fraction, by visual estimation, is 60 to 65%.   4. Normal global left ventricular systolic function.   5. The left ventricular diastolic function could not be assessed in this study.   6. Normal left ventricular internal cavity size.   7. Moderately enlarged right ventricle. Moderately reduced RV systolic function.   8. The left atrium is normal in size.   9. Moderately enlarged right atrium.  10. Mild thickening and calcification of the anterior and posterior mitral valve leaflets.  11. Mild mitral valve regurgitation.  12. Mild tricuspid regurgitation.  13. There is no evidence of pericardial effusion.    MD [place] Electronically signed on 12/14/2020 at 4:23:18 PM

## 2020-12-24 NOTE — PROGRESS NOTE ADULT - SUBJECTIVE AND OBJECTIVE BOX
Follow up: diabetes  Interval Hx/Events: seen at bedside eating sugar free candies    MEDICATIONS  (STANDING):  albuterol/ipratropium for Nebulization 3 milliLiter(s) Nebulizer every 6 hours  aspirin  chewable 81 milliGRAM(s) Oral daily  atorvastatin 40 milliGRAM(s) Oral at bedtime  chlorhexidine 2% Cloths 1 Application(s) Topical <User Schedule>  citalopram 40 milliGRAM(s) Oral daily  clopidogrel Tablet 75 milliGRAM(s) Oral daily  dextrose 40% Gel 15 Gram(s) Oral once  dextrose 5%. 1000 milliLiter(s) (50 mL/Hr) IV Continuous <Continuous>  dextrose 5%. 1000 milliLiter(s) (100 mL/Hr) IV Continuous <Continuous>  dextrose 50% Injectable 25 Gram(s) IV Push once  dextrose 50% Injectable 12.5 Gram(s) IV Push once  dextrose 50% Injectable 25 Gram(s) IV Push once  digoxin     Tablet 0.125 milliGRAM(s) Oral daily  diltiazem    Tablet 60 milliGRAM(s) Oral four times a day  famotidine    Tablet 20 milliGRAM(s) Oral two times a day  glucagon  Injectable 1 milliGRAM(s) IntraMuscular once  influenza   Vaccine 0.5 milliLiter(s) IntraMuscular once  insulin glargine Injectable (LANTUS) 20 Unit(s) SubCutaneous at bedtime  insulin lispro (ADMELOG) corrective regimen sliding scale   SubCutaneous Before meals and at bedtime  insulin lispro Injectable (ADMELOG) 18 Unit(s) SubCutaneous three times a day before meals  levETIRAcetam  IVPB 500 milliGRAM(s) IV Intermittent every 12 hours  methylPREDNISolone sodium succinate Injectable 40 milliGRAM(s) IV Push every 8 hours  metoprolol tartrate 25 milliGRAM(s) Oral two times a day  polyethylene glycol 3350 17 Gram(s) Oral daily  saccharomyces boulardii 250 milliGRAM(s) Oral two times a day    MEDICATIONS  (PRN):  aluminum hydroxide/magnesium hydroxide/simethicone Suspension 30 milliLiter(s) Oral every 4 hours PRN Dyspepsia      ALLERGIES: No Known Allergies      REVIEW OF SYSTEMS:  CONSTITUTIONAL: No fever, weight loss, or fatigue  RESPIRATORY: No cough, No shortness of breath - improved  CARDIOVASCULAR: No chest pain  GASTROINTESTINAL: No abdominal or epigastric pain. No nausea or vomiting  GENITOURINARY: No dysuria  PSYCHIATRIC: No depression or anxiety      Vital Signs Last 24 Hrs  T(C): 36.8 (24 Dec 2020 12:00), Max: 36.8 (24 Dec 2020 12:00)  T(F): 98.2 (24 Dec 2020 12:00), Max: 98.2 (24 Dec 2020 12:00)  HR: 76 (24 Dec 2020 14:02) (76 - 117)  BP: 127/50 (24 Dec 2020 12:00) (122/67 - 146/69)  BP(mean): 66 (24 Dec 2020 12:00) (66 - 93)  RR: 24 (24 Dec 2020 12:00) (18 - 24)  SpO2: 89% (24 Dec 2020 12:00) (89% - 100%)    Physical Exam:  General appearance: Well developed, well nourished. Obese  Eyes: Pupils equal. EOMI  Lungs: Normal respiratory excursion. Lungs clear no w/r/r  CV: Normal S1S2, regular. No m/r/g.    Abdomen: Soft, nontender  Skin: Warm and moist.   Neuro: Cranial nerves intact.  Psych: Normal affect, good judgement/insight      LABS:                        11.9   23.02 )-----------( 244      ( 24 Dec 2020 07:31 )             38.6     12-24    143  |  90<L>  |  29.0<H>  ----------------------------<  226<H>  4.5   |  48.0<HH>  |  0.65    Ca    8.9      24 Dec 2020 07:31          A1C with Estimated Average Glucose Result: 9.1 % (12-11-20 @ 05:05)  A1C with Estimated Average Glucose Result: 8.7 % (12-02-20 @ 04:55)      CAPILLARY BLOOD GLUCOSE  POCT Blood Glucose.: 311 mg/dL (24 Dec 2020 12:13)  POCT Blood Glucose.: 290 mg/dL (24 Dec 2020 08:00)  POCT Blood Glucose.: 187 mg/dL (23 Dec 2020 20:49)  POCT Blood Glucose.: 286 mg/dL (23 Dec 2020 17:12)  POCT Blood Glucose.: 345 mg/dL (23 Dec 2020 12:06)  POCT Blood Glucose.: 242 mg/dL (23 Dec 2020 08:14)  POCT Blood Glucose.: 127 mg/dL (23 Dec 2020 05:33)  POCT Blood Glucose.: 107 mg/dL (22 Dec 2020 21:00)  POCT Blood Glucose.: 109 mg/dL (22 Dec 2020 17:06)      Thyroid Stimulating Hormone, Serum: 0.38 uIU/mL [0.27 - 4.20] (12-02-20)            RADIOLOGY:

## 2020-12-24 NOTE — CHART NOTE - NSCHARTNOTEFT_GEN_A_CORE
Source: Patient [x ]  Family [ ]   other [ ]    Current Diet: Diet, Consistent Carbohydrate w/Evening Snack:   DASH/TLC {Sodium & Cholesterol Restricted} (DASH) (12-16-20 @ 10:07)    PO intake:  Pt reports decreased po intake at meals as he dislikes hospital food.  Offered food preferences, but pt declined.    Current Weight:   (12/24) 226.1 lbs  (12/22) 241.6 lbs  (12/11) 239.6 lbs    % Weight Change - wt fluctuations noted, will continue to monitor.  Aware pt with 2+ mild left leg/generalized edema noted.    Pertinent Medications: MEDICATIONS  (STANDING):  albuterol/ipratropium for Nebulization 3 milliLiter(s) Nebulizer every 6 hours  aspirin  chewable 81 milliGRAM(s) Oral daily  atorvastatin 40 milliGRAM(s) Oral at bedtime  chlorhexidine 2% Cloths 1 Application(s) Topical <User Schedule>  citalopram 40 milliGRAM(s) Oral daily  clopidogrel Tablet 75 milliGRAM(s) Oral daily  dextrose 40% Gel 15 Gram(s) Oral once  dextrose 5%. 1000 milliLiter(s) (50 mL/Hr) IV Continuous <Continuous>  dextrose 5%. 1000 milliLiter(s) (100 mL/Hr) IV Continuous <Continuous>  dextrose 50% Injectable 25 Gram(s) IV Push once  dextrose 50% Injectable 12.5 Gram(s) IV Push once  dextrose 50% Injectable 25 Gram(s) IV Push once  digoxin     Tablet 0.125 milliGRAM(s) Oral daily  diltiazem    Tablet 60 milliGRAM(s) Oral four times a day  famotidine    Tablet 20 milliGRAM(s) Oral two times a day  glucagon  Injectable 1 milliGRAM(s) IntraMuscular once  influenza   Vaccine 0.5 milliLiter(s) IntraMuscular once  insulin glargine Injectable (LANTUS) 20 Unit(s) SubCutaneous at bedtime  insulin lispro (ADMELOG) corrective regimen sliding scale   SubCutaneous Before meals and at bedtime  insulin lispro Injectable (ADMELOG) 16 Unit(s) SubCutaneous three times a day before meals  levETIRAcetam  IVPB 500 milliGRAM(s) IV Intermittent every 12 hours  methylPREDNISolone sodium succinate Injectable 40 milliGRAM(s) IV Push every 8 hours  metoprolol tartrate 25 milliGRAM(s) Oral two times a day  polyethylene glycol 3350 17 Gram(s) Oral daily  saccharomyces boulardii 250 milliGRAM(s) Oral two times a day    MEDICATIONS  (PRN):  aluminum hydroxide/magnesium hydroxide/simethicone Suspension 30 milliLiter(s) Oral every 4 hours PRN Dyspepsia    Pertinent Labs: CBC Full  -  ( 24 Dec 2020 07:31 )  WBC Count : 23.02 K/uL  RBC Count : 4.16 M/uL  Hemoglobin : 11.9 g/dL  Hematocrit : 38.6 %  Platelet Count - Automated : 244 K/uL  Mean Cell Volume : 92.8 fl  Mean Cell Hemoglobin : 28.6 pg  Mean Cell Hemoglobin Concentration : 30.8 gm/dL  12-24 Na143 mmol/L Glu 226 mg/dL<H> K+ 4.5 mmol/L Cr  0.65 mg/dL BUN 29.0 mg/dL<H> Phos n/a   Alb n/a   PAB n/a       Skin: no skin breakdown noted     Nutrition focused physical exam not conducted - found signs of malnutrition [ ]absent [ ]present    Subcutaneous fat loss: [ ] Orbital fat pads region, [ ]Buccal fat region, [ ]Triceps region,  [ ]Ribs region    Muscle wasting: [ ]Temples region, [ ]Clavicle region, [ ]Shoulder region, [ ]Scapula region, [ ]Interosseous region,  [ ]thigh region, [ ]Calf region    Current Nutrition Diagnosis: Pt remains at nutrition risk secondary to increased nutrient needs related to increased physiological demand for nutrient as evidenced by pt with   acute on chronic hypoxic hypercapnic respiratory failure sec copd exacerbation, ELISSA, acute on chronic CHF.  Pt states decreased po intake at meals as he dislikes hospital food.  Food preferences offered, however pt declined at this time and states he is going home today.    Recommendations:   1. RX: Glucerna TID   2. RX: MVI daily   3. Monitor daily wts     Monitoring and Evaluation:   [x ] PO intake [x ] Tolerance to diet prescription [X] Weights  [X] Follow up per protocol [X] Labs:

## 2020-12-25 LAB
ANION GAP SERPL CALC-SCNC: 5 MMOL/L — SIGNIFICANT CHANGE UP (ref 5–17)
BUN SERPL-MCNC: 30 MG/DL — HIGH (ref 8–20)
CALCIUM SERPL-MCNC: 8.6 MG/DL — SIGNIFICANT CHANGE UP (ref 8.6–10.2)
CHLORIDE SERPL-SCNC: 93 MMOL/L — LOW (ref 98–107)
CO2 SERPL-SCNC: 47 MMOL/L — CRITICAL HIGH (ref 22–29)
CREAT SERPL-MCNC: 0.58 MG/DL — SIGNIFICANT CHANGE UP (ref 0.5–1.3)
GLUCOSE BLDC GLUCOMTR-MCNC: 152 MG/DL — HIGH (ref 70–99)
GLUCOSE BLDC GLUCOMTR-MCNC: 175 MG/DL — HIGH (ref 70–99)
GLUCOSE BLDC GLUCOMTR-MCNC: 190 MG/DL — HIGH (ref 70–99)
GLUCOSE BLDC GLUCOMTR-MCNC: 205 MG/DL — HIGH (ref 70–99)
GLUCOSE SERPL-MCNC: 164 MG/DL — HIGH (ref 70–99)
POTASSIUM SERPL-MCNC: 4.2 MMOL/L — SIGNIFICANT CHANGE UP (ref 3.5–5.3)
POTASSIUM SERPL-SCNC: 4.2 MMOL/L — SIGNIFICANT CHANGE UP (ref 3.5–5.3)
SODIUM SERPL-SCNC: 145 MMOL/L — SIGNIFICANT CHANGE UP (ref 135–145)

## 2020-12-25 PROCEDURE — 99233 SBSQ HOSP IP/OBS HIGH 50: CPT

## 2020-12-25 RX ORDER — METOPROLOL TARTRATE 50 MG
5 TABLET ORAL EVERY 6 HOURS
Refills: 0 | Status: DISCONTINUED | OUTPATIENT
Start: 2020-12-25 | End: 2020-12-26

## 2020-12-25 RX ADMIN — Medication 2: at 08:20

## 2020-12-25 RX ADMIN — Medication 2: at 22:09

## 2020-12-25 RX ADMIN — CITALOPRAM 40 MILLIGRAM(S): 10 TABLET, FILM COATED ORAL at 08:19

## 2020-12-25 RX ADMIN — CHLORHEXIDINE GLUCONATE 1 APPLICATION(S): 213 SOLUTION TOPICAL at 05:41

## 2020-12-25 RX ADMIN — LEVETIRACETAM 420 MILLIGRAM(S): 250 TABLET, FILM COATED ORAL at 17:27

## 2020-12-25 RX ADMIN — Medication 60 MILLIGRAM(S): at 17:26

## 2020-12-25 RX ADMIN — Medication 25 MILLIGRAM(S): at 17:27

## 2020-12-25 RX ADMIN — Medication 25 MILLIGRAM(S): at 06:07

## 2020-12-25 RX ADMIN — POLYETHYLENE GLYCOL 3350 17 GRAM(S): 17 POWDER, FOR SOLUTION ORAL at 08:20

## 2020-12-25 RX ADMIN — Medication 81 MILLIGRAM(S): at 08:19

## 2020-12-25 RX ADMIN — Medication 0.12 MILLIGRAM(S): at 06:07

## 2020-12-25 RX ADMIN — FAMOTIDINE 20 MILLIGRAM(S): 10 INJECTION INTRAVENOUS at 17:27

## 2020-12-25 RX ADMIN — Medication 3 MILLILITER(S): at 16:00

## 2020-12-25 RX ADMIN — Medication 40 MILLIGRAM(S): at 17:27

## 2020-12-25 RX ADMIN — Medication 3 MILLILITER(S): at 20:25

## 2020-12-25 RX ADMIN — Medication 3 MILLILITER(S): at 08:57

## 2020-12-25 RX ADMIN — Medication 18 UNIT(S): at 17:26

## 2020-12-25 RX ADMIN — FAMOTIDINE 20 MILLIGRAM(S): 10 INJECTION INTRAVENOUS at 06:07

## 2020-12-25 RX ADMIN — Medication 60 MILLIGRAM(S): at 06:07

## 2020-12-25 RX ADMIN — Medication 2: at 17:26

## 2020-12-25 RX ADMIN — Medication 250 MILLIGRAM(S): at 17:27

## 2020-12-25 RX ADMIN — LEVETIRACETAM 420 MILLIGRAM(S): 250 TABLET, FILM COATED ORAL at 06:07

## 2020-12-25 RX ADMIN — Medication 18 UNIT(S): at 12:25

## 2020-12-25 RX ADMIN — CLOPIDOGREL BISULFATE 75 MILLIGRAM(S): 75 TABLET, FILM COATED ORAL at 08:20

## 2020-12-25 RX ADMIN — Medication 250 MILLIGRAM(S): at 06:06

## 2020-12-25 RX ADMIN — Medication 18 UNIT(S): at 08:20

## 2020-12-25 RX ADMIN — Medication 40 MILLIGRAM(S): at 06:07

## 2020-12-25 RX ADMIN — Medication 60 MILLIGRAM(S): at 12:25

## 2020-12-25 RX ADMIN — Medication 3 MILLILITER(S): at 03:13

## 2020-12-25 RX ADMIN — ATORVASTATIN CALCIUM 40 MILLIGRAM(S): 80 TABLET, FILM COATED ORAL at 21:16

## 2020-12-25 RX ADMIN — Medication 4: at 12:25

## 2020-12-25 RX ADMIN — INSULIN GLARGINE 30 UNIT(S): 100 INJECTION, SOLUTION SUBCUTANEOUS at 22:08

## 2020-12-25 NOTE — CHART NOTE - NSCHARTNOTEFT_GEN_A_CORE
Called by RN that pt was originally on nocturnal BiPAP but has been unable to wean off due to persistent hypoxia so is therefore becoming continuous BiPAP.  Since continuous BiPAP, will transfer to step down.  Pt on cardizem and metoprolol which he will be unable to take on BiPAP so will put in IV metoprolol 5mg PRN for tachycardia and hypertension.  Also ordered ABG.

## 2020-12-25 NOTE — PROGRESS NOTE ADULT - ASSESSMENT
70y Male with past medical history significant for COPD, HTN, DM, pAF on coumadin, seizure d/o with recent hospitalization for hypercapnic respiratory failure presents with HTN emergency and subsequent PEA/VT arrest.  s/p cath with Severe RCA and LAD disease. The RCA was treated with PTCA and STENT (THI-Resolute) with IVUS with residual LAD lesion    CAD  - Continue ASA with Plavix  - Plan for intervention on LAD on Monday     HTN  - BP controlled    Dyslipidemia  - Continue statin    AF  - Rates controlled with diltiazem  - Coumadin held at this time given R femoral artery pseudoaneurysm s/p thrombin injection RFA by IR,    Shortness of breath, Hypoxia on high flow  - Likely multifactorial including CAD, AF, ELISSA-OHS  - Alkalosis improving after holding diuretics. Will give 1 dose in AM     VT  - On amiodarone 200 mg daily    s/w Dr. Joyce

## 2020-12-25 NOTE — PROGRESS NOTE ADULT - SUBJECTIVE AND OBJECTIVE BOX
Patient is a 70y old  Male who presents with a chief complaint of Acute hypoxic/hypercapnic respiratory failure, cardiac arrest (25 Dec 2020 10:42)    Pt seen and exam am, pt was on bipap noct.  Pt states he is feeling better  Will change to nc.    No cp, palpitation  cath MONDAY    SUBJECTIVE / OVERNIGHT EVENTS:  REVIEW OF SYSTEMS: All systems are reviewed and found to be negative except above    MEDICATIONS  (STANDING):  albuterol/ipratropium for Nebulization 3 milliLiter(s) Nebulizer every 6 hours  aspirin  chewable 81 milliGRAM(s) Oral daily  atorvastatin 40 milliGRAM(s) Oral at bedtime  chlorhexidine 2% Cloths 1 Application(s) Topical <User Schedule>  citalopram 40 milliGRAM(s) Oral daily  clopidogrel Tablet 75 milliGRAM(s) Oral daily  dextrose 40% Gel 15 Gram(s) Oral once  dextrose 5%. 1000 milliLiter(s) (50 mL/Hr) IV Continuous <Continuous>  dextrose 5%. 1000 milliLiter(s) (100 mL/Hr) IV Continuous <Continuous>  dextrose 50% Injectable 25 Gram(s) IV Push once  dextrose 50% Injectable 12.5 Gram(s) IV Push once  dextrose 50% Injectable 25 Gram(s) IV Push once  digoxin     Tablet 0.125 milliGRAM(s) Oral daily  diltiazem    Tablet 60 milliGRAM(s) Oral four times a day  famotidine    Tablet 20 milliGRAM(s) Oral two times a day  glucagon  Injectable 1 milliGRAM(s) IntraMuscular once  influenza   Vaccine 0.5 milliLiter(s) IntraMuscular once  insulin glargine Injectable (LANTUS) 30 Unit(s) SubCutaneous at bedtime  insulin lispro (ADMELOG) corrective regimen sliding scale   SubCutaneous Before meals and at bedtime  insulin lispro Injectable (ADMELOG) 18 Unit(s) SubCutaneous three times a day before meals  levETIRAcetam  IVPB 500 milliGRAM(s) IV Intermittent every 12 hours  methylPREDNISolone sodium succinate Injectable 40 milliGRAM(s) IV Push every 12 hours  metoprolol tartrate 25 milliGRAM(s) Oral two times a day  polyethylene glycol 3350 17 Gram(s) Oral daily  saccharomyces boulardii 250 milliGRAM(s) Oral two times a day    MEDICATIONS  (PRN):  aluminum hydroxide/magnesium hydroxide/simethicone Suspension 30 milliLiter(s) Oral every 4 hours PRN Dyspepsia      CAPILLARY BLOOD GLUCOSE      POCT Blood Glucose.: 205 mg/dL (25 Dec 2020 12:04)  POCT Blood Glucose.: 175 mg/dL (25 Dec 2020 08:06)  POCT Blood Glucose.: 191 mg/dL (24 Dec 2020 23:02)  POCT Blood Glucose.: 263 mg/dL (24 Dec 2020 17:19)    I&O's Summary    24 Dec 2020 07:01  -  25 Dec 2020 07:00  --------------------------------------------------------  IN: 450 mL / OUT: 650 mL / NET: -200 mL    25 Dec 2020 07:01  -  25 Dec 2020 13:17  --------------------------------------------------------  IN: 290 mL / OUT: 0 mL / NET: 290 mL        PHYSICAL EXAM:  Vital Signs Last 24 Hrs  T(C): 36.2 (25 Dec 2020 09:44), Max: 36.8 (24 Dec 2020 16:10)  T(F): 97.2 (25 Dec 2020 09:44), Max: 98.2 (24 Dec 2020 16:10)  HR: 90 (25 Dec 2020 09:44) (72 - 113)  BP: 119/74 (25 Dec 2020 09:44) (105/50 - 153/66)  BP(mean): 57 (24 Dec 2020 23:00) (57 - 86)  RR: 18 (25 Dec 2020 09:44) (18 - 22)  SpO2: 98% (25 Dec 2020 09:44) (89% - 100%)    CONSTITUTIONAL: NAD, MORBID OBESE  EYES: PERRLA; conjunctiva and sclera clear  ENMT: Moist oral mucosa, ly  RESPIRATORY: Normal respiratory effort; + AIR ENTRY.lungs are clear to auscultation bilaterally  CARDIOVASCULAR: Regular rate and rhythm, normal S1 and S2, no murmur   EXTS: + lower extremity edema-Improving; Peripheral pulses are 2+ bilaterally  ABDOMEN: Nontender to palpation, normoactive bowel sounds, no rebound/guarding;   PSYCH: affect appropriate  NEUROLOGY: A+O to person, place, and time; CN 2-12 are intact and symmetric; no gross sensory deficits;       LABS:                        11.9   23.02 )-----------( 244      ( 24 Dec 2020 07:31 )             38.6     12-25    145  |  93<L>  |  30.0<H>  ----------------------------<  164<H>  4.2   |  47.0<HH>  |  0.58    Ca    8.6      25 Dec 2020 06:49                  RADIOLOGY & ADDITIONAL TESTS:  Results Reviewed:   Imaging Personally Reviewed:  Electrocardiogram Personally Reviewed:    COORDINATION OF CARE:  Care Discussed with Consultants/Other Providers [Y/N]:  Prior or Outpatient Records Reviewed [Y/N]:

## 2020-12-25 NOTE — PROGRESS NOTE ADULT - SUBJECTIVE AND OBJECTIVE BOX
PULMONARY PROGRESS NOTE      CLAUDIA MABRYN-[id number]    Patient is a 70y old  Male who presents with a chief complaint of Acute hypoxic/hypercapnic respiratory failure, cardiac arrest (24 Dec 2020 15:46)      INTERVAL HPI/OVERNIGHT EVENTS:Comfortable laying flat. For cath 12/28.    MEDICATIONS  (STANDING):  albuterol/ipratropium for Nebulization 3 milliLiter(s) Nebulizer every 6 hours  aspirin  chewable 81 milliGRAM(s) Oral daily  atorvastatin 40 milliGRAM(s) Oral at bedtime  chlorhexidine 2% Cloths 1 Application(s) Topical <User Schedule>  citalopram 40 milliGRAM(s) Oral daily  clopidogrel Tablet 75 milliGRAM(s) Oral daily  dextrose 40% Gel 15 Gram(s) Oral once  dextrose 5%. 1000 milliLiter(s) (50 mL/Hr) IV Continuous <Continuous>  dextrose 5%. 1000 milliLiter(s) (100 mL/Hr) IV Continuous <Continuous>  dextrose 50% Injectable 25 Gram(s) IV Push once  dextrose 50% Injectable 12.5 Gram(s) IV Push once  dextrose 50% Injectable 25 Gram(s) IV Push once  digoxin     Tablet 0.125 milliGRAM(s) Oral daily  diltiazem    Tablet 60 milliGRAM(s) Oral four times a day  famotidine    Tablet 20 milliGRAM(s) Oral two times a day  glucagon  Injectable 1 milliGRAM(s) IntraMuscular once  influenza   Vaccine 0.5 milliLiter(s) IntraMuscular once  insulin glargine Injectable (LANTUS) 30 Unit(s) SubCutaneous at bedtime  insulin lispro (ADMELOG) corrective regimen sliding scale   SubCutaneous Before meals and at bedtime  insulin lispro Injectable (ADMELOG) 18 Unit(s) SubCutaneous three times a day before meals  levETIRAcetam  IVPB 500 milliGRAM(s) IV Intermittent every 12 hours  methylPREDNISolone sodium succinate Injectable 40 milliGRAM(s) IV Push every 8 hours  metoprolol tartrate 25 milliGRAM(s) Oral two times a day  polyethylene glycol 3350 17 Gram(s) Oral daily  saccharomyces boulardii 250 milliGRAM(s) Oral two times a day      MEDICATIONS  (PRN):  aluminum hydroxide/magnesium hydroxide/simethicone Suspension 30 milliLiter(s) Oral every 4 hours PRN Dyspepsia      Allergies    No Known Allergies    Intolerances        PAST MEDICAL & SURGICAL HISTORY:  Seizure    Depression, unspecified depression type    Diabetes    Hypertension    Hyperlipidemia    Afib    COPD (chronic obstructive pulmonary disease)    Abdominal hernia    H/O carotid endarterectomy        SOCIAL HISTORY  Smoking History:       REVIEW OF SYSTEMS:    CONSTITUTIONAL:  No distress    HEENT:  Eyes:  No diplopia or blurred vision. ENT:  No earache, sore throat or runny nose.    CARDIOVASCULAR:  No pressure, squeezing, tightness, heaviness or aching about the chest; no palpitations.    RESPIRATORY:  per hpi    GASTROINTESTINAL:  No nausea, vomiting or diarrhea.    GENITOURINARY:  No dysuria, frequency or urgency.    MUSCULOSKELETAL:  No joint pain    SKIN:  No new lesions.    NEUROLOGIC:  No paresthesias, fasciculations, seizures or weakness.    PSYCHIATRIC:  No disorder of thought or mood.    ENDOCRINE:  No heat or cold intolerance, polyuria or polydipsia.    HEMATOLOGICAL:  No easy bruising or bleeding.     Vital Signs Last 24 Hrs  T(C): 36.2 (25 Dec 2020 09:44), Max: 36.8 (24 Dec 2020 12:00)  T(F): 97.2 (25 Dec 2020 09:44), Max: 98.2 (24 Dec 2020 12:00)  HR: 90 (25 Dec 2020 09:44) (72 - 117)  BP: 119/74 (25 Dec 2020 09:44) (105/50 - 153/66)  BP(mean): 57 (24 Dec 2020 23:00) (57 - 86)  RR: 18 (25 Dec 2020 09:44) (18 - 24)  SpO2: 98% (25 Dec 2020 09:44) (89% - 100%)    PHYSICAL EXAMINATION:    GENERAL: The patient is awake and alert in no apparent distress.     HEENT: Head is normocephalic and atraumatic. Extraocular muscles are intact. Mucous membranes are moist.    NECK: Supple.    LUNGS: diminished bs bilat    HEART: Regular rate and rhythm without murmur.    ABDOMEN: Soft, nontender, and nondistended.      EXTREMITIES: Without any cyanosis, clubbing, rash, lesions or edema.    NEUROLOGIC: Grossly intact.    SKIN: No ulceration or induration present.      LABS:                        11.9   23.02 )-----------( 244      ( 24 Dec 2020 07:31 )             38.6     12-25    145  |  93<L>  |  30.0<H>  ----------------------------<  164<H>  4.2   |  47.0<HH>  |  0.58    Ca    8.6      25 Dec 2020 06:49          ABG - ( 23 Dec 2020 15:19 )  pH, Arterial: 7.39  pH, Blood: x     /  pCO2: 84    /  pO2: 90    / HCO3: 46    / Base Excess: 21.5  /  SaO2: 98                              MICROBIOLOGY:    RADIOLOGY & ADDITIONAL STUDIES:

## 2020-12-25 NOTE — PROGRESS NOTE ADULT - ASSESSMENT
70y Male with past medical history significant for COPD, HTN, DM, pAF on coumadin, seizure d/o with recent hospitalization for hypercapnic respiratory failure presents with HTN emergency and subsequent PEA/VT arrest on 12/10 this admission.     Acute on chronic hypoxic hypercapnic respiratory failure sec copd exacerbation, ELISSA, Acute of chronic chf  - Pt is on 3 l nc now, off bipap. nebs.  Noct/prn AVAPS, Spoke with Dr Ulises mehta for cath but recommends place on AVAPS during procedure   - Taper iv solumetrol  - per pulm Needs AVAPS as out pt to decrease re admissions and improve mortality has failed BIPAP ( [facility])    Right groin pseudoaneurysm s/p thrombin injection   -  US Duplex Arterial Lower Ext Ltd, Right (12.19.20 @ 11:24) >  The larger of the 2 pseudoaneurysms seen on the prior study remains thrombosed and measures 2.5 x 2.8 x 0.9 cm.  The smaller pseudoaneurysm demonstrating residual flow on the prior study adjacent to the common femoral artery is not well seen on the current study, possibly related to technical factors.     - CT RLE and RLE arterial duplex with either a single right groin pseudoaneurysm measuring 1.6 x 1.5 x 1.4 cm with dilatation of a portion of the neck measuring up to 0.9 cm transverse or 2 adjacent pseudoaneurysms with the more proximal pseudoaneurysm measuring 0.9 x 0.7 cm and the more distal pseudoaneurysm measuring 1.6 x 1.5 x 1.4 cm.  - Vascular following.  - IR for thrombin injection 12/18  - AC on hold.will resume when okay with vascular  -s/p  Transfuse 2units PRBC 12/18    CAD   - s/p cath with Severe RCA and LAD disease on 12/15. The RCA was treated with PTCA and STENT (THI-Resolute) with IVUS with residual LAD lesion  - Plan for intervention on LAD 12/18, to be done with anesthesia - cancelled secondary to hgb 7.8.   - Large right groin hematoma, and noted hgb drop (8.7-->7.8). Discussed with Cardiology, who is aware of hematoma.  - CT RLE and RLE arterial duplex with either a single right groin pseudoaneurysm measuring 1.6 x 1.5 x 1.4 cm with dilatation of a portion of the neck measuring up to 0.9 cm transverse or 2 adjacent pseudoaneurysms with the more proximal pseudoaneurysm measuring 0.9 x 0.7 cm and the more distal pseudoaneurysm measuring 1.6 x 1.5 x 1.4 cm.  - AC held for now. SCD boots ordered  - Continue ASA with Plavix, BB, Statin   - cath Monday      HTN  - c/w metoprolol and cardizem and digoxin    AFib  - Rate controlled with diltiazem/Metoprolol  - coumadin held at this time    Cardiac arrest from/ VT  - amiodarone decreased to 200 mg daily per Dr Arroyo recommendations   -  EP evaluation, no plan AICD now    Blood loss anemia  - ppx lovenox held due to groin hematoma.   - S/P  transfuse 2units PRBC  - Monitor H/H    Shortness of breath due to COPD exacerbation,  acute on chronic CHF, possible gram positive/gram negative pneumonia  - improved, currently on NC  - LVEDP was elevated on cath.  - Use of diuretics with worsening contraction alkalosis   - F/U RENAL REC. f/u BMP  - Strict I & Os  - Daily weight  - nocturnal NIV  - Iv solumedrol 40mg q12 Taper  - bronchodilators   - f/u pulm rec  -off lasix per cardiology  - completed 7 days of zosyn per ID  - Blood cx NGTD      DM2, uncontrolled-high  -  A1c 9.1  - lantus with increase  meal coverage   - close monitoring  - endocrine following      Morbid obesity-  Diet & weight loss recommended      DVT prophylaxis: scd    DISPO: cath to be rescheduled Monday . monitor H/H. groin hematoma. LOS uncertain as patient is still acute.   CARE OF PLAN DW PT  Long  dw daughter CATRACHITA [phone]. Discussed above care of plan. over all prognosis guarded. Per Daughter -pt wants to be fullcode ,was intubated in past and recovered.  Pt wants to be full code.  care of plan dw pt-agreed  AVAPS DURING CATH

## 2020-12-25 NOTE — PROGRESS NOTE ADULT - SUBJECTIVE AND OBJECTIVE BOX
[facility]                                      [facility]                              [address] [address]                                                 PHONE: [phone]                                                 FAX: [phone]  -----------------------------------------------------------------------------------------------  Pt seen and examined. FU for  shortness of breath     Overnight events/Complaints: Pt more awake, comfortable on BiPAP.     Vital Signs Last 24 Hrs  T(C): 36.2 (25 Dec 2020 09:44), Max: 36.8 (24 Dec 2020 12:00)  T(F): 97.2 (25 Dec 2020 09:44), Max: 98.2 (24 Dec 2020 12:00)  HR: 90 (25 Dec 2020 09:44) (72 - 117)  BP: 119/74 (25 Dec 2020 09:44) (105/50 - 153/66)  BP(mean): 57 (24 Dec 2020 23:00) (57 - 86)  RR: 18 (25 Dec 2020 09:44) (18 - 24)  SpO2: 98% (25 Dec 2020 09:44) (89% - 100%)  I&O's Summary    24 Dec 2020 07:01  -  25 Dec 2020 07:00  --------------------------------------------------------  IN: 450 mL / OUT: 650 mL / NET: -200 mL    PHYSICAL EXAM:  Constitutional: Obese male on BiPAP  HEENT:     Head: Normocephalic and atraumatic  Neck: supple. No JVD  Cardiovascular: regular S1 S2  Respiratory: Lungs clear to auscultation; no crepitations, no wheeze  Gastrointestinal:  Soft, Non-tender, + BS	  Musculoskeletal: Normal range of motion. + edema  Skin: R groin soft with ecchymosis  Neurologic: Alert oriented to time place and person. Normal strength and no tremor.        LABS:                        11.9   23.02 )-----------( 244      ( 24 Dec 2020 07:31 )             38.6     12-25    145  |  93<L>  |  30.0<H>  ----------------------------<  164<H>  4.2   |  47.0<HH>  |  0.58    Ca    8.6      25 Dec 2020 06:49    RADIOLOGY & ADDITIONAL STUDIES: (reviewed)  CXR was independently visualized/reviewed  and demonstrated: basal infiltrates    CARDIOLOGY TESTING:(reviewed)     ECHOCARDIOGRAM independently visualized/reviewed and demonstrated :    1. Technically difficult study.   2. Endocardial visualization was enhanced with intravenous echo contrast.   3. Left ventricular ejection fraction, by visual estimation, is 60 to 65%.   4. Normal global left ventricular systolic function.   5. The left ventricular diastolic function could not be assessed in this study.   6. Normal left ventricular internal cavity size.   7. Moderately enlarged right ventricle. Moderately reduced RV systolic function.   8. The left atrium is normal in size.   9. Moderately enlarged right atrium.  10. Mild thickening and calcification of the anterior and posterior mitral valve leaflets.  11. Mild mitral valve regurgitation.  12. Mild tricuspid regurgitation.  13. There is no evidence of pericardial effusion.    CARDIAC CATH:  CORONARY VESSELS: The coronary circulation is right dominant.  LM:   --  LM: Normal.  LAD:   --  Mid LAD: There was a 80 % stenosis.  CX:   --  Circumflex: Angiography showed minor luminal irregularities with  no flow limiting lesions. The vessel arose anomalously from the proximal  RCA.  RCA:   --  RCA: iFR index of 0.82  --  Mid RCA: There was a 80 % stenosis.  --  Distal RCA: There was a 90 % stenosis.  COMPLICATIONS: There were no complications. No complications occurred  during the cath lab visit.  DIAGNOSTIC IMPRESSIONS: Severe RCA and LAD disease. The RCA was treated  with PTCA and STENT (THI-Resolute) with IVUS  DIAGNOSTIC RECOMMENDATIONS: Plavix and coumadin Will need PCI of LAD in  future  INTERVENTIONAL IMPRESSIONS: Severe RCA and LAD disease. The RCA was treated  with PTCA and STENT (THI-Resolute) with IVUS  INTERVENTIONAL RECOMMENDATIONS: Plavix and coumadin Will need PCI of LAD in  future    TELEMETRY independently visualized/reviewed and demonstrated : AF 70-80    MEDICATIONS:(reviewed)  MEDICATIONS  (STANDING):  albuterol/ipratropium for Nebulization 3 milliLiter(s) Nebulizer every 6 hours  aspirin  chewable 81 milliGRAM(s) Oral daily  atorvastatin 40 milliGRAM(s) Oral at bedtime  chlorhexidine 2% Cloths 1 Application(s) Topical <User Schedule>  citalopram 40 milliGRAM(s) Oral daily  clopidogrel Tablet 75 milliGRAM(s) Oral daily  dextrose 40% Gel 15 Gram(s) Oral once  dextrose 5%. 1000 milliLiter(s) (50 mL/Hr) IV Continuous <Continuous>  dextrose 5%. 1000 milliLiter(s) (100 mL/Hr) IV Continuous <Continuous>  dextrose 50% Injectable 25 Gram(s) IV Push once  dextrose 50% Injectable 12.5 Gram(s) IV Push once  dextrose 50% Injectable 25 Gram(s) IV Push once  digoxin     Tablet 0.125 milliGRAM(s) Oral daily  diltiazem    Tablet 60 milliGRAM(s) Oral four times a day  famotidine    Tablet 20 milliGRAM(s) Oral two times a day  glucagon  Injectable 1 milliGRAM(s) IntraMuscular once  influenza   Vaccine 0.5 milliLiter(s) IntraMuscular once  insulin glargine Injectable (LANTUS) 30 Unit(s) SubCutaneous at bedtime  insulin lispro (ADMELOG) corrective regimen sliding scale   SubCutaneous Before meals and at bedtime  insulin lispro Injectable (ADMELOG) 18 Unit(s) SubCutaneous three times a day before meals  levETIRAcetam  IVPB 500 milliGRAM(s) IV Intermittent every 12 hours  methylPREDNISolone sodium succinate Injectable 40 milliGRAM(s) IV Push every 12 hours  metoprolol tartrate 25 milliGRAM(s) Oral two times a day  polyethylene glycol 3350 17 Gram(s) Oral daily  saccharomyces boulardii 250 milliGRAM(s) Oral two times a day

## 2020-12-26 DIAGNOSIS — I46.9 CARDIAC ARREST, CAUSE UNSPECIFIED: ICD-10-CM

## 2020-12-26 DIAGNOSIS — J96.21 ACUTE AND CHRONIC RESPIRATORY FAILURE WITH HYPOXIA: ICD-10-CM

## 2020-12-26 DIAGNOSIS — I48.91 UNSPECIFIED ATRIAL FIBRILLATION: ICD-10-CM

## 2020-12-26 LAB
ANION GAP SERPL CALC-SCNC: 5 MMOL/L — SIGNIFICANT CHANGE UP (ref 5–17)
BUN SERPL-MCNC: 25 MG/DL — HIGH (ref 8–20)
CALCIUM SERPL-MCNC: 8.3 MG/DL — LOW (ref 8.6–10.2)
CHLORIDE SERPL-SCNC: 93 MMOL/L — LOW (ref 98–107)
CO2 SERPL-SCNC: 44 MMOL/L — HIGH (ref 22–29)
CREAT SERPL-MCNC: 0.5 MG/DL — SIGNIFICANT CHANGE UP (ref 0.5–1.3)
CULTURE RESULTS: SIGNIFICANT CHANGE UP
GLUCOSE BLDC GLUCOMTR-MCNC: 116 MG/DL — HIGH (ref 70–99)
GLUCOSE BLDC GLUCOMTR-MCNC: 209 MG/DL — HIGH (ref 70–99)
GLUCOSE BLDC GLUCOMTR-MCNC: 270 MG/DL — HIGH (ref 70–99)
GLUCOSE BLDC GLUCOMTR-MCNC: 277 MG/DL — HIGH (ref 70–99)
GLUCOSE SERPL-MCNC: 107 MG/DL — HIGH (ref 70–99)
HCT VFR BLD CALC: 37 % — LOW (ref 39–50)
HGB BLD-MCNC: 11.5 G/DL — LOW (ref 13–17)
MCHC RBC-ENTMCNC: 28.4 PG — SIGNIFICANT CHANGE UP (ref 27–34)
MCHC RBC-ENTMCNC: 31.1 GM/DL — LOW (ref 32–36)
MCV RBC AUTO: 91.4 FL — SIGNIFICANT CHANGE UP (ref 80–100)
PLATELET # BLD AUTO: 206 K/UL — SIGNIFICANT CHANGE UP (ref 150–400)
POTASSIUM SERPL-MCNC: 4.6 MMOL/L — SIGNIFICANT CHANGE UP (ref 3.5–5.3)
POTASSIUM SERPL-SCNC: 4.6 MMOL/L — SIGNIFICANT CHANGE UP (ref 3.5–5.3)
RBC # BLD: 4.05 M/UL — LOW (ref 4.2–5.8)
RBC # FLD: 15.2 % — HIGH (ref 10.3–14.5)
SODIUM SERPL-SCNC: 142 MMOL/L — SIGNIFICANT CHANGE UP (ref 135–145)
SPECIMEN SOURCE: SIGNIFICANT CHANGE UP
WBC # BLD: 17.68 K/UL — HIGH (ref 3.8–10.5)
WBC # FLD AUTO: 17.68 K/UL — HIGH (ref 3.8–10.5)

## 2020-12-26 PROCEDURE — 99232 SBSQ HOSP IP/OBS MODERATE 35: CPT

## 2020-12-26 PROCEDURE — 99233 SBSQ HOSP IP/OBS HIGH 50: CPT

## 2020-12-26 RX ORDER — INSULIN LISPRO 100/ML
22 VIAL (ML) SUBCUTANEOUS
Refills: 0 | Status: DISCONTINUED | OUTPATIENT
Start: 2020-12-26 | End: 2020-12-31

## 2020-12-26 RX ADMIN — Medication 60 MILLIGRAM(S): at 11:35

## 2020-12-26 RX ADMIN — Medication 25 MILLIGRAM(S): at 06:31

## 2020-12-26 RX ADMIN — Medication 250 MILLIGRAM(S): at 17:07

## 2020-12-26 RX ADMIN — Medication 3 MILLILITER(S): at 14:35

## 2020-12-26 RX ADMIN — Medication 250 MILLIGRAM(S): at 06:31

## 2020-12-26 RX ADMIN — Medication 60 MILLIGRAM(S): at 23:53

## 2020-12-26 RX ADMIN — LEVETIRACETAM 420 MILLIGRAM(S): 250 TABLET, FILM COATED ORAL at 17:08

## 2020-12-26 RX ADMIN — Medication 81 MILLIGRAM(S): at 08:52

## 2020-12-26 RX ADMIN — Medication 25 MILLIGRAM(S): at 17:07

## 2020-12-26 RX ADMIN — CLOPIDOGREL BISULFATE 75 MILLIGRAM(S): 75 TABLET, FILM COATED ORAL at 08:52

## 2020-12-26 RX ADMIN — Medication 60 MILLIGRAM(S): at 06:31

## 2020-12-26 RX ADMIN — Medication 18 UNIT(S): at 08:52

## 2020-12-26 RX ADMIN — Medication 0.12 MILLIGRAM(S): at 06:31

## 2020-12-26 RX ADMIN — Medication 40 MILLIGRAM(S): at 17:07

## 2020-12-26 RX ADMIN — Medication 3 MILLILITER(S): at 20:49

## 2020-12-26 RX ADMIN — Medication 6: at 17:07

## 2020-12-26 RX ADMIN — FAMOTIDINE 20 MILLIGRAM(S): 10 INJECTION INTRAVENOUS at 17:07

## 2020-12-26 RX ADMIN — LEVETIRACETAM 420 MILLIGRAM(S): 250 TABLET, FILM COATED ORAL at 06:31

## 2020-12-26 RX ADMIN — Medication 18 UNIT(S): at 11:31

## 2020-12-26 RX ADMIN — Medication 3 MILLILITER(S): at 04:53

## 2020-12-26 RX ADMIN — Medication 40 MILLIGRAM(S): at 06:31

## 2020-12-26 RX ADMIN — Medication 3 MILLILITER(S): at 08:13

## 2020-12-26 RX ADMIN — ATORVASTATIN CALCIUM 40 MILLIGRAM(S): 80 TABLET, FILM COATED ORAL at 21:31

## 2020-12-26 RX ADMIN — Medication 60 MILLIGRAM(S): at 17:07

## 2020-12-26 RX ADMIN — Medication 18 UNIT(S): at 17:07

## 2020-12-26 RX ADMIN — Medication 4: at 11:35

## 2020-12-26 RX ADMIN — CITALOPRAM 40 MILLIGRAM(S): 10 TABLET, FILM COATED ORAL at 17:07

## 2020-12-26 RX ADMIN — INSULIN GLARGINE 30 UNIT(S): 100 INJECTION, SOLUTION SUBCUTANEOUS at 21:30

## 2020-12-26 RX ADMIN — Medication 6: at 21:29

## 2020-12-26 RX ADMIN — FAMOTIDINE 20 MILLIGRAM(S): 10 INJECTION INTRAVENOUS at 06:31

## 2020-12-26 NOTE — PROGRESS NOTE ADULT - ASSESSMENT
Advanced COPD, hypercapneic respir failure  Significant CAD requiring second cath for management of LAD on 10/28    Awake, stable now on nasal oxygen but has periods where he requests BiPap    Would recheck ABG and CXR tomorrow in advance of cath  As noted, will require AVAPS during cath

## 2020-12-26 NOTE — PROGRESS NOTE ADULT - SUBJECTIVE AND OBJECTIVE BOX
Follow up: diabetes  Interval Hx/Events: no issues    MEDICATIONS  (STANDING):  albuterol/ipratropium for Nebulization 3 milliLiter(s) Nebulizer every 6 hours  aspirin  chewable 81 milliGRAM(s) Oral daily  atorvastatin 40 milliGRAM(s) Oral at bedtime  chlorhexidine 2% Cloths 1 Application(s) Topical <User Schedule>  citalopram 40 milliGRAM(s) Oral daily  clopidogrel Tablet 75 milliGRAM(s) Oral daily  dextrose 40% Gel 15 Gram(s) Oral once  dextrose 5%. 1000 milliLiter(s) (50 mL/Hr) IV Continuous <Continuous>  dextrose 5%. 1000 milliLiter(s) (100 mL/Hr) IV Continuous <Continuous>  dextrose 50% Injectable 25 Gram(s) IV Push once  dextrose 50% Injectable 12.5 Gram(s) IV Push once  dextrose 50% Injectable 25 Gram(s) IV Push once  digoxin     Tablet 0.125 milliGRAM(s) Oral daily  diltiazem    Tablet 60 milliGRAM(s) Oral four times a day  famotidine    Tablet 20 milliGRAM(s) Oral two times a day  glucagon  Injectable 1 milliGRAM(s) IntraMuscular once  influenza   Vaccine 0.5 milliLiter(s) IntraMuscular once  insulin glargine Injectable (LANTUS) 30 Unit(s) SubCutaneous at bedtime  insulin lispro (ADMELOG) corrective regimen sliding scale   SubCutaneous Before meals and at bedtime  insulin lispro Injectable (ADMELOG) 18 Unit(s) SubCutaneous three times a day before meals  levETIRAcetam  IVPB 500 milliGRAM(s) IV Intermittent every 12 hours  methylPREDNISolone sodium succinate Injectable 40 milliGRAM(s) IV Push every 12 hours  metoprolol tartrate 25 milliGRAM(s) Oral two times a day  polyethylene glycol 3350 17 Gram(s) Oral daily  saccharomyces boulardii 250 milliGRAM(s) Oral two times a day    MEDICATIONS  (PRN):  aluminum hydroxide/magnesium hydroxide/simethicone Suspension 30 milliLiter(s) Oral every 4 hours PRN Dyspepsia      ALLERGIES: No Known Allergies      REVIEW OF SYSTEMS:  CONSTITUTIONAL: No fever, weight loss, or fatigue  RESPIRATORY: No cough, No shortness of breath - improved  CARDIOVASCULAR: No chest pain  GASTROINTESTINAL: No abdominal or epigastric pain. No nausea or vomiting  GENITOURINARY: No dysuria  PSYCHIATRIC: No depression or anxiety    Vital Signs Last 24 Hrs  T(C): 36.4 (26 Dec 2020 15:27), Max: 36.7 (26 Dec 2020 09:00)  T(F): 97.6 (26 Dec 2020 15:27), Max: 98 (26 Dec 2020 09:00)  HR: 112 (26 Dec 2020 17:18) (76 - 112)  BP: 108/68 (26 Dec 2020 17:18) (100/66 - 151/72)  BP(mean): 92 (26 Dec 2020 06:28) (92 - 96)  RR: 16 (26 Dec 2020 09:00) (16 - 22)  SpO2: 97% (26 Dec 2020 15:27) (91% - 100%)    Physical Exam:  General appearance: Well developed, well nourished. Obese  Eyes: Pupils equal. EOMI  Lungs: Normal respiratory excursion. Lungs clear no w/r/r  CV: Normal S1S2, regular. No m/r/g.    Abdomen: Soft, nontender  Skin: Warm and moist.   Neuro: Cranial nerves intact.  Psych: Normal affect, good judgement/insight      LABS:                        11.5   17.68 )-----------( 206      ( 26 Dec 2020 09:57 )             37.0     12-26    142  |  93<L>  |  25.0<H>  ----------------------------<  107<H>  4.6   |  44.0<H>  |  0.50    Ca    8.3<L>      26 Dec 2020 09:57          A1C with Estimated Average Glucose Result: 9.1 % (12-11-20 @ 05:05)  A1C with Estimated Average Glucose Result: 8.7 % (12-02-20 @ 04:55)      CAPILLARY BLOOD GLUCOSE  POCT Blood Glucose.: 270 mg/dL (26 Dec 2020 16:44)  POCT Blood Glucose.: 209 mg/dL (26 Dec 2020 11:16)  POCT Blood Glucose.: 116 mg/dL (26 Dec 2020 08:04)  POCT Blood Glucose.: 152 mg/dL (25 Dec 2020 21:21)  POCT Blood Glucose.: 190 mg/dL (25 Dec 2020 16:55)  POCT Blood Glucose.: 205 mg/dL (25 Dec 2020 12:04)  POCT Blood Glucose.: 175 mg/dL (25 Dec 2020 08:06)  POCT Blood Glucose.: 191 mg/dL (24 Dec 2020 23:02)      Thyroid Stimulating Hormone, Serum: 0.38 uIU/mL [0.27 - 4.20] (12-02-20)

## 2020-12-26 NOTE — PROGRESS NOTE ADULT - ASSESSMENT
70y Male with past medical history significant for severe COPD, on home O2, DM type 2, HTN, permanent AF, admitted with recurrent hypoxic/hypercapneic respiratory failure and agonal respiration 12/10 s/p emergent intubation followed by PEA arrest, then developed VT with a pulse, s/p DCCV and initiation of amiodarone s/p cath which revealed severe RCA and LAD disease s/p THI to RCA however with agitation during the procedure resulting in R femoral artery pseudoaneurysm s/p thrombin injection RFA by IR, now pending staged intervention to the LAD. Patient now with increased work of breathing requiring escalation of O2 support, now on AVAPS  1. T2DM - still w daytime hyperglycemia  - cont Lantus 30 units at bedtime  - increase Lispro 22 units w meals, cont Lispro scale  - will follow    2. HLD - cont statin    3. severe CAD - planning cath on Monday, cont asa, statin, plavix, metoprolol    4. hypercapnic resp failure - improved w AVAPS, management per pulmonary

## 2020-12-26 NOTE — PROGRESS NOTE ADULT - ASSESSMENT
70y Male with past medical history significant for COPD, HTN, DM, pAF on coumadin, seizure d/o with recent hospitalization for hypercapnic respiratory failure presents with HTN emergency and subsequent PEA/VT arrest.  s/p cath with Severe RCA and LAD disease. The RCA was treated with PTCA and STENT (THI-Resolute) with IVUS with residual LAD lesion    CAD  - Continue ASA with Plavix  - Plan for intervention on LAD on Monday     HTN  - BP controlled    Dyslipidemia  - Continue statin    AF  - Rates controlled with diltiazem  - Coumadin held at this time given R femoral artery pseudoaneurysm s/p thrombin injection RFA by IR    Shortness of breath, Hypoxia on high flow  - Alkalosis improving after holding diuretics.  - Restart lasix 20 mg in AM    VT  - On amiodarone 200 mg daily     70y Male with past medical history significant for COPD, HTN, DM, pAF on coumadin, seizure d/o with recent hospitalization for hypercapnic respiratory failure presents with HTN emergency and subsequent PEA/VT arrest.  s/p cath with Severe RCA and LAD disease. The RCA was treated with PTCA and STENT (THI-Resolute) with IVUS with residual LAD lesion    CAD  - Continue ASA with Plavix  - Plan for intervention on LAD on Monday     HTN  - BP controlled    Dyslipidemia  - Continue statin    AF  - Rates controlled with diltiazem  - Coumadin held at this time given R femoral artery pseudoaneurysm s/p thrombin injection RFA by IR    Shortness of breath, Hypoxia on high flow  - Alkalosis improving after holding diuretics.  - Restart lasix post cath pending LVEDP    VT  - On amiodarone 200 mg daily

## 2020-12-26 NOTE — PROGRESS NOTE ADULT - SUBJECTIVE AND OBJECTIVE BOX
Patient is a 70y old  Male who presents with a chief complaint of Acute hypoxic/hypercapnic respiratory failure, cardiac arrest (25 Dec 2020 10:42)      SUBJECTIVE / OVERNIGHT EVENTS: Pt was on BiPAP all night & upgraded to stepdown. Was on NC & now back up on BiPAP. SOB improved as per pt. Cath on Monday  REVIEW OF SYSTEMS: All systems are reviewed and found to be negative except above    MEDICATIONS  (STANDING):  albuterol/ipratropium for Nebulization 3 milliLiter(s) Nebulizer every 6 hours  aspirin  chewable 81 milliGRAM(s) Oral daily  atorvastatin 40 milliGRAM(s) Oral at bedtime  chlorhexidine 2% Cloths 1 Application(s) Topical <User Schedule>  citalopram 40 milliGRAM(s) Oral daily  clopidogrel Tablet 75 milliGRAM(s) Oral daily  dextrose 40% Gel 15 Gram(s) Oral once  dextrose 5%. 1000 milliLiter(s) (50 mL/Hr) IV Continuous <Continuous>  dextrose 5%. 1000 milliLiter(s) (100 mL/Hr) IV Continuous <Continuous>  dextrose 50% Injectable 25 Gram(s) IV Push once  dextrose 50% Injectable 12.5 Gram(s) IV Push once  dextrose 50% Injectable 25 Gram(s) IV Push once  digoxin     Tablet 0.125 milliGRAM(s) Oral daily  diltiazem    Tablet 60 milliGRAM(s) Oral four times a day  famotidine    Tablet 20 milliGRAM(s) Oral two times a day  glucagon  Injectable 1 milliGRAM(s) IntraMuscular once  influenza   Vaccine 0.5 milliLiter(s) IntraMuscular once  insulin glargine Injectable (LANTUS) 30 Unit(s) SubCutaneous at bedtime  insulin lispro (ADMELOG) corrective regimen sliding scale   SubCutaneous Before meals and at bedtime  insulin lispro Injectable (ADMELOG) 18 Unit(s) SubCutaneous three times a day before meals  levETIRAcetam  IVPB 500 milliGRAM(s) IV Intermittent every 12 hours  methylPREDNISolone sodium succinate Injectable 40 milliGRAM(s) IV Push every 12 hours  metoprolol tartrate 25 milliGRAM(s) Oral two times a day  polyethylene glycol 3350 17 Gram(s) Oral daily  saccharomyces boulardii 250 milliGRAM(s) Oral two times a day    MEDICATIONS  (PRN):  aluminum hydroxide/magnesium hydroxide/simethicone Suspension 30 milliLiter(s) Oral every 4 hours PRN Dyspepsia      CAPILLARY BLOOD GLUCOSE      POCT Blood Glucose.: 205 mg/dL (25 Dec 2020 12:04)  POCT Blood Glucose.: 175 mg/dL (25 Dec 2020 08:06)  POCT Blood Glucose.: 191 mg/dL (24 Dec 2020 23:02)  POCT Blood Glucose.: 263 mg/dL (24 Dec 2020 17:19)    I&O's Summary    24 Dec 2020 07:01  -  25 Dec 2020 07:00  --------------------------------------------------------  IN: 450 mL / OUT: 650 mL / NET: -200 mL    25 Dec 2020 07:01  -  25 Dec 2020 13:17  --------------------------------------------------------  IN: 290 mL / OUT: 0 mL / NET: 290 mL        PHYSICAL EXAM:  Vital Signs Last 24 Hrs  T(C): 36.2 (25 Dec 2020 09:44), Max: 36.8 (24 Dec 2020 16:10)  T(F): 97.2 (25 Dec 2020 09:44), Max: 98.2 (24 Dec 2020 16:10)  HR: 90 (25 Dec 2020 09:44) (72 - 113)  BP: 119/74 (25 Dec 2020 09:44) (105/50 - 153/66)  BP(mean): 57 (24 Dec 2020 23:00) (57 - 86)  RR: 18 (25 Dec 2020 09:44) (18 - 22)  SpO2: 98% (25 Dec 2020 09:44) (89% - 100%)    CONSTITUTIONAL: NAD, morbidly obese  EYES: PERRLA; conjunctiva and sclera clear  ENMT: Moist oral mucosa, ly  RESPIRATORY: decreased BS b/l  CARDIOVASCULAR: Regular rate and rhythm, normal S1 and S2, no murmur   EXTS: + lower extremity edema-Improving; Peripheral pulses are 2+ bilaterally, multiple bruises on b/l UE  ABDOMEN: Nontender to palpation, normoactive bowel sounds, no rebound/guarding;   PSYCH: affect appropriate  NEUROLOGY: A+O to person, place, and time; CN 2-12 are intact and symmetric; no gross sensory deficits;  R groin hematoma improving       LABS:                        11.9   23.02 )-----------( 244      ( 24 Dec 2020 07:31 )             38.6     12-25    145  |  93<L>  |  30.0<H>  ----------------------------<  164<H>  4.2   |  47.0<HH>  |  0.58    Ca    8.6      25 Dec 2020 06:49                  RADIOLOGY & ADDITIONAL TESTS:  Results Reviewed:   Imaging Personally Reviewed:  Electrocardiogram Personally Reviewed:    COORDINATION OF CARE:  Care Discussed with Consultants/Other Providers [Y/N]:  Prior or Outpatient Records Reviewed [Y/N]:

## 2020-12-26 NOTE — PROGRESS NOTE ADULT - SUBJECTIVE AND OBJECTIVE BOX
[facility]                              [address] [address]                                                 PHONE: [phone]                                                 FAX: [phone]  -----------------------------------------------------------------------------------------------  Pt seen and examined. FU for  shortness of breath     Overnight events/Complaints: Pt sleeping in bed. On NC. No events    Vital Signs Last 24 Hrs  T(C): 36.7 (26 Dec 2020 09:00), Max: 36.7 (26 Dec 2020 09:00)  T(F): 98 (26 Dec 2020 09:00), Max: 98 (26 Dec 2020 09:00)  HR: 76 (26 Dec 2020 09:00) (76 - 132)  BP: 132/56 (26 Dec 2020 09:00) (119/74 - 151/72)  BP(mean): 92 (26 Dec 2020 06:28) (92 - 96)  RR: 16 (26 Dec 2020 09:00) (16 - 22)  SpO2: 97% (26 Dec 2020 09:00) (91% - 100%)  I&O's Summary    25 Dec 2020 07:01  -  26 Dec 2020 07:00  --------------------------------------------------------  IN: 290 mL / OUT: 450 mL / NET: -160 mL    PHYSICAL EXAM:  Constitutional: Obese male  HEENT:     Head: Normocephalic and atraumatic  Neck: supple. No JVD  Cardiovascular: regular S1 S2  Respiratory: Lungs clear to auscultation; no crepitations, no wheeze  Gastrointestinal:  Soft, Non-tender, + BS	  Musculoskeletal: Normal range of motion. + edema  Skin: R groin soft with ecchymosis  Neurologic: Alert oriented to time place and person. Normal strength and no tremor.        LABS:               12-25    145  |  93<L>  |  30.0<H>  ----------------------------<  164<H>  4.2   |  47.0<HH>  |  0.58    Ca    8.6      25 Dec 2020 06:49      RADIOLOGY & ADDITIONAL STUDIES: (reviewed)  CXR was independently visualized/reviewed  and demonstrated: basal infiltrates    CARDIOLOGY TESTING:(reviewed)     ECHOCARDIOGRAM independently visualized/reviewed and demonstrated :    1. Technically difficult study.   2. Endocardial visualization was enhanced with intravenous echo contrast.   3. Left ventricular ejection fraction, by visual estimation, is 60 to 65%.   4. Normal global left ventricular systolic function.   5. The left ventricular diastolic function could not be assessed in this study.   6. Normal left ventricular internal cavity size.   7. Moderately enlarged right ventricle. Moderately reduced RV systolic function.   8. The left atrium is normal in size.   9. Moderately enlarged right atrium.  10. Mild thickening and calcification of the anterior and posterior mitral valve leaflets.  11. Mild mitral valve regurgitation.  12. Mild tricuspid regurgitation.  13. There is no evidence of pericardial effusion.    CARDIAC CATH:  CORONARY VESSELS: The coronary circulation is right dominant.  LM:   --  LM: Normal.  LAD:   --  Mid LAD: There was a 80 % stenosis.  CX:   --  Circumflex: Angiography showed minor luminal irregularities with  no flow limiting lesions. The vessel arose anomalously from the proximal  RCA.  RCA:   --  RCA: iFR index of 0.82  --  Mid RCA: There was a 80 % stenosis.  --  Distal RCA: There was a 90 % stenosis.  COMPLICATIONS: There were no complications. No complications occurred  during the cath lab visit.  DIAGNOSTIC IMPRESSIONS: Severe RCA and LAD disease. The RCA was treated  with PTCA and STENT (THI-Resolute) with IVUS  DIAGNOSTIC RECOMMENDATIONS: Plavix and coumadin Will need PCI of LAD in  future  INTERVENTIONAL IMPRESSIONS: Severe RCA and LAD disease. The RCA was treated  with PTCA and STENT (THI-Resolute) with IVUS  INTERVENTIONAL RECOMMENDATIONS: Plavix and coumadin Will need PCI of LAD in  future    TELEMETRY independently visualized/reviewed and demonstrated : AF 70-80    MEDICATIONS:(reviewed)  MEDICATIONS  (STANDING):  albuterol/ipratropium for Nebulization 3 milliLiter(s) Nebulizer every 6 hours  aspirin  chewable 81 milliGRAM(s) Oral daily  atorvastatin 40 milliGRAM(s) Oral at bedtime  chlorhexidine 2% Cloths 1 Application(s) Topical <User Schedule>  citalopram 40 milliGRAM(s) Oral daily  clopidogrel Tablet 75 milliGRAM(s) Oral daily  dextrose 40% Gel 15 Gram(s) Oral once  dextrose 5%. 1000 milliLiter(s) (50 mL/Hr) IV Continuous <Continuous>  dextrose 5%. 1000 milliLiter(s) (100 mL/Hr) IV Continuous <Continuous>  dextrose 50% Injectable 25 Gram(s) IV Push once  dextrose 50% Injectable 12.5 Gram(s) IV Push once  dextrose 50% Injectable 25 Gram(s) IV Push once  digoxin     Tablet 0.125 milliGRAM(s) Oral daily  diltiazem    Tablet 60 milliGRAM(s) Oral four times a day  famotidine    Tablet 20 milliGRAM(s) Oral two times a day  glucagon  Injectable 1 milliGRAM(s) IntraMuscular once  influenza   Vaccine 0.5 milliLiter(s) IntraMuscular once  insulin glargine Injectable (LANTUS) 30 Unit(s) SubCutaneous at bedtime  insulin lispro (ADMELOG) corrective regimen sliding scale   SubCutaneous Before meals and at bedtime  insulin lispro Injectable (ADMELOG) 18 Unit(s) SubCutaneous three times a day before meals  levETIRAcetam  IVPB 500 milliGRAM(s) IV Intermittent every 12 hours  methylPREDNISolone sodium succinate Injectable 40 milliGRAM(s) IV Push every 12 hours  metoprolol tartrate 25 milliGRAM(s) Oral two times a day  polyethylene glycol 3350 17 Gram(s) Oral daily  saccharomyces boulardii 250 milliGRAM(s) Oral two times a day

## 2020-12-26 NOTE — PROGRESS NOTE ADULT - ASSESSMENT
70y Male with past medical history significant for COPD, HTN, DM, pAF on coumadin, seizure d/o with recent hospitalization for hypercapnic respiratory failure presents with HTN emergency and subsequent PEA/VT arrest on 12/10 this admission.     Acute on chronic hypoxic hypercapnic respiratory failure sec copd exacerbation, ELISSA, Acute of chronic chf  - Pt is back on bipap, prn. nebs. Pulm cleared pt for cath but recommends place on AVAPS during procedure   - Taper iv solumetrol  - per pulm Needs AVAPS as out pt to decrease re admissions and improve mortality has failed BIPAP ( [facility])  Would recheck ABG and CXR tomorrow in advance of cath    Right groin pseudoaneurysm s/p thrombin injection   -  US Duplex Arterial Lower Ext Ltd, Right (12.19.20 @ 11:24) >  The larger of the 2 pseudoaneurysms seen on the prior study remains thrombosed and measures 2.5 x 2.8 x 0.9 cm.  The smaller pseudoaneurysm demonstrating residual flow on the prior study adjacent to the common femoral artery is not well seen on the current study, possibly related to technical factors.     - CT RLE and RLE arterial duplex with either a single right groin pseudoaneurysm measuring 1.6 x 1.5 x 1.4 cm with dilatation of a portion of the neck measuring up to 0.9 cm transverse or 2 adjacent pseudoaneurysms with the more proximal pseudoaneurysm measuring 0.9 x 0.7 cm and the more distal pseudoaneurysm measuring 1.6 x 1.5 x 1.4 cm.  - Vascular following.  - s/p thrombin injection by IR on 12/18  - AC on hold. will resume after cath      CAD   - s/p cath with Severe RCA and LAD disease on 12/15. The RCA was treated with PTCA and STENT (THI-Resolute) with IVUS with residual LAD lesion  - Plan for intervention on LAD 12/18, to be done with anesthesia - cancelled secondary to hgb 7.8.   - Large right groin hematoma, and noted hgb drop (8.7-->7.8). Discussed with Cardiology, who is aware of hematoma.  - CT RLE and RLE arterial duplex with either a single right groin pseudoaneurysm measuring 1.6 x 1.5 x 1.4 cm with dilatation of a portion of the neck measuring up to 0.9 cm transverse or 2 adjacent pseudoaneurysms with the more proximal pseudoaneurysm measuring 0.9 x 0.7 cm and the more distal pseudoaneurysm measuring 1.6 x 1.5 x 1.4 cm.  - AC held for now. SCD boots ordered  - Continue ASA with Plavix, BB, Statin   - cath Monday      HTN  - c/w metoprolol and cardizem and digoxin    AFib  - Rate controlled with diltiazem/Metoprolol  - coumadin held at this time    Cardiac arrest from/ VT  - amiodarone decreased to 200 mg daily per Dr Arroyo recommendations   -  EP evaluation, no plan AICD now    Blood loss anemia  - ppx lovenox held due to groin hematoma.   - S/P  transfuse 2units PRBC  - Monitor H/H    Shortness of breath due to COPD exacerbation,  acute on chronic CHF, possible gram positive/gram negative pneumonia  - improved, currently on NC  - LVEDP was elevated on cath.  - Use of diuretics with worsening contraction alkalosis   - Alkalosis improving after holding diuretics.  - Restart lasix post cath pending LVEDP  - F/U RENAL REC. f/u BMP  - Strict I & Os  - Daily weight  - nocturnal NIV  - Iv solumedrol 40mg q12 Taper  - bronchodilators   - f/u pulm rec  -off lasix per cardiology  - completed 7 days of zosyn per ID  - Blood cx NGTD      DM2, uncontrolled-high  -  A1c 9.1  - lantus with increase  meal coverage   - close monitoring  - endocrine following      Morbid obesity-  Diet & weight loss recommended      DVT prophylaxis: scd    DISPO: cath to be rescheduled Monday . monitor H/H. groin hematoma. LOS uncertain as patient is still acute.   CARE OF PLAN DW PT  Long  dw daughter CATRACHITA [phone]. Discussed above care of plan. over all prognosis guarded. Per Daughter -pt wants to be fullcode ,was intubated in past and recovered.  Pt wants to be full code.  care of plan dw pt-agreed  AVAPS DURING CATH

## 2020-12-26 NOTE — PROGRESS NOTE ADULT - SUBJECTIVE AND OBJECTIVE BOX
PULMONARY PROGRESS NOTE      CLAUDIA MABRYN-[id number]    Patient is a 70y old  Male who presents with a chief complaint of Acute hypoxic/hypercapnic respiratory failure, cardiac arrest (26 Dec 2020 09:40)      INTERVAL HPI/OVERNIGHT EVENTS:    MEDICATIONS  (STANDING):  albuterol/ipratropium for Nebulization 3 milliLiter(s) Nebulizer every 6 hours  aspirin  chewable 81 milliGRAM(s) Oral daily  atorvastatin 40 milliGRAM(s) Oral at bedtime  chlorhexidine 2% Cloths 1 Application(s) Topical <User Schedule>  citalopram 40 milliGRAM(s) Oral daily  clopidogrel Tablet 75 milliGRAM(s) Oral daily  dextrose 40% Gel 15 Gram(s) Oral once  dextrose 5%. 1000 milliLiter(s) (50 mL/Hr) IV Continuous <Continuous>  dextrose 5%. 1000 milliLiter(s) (100 mL/Hr) IV Continuous <Continuous>  dextrose 50% Injectable 25 Gram(s) IV Push once  dextrose 50% Injectable 12.5 Gram(s) IV Push once  dextrose 50% Injectable 25 Gram(s) IV Push once  digoxin     Tablet 0.125 milliGRAM(s) Oral daily  diltiazem    Tablet 60 milliGRAM(s) Oral four times a day  famotidine    Tablet 20 milliGRAM(s) Oral two times a day  glucagon  Injectable 1 milliGRAM(s) IntraMuscular once  influenza   Vaccine 0.5 milliLiter(s) IntraMuscular once  insulin glargine Injectable (LANTUS) 30 Unit(s) SubCutaneous at bedtime  insulin lispro (ADMELOG) corrective regimen sliding scale   SubCutaneous Before meals and at bedtime  insulin lispro Injectable (ADMELOG) 18 Unit(s) SubCutaneous three times a day before meals  levETIRAcetam  IVPB 500 milliGRAM(s) IV Intermittent every 12 hours  methylPREDNISolone sodium succinate Injectable 40 milliGRAM(s) IV Push every 12 hours  metoprolol tartrate 25 milliGRAM(s) Oral two times a day  polyethylene glycol 3350 17 Gram(s) Oral daily  saccharomyces boulardii 250 milliGRAM(s) Oral two times a day      MEDICATIONS  (PRN):  aluminum hydroxide/magnesium hydroxide/simethicone Suspension 30 milliLiter(s) Oral every 4 hours PRN Dyspepsia      Allergies    No Known Allergies    Intolerances        PAST MEDICAL & SURGICAL HISTORY:  Seizure    Depression, unspecified depression type    Diabetes    Hypertension    Hyperlipidemia    Afib    COPD (chronic obstructive pulmonary disease)    Abdominal hernia    H/O carotid endarterectomy        SOCIAL HISTORY  Smoking History:       REVIEW OF SYSTEMS:    CONSTITUTIONAL:  No distress    HEENT:  Eyes:  No diplopia or blurred vision. ENT:  No earache, sore throat or runny nose.    CARDIOVASCULAR:  No pressure, squeezing, tightness, heaviness or aching about the chest; no palpitations.    RESPIRATORY:  No cough, shortness of breath, PND or orthopnea. Mild SOBOE    GASTROINTESTINAL:  No nausea, vomiting or diarrhea.    GENITOURINARY:  No dysuria, frequency or urgency.    MUSCULOSKELETAL:  No joint pain    SKIN:  No new lesions.    NEUROLOGIC:  No paresthesias, fasciculations, seizures or weakness.    PSYCHIATRIC:  No disorder of thought or mood.    ENDOCRINE:  No heat or cold intolerance, polyuria or polydipsia.    HEMATOLOGICAL:  No easy bruising or bleeding.     Vital Signs Last 24 Hrs  T(C): 36.7 (26 Dec 2020 09:00), Max: 36.7 (26 Dec 2020 09:00)  T(F): 98 (26 Dec 2020 09:00), Max: 98 (26 Dec 2020 09:00)  HR: 90 (26 Dec 2020 12:42) (76 - 132)  BP: 132/56 (26 Dec 2020 09:00) (132/56 - 151/72)  BP(mean): 92 (26 Dec 2020 06:28) (92 - 96)  RR: 16 (26 Dec 2020 09:00) (16 - 22)  SpO2: 98% (26 Dec 2020 12:42) (91% - 100%)    PHYSICAL EXAMINATION:    GENERAL: The patient is awake and alert in no apparent distress.     HEENT: Head is normocephalic and atraumatic. Extraocular muscles are intact. Mucous membranes are moist.    NECK: Supple.    LUNGS: Clear to auscultation without wheezing, rales or rhonchi; respirations unlabored    HEART: Regular rate and rhythm without murmur.    ABDOMEN: Soft, nontender, and nondistended.      EXTREMITIES: Without any cyanosis, clubbing, rash, lesions or edema.    NEUROLOGIC: Grossly intact.    SKIN: No ulceration or induration present.      LABS:                        11.5   17.68 )-----------( 206      ( 26 Dec 2020 09:57 )             37.0     12-26    142  |  93<L>  |  25.0<H>  ----------------------------<  107<H>  4.6   |  44.0<H>  |  0.50    Ca    8.3<L>      26 Dec 2020 09:57                          MICROBIOLOGY:    RADIOLOGY & ADDITIONAL STUDIES:r< from: Xray Chest 1 View-PORTABLE IMMEDIATE (Xray Chest 1 View-PORTABLE IMMEDIATE .) (12.21.20 @ 12:10) >   EXAM:  XR CHEST PORTABLE IMMED 1V                          PROCEDURE DATE:  12/21/2020          INTERPRETATION:  AP chest on December 21, 2020 at 11:36 AM. 2 images. Patient is short of breath.    Heart magnified by technique.    Present film shows a mild right base infiltrate likely with pleural component new since December 1.    In addition there is a slight left base infiltrate also seen new since prior area    IMPRESSION: Basilar lung processes are now seen right greater than left.    < end of copied text >

## 2020-12-27 DIAGNOSIS — I47.2 VENTRICULAR TACHYCARDIA: ICD-10-CM

## 2020-12-27 LAB
AMMONIA BLD-MCNC: 36 UMOL/L — SIGNIFICANT CHANGE UP (ref 11–55)
ANION GAP SERPL CALC-SCNC: 7 MMOL/L — SIGNIFICANT CHANGE UP (ref 5–17)
ANISOCYTOSIS BLD QL: SLIGHT — SIGNIFICANT CHANGE UP
BASE EXCESS BLDA CALC-SCNC: 22 MMOL/L — HIGH (ref -2–2)
BASOPHILS # BLD AUTO: 0 K/UL — SIGNIFICANT CHANGE UP (ref 0–0.2)
BASOPHILS NFR BLD AUTO: 0 % — SIGNIFICANT CHANGE UP (ref 0–2)
BLOOD GAS COMMENTS ARTERIAL: SIGNIFICANT CHANGE UP
BUN SERPL-MCNC: 28 MG/DL — HIGH (ref 8–20)
CALCIUM SERPL-MCNC: 8.4 MG/DL — LOW (ref 8.6–10.2)
CHLORIDE SERPL-SCNC: 96 MMOL/L — LOW (ref 98–107)
CO2 SERPL-SCNC: 34 MMOL/L — HIGH (ref 22–29)
CREAT SERPL-MCNC: 0.54 MG/DL — SIGNIFICANT CHANGE UP (ref 0.5–1.3)
ELLIPTOCYTES BLD QL SMEAR: SLIGHT — SIGNIFICANT CHANGE UP
EOSINOPHIL # BLD AUTO: 0 K/UL — SIGNIFICANT CHANGE UP (ref 0–0.5)
EOSINOPHIL NFR BLD AUTO: 0 % — SIGNIFICANT CHANGE UP (ref 0–6)
GAS PNL BLDA: SIGNIFICANT CHANGE UP
GAS PNL BLDA: SIGNIFICANT CHANGE UP
GIANT PLATELETS BLD QL SMEAR: PRESENT — SIGNIFICANT CHANGE UP
GLUCOSE BLDC GLUCOMTR-MCNC: 172 MG/DL — HIGH (ref 70–99)
GLUCOSE BLDC GLUCOMTR-MCNC: 177 MG/DL — HIGH (ref 70–99)
GLUCOSE BLDC GLUCOMTR-MCNC: 179 MG/DL — HIGH (ref 70–99)
GLUCOSE BLDC GLUCOMTR-MCNC: 304 MG/DL — HIGH (ref 70–99)
GLUCOSE SERPL-MCNC: 159 MG/DL — HIGH (ref 70–99)
HCO3 BLDA-SCNC: 47 MMOL/L — HIGH (ref 20–26)
HCT VFR BLD CALC: 36.5 % — LOW (ref 39–50)
HGB BLD-MCNC: 11.4 G/DL — LOW (ref 13–17)
HOROWITZ INDEX BLDA+IHG-RTO: SIGNIFICANT CHANGE UP
HYPOCHROMIA BLD QL: SLIGHT — SIGNIFICANT CHANGE UP
LYMPHOCYTES # BLD AUTO: 0.16 K/UL — LOW (ref 1–3.3)
LYMPHOCYTES # BLD AUTO: 0.9 % — LOW (ref 13–44)
MACROCYTES BLD QL: SLIGHT — SIGNIFICANT CHANGE UP
MAGNESIUM SERPL-MCNC: 2.1 MG/DL — SIGNIFICANT CHANGE UP (ref 1.6–2.6)
MANUAL SMEAR VERIFICATION: SIGNIFICANT CHANGE UP
MCHC RBC-ENTMCNC: 28.6 PG — SIGNIFICANT CHANGE UP (ref 27–34)
MCHC RBC-ENTMCNC: 31.2 GM/DL — LOW (ref 32–36)
MCV RBC AUTO: 91.5 FL — SIGNIFICANT CHANGE UP (ref 80–100)
MONOCYTES # BLD AUTO: 0.31 K/UL — SIGNIFICANT CHANGE UP (ref 0–0.9)
MONOCYTES NFR BLD AUTO: 1.8 % — LOW (ref 2–14)
NEUTROPHILS # BLD AUTO: 17 K/UL — HIGH (ref 1.8–7.4)
NEUTROPHILS NFR BLD AUTO: 97.3 % — HIGH (ref 43–77)
NT-PROBNP SERPL-SCNC: 1094 PG/ML — HIGH (ref 0–300)
OVALOCYTES BLD QL SMEAR: SLIGHT — SIGNIFICANT CHANGE UP
PCO2 BLDA: 85 MMHG — CRITICAL HIGH (ref 35–45)
PH BLDA: 7.39 — SIGNIFICANT CHANGE UP (ref 7.35–7.45)
PHOSPHATE SERPL-MCNC: 3.4 MG/DL — SIGNIFICANT CHANGE UP (ref 2.4–4.7)
PLAT MORPH BLD: NORMAL — SIGNIFICANT CHANGE UP
PLATELET # BLD AUTO: 202 K/UL — SIGNIFICANT CHANGE UP (ref 150–400)
PO2 BLDA: 84 MMHG — SIGNIFICANT CHANGE UP (ref 83–108)
POIKILOCYTOSIS BLD QL AUTO: SLIGHT — SIGNIFICANT CHANGE UP
POLYCHROMASIA BLD QL SMEAR: SLIGHT — SIGNIFICANT CHANGE UP
POTASSIUM SERPL-MCNC: 5.7 MMOL/L — HIGH (ref 3.5–5.3)
POTASSIUM SERPL-SCNC: 5.7 MMOL/L — HIGH (ref 3.5–5.3)
RBC # BLD: 3.99 M/UL — LOW (ref 4.2–5.8)
RBC # FLD: 15.3 % — HIGH (ref 10.3–14.5)
RBC BLD AUTO: ABNORMAL
SAO2 % BLDA: 97 % — SIGNIFICANT CHANGE UP (ref 95–99)
SODIUM SERPL-SCNC: 137 MMOL/L — SIGNIFICANT CHANGE UP (ref 135–145)
TSH SERPL-MCNC: 1.5 UIU/ML — SIGNIFICANT CHANGE UP (ref 0.27–4.2)
VIT B12 SERPL-MCNC: 1456 PG/ML — HIGH (ref 232–1245)
WBC # BLD: 17.47 K/UL — HIGH (ref 3.8–10.5)
WBC # FLD AUTO: 17.47 K/UL — HIGH (ref 3.8–10.5)

## 2020-12-27 PROCEDURE — 99232 SBSQ HOSP IP/OBS MODERATE 35: CPT

## 2020-12-27 PROCEDURE — 71045 X-RAY EXAM CHEST 1 VIEW: CPT | Mod: 26,77

## 2020-12-27 PROCEDURE — 71045 X-RAY EXAM CHEST 1 VIEW: CPT | Mod: 26

## 2020-12-27 PROCEDURE — 99233 SBSQ HOSP IP/OBS HIGH 50: CPT

## 2020-12-27 RX ORDER — HEPARIN SODIUM 5000 [USP'U]/ML
4000 INJECTION INTRAVENOUS; SUBCUTANEOUS EVERY 6 HOURS
Refills: 0 | Status: DISCONTINUED | OUTPATIENT
Start: 2020-12-27 | End: 2020-12-28

## 2020-12-27 RX ORDER — HEPARIN SODIUM 5000 [USP'U]/ML
INJECTION INTRAVENOUS; SUBCUTANEOUS
Qty: 25000 | Refills: 0 | Status: DISCONTINUED | OUTPATIENT
Start: 2020-12-27 | End: 2020-12-28

## 2020-12-27 RX ORDER — HEPARIN SODIUM 5000 [USP'U]/ML
9000 INJECTION INTRAVENOUS; SUBCUTANEOUS EVERY 6 HOURS
Refills: 0 | Status: DISCONTINUED | OUTPATIENT
Start: 2020-12-27 | End: 2020-12-28

## 2020-12-27 RX ORDER — DILTIAZEM HCL 120 MG
10 CAPSULE, EXT RELEASE 24 HR ORAL ONCE
Refills: 0 | Status: COMPLETED | OUTPATIENT
Start: 2020-12-27 | End: 2020-12-27

## 2020-12-27 RX ORDER — METOPROLOL TARTRATE 50 MG
5 TABLET ORAL ONCE
Refills: 0 | Status: COMPLETED | OUTPATIENT
Start: 2020-12-27 | End: 2020-12-27

## 2020-12-27 RX ORDER — INSULIN GLARGINE 100 [IU]/ML
15 INJECTION, SOLUTION SUBCUTANEOUS ONCE
Refills: 0 | Status: COMPLETED | OUTPATIENT
Start: 2020-12-27 | End: 2020-12-27

## 2020-12-27 RX ADMIN — LEVETIRACETAM 420 MILLIGRAM(S): 250 TABLET, FILM COATED ORAL at 05:25

## 2020-12-27 RX ADMIN — Medication 3 MILLILITER(S): at 03:32

## 2020-12-27 RX ADMIN — Medication 60 MILLIGRAM(S): at 11:30

## 2020-12-27 RX ADMIN — Medication 3 MILLILITER(S): at 09:11

## 2020-12-27 RX ADMIN — Medication 2: at 08:10

## 2020-12-27 RX ADMIN — Medication 10 MILLIGRAM(S): at 19:50

## 2020-12-27 RX ADMIN — Medication 22 UNIT(S): at 08:10

## 2020-12-27 RX ADMIN — HEPARIN SODIUM 1900 UNIT(S)/HR: 5000 INJECTION INTRAVENOUS; SUBCUTANEOUS at 18:01

## 2020-12-27 RX ADMIN — Medication 3 MILLILITER(S): at 15:14

## 2020-12-27 RX ADMIN — Medication 25 MILLIGRAM(S): at 05:28

## 2020-12-27 RX ADMIN — Medication 60 MILLIGRAM(S): at 05:28

## 2020-12-27 RX ADMIN — LEVETIRACETAM 420 MILLIGRAM(S): 250 TABLET, FILM COATED ORAL at 21:08

## 2020-12-27 RX ADMIN — INSULIN GLARGINE 15 UNIT(S): 100 INJECTION, SOLUTION SUBCUTANEOUS at 22:37

## 2020-12-27 RX ADMIN — Medication 250 MILLIGRAM(S): at 05:44

## 2020-12-27 RX ADMIN — Medication 3 MILLILITER(S): at 20:51

## 2020-12-27 RX ADMIN — Medication 2: at 22:35

## 2020-12-27 RX ADMIN — Medication 2: at 11:30

## 2020-12-27 RX ADMIN — FAMOTIDINE 20 MILLIGRAM(S): 10 INJECTION INTRAVENOUS at 05:44

## 2020-12-27 RX ADMIN — Medication 81 MILLIGRAM(S): at 08:11

## 2020-12-27 RX ADMIN — Medication 8: at 17:58

## 2020-12-27 RX ADMIN — Medication 22 UNIT(S): at 11:30

## 2020-12-27 RX ADMIN — Medication 5 MILLIGRAM(S): at 18:31

## 2020-12-27 RX ADMIN — CITALOPRAM 40 MILLIGRAM(S): 10 TABLET, FILM COATED ORAL at 08:11

## 2020-12-27 RX ADMIN — CHLORHEXIDINE GLUCONATE 1 APPLICATION(S): 213 SOLUTION TOPICAL at 05:24

## 2020-12-27 RX ADMIN — Medication 40 MILLIGRAM(S): at 05:26

## 2020-12-27 RX ADMIN — Medication 0.12 MILLIGRAM(S): at 05:28

## 2020-12-27 RX ADMIN — CLOPIDOGREL BISULFATE 75 MILLIGRAM(S): 75 TABLET, FILM COATED ORAL at 08:11

## 2020-12-27 RX ADMIN — Medication 40 MILLIGRAM(S): at 17:58

## 2020-12-27 NOTE — PROGRESS NOTE ADULT - SUBJECTIVE AND OBJECTIVE BOX
[facility], THE HEART CENTER                                   [address] [place] [address]                                                      PHONE: [phone]                                                         FAX: [phone]  [email or web address]  ---------------------------------------------------------------------------------------------------------------------------------    Pt seen and examined. FU for  shortness of breath     Overnight events/patient complaints:    INTERPRETATION OF TELEMETRY (personally reviewed)    ECHOCARDIOGRAM independently visualized/reviewed and demonstrated :    1. Technically difficult study.   2. Endocardial visualization was enhanced with intravenous echo contrast.   3. Left ventricular ejection fraction, by visual estimation, is 60 to 65%.   4. Normal global left ventricular systolic function.   5. The left ventricular diastolic function could not be assessed in this study.   6. Normal left ventricular internal cavity size.   7. Moderately enlarged right ventricle. Moderately reduced RV systolic function.   8. The left atrium is normal in size.   9. Moderately enlarged right atrium.  10. Mild thickening and calcification of the anterior and posterior mitral valve leaflets.  11. Mild mitral valve regurgitation.  12. Mild tricuspid regurgitation.  13. There is no evidence of pericardial effusion.    CARDIAC CATH:  CORONARY VESSELS: The coronary circulation is right dominant.  LM:   --  LM: Normal.  LAD:   --  Mid LAD: There was a 80 % stenosis.  CX:   --  Circumflex: Angiography showed minor luminal irregularities with  no flow limiting lesions. The vessel arose anomalously from the proximal  RCA.  RCA:   --  RCA: iFR index of 0.82  --  Mid RCA: There was a 80 % stenosis.  --  Distal RCA: There was a 90 % stenosis.  COMPLICATIONS: There were no complications. No complications occurred  during the cath lab visit.  DIAGNOSTIC IMPRESSIONS: Severe RCA and LAD disease. The RCA was treated  with PTCA and STENT (THI-Resolute) with IVUS  DIAGNOSTIC RECOMMENDATIONS: Plavix and coumadin Will need PCI of LAD in  future  INTERVENTIONAL IMPRESSIONS: Severe RCA and LAD disease. The RCA was treated  with PTCA and STENT (THI-Resolute) with IVUS  INTERVENTIONAL RECOMMENDATIONS: Plavix and coumadin Will need PCI of LAD in  future    I&O's Summary    26 Dec 2020 07:01  -  27 Dec 2020 07:00  --------------------------------------------------------  IN: 1280 mL / OUT: 1150 mL / NET: 130 mL    27 Dec 2020 07:01  -  27 Dec 2020 11:15  --------------------------------------------------------  IN: 240 mL / OUT: 400 mL / NET: -160 mL    No Known Allergies    MEDICATIONS  (STANDING):  albuterol/ipratropium for Nebulization 3 milliLiter(s) Nebulizer every 6 hours  aspirin  chewable 81 milliGRAM(s) Oral daily  atorvastatin 40 milliGRAM(s) Oral at bedtime  chlorhexidine 2% Cloths 1 Application(s) Topical <User Schedule>  citalopram 40 milliGRAM(s) Oral daily  clopidogrel Tablet 75 milliGRAM(s) Oral daily  dextrose 40% Gel 15 Gram(s) Oral once  dextrose 5%. 1000 milliLiter(s) (50 mL/Hr) IV Continuous <Continuous>  dextrose 5%. 1000 milliLiter(s) (100 mL/Hr) IV Continuous <Continuous>  dextrose 50% Injectable 25 Gram(s) IV Push once  dextrose 50% Injectable 12.5 Gram(s) IV Push once  dextrose 50% Injectable 25 Gram(s) IV Push once  digoxin     Tablet 0.125 milliGRAM(s) Oral daily  diltiazem    Tablet 60 milliGRAM(s) Oral four times a day  famotidine    Tablet 20 milliGRAM(s) Oral two times a day  glucagon  Injectable 1 milliGRAM(s) IntraMuscular once  influenza   Vaccine 0.5 milliLiter(s) IntraMuscular once  insulin glargine Injectable (LANTUS) 30 Unit(s) SubCutaneous at bedtime  insulin lispro (ADMELOG) corrective regimen sliding scale   SubCutaneous Before meals and at bedtime  insulin lispro Injectable (ADMELOG) 22 Unit(s) SubCutaneous three times a day before meals  levETIRAcetam  IVPB 500 milliGRAM(s) IV Intermittent every 12 hours  methylPREDNISolone sodium succinate Injectable 40 milliGRAM(s) IV Push every 12 hours  metoprolol tartrate 25 milliGRAM(s) Oral two times a day  polyethylene glycol 3350 17 Gram(s) Oral daily  saccharomyces boulardii 250 milliGRAM(s) Oral two times a day    MEDICATIONS  (PRN):  aluminum hydroxide/magnesium hydroxide/simethicone Suspension 30 milliLiter(s) Oral every 4 hours PRN Dyspepsia      Vital Signs Last 24 Hrs  T(C): 36.2 (27 Dec 2020 08:00), Max: 36.4 (26 Dec 2020 15:27)  T(F): 97.2 (27 Dec 2020 08:00), Max: 97.6 (26 Dec 2020 15:27)  HR: 110 (27 Dec 2020 09:12) (65 - 112)  BP: 110/54 (27 Dec 2020 08:00) (100/66 - 168/108)  BP(mean): --  RR: 22 (27 Dec 2020 09:49) (16 - 22)  SpO2: 80% (27 Dec 2020 09:49) (80% - 99%)  ICU Vital Signs Last 24 Hrs    PHYSICAL EXAM:  General: Appears well developed, well nourished alert and cooperative.  HEENT: Head; normocephalic, atraumatic.Pupils reactive, cornea wnl. Neck supple, no nodes adenopathy, no JVD  CARDIOVASCULAR: Normal S1 and S2, 1/6 IRINA, no rub, gallop or lift.   LUNGS: No rales, rhonchi or wheeze. Normal breath sounds bilaterally.  ABDOMEN: Soft, nontender without mass or organomegaly. bowel sounds normoactive.  EXTREMITIES: No clubbing, cyanosis or edema.   SKIN: warm and dry with normal turgor.  NEURO: Alert/oriented x 3/normal motor exam.   PSYCH: normal affect.        LABS:                        11.5   17.68 )-----------( 206      ( 26 Dec 2020 09:57 )             37.0     12-27    137  |  96<L>  |  28.0<H>  ----------------------------<  159<H>  5.7<H>   |  34.0<H>  |  0.54    Ca    8.4<L>      27 Dec 2020 07:31  Phos  3.4     12-27  Mg     2.1     12-27    ASSESSMENT AND PLAN:  In summary, CLAUDIA MABRY is a [age]y Male with past medical history significant for COPD, HTN, DM, pAF on coumadin, seizure d/o with recent hospitalization for hypercapnic respiratory failure presents with HTN emergency and subsequent PEA/VT arrest.  s/p cath with Severe RCA and LAD disease. The RCA was treated with PTCA and STENT (THI-Resolute) with IVUS with residual LAD lesion    CAD  - Continue ASA with Plavix  - Plan for intervention on LAD on Monday     HTN  - BP controlled    Dyslipidemia  - Continue statin    AF  - Rates controlled with diltiazem  - Coumadin held at this time given R femoral artery pseudoaneurysm s/p thrombin injection RFA by IR    Shortness of breath, Hypoxia on high flow  - Alkalosis improving after holding diuretics.  - Restart lasix post cath pending LVEDP    VT  - On amiodarone 200 mg daily    Thank you for allowing [facility] to participate in the care of this patient.  Please feel free to call with any questions or concerns.                  [facility], THE HEART CENTER                                   [address] [place] [address]                                                      PHONE: [phone]                                                         FAX: [phone]  [email or web address]  ---------------------------------------------------------------------------------------------------------------------------------    Pt seen and examined. FU for  shortness of breath     Overnight events/patient complaints: remains on BiPAP and intolerant of de-escalation     INTERPRETATION OF TELEMETRY (personally reviewed)    ECHOCARDIOGRAM independently visualized/reviewed and demonstrated :    1. Technically difficult study.   2. Endocardial visualization was enhanced with intravenous echo contrast.   3. Left ventricular ejection fraction, by visual estimation, is 60 to 65%.   4. Normal global left ventricular systolic function.   5. The left ventricular diastolic function could not be assessed in this study.   6. Normal left ventricular internal cavity size.   7. Moderately enlarged right ventricle. Moderately reduced RV systolic function.   8. The left atrium is normal in size.   9. Moderately enlarged right atrium.  10. Mild thickening and calcification of the anterior and posterior mitral valve leaflets.  11. Mild mitral valve regurgitation.  12. Mild tricuspid regurgitation.  13. There is no evidence of pericardial effusion.    CARDIAC CATH:  CORONARY VESSELS: The coronary circulation is right dominant.  LM:   --  LM: Normal.  LAD:   --  Mid LAD: There was a 80 % stenosis.  CX:   --  Circumflex: Angiography showed minor luminal irregularities with  no flow limiting lesions. The vessel arose anomalously from the proximal  RCA.  RCA:   --  RCA: iFR index of 0.82  --  Mid RCA: There was a 80 % stenosis.  --  Distal RCA: There was a 90 % stenosis.  COMPLICATIONS: There were no complications. No complications occurred  during the cath lab visit.  DIAGNOSTIC IMPRESSIONS: Severe RCA and LAD disease. The RCA was treated  with PTCA and STENT (THI-Resolute) with IVUS  DIAGNOSTIC RECOMMENDATIONS: Plavix and coumadin Will need PCI of LAD in  future  INTERVENTIONAL IMPRESSIONS: Severe RCA and LAD disease. The RCA was treated  with PTCA and STENT (THI-Resolute) with IVUS  INTERVENTIONAL RECOMMENDATIONS: Plavix and coumadin Will need PCI of LAD in  future    I&O's Summary    26 Dec 2020 07:01  -  27 Dec 2020 07:00  --------------------------------------------------------  IN: 1280 mL / OUT: 1150 mL / NET: 130 mL    27 Dec 2020 07:01  -  27 Dec 2020 11:15  --------------------------------------------------------  IN: 240 mL / OUT: 400 mL / NET: -160 mL    No Known Allergies    MEDICATIONS  (STANDING):  albuterol/ipratropium for Nebulization 3 milliLiter(s) Nebulizer every 6 hours  aspirin  chewable 81 milliGRAM(s) Oral daily  atorvastatin 40 milliGRAM(s) Oral at bedtime  chlorhexidine 2% Cloths 1 Application(s) Topical <User Schedule>  citalopram 40 milliGRAM(s) Oral daily  clopidogrel Tablet 75 milliGRAM(s) Oral daily  dextrose 40% Gel 15 Gram(s) Oral once  dextrose 5%. 1000 milliLiter(s) (50 mL/Hr) IV Continuous <Continuous>  dextrose 5%. 1000 milliLiter(s) (100 mL/Hr) IV Continuous <Continuous>  dextrose 50% Injectable 25 Gram(s) IV Push once  dextrose 50% Injectable 12.5 Gram(s) IV Push once  dextrose 50% Injectable 25 Gram(s) IV Push once  digoxin     Tablet 0.125 milliGRAM(s) Oral daily  diltiazem    Tablet 60 milliGRAM(s) Oral four times a day  famotidine    Tablet 20 milliGRAM(s) Oral two times a day  glucagon  Injectable 1 milliGRAM(s) IntraMuscular once  influenza   Vaccine 0.5 milliLiter(s) IntraMuscular once  insulin glargine Injectable (LANTUS) 30 Unit(s) SubCutaneous at bedtime  insulin lispro (ADMELOG) corrective regimen sliding scale   SubCutaneous Before meals and at bedtime  insulin lispro Injectable (ADMELOG) 22 Unit(s) SubCutaneous three times a day before meals  levETIRAcetam  IVPB 500 milliGRAM(s) IV Intermittent every 12 hours  methylPREDNISolone sodium succinate Injectable 40 milliGRAM(s) IV Push every 12 hours  metoprolol tartrate 25 milliGRAM(s) Oral two times a day  polyethylene glycol 3350 17 Gram(s) Oral daily  saccharomyces boulardii 250 milliGRAM(s) Oral two times a day    MEDICATIONS  (PRN):  aluminum hydroxide/magnesium hydroxide/simethicone Suspension 30 milliLiter(s) Oral every 4 hours PRN Dyspepsia      Vital Signs Last 24 Hrs  T(C): 36.2 (27 Dec 2020 08:00), Max: 36.4 (26 Dec 2020 15:27)  T(F): 97.2 (27 Dec 2020 08:00), Max: 97.6 (26 Dec 2020 15:27)  HR: 110 (27 Dec 2020 09:12) (65 - 112)  BP: 110/54 (27 Dec 2020 08:00) (100/66 - 168/108)  BP(mean): --  RR: 22 (27 Dec 2020 09:49) (16 - 22)  SpO2: 80% (27 Dec 2020 09:49) (80% - 99%)  ICU Vital Signs Last 24 Hrs    PHYSICAL EXAM:  General: Appears ill , BIPAP in place   HEENT: Head; normocephalic, atraumatic.Pupils reactive, cornea wnl. Neck supple, no nodes adenopathy, no JVD  CARDIOVASCULAR: Normal S1 and S2, 1/6 IRINA, no rub, gallop or lift.   LUNGS: poor respiratory effort, decreased airway movement, BiPAP in place   ABDOMEN: Soft, nontender without mass or organomegaly. bowel sounds normoactive.  EXTREMITIES: No clubbing, cyanosis, (+) edema.   SKIN: warm and dry with normal turgor.  NEURO: Alert/oriented x 2/normal motor exam.   PSYCH: normal affect.        LABS:                        11.5   17.68 )-----------( 206      ( 26 Dec 2020 09:57 )             37.0     12-27    137  |  96<L>  |  28.0<H>  ----------------------------<  159<H>  5.7<H>   |  34.0<H>  |  0.54    Ca    8.4<L>      27 Dec 2020 07:31  Phos  3.4     12-27  Mg     2.1     12-27    ASSESSMENT AND PLAN:  In summary, CLAUDIA MABRY is a [age]y Male with past medical history significant for COPD, HTN, DM, pAF on coumadin, seizure d/o with recent hospitalization for hypercapnic respiratory failure presents with HTN emergency and subsequent PEA/VT arrest.  s/p cath with Severe RCA and LAD disease. The RCA was treated with PTCA and STENT (THI-Resolute) with IVUS with residual LAD lesion    CAD/pulmonary edema complicated by hypoxic respiratory failure   - continue BiPAP support   - Continue ASA with Plavix  -  intervention on LAD will likely be delayed given his persistent hypoxic respiratory failure requiring continuous BiPAP support  - will plan for lasix 40mg IV today given his worsening respiratory status   - discussed with ICU, patient will additionally continue bronchodilator therapy given underlying COPD   - repeat BMP and proBNP in am; contraction alkalosis and mild hyperkalemia noted   - continue telemetry monitoring     HTN  - BP controlled    Dyslipidemia  - Continue statin    AF  - Rates controlled with diltiazem and digoxin   - Coumadin held at this time given R femoral artery pseudoaneurysm s/p thrombin injection RFA by IR    VT  - On amiodarone 200 mg daily    CC 35. Thank you for allowing [facility] to participate in the care of this patient.  Please feel free to call with any questions or concerns.

## 2020-12-27 NOTE — PROGRESS NOTE ADULT - SUBJECTIVE AND OBJECTIVE BOX
Patient is a 70y old  Male who presents with a chief complaint of Acute hypoxic/hypercapnic respiratory failure, cardiac arrest (25 Dec 2020 10:42)      SUBJECTIVE / OVERNIGHT EVENTS: Pt was on BiPAP all night & currently on BiPAP.  Today appears more lethargic but arousable.  Cath on Monday  REVIEW OF SYSTEMS: All systems are reviewed and found to be negative except above    MEDICATIONS  (STANDING):  albuterol/ipratropium for Nebulization 3 milliLiter(s) Nebulizer every 6 hours  aspirin  chewable 81 milliGRAM(s) Oral daily  atorvastatin 40 milliGRAM(s) Oral at bedtime  chlorhexidine 2% Cloths 1 Application(s) Topical <User Schedule>  citalopram 40 milliGRAM(s) Oral daily  clopidogrel Tablet 75 milliGRAM(s) Oral daily  dextrose 40% Gel 15 Gram(s) Oral once  dextrose 5%. 1000 milliLiter(s) (50 mL/Hr) IV Continuous <Continuous>  dextrose 5%. 1000 milliLiter(s) (100 mL/Hr) IV Continuous <Continuous>  dextrose 50% Injectable 25 Gram(s) IV Push once  dextrose 50% Injectable 12.5 Gram(s) IV Push once  dextrose 50% Injectable 25 Gram(s) IV Push once  digoxin     Tablet 0.125 milliGRAM(s) Oral daily  diltiazem    Tablet 60 milliGRAM(s) Oral four times a day  famotidine    Tablet 20 milliGRAM(s) Oral two times a day  glucagon  Injectable 1 milliGRAM(s) IntraMuscular once  influenza   Vaccine 0.5 milliLiter(s) IntraMuscular once  insulin glargine Injectable (LANTUS) 30 Unit(s) SubCutaneous at bedtime  insulin lispro (ADMELOG) corrective regimen sliding scale   SubCutaneous Before meals and at bedtime  insulin lispro Injectable (ADMELOG) 18 Unit(s) SubCutaneous three times a day before meals  levETIRAcetam  IVPB 500 milliGRAM(s) IV Intermittent every 12 hours  methylPREDNISolone sodium succinate Injectable 40 milliGRAM(s) IV Push every 12 hours  metoprolol tartrate 25 milliGRAM(s) Oral two times a day  polyethylene glycol 3350 17 Gram(s) Oral daily  saccharomyces boulardii 250 milliGRAM(s) Oral two times a day    MEDICATIONS  (PRN):  aluminum hydroxide/magnesium hydroxide/simethicone Suspension 30 milliLiter(s) Oral every 4 hours PRN Dyspepsia      CAPILLARY BLOOD GLUCOSE      POCT Blood Glucose.: 205 mg/dL (25 Dec 2020 12:04)  POCT Blood Glucose.: 175 mg/dL (25 Dec 2020 08:06)  POCT Blood Glucose.: 191 mg/dL (24 Dec 2020 23:02)  POCT Blood Glucose.: 263 mg/dL (24 Dec 2020 17:19)    I&O's Summary    24 Dec 2020 07:01  -  25 Dec 2020 07:00  --------------------------------------------------------  IN: 450 mL / OUT: 650 mL / NET: -200 mL    25 Dec 2020 07:01  -  25 Dec 2020 13:17  --------------------------------------------------------  IN: 290 mL / OUT: 0 mL / NET: 290 mL        PHYSICAL EXAM:  Vital Signs Last 24 Hrs  T(C): 36.2 (25 Dec 2020 09:44), Max: 36.8 (24 Dec 2020 16:10)  T(F): 97.2 (25 Dec 2020 09:44), Max: 98.2 (24 Dec 2020 16:10)  HR: 90 (25 Dec 2020 09:44) (72 - 113)  BP: 119/74 (25 Dec 2020 09:44) (105/50 - 153/66)  BP(mean): 57 (24 Dec 2020 23:00) (57 - 86)  RR: 18 (25 Dec 2020 09:44) (18 - 22)  SpO2: 98% (25 Dec 2020 09:44) (89% - 100%)    CONSTITUTIONAL: NAD, morbidly obese  EYES: PERRLA; conjunctiva and sclera clear  ENMT: Moist oral mucosa, ly  RESPIRATORY: decreased BS b/l  CARDIOVASCULAR: Regular rate and rhythm, normal S1 and S2, no murmur   EXTS: + lower extremity edema-Improving; Peripheral pulses are 2+ bilaterally, multiple bruises on b/l UE  ABDOMEN: Nontender to palpation, normoactive bowel sounds, no rebound/guarding;   PSYCH: affect appropriate  NEUROLOGY: lethargic but arousable, AAO x 3 when arousable; CN 2-12 are intact and symmetric; no gross sensory deficits; motor strength 5/5 x 4, sensation intact  R groin hematoma improving       LABS:                        11.9   23.02 )-----------( 244      ( 24 Dec 2020 07:31 )             38.6     12-25    145  |  93<L>  |  30.0<H>  ----------------------------<  164<H>  4.2   |  47.0<HH>  |  0.58    Ca    8.6      25 Dec 2020 06:49                  RADIOLOGY & ADDITIONAL TESTS:  Results Reviewed:   Imaging Personally Reviewed:  Electrocardiogram Personally Reviewed:    COORDINATION OF CARE:  Care Discussed with Consultants/Other Providers [Y/N]:  Prior or Outpatient Records Reviewed [Y/N]:

## 2020-12-27 NOTE — PROGRESS NOTE ADULT - ASSESSMENT
71 yo m pmhx COPD noncompliant with BIPAP, chronic hypercapnic respiratory failure, HTN, DM, pAF on Coumadin, seizure d/o admitted with PEA/VT arrest    RECOMMENDATIONS:   -suggest continuing bipap  -suggest HFNC when off bipap   -suggest diuresis   -suggest nebs/inhalers  -suggest strict I&Os.     Patient seen at bedside, vitals/chart/medications reviewed case discussed with MICU attending Dr. Herbert.  At this time patient does not require MICU admission, recommendations as above.

## 2020-12-27 NOTE — PROGRESS NOTE ADULT - ASSESSMENT
Advanced COPD  with hypercapneic respir failure    For repeat cath tomorrow    Repeat CXR has shown residual RLL haze/infiltrate but no CHF    Worsened mental status earlier, placed back on BiPap for last few hours and is now more alert, not dyspneic, lying flat/  Repeat ABG shows persistent hypercapnea, probably no significantly worse    Would continue bronchodilator regimen. May come off BiPap to eat later  As noted, will require NIV during procedure tomorrow which is higher risk in view of his respiratory status.    Case discussed with RN at bedside

## 2020-12-27 NOTE — PROGRESS NOTE ADULT - SUBJECTIVE AND OBJECTIVE BOX
Patient is a 70y old  Male who presents with a chief complaint of Acute hypoxic/hypercapnic respiratory failure, cardiac arrest (27 Dec 2020 15:24)      BRIEF HOSPITAL COURSE: ***    Events last 24 hours: ***    PAST MEDICAL & SURGICAL HISTORY:  Seizure    Depression, unspecified depression type    Diabetes    Hypertension    Hyperlipidemia    Afib    COPD (chronic obstructive pulmonary disease)    Abdominal hernia    H/O carotid endarterectomy      Allergies    No Known Allergies    Intolerances      FAMILY HISTORY:  No pertinent family history in first degree relatives        Social History:     Review of Systems:  CONSTITUTIONAL: No fever, chills, or fatigue  EYES: No eye pain, visual disturbances, or discharge  ENMT:  No difficulty hearing, tinnitus, vertigo; No sinus or throat pain  NECK: No pain or stiffness  RESPIRATORY: No cough, wheezing, chills or hemoptysis; No shortness of breath  CARDIOVASCULAR: No chest pain, palpitations, dizziness, or leg swelling  GASTROINTESTINAL: No abdominal or epigastric pain. No nausea, vomiting, or hematemesis; No diarrhea or constipation. No melena or hematochezia.  GENITOURINARY: No dysuria, frequency, hematuria, or incontinence  NEUROLOGICAL: No headaches, memory loss, loss of strength, numbness, or tremors  SKIN: No itching, burning, rashes, or lesions   MUSCULOSKELETAL: No joint pain or swelling; No muscle, back, or extremity pain  PSYCHIATRIC: No depression, anxiety, mood swings, or difficulty sleeping      Vitals During Exam:   HR: 94  BP: 140/82 mmHg   RR: 17  sPO2: 99% on 40% BiPAP     Physical Examination:    General: No ac    HEENT: Pupils equal, reactive to light.  Symmetric.    PULM: Clear to auscultation bilaterally, no significant sputum production    CVS: Regular rate and rhythm, no murmurs, rubs, or gallops    ABD: Soft, nondistended, nontender, normoactive bowel sounds, no masses    EXT: No edema, nontender    SKIN: Warm and well perfused, no rashes noted.    NEURO: Alert, oriented, interactive, nonfocal      Medications:  digoxin     Tablet 0.125 milliGRAM(s) Oral daily  diltiazem    Tablet 60 milliGRAM(s) Oral four times a day  diltiazem Injectable 10 milliGRAM(s) IV Push once  metoprolol tartrate 25 milliGRAM(s) Oral two times a day  albuterol/ipratropium for Nebulization 3 milliLiter(s) Nebulizer every 6 hours  citalopram 40 milliGRAM(s) Oral daily  levETIRAcetam  IVPB 500 milliGRAM(s) IV Intermittent every 12 hours  aspirin  chewable 81 milliGRAM(s) Oral daily  clopidogrel Tablet 75 milliGRAM(s) Oral daily  heparin   Injectable 9000 Unit(s) IV Push every 6 hours PRN  heparin   Injectable 4000 Unit(s) IV Push every 6 hours PRN  heparin  Infusion.  Unit(s)/Hr IV Continuous <Continuous>  aluminum hydroxide/magnesium hydroxide/simethicone Suspension 30 milliLiter(s) Oral every 4 hours PRN  famotidine    Tablet 20 milliGRAM(s) Oral two times a day  polyethylene glycol 3350 17 Gram(s) Oral daily  atorvastatin 40 milliGRAM(s) Oral at bedtime  dextrose 40% Gel 15 Gram(s) Oral once  dextrose 50% Injectable 25 Gram(s) IV Push once  dextrose 50% Injectable 12.5 Gram(s) IV Push once  dextrose 50% Injectable 25 Gram(s) IV Push once  glucagon  Injectable 1 milliGRAM(s) IntraMuscular once  insulin glargine Injectable (LANTUS) 30 Unit(s) SubCutaneous at bedtime  insulin lispro (ADMELOG) corrective regimen sliding scale   SubCutaneous Before meals and at bedtime  insulin lispro Injectable (ADMELOG) 22 Unit(s) SubCutaneous three times a day before meals  methylPREDNISolone sodium succinate Injectable 40 milliGRAM(s) IV Push every 12 hours  dextrose 5%. 1000 milliLiter(s) IV Continuous <Continuous>  dextrose 5%. 1000 milliLiter(s) IV Continuous <Continuous>  influenza   Vaccine 0.5 milliLiter(s) IntraMuscular once  chlorhexidine 2% Cloths 1 Application(s) Topical <User Schedule>  saccharomyces boulardii 250 milliGRAM(s) Oral two times a day      ICU Vital Signs Last 24 Hrs  T(C): 36.7 (27 Dec 2020 17:40), Max: 36.7 (27 Dec 2020 17:40)  T(F): 98 (27 Dec 2020 17:40), Max: 98 (27 Dec 2020 17:40)  HR: 94 (27 Dec 2020 18:33) (65 - 142)  BP: 140/82 (27 Dec 2020 18:33) (110/54 - 168/108)  BP(mean): --  ABP: --  ABP(mean): --  RR: 14 (27 Dec 2020 17:40) (14 - 22)  SpO2: 52% (27 Dec 2020 17:40) (52% - 100%)    Vital Signs Last 24 Hrs  T(C): 36.7 (27 Dec 2020 17:40), Max: 36.7 (27 Dec 2020 17:40)  T(F): 98 (27 Dec 2020 17:40), Max: 98 (27 Dec 2020 17:40)  HR: 94 (27 Dec 2020 18:33) (65 - 142)  BP: 140/82 (27 Dec 2020 18:33) (110/54 - 168/108)  BP(mean): --  RR: 14 (27 Dec 2020 17:40) (14 - 22)  SpO2: 52% (27 Dec 2020 17:40) (52% - 100%)    ABG - ( 27 Dec 2020 13:14 )  pH, Arterial: 7.38  pH, Blood: x     /  pCO2: 88    /  pO2: 61    / HCO3: 47    / Base Excess: 22.6  /  SaO2: 92          I&O's Detail    26 Dec 2020 07:01  -  27 Dec 2020 07:00  --------------------------------------------------------  IN:    IV PiggyBack: 200 mL    Oral Fluid: 1080 mL  Total IN: 1280 mL    OUT:    Incontinent per Condom Catheter (mL): 700 mL    Voided (mL): 450 mL  Total OUT: 1150 mL  Total NET: 130 mL      27 Dec 2020 07:01  -  27 Dec 2020 18:47  --------------------------------------------------------  IN:    Oral Fluid: 240 mL  Total IN: 240 mL    OUT:    Incontinent per Condom Catheter (mL): 400 mL  Total OUT: 400 mL  Total NET: -160 mL      LABS:                        11.4   17.47 )-----------( 202      ( 27 Dec 2020 11:30 )             36.5     12-27    137  |  96<L>  |  28.0<H>  ----------------------------<  159<H>  5.7<H>   |  34.0<H>  |  0.54    Ca    8.4<L>      27 Dec 2020 07:31  Phos  3.4     12-27  Mg     2.1     12-27      CAPILLARY BLOOD GLUCOSE\  POCT Blood Glucose.: 304 mg/dL (27 Dec 2020 17:32)      CULTURES:  Culture Results:   No growth at 5 days. (12-21 @ 12:19)        RADIOLOGY: ***      SUPPLEMENTAL O2:   LINES:  IVF:  JULIA:   PPx:   CONTACT: Patient is a 70y old  Male who presents with a chief complaint of Acute hypoxic/hypercapnic respiratory failure, cardiac arrest (27 Dec 2020 15:24)      BRIEF HOSPITAL COURSE:   71 yo m pmhx COPD noncompliant with BIPAP, chronic hypercapnic respiratory failure, HTN, DM, pAF on Coumadin, seizure d/o admitted with PEA/VT arrest    Events last 24 hours:   Patient bipap dependent today, briefly taken off bipap and acutely dropped with spo2, placed back on bipap and recovered.  Patient seen at bedside, comfortable on bipap       PAST MEDICAL & SURGICAL HISTORY:  Seizure  Depression, unspecified depression type  Diabetes  Hypertension  Hyperlipidemia  Afib  COPD (chronic obstructive pulmonary disease)  Abdominal hernia  H/O carotid endarterectomy      Allergies  No Known Allergies      FAMILY HISTORY:  No pertinent family history in first degree relatives      Social History:   From home, smoker       Review of Systems:  +sob,       Vitals During Exam:   HR: 94  BP: 140/82 mmHg   RR: 17  sPO2: 99% on 40% AVAPS    Physical Examination:    General: Elderly male, lying in bed, NAD    HEENT: NC/AT, Pupils equal, reactive to light.  Symmetric. BiPAP in place    PULM: Symmetrical Clear to auscultation bilaterally, poor airmovement b/l, significant sputum production     CVS: Regular rate and rhythm, no murmurs, rubs, or gallops    ABD: Soft, nondistended, nontender, normoactive bowel sounds, no masses appreciated    EXT: 2+ edema, nontender    SKIN: Warm and well perfused, no rashes noted.    NEURO: Alert, oriented, interactive, nonfocal      Medications:  digoxin     Tablet 0.125 milliGRAM(s) Oral daily  diltiazem    Tablet 60 milliGRAM(s) Oral four times a day  diltiazem Injectable 10 milliGRAM(s) IV Push once  metoprolol tartrate 25 milliGRAM(s) Oral two times a day  albuterol/ipratropium for Nebulization 3 milliLiter(s) Nebulizer every 6 hours  citalopram 40 milliGRAM(s) Oral daily  levETIRAcetam  IVPB 500 milliGRAM(s) IV Intermittent every 12 hours  aspirin  chewable 81 milliGRAM(s) Oral daily  clopidogrel Tablet 75 milliGRAM(s) Oral daily  heparin   Injectable 9000 Unit(s) IV Push every 6 hours PRN  heparin   Injectable 4000 Unit(s) IV Push every 6 hours PRN  heparin  Infusion.  Unit(s)/Hr IV Continuous <Continuous>  aluminum hydroxide/magnesium hydroxide/simethicone Suspension 30 milliLiter(s) Oral every 4 hours PRN  famotidine    Tablet 20 milliGRAM(s) Oral two times a day  polyethylene glycol 3350 17 Gram(s) Oral daily  atorvastatin 40 milliGRAM(s) Oral at bedtime  dextrose 40% Gel 15 Gram(s) Oral once  dextrose 50% Injectable 25 Gram(s) IV Push once  dextrose 50% Injectable 12.5 Gram(s) IV Push once  dextrose 50% Injectable 25 Gram(s) IV Push once  glucagon  Injectable 1 milliGRAM(s) IntraMuscular once  insulin glargine Injectable (LANTUS) 30 Unit(s) SubCutaneous at bedtime  insulin lispro (ADMELOG) corrective regimen sliding scale   SubCutaneous Before meals and at bedtime  insulin lispro Injectable (ADMELOG) 22 Unit(s) SubCutaneous three times a day before meals  methylPREDNISolone sodium succinate Injectable 40 milliGRAM(s) IV Push every 12 hours  dextrose 5%. 1000 milliLiter(s) IV Continuous <Continuous>  dextrose 5%. 1000 milliLiter(s) IV Continuous <Continuous>  influenza   Vaccine 0.5 milliLiter(s) IntraMuscular once  chlorhexidine 2% Cloths 1 Application(s) Topical <User Schedule>  saccharomyces boulardii 250 milliGRAM(s) Oral two times a day      ICU Vital Signs Last 24 Hrs  T(C): 36.7 (27 Dec 2020 17:40), Max: 36.7 (27 Dec 2020 17:40)  T(F): 98 (27 Dec 2020 17:40), Max: 98 (27 Dec 2020 17:40)  HR: 94 (27 Dec 2020 18:33) (65 - 142)  BP: 140/82 (27 Dec 2020 18:33) (110/54 - 168/108)  BP(mean): --  ABP: --  ABP(mean): --  RR: 14 (27 Dec 2020 17:40) (14 - 22)  SpO2: 52% (27 Dec 2020 17:40) (52% - 100%)    Vital Signs Last 24 Hrs  T(C): 36.7 (27 Dec 2020 17:40), Max: 36.7 (27 Dec 2020 17:40)  T(F): 98 (27 Dec 2020 17:40), Max: 98 (27 Dec 2020 17:40)  HR: 94 (27 Dec 2020 18:33) (65 - 142)  BP: 140/82 (27 Dec 2020 18:33) (110/54 - 168/108)  BP(mean): --  RR: 14 (27 Dec 2020 17:40) (14 - 22)  SpO2: 52% (27 Dec 2020 17:40) (52% - 100%)    ABG - ( 27 Dec 2020 13:14 )  pH, Arterial: 7.38  pH, Blood: x     /  pCO2: 88    /  pO2: 61    / HCO3: 47    / Base Excess: 22.6  /  SaO2: 92          I&O's Detail    26 Dec 2020 07:01  -  27 Dec 2020 07:00  --------------------------------------------------------  IN:    IV PiggyBack: 200 mL    Oral Fluid: 1080 mL  Total IN: 1280 mL    OUT:    Incontinent per Condom Catheter (mL): 700 mL    Voided (mL): 450 mL  Total OUT: 1150 mL  Total NET: 130 mL      27 Dec 2020 07:01  -  27 Dec 2020 18:47  --------------------------------------------------------  IN:    Oral Fluid: 240 mL  Total IN: 240 mL    OUT:    Incontinent per Condom Catheter (mL): 400 mL  Total OUT: 400 mL  Total NET: -160 mL      LABS:                        11.4   17.47 )-----------( 202      ( 27 Dec 2020 11:30 )             36.5     12-27    137  |  96<L>  |  28.0<H>  ----------------------------<  159<H>  5.7<H>   |  34.0<H>  |  0.54    Ca    8.4<L>      27 Dec 2020 07:31  Phos  3.4     12-27  Mg     2.1     12-27      CAPILLARY BLOOD GLUCOSE\  POCT Blood Glucose.: 304 mg/dL (27 Dec 2020 17:32)      CULTURES:  Culture Results:   No growth at 5 days. (12-21 @ 12:19)      RADIOLOGY: ***      SUPPLEMENTAL O2:   LINES:  LOW:  JULIA:   Elvia:   CONTACT:

## 2020-12-27 NOTE — PROGRESS NOTE ADULT - ASSESSMENT
70y Male with past medical history significant for COPD, HTN, DM, pAF on coumadin, seizure d/o with recent hospitalization for hypercapnic respiratory failure presents with HTN emergency and subsequent PEA/VT arrest on 12/10 this admission.     Acute on chronic hypoxic hypercapnic respiratory failure sec copd exacerbation, ELISSA, Acute of chronic chf  - Pt is back on bipap, prn. nebs. Pulm cleared pt for cath but recommends place on AVAPS during procedure   ABG with mixed resp acidosis & metabolic alkalosis  - c/w iv solumetrol  - per pulm Needs AVAPS as out pt to decrease re admissions and improve mortality has failed BIPAP ( [facility])  Repeat CXR: has shows right base airspace disease/ pleural effusion    Acute metabolic encephalopathy-  liekly from hypercarbia & hypoxia  Check ABG  B12, folate, TSH, RPR, ammonia  No signs of CVA. Pt is non focal. Hold off on CT head for now.       Right groin pseudoaneurysm s/p thrombin injection   -  US Duplex Arterial Lower Ext Ltd, Right (12.19.20 @ 11:24) >  The larger of the 2 pseudoaneurysms seen on the prior study remains thrombosed and measures 2.5 x 2.8 x 0.9 cm.  The smaller pseudoaneurysm demonstrating residual flow on the prior study adjacent to the common femoral artery is not well seen on the current study, possibly related to technical factors.     - CT RLE and RLE arterial duplex with either a single right groin pseudoaneurysm measuring 1.6 x 1.5 x 1.4 cm with dilatation of a portion of the neck measuring up to 0.9 cm transverse or 2 adjacent pseudoaneurysms with the more proximal pseudoaneurysm measuring 0.9 x 0.7 cm and the more distal pseudoaneurysm measuring 1.6 x 1.5 x 1.4 cm.  - Vascular following.  - s/p thrombin injection by IR on 12/18  - AC on hold. will resume after cath      CAD   - s/p cath with Severe RCA and LAD disease on 12/15. The RCA was treated with PTCA and STENT (THI-Resolute) with IVUS with residual LAD lesion  - Plan for intervention on LAD 12/18, to be done with anesthesia - cancelled secondary to hgb 7.8.   - Large right groin hematoma, and noted hgb drop (8.7-->7.8). Discussed with Cardiology, who is aware of hematoma.  - CT RLE and RLE arterial duplex with either a single right groin pseudoaneurysm measuring 1.6 x 1.5 x 1.4 cm with dilatation of a portion of the neck measuring up to 0.9 cm transverse or 2 adjacent pseudoaneurysms with the more proximal pseudoaneurysm measuring 0.9 x 0.7 cm and the more distal pseudoaneurysm measuring 1.6 x 1.5 x 1.4 cm.  - AC held for now. SCD boots ordered  - Continue ASA with Plavix, BB, Statin   - cath Monday      HTN  - c/w metoprolol and cardizem and digoxin    AFib  - Rate controlled with diltiazem/Metoprolol  - coumadin held at this time    Cardiac arrest from/ VT  - amiodarone decreased to 200 mg daily per Dr Arroyo recommendations   -  EP evaluation, no plan AICD now    Blood loss anemia  - ppx lovenox held due to groin hematoma.   - S/P  transfuse 2units PRBC  - Monitor H/H    Shortness of breath due to COPD exacerbation,  acute on chronic CHF, possible gram positive/gram negative pneumonia  - improved, currently on NC  - LVEDP was elevated on cath.  - Use of diuretics with worsening contraction alkalosis   - Alkalosis improving after holding diuretics.  - Restart lasix post cath pending LVEDP  - F/U RENAL REC. f/u BMP  - Strict I & Os  - Daily weight  - nocturnal NIV  - Iv solumedrol 40mg q12 Taper  - bronchodilators   - f/u pulm rec  -off lasix per cardiology  - completed 7 days of zosyn per ID  - Blood cx NGTD      DM2, uncontrolled-high  -  A1c 9.1  - lantus with increase  meal coverage   - close monitoring  - endocrine following      Morbid obesity-  Diet & weight loss recommended      DVT prophylaxis: scd    DISPO: cath to be rescheduled Monday . monitor H/H. groin hematoma. LOS uncertain as patient is still acute.   CARE OF PLAN DW PT  Long  dw daughter CATRACHITA [phone]. Discussed above care of plan. over all prognosis guarded. Per Daughter -pt wants to be fullcode ,was intubated in past and recovered.  Pt wants to be full code.  care of plan dw pt-agreed  AVAPS DURING CATH

## 2020-12-27 NOTE — PROGRESS NOTE ADULT - SUBJECTIVE AND OBJECTIVE BOX
PULMONARY PROGRESS NOTE      CLAUDIA [id number]    Patient is a 70y old  Male who presents with a chief complaint of Acute hypoxic/hypercapnic respiratory failure, cardiac arrest (27 Dec 2020 11:15)      INTERVAL HPI/OVERNIGHT EVENTS:    MEDICATIONS  (STANDING):  albuterol/ipratropium for Nebulization 3 milliLiter(s) Nebulizer every 6 hours  aspirin  chewable 81 milliGRAM(s) Oral daily  atorvastatin 40 milliGRAM(s) Oral at bedtime  chlorhexidine 2% Cloths 1 Application(s) Topical <User Schedule>  citalopram 40 milliGRAM(s) Oral daily  clopidogrel Tablet 75 milliGRAM(s) Oral daily  dextrose 40% Gel 15 Gram(s) Oral once  dextrose 5%. 1000 milliLiter(s) (50 mL/Hr) IV Continuous <Continuous>  dextrose 5%. 1000 milliLiter(s) (100 mL/Hr) IV Continuous <Continuous>  dextrose 50% Injectable 25 Gram(s) IV Push once  dextrose 50% Injectable 12.5 Gram(s) IV Push once  dextrose 50% Injectable 25 Gram(s) IV Push once  digoxin     Tablet 0.125 milliGRAM(s) Oral daily  diltiazem    Tablet 60 milliGRAM(s) Oral four times a day  famotidine    Tablet 20 milliGRAM(s) Oral two times a day  glucagon  Injectable 1 milliGRAM(s) IntraMuscular once  influenza   Vaccine 0.5 milliLiter(s) IntraMuscular once  insulin glargine Injectable (LANTUS) 30 Unit(s) SubCutaneous at bedtime  insulin lispro (ADMELOG) corrective regimen sliding scale   SubCutaneous Before meals and at bedtime  insulin lispro Injectable (ADMELOG) 22 Unit(s) SubCutaneous three times a day before meals  levETIRAcetam  IVPB 500 milliGRAM(s) IV Intermittent every 12 hours  methylPREDNISolone sodium succinate Injectable 40 milliGRAM(s) IV Push every 12 hours  metoprolol tartrate 25 milliGRAM(s) Oral two times a day  polyethylene glycol 3350 17 Gram(s) Oral daily  saccharomyces boulardii 250 milliGRAM(s) Oral two times a day      MEDICATIONS  (PRN):  aluminum hydroxide/magnesium hydroxide/simethicone Suspension 30 milliLiter(s) Oral every 4 hours PRN Dyspepsia      Allergies    No Known Allergies    Intolerances        PAST MEDICAL & SURGICAL HISTORY:  Seizure    Depression, unspecified depression type    Diabetes    Hypertension    Hyperlipidemia    Afib    COPD (chronic obstructive pulmonary disease)    Abdominal hernia    H/O carotid endarterectomy        SOCIAL HISTORY  Smoking History:       REVIEW OF SYSTEMS:    CONSTITUTIONAL:  No distress    HEENT:  Eyes:  No diplopia or blurred vision. ENT:  No earache, sore throat or runny nose.    CARDIOVASCULAR:  No pressure, squeezing, tightness, heaviness or aching about the chest; no palpitations.    RESPIRATORY:  No cough, shortness of breath, PND or orthopnea. Mild SOBOE    GASTROINTESTINAL:  No nausea, vomiting or diarrhea.    GENITOURINARY:  No dysuria, frequency or urgency.    MUSCULOSKELETAL:  No joint pain    SKIN:  No new lesions.    NEUROLOGIC:  No paresthesias, fasciculations, seizures or weakness.    PSYCHIATRIC:  No disorder of thought or mood.    ENDOCRINE:  No heat or cold intolerance, polyuria or polydipsia.    HEMATOLOGICAL:  No easy bruising or bleeding.     Vital Signs Last 24 Hrs  T(C): 36.2 (27 Dec 2020 08:00), Max: 36.4 (26 Dec 2020 15:27)  T(F): 97.2 (27 Dec 2020 08:00), Max: 97.6 (26 Dec 2020 15:27)  HR: 100 (27 Dec 2020 11:29) (65 - 112)  BP: 110/54 (27 Dec 2020 11:29) (100/66 - 168/108)  BP(mean): --  RR: 21 (27 Dec 2020 11:29) (16 - 22)  SpO2: 100% (27 Dec 2020 11:29) (80% - 100%)    PHYSICAL EXAMINATION:    GENERAL: The patient is awake and alert in no apparent distress.     HEENT: Head is normocephalic and atraumatic. Extraocular muscles are intact. Mucous membranes are moist.    NECK: Supple.    LUNGS: Clear to auscultation without wheezing, rales or rhonchi; respirations unlabored    HEART: Regular rate and rhythm without murmur.    ABDOMEN: Soft, nontender, and nondistended.      EXTREMITIES: Without any cyanosis, clubbing, rash, lesions or edema.    NEUROLOGIC: Grossly intact.    SKIN: No ulceration or induration present.      LABS:                        11.4   17.47 )-----------( 202      ( 27 Dec 2020 11:30 )             36.5     12-27    137  |  96<L>  |  28.0<H>  ----------------------------<  159<H>  5.7<H>   |  34.0<H>  |  0.54    Ca    8.4<L>      27 Dec 2020 07:31  Phos  3.4     12-27  Mg     2.1     12-27          ABG - ( 27 Dec 2020 13:14 )  pH, Arterial: 7.38  pH, Blood: x     /  pCO2: 88    /  pO2: 61    / HCO3: 47    / Base Excess: 22.6  /  SaO2: 92                              MICROBIOLOGY:    RADIOLOGY & ADDITIONAL STUDIES:rad< from: Xray Chest 1 View- PORTABLE-Routine (Xray Chest 1 View- PORTABLE-Routine in AM.) (12.27.20 @ 06:30) >     EXAM:  XR CHEST PORTABLE ROUTINE 1V                          PROCEDURE DATE:  12/27/2020          INTERPRETATION:  Clinical history: Abnormal chest sounds    Single portable view obtained. Comparison is made to priors    Persistent right basilar airspace disease. Left lung grossly clear. No evidence of pneumothorax.    IMPRESSION: Stable right basilar airspace disease    < end of copied text >

## 2020-12-28 LAB
ANION GAP SERPL CALC-SCNC: 4 MMOL/L — LOW (ref 5–17)
APTT BLD: 121.1 SEC — CRITICAL HIGH (ref 27.5–35.5)
APTT BLD: >200 SEC — CRITICAL HIGH (ref 27.5–35.5)
BASE EXCESS BLDA CALC-SCNC: 20.9 MMOL/L — HIGH (ref -2–2)
BASOPHILS # BLD AUTO: 0.01 K/UL — SIGNIFICANT CHANGE UP (ref 0–0.2)
BASOPHILS NFR BLD AUTO: 0.1 % — SIGNIFICANT CHANGE UP (ref 0–2)
BLOOD GAS COMMENTS ARTERIAL: SIGNIFICANT CHANGE UP
BUN SERPL-MCNC: 25 MG/DL — HIGH (ref 8–20)
CALCIUM SERPL-MCNC: 8.4 MG/DL — LOW (ref 8.6–10.2)
CHLORIDE SERPL-SCNC: 95 MMOL/L — LOW (ref 98–107)
CO2 SERPL-SCNC: 45 MMOL/L — CRITICAL HIGH (ref 22–29)
CREAT SERPL-MCNC: 0.45 MG/DL — LOW (ref 0.5–1.3)
EOSINOPHIL # BLD AUTO: 0 K/UL — SIGNIFICANT CHANGE UP (ref 0–0.5)
EOSINOPHIL NFR BLD AUTO: 0 % — SIGNIFICANT CHANGE UP (ref 0–6)
FOLATE RBC-MCNC: 1523 NG/ML — HIGH (ref 499–1504)
GAS PNL BLDA: SIGNIFICANT CHANGE UP
GLUCOSE BLDC GLUCOMTR-MCNC: 173 MG/DL — HIGH (ref 70–99)
GLUCOSE BLDC GLUCOMTR-MCNC: 273 MG/DL — HIGH (ref 70–99)
GLUCOSE BLDC GLUCOMTR-MCNC: 76 MG/DL — SIGNIFICANT CHANGE UP (ref 70–99)
GLUCOSE BLDC GLUCOMTR-MCNC: 83 MG/DL — SIGNIFICANT CHANGE UP (ref 70–99)
GLUCOSE SERPL-MCNC: 59 MG/DL — LOW (ref 70–99)
HCO3 BLDA-SCNC: 46 MMOL/L — HIGH (ref 20–26)
HCT VFR BLD CALC: 35 % — LOW (ref 39–50)
HCT VFR BLD CALC: 36.5 % — LOW (ref 39–50)
HGB BLD-MCNC: 11.3 G/DL — LOW (ref 13–17)
HOROWITZ INDEX BLDA+IHG-RTO: 40 — SIGNIFICANT CHANGE UP
IMM GRANULOCYTES NFR BLD AUTO: 0.7 % — SIGNIFICANT CHANGE UP (ref 0–1.5)
LYMPHOCYTES # BLD AUTO: 0.27 K/UL — LOW (ref 1–3.3)
LYMPHOCYTES # BLD AUTO: 1.7 % — LOW (ref 13–44)
MAGNESIUM SERPL-MCNC: 1.9 MG/DL — SIGNIFICANT CHANGE UP (ref 1.6–2.6)
MCHC RBC-ENTMCNC: 28.3 PG — SIGNIFICANT CHANGE UP (ref 27–34)
MCHC RBC-ENTMCNC: 31 GM/DL — LOW (ref 32–36)
MCV RBC AUTO: 91.3 FL — SIGNIFICANT CHANGE UP (ref 80–100)
MONOCYTES # BLD AUTO: 1 K/UL — HIGH (ref 0–0.9)
MONOCYTES NFR BLD AUTO: 6.2 % — SIGNIFICANT CHANGE UP (ref 2–14)
NEUTROPHILS # BLD AUTO: 14.67 K/UL — HIGH (ref 1.8–7.4)
NEUTROPHILS NFR BLD AUTO: 91.3 % — HIGH (ref 43–77)
PCO2 BLDA: 69 MMHG — HIGH (ref 35–45)
PH BLDA: 7.45 — SIGNIFICANT CHANGE UP (ref 7.35–7.45)
PHOSPHATE SERPL-MCNC: 2.5 MG/DL — SIGNIFICANT CHANGE UP (ref 2.4–4.7)
PLATELET # BLD AUTO: 184 K/UL — SIGNIFICANT CHANGE UP (ref 150–400)
PO2 BLDA: 100 MMHG — SIGNIFICANT CHANGE UP (ref 83–108)
POTASSIUM SERPL-MCNC: 4.5 MMOL/L — SIGNIFICANT CHANGE UP (ref 3.5–5.3)
POTASSIUM SERPL-SCNC: 4.5 MMOL/L — SIGNIFICANT CHANGE UP (ref 3.5–5.3)
RBC # BLD: 4 M/UL — LOW (ref 4.2–5.8)
RBC # FLD: 15.2 % — HIGH (ref 10.3–14.5)
SAO2 % BLDA: 98 % — SIGNIFICANT CHANGE UP (ref 95–99)
SODIUM SERPL-SCNC: 144 MMOL/L — SIGNIFICANT CHANGE UP (ref 135–145)
T PALLIDUM AB TITR SER: NEGATIVE — SIGNIFICANT CHANGE UP
WBC # BLD: 16.06 K/UL — HIGH (ref 3.8–10.5)
WBC # FLD AUTO: 16.06 K/UL — HIGH (ref 3.8–10.5)

## 2020-12-28 PROCEDURE — 84484 ASSAY OF TROPONIN QUANT: CPT

## 2020-12-28 PROCEDURE — 84132 ASSAY OF SERUM POTASSIUM: CPT

## 2020-12-28 PROCEDURE — 96365 THER/PROPH/DIAG IV INF INIT: CPT

## 2020-12-28 PROCEDURE — 80053 COMPREHEN METABOLIC PANEL: CPT

## 2020-12-28 PROCEDURE — 86769 SARS-COV-2 COVID-19 ANTIBODY: CPT

## 2020-12-28 PROCEDURE — 80048 BASIC METABOLIC PNL TOTAL CA: CPT

## 2020-12-28 PROCEDURE — 82435 ASSAY OF BLOOD CHLORIDE: CPT

## 2020-12-28 PROCEDURE — 86803 HEPATITIS C AB TEST: CPT

## 2020-12-28 PROCEDURE — 82330 ASSAY OF CALCIUM: CPT

## 2020-12-28 PROCEDURE — 84295 ASSAY OF SERUM SODIUM: CPT

## 2020-12-28 PROCEDURE — 82962 GLUCOSE BLOOD TEST: CPT

## 2020-12-28 PROCEDURE — 85610 PROTHROMBIN TIME: CPT

## 2020-12-28 PROCEDURE — 82947 ASSAY GLUCOSE BLOOD QUANT: CPT

## 2020-12-28 PROCEDURE — 85018 HEMOGLOBIN: CPT

## 2020-12-28 PROCEDURE — 85027 COMPLETE CBC AUTOMATED: CPT

## 2020-12-28 PROCEDURE — 36415 COLL VENOUS BLD VENIPUNCTURE: CPT

## 2020-12-28 PROCEDURE — 99231 SBSQ HOSP IP/OBS SF/LOW 25: CPT

## 2020-12-28 PROCEDURE — 84443 ASSAY THYROID STIM HORMONE: CPT

## 2020-12-28 PROCEDURE — 71045 X-RAY EXAM CHEST 1 VIEW: CPT

## 2020-12-28 PROCEDURE — 83036 HEMOGLOBIN GLYCOSYLATED A1C: CPT

## 2020-12-28 PROCEDURE — 94760 N-INVAS EAR/PLS OXIMETRY 1: CPT

## 2020-12-28 PROCEDURE — 97163 PT EVAL HIGH COMPLEX 45 MIN: CPT

## 2020-12-28 PROCEDURE — 99233 SBSQ HOSP IP/OBS HIGH 50: CPT

## 2020-12-28 PROCEDURE — U0003: CPT

## 2020-12-28 PROCEDURE — 84100 ASSAY OF PHOSPHORUS: CPT

## 2020-12-28 PROCEDURE — 85025 COMPLETE CBC W/AUTO DIFF WBC: CPT

## 2020-12-28 PROCEDURE — 80162 ASSAY OF DIGOXIN TOTAL: CPT

## 2020-12-28 PROCEDURE — 96375 TX/PRO/DX INJ NEW DRUG ADDON: CPT

## 2020-12-28 PROCEDURE — 99223 1ST HOSP IP/OBS HIGH 75: CPT

## 2020-12-28 PROCEDURE — 85730 THROMBOPLASTIN TIME PARTIAL: CPT

## 2020-12-28 PROCEDURE — 94660 CPAP INITIATION&MGMT: CPT

## 2020-12-28 PROCEDURE — 93005 ELECTROCARDIOGRAM TRACING: CPT

## 2020-12-28 PROCEDURE — 99291 CRITICAL CARE FIRST HOUR: CPT | Mod: 25

## 2020-12-28 PROCEDURE — 83605 ASSAY OF LACTIC ACID: CPT

## 2020-12-28 PROCEDURE — C8929: CPT

## 2020-12-28 PROCEDURE — 94640 AIRWAY INHALATION TREATMENT: CPT

## 2020-12-28 PROCEDURE — 83735 ASSAY OF MAGNESIUM: CPT

## 2020-12-28 PROCEDURE — 82803 BLOOD GASES ANY COMBINATION: CPT

## 2020-12-28 PROCEDURE — 85014 HEMATOCRIT: CPT

## 2020-12-28 PROCEDURE — 99232 SBSQ HOSP IP/OBS MODERATE 35: CPT

## 2020-12-28 PROCEDURE — 80061 LIPID PANEL: CPT

## 2020-12-28 RX ORDER — DILTIAZEM HCL 120 MG
10 CAPSULE, EXT RELEASE 24 HR ORAL EVERY 6 HOURS
Refills: 0 | Status: DISCONTINUED | OUTPATIENT
Start: 2020-12-28 | End: 2021-01-01

## 2020-12-28 RX ADMIN — Medication 3 MILLILITER(S): at 03:18

## 2020-12-28 RX ADMIN — Medication 60 MILLIGRAM(S): at 17:12

## 2020-12-28 RX ADMIN — LEVETIRACETAM 420 MILLIGRAM(S): 250 TABLET, FILM COATED ORAL at 05:17

## 2020-12-28 RX ADMIN — Medication 81 MILLIGRAM(S): at 12:12

## 2020-12-28 RX ADMIN — Medication 250 MILLIGRAM(S): at 17:12

## 2020-12-28 RX ADMIN — Medication 3 MILLILITER(S): at 14:14

## 2020-12-28 RX ADMIN — Medication 60 MILLIGRAM(S): at 12:12

## 2020-12-28 RX ADMIN — CLOPIDOGREL BISULFATE 75 MILLIGRAM(S): 75 TABLET, FILM COATED ORAL at 12:12

## 2020-12-28 RX ADMIN — Medication 3 MILLILITER(S): at 21:02

## 2020-12-28 RX ADMIN — Medication 0.12 MILLIGRAM(S): at 12:12

## 2020-12-28 RX ADMIN — Medication 10 MILLIGRAM(S): at 15:40

## 2020-12-28 RX ADMIN — INSULIN GLARGINE 30 UNIT(S): 100 INJECTION, SOLUTION SUBCUTANEOUS at 22:31

## 2020-12-28 RX ADMIN — Medication 2: at 17:13

## 2020-12-28 RX ADMIN — HEPARIN SODIUM 1400 UNIT(S)/HR: 5000 INJECTION INTRAVENOUS; SUBCUTANEOUS at 10:04

## 2020-12-28 RX ADMIN — Medication 60 MILLIGRAM(S): at 23:04

## 2020-12-28 RX ADMIN — HEPARIN SODIUM 0 UNIT(S)/HR: 5000 INJECTION INTRAVENOUS; SUBCUTANEOUS at 08:48

## 2020-12-28 RX ADMIN — HEPARIN SODIUM 1700 UNIT(S)/HR: 5000 INJECTION INTRAVENOUS; SUBCUTANEOUS at 00:32

## 2020-12-28 RX ADMIN — FAMOTIDINE 20 MILLIGRAM(S): 10 INJECTION INTRAVENOUS at 17:12

## 2020-12-28 RX ADMIN — Medication 40 MILLIGRAM(S): at 17:12

## 2020-12-28 RX ADMIN — Medication 3 MILLILITER(S): at 09:08

## 2020-12-28 RX ADMIN — Medication 25 MILLIGRAM(S): at 17:12

## 2020-12-28 RX ADMIN — CHLORHEXIDINE GLUCONATE 1 APPLICATION(S): 213 SOLUTION TOPICAL at 02:15

## 2020-12-28 RX ADMIN — Medication 40 MILLIGRAM(S): at 05:17

## 2020-12-28 RX ADMIN — Medication 6: at 21:49

## 2020-12-28 RX ADMIN — ATORVASTATIN CALCIUM 40 MILLIGRAM(S): 80 TABLET, FILM COATED ORAL at 22:31

## 2020-12-28 RX ADMIN — LEVETIRACETAM 420 MILLIGRAM(S): 250 TABLET, FILM COATED ORAL at 17:13

## 2020-12-28 NOTE — PROVIDER CONTACT NOTE (CRITICAL VALUE NOTIFICATION) - NAME OF MD/NP/PA/DO NOTIFIED:
Dr Ramirez
Dr. Rollins
KIAH Gunderson
ANGELINE Hughes
Md Robson Saldivar
Yariel Thurston
dr jones
Jimena MARCUS
dr jones
DEMETRIA Aguilera
MD Joyce

## 2020-12-28 NOTE — CONSULT NOTE ADULT - CONSULT REQUESTED DATE/TIME
17-Dec-2020 20:00
10-Dec-2020 14:34
15-Dec-2020 13:51
15-Dec-2020 17:44
18-Dec-2020 15:54
28-Dec-2020 15:43
18-Dec-2020 10:28
21-Dec-2020 09:08
17-Dec-2020 14:10

## 2020-12-28 NOTE — CONSULT NOTE ADULT - SUBJECTIVE AND OBJECTIVE BOX
HPI: This is a 71 yo M PMH of COPD on Oxygen 4L NC and BIPAP nocturnally at home was found unconscious by daughter, intubated. Extensive PMH   including Depression, Seizures DM -Non compliant (High A1C AFIB with RVR, HTN, HLD. Admitted with Acute on Chronic Hypoxia and Hypercapnia  He developed a Pseudoaneurysm after 12/15 Cath  Rx with Thrombin injection R groin. Scheduled for a RHC today, but Staged procedure  to LAD was postponed until Patient stronger and stable for procedure. He has also been  treated for B/L PNA and transfused 2 U PRBC's on 12/15  Today Patient was seen OOB/CH C/O extreme hunger; waiting all day for procedure kept NPO. Urine via [place] Catheter. He is looking better today, Awake, alert oriented x 3 using O2 NC 4L, has very infrequent non productive cough. R groin swollen and ecchymotic; BLLE's dusky R>L ecchymotic groin swollen. He denies CP, non symptomatic Paroxysmal AFIB increases with activity. NO headache, dizziness, constipation or pain   He has+dyspnea on exertion , NO  overt signs of bleeding,  fever or chills      71 y/o M w/ a PMHx of COPD (noncompliant w/ CPAP), HTN, type 2 DM, afib (on Coumadin), and seizure disorder who presented unresponsive, hypoxic (SpO2 70s), and agonally breathing. Pt was subsequently intubated. Shortly after intubation, pt suffered from a brief PEA arrest. Pt received epi x2, bicarb, calcium, and mag. ROSC was achieved, found to be in v tach w/ a pulse. Pt was cardioverted at 100J, 200J x2, and 300J w/ conversion to sinus rhythm. Amio bolus and infusion initiated. Initially hypertensive (SBP 200s), subsequently became hypotensive and Levophed infusion was started. Labs significant for leukocytosis (23K), hyperkalemia (K 5.8), uremia (BUN 45), and hyperglycemia. ABG revealing of a respiratory acidosis. CXR w/ small bibasilar infiltrates. S/p Zosyn and Vanco in ED. CT head negative for acute intracranial event. Admit to MICU for continuation of care.    Of note, pt was admitted to ICU on 12/1 w/ acute hypercapnic respiratory failure requiring BiPAP. Discharged from [facility] yesterday 12/9. (10 Dec 2020 16:52)    HPI:  No chest pain, SOB, Nausea, vomiting, diarrhea, fever, chills    PERTINENT PMH REVIEWED: Yes No    PAST MEDICAL & SURGICAL HISTORY:  Seizure    Depression, unspecified depression type    Diabetes    Hypertension    Hyperlipidemia    Afib    COPD (chronic obstructive pulmonary disease)    Abdominal hernia    H/O carotid endarterectomy        SOCIAL HISTORY:  EtOH    No                              Drugs  No                              nonsmoker                               Admitted from: home  HCP: Becky Davenport  Phone#:[phone] or [phone]    FAMILY HISTORY:  No pertinent family history in first degree relatives  Father:  Mother:   No family history related to admission diagnosis    Allergies  ntolerances    Baseline ADLs (prior to admission):  Independent     Present Symptoms:   Dyspnea:  3   Nausea/Vomiting:  No  Anxiety:  Yes restless and hungry  Depression: Yes   Fatigue: Yes   Loss of appetite: Yes No    Pain:             Character-            Duration-            Effect-            Factors-            Frequency-            Location-            Severity-    Review of Systems: Reviewed                     Negative:                     Positive:  Unable to obtain due to poor mentation   All others negative    MEDICATIONS  (STANDING):  albuterol/ipratropium for Nebulization 3 milliLiter(s) Nebulizer every 6 hours  aspirin  chewable 81 milliGRAM(s) Oral daily  atorvastatin 40 milliGRAM(s) Oral at bedtime  chlorhexidine 2% Cloths 1 Application(s) Topical <User Schedule>  citalopram 40 milliGRAM(s) Oral daily  clopidogrel Tablet 75 milliGRAM(s) Oral daily  dextrose 40% Gel 15 Gram(s) Oral once  dextrose 5%. 1000 milliLiter(s) (50 mL/Hr) IV Continuous <Continuous>  dextrose 5%. 1000 milliLiter(s) (100 mL/Hr) IV Continuous <Continuous>  dextrose 50% Injectable 25 Gram(s) IV Push once  dextrose 50% Injectable 12.5 Gram(s) IV Push once  dextrose 50% Injectable 25 Gram(s) IV Push once  digoxin     Tablet 0.125 milliGRAM(s) Oral daily  diltiazem    Tablet 60 milliGRAM(s) Oral four times a day  famotidine    Tablet 20 milliGRAM(s) Oral two times a day  glucagon  Injectable 1 milliGRAM(s) IntraMuscular once  influenza   Vaccine 0.5 milliLiter(s) IntraMuscular once  insulin glargine Injectable (LANTUS) 30 Unit(s) SubCutaneous at bedtime  insulin lispro (ADMELOG) corrective regimen sliding scale   SubCutaneous Before meals and at bedtime  insulin lispro Injectable (ADMELOG) 22 Unit(s) SubCutaneous three times a day before meals  levETIRAcetam  IVPB 500 milliGRAM(s) IV Intermittent every 12 hours  methylPREDNISolone sodium succinate Injectable 40 milliGRAM(s) IV Push every 12 hours  metoprolol tartrate 25 milliGRAM(s) Oral two times a day  polyethylene glycol 3350 17 Gram(s) Oral daily  saccharomyces boulardii 250 milliGRAM(s) Oral two times a day    MEDICATIONS  (PRN):  aluminum hydroxide/magnesium hydroxide/simethicone Suspension 30 milliLiter(s) Oral every 4 hours PRN Dyspepsia  diltiazem Injectable 10 milliGRAM(s) IV Push every 6 hours PRN for HR > 110; hold for SBP < 100      PHYSICAL EXAM:    Vital Signs Last 24 Hrs  T(C): 36.4 (28 Dec 2020 12:00), Max: 36.8 (27 Dec 2020 23:06)  T(F): 97.5 (28 Dec 2020 12:00), Max: 98.2 (27 Dec 2020 23:06)  HR: 108 (28 Dec 2020 14:15) (86 - 142)  BP: 148/87 (28 Dec 2020 12:00) (119/67 - 175/69)  BP(mean): 102 (28 Dec 2020 12:00) (87 - 106)  RR: 17 (28 Dec 2020 12:00) (14 - 20)  SpO2: 100% (28 Dec 2020 14:15) (52% - 100%)    General: alert  oriented x ____ lethargic agitated                  cachexia  nonverbal  coma    Karnofsky:  %    HEENT: normal  dry mouth  ET tube/trach    Lungs: comfortable tachypnea/labored breathing  excessive secretions    CV: normal  tachycardia    GI: normal  distended  tender  no BS               PEG/NG/OG tube  constipation  last BM:     : normal  incontinent  oliguria/anuria  guerra    MSK: normal  weakness  edema             ambulatory  bedbound/wheelchair bound    Skin: normal  pressure ulcers- Stage_____  no rash    LABS:                        11.3   16.06 )-----------( 184      ( 28 Dec 2020 07:58 )             36.5     12-28    144  |  95<L>  |  25.0<H>  ----------------------------<  59<L>  4.5   |  45.0<HH>  |  0.45<L>    Ca    8.4<L>      28 Dec 2020 07:58  Phos  2.5     12-28  Mg     1.9     12-28      PTT - ( 28 Dec 2020 07:58 )  PTT:>200.0 sec    I&O's Summary    27 Dec 2020 07:01  -  28 Dec 2020 07:00  --------------------------------------------------------  IN: 1070 mL / OUT: 550 mL / NET: 520 mL    28 Dec 2020 07:01  -  28 Dec 2020 15:46  --------------------------------------------------------  IN: 45 mL / OUT: 110 mL / NET: -65 mL        RADIOLOGY & ADDITIONAL STUDIES:    ADVANCE DIRECTIVES:   DNR NO  Completed on:                     MIQUEL  NO   Completed on:  Living Will   NO   Completed on:             HPI: This is a 69 yo M PMH of COPD on Oxygen 4L NC and BIPAP nocturnally at home was found unconscious by daughter, intubated. Extensive PMH   including Depression, Seizures DM -Non compliant (High A1C AFIB with RVR, HTN, HLD. Admitted with Acute on Chronic Hypoxia and Hypercapnia  He developed a Pseudoaneurysm after 12/15 Cath  Rx with Thrombin injection R groin. Scheduled for a RHC today, but Staged procedure  to LAD was postponed until Patient stronger and stable for procedure. He has also been  treated for B/L PNA and transfused 2 U PRBC's on 12/15  Today Patient was seen OOB/CH C/O extreme hunger; waiting all day for procedure kept NPO. Urine via [place] Catheter. He is looking better today, Awake, alert oriented x 3 using O2 NC 4L, has very infrequent non productive cough. R groin swollen and ecchymotic; BLLE's dusky R>L ecchymotic groin swollen. He denies CP, non symptomatic Paroxysmal AFIB increases with activity. NO headache, dizziness, constipation or pain   He has+dyspnea on exertion , NO  overt signs of bleeding,  fever or chills      71 y/o M w/ a PMHx of COPD (noncompliant w/ CPAP), HTN, type 2 DM, afib (on Coumadin), and seizure disorder who presented unresponsive, hypoxic (SpO2 70s), and agonally breathing. Pt was subsequently intubated. Shortly after intubation, pt suffered from a brief PEA arrest. Pt received epi x2, bicarb, calcium, and mag. ROSC was achieved, found to be in v tach w/ a pulse. Pt was cardioverted at 100J, 200J x2, and 300J w/ conversion to sinus rhythm. Amio bolus and infusion initiated. Initially hypertensive (SBP 200s), subsequently became hypotensive and Levophed infusion was started. Labs significant for leukocytosis (23K), hyperkalemia (K 5.8), uremia (BUN 45), and hyperglycemia. ABG revealing of a respiratory acidosis. CXR w/ small bibasilar infiltrates. S/p Zosyn and Vanco in ED. CT head negative for acute intracranial event. Admit to MICU for continuation of care.    Of note, pt was admitted to ICU on 12/1 w/ acute hypercapnic respiratory failure requiring BiPAP. Discharged from [facility] yesterday 12/9. (10 Dec 2020 16:52)    HPI:  No chest pain, SOB, Nausea, vomiting, diarrhea, fever, chills    PERTINENT PMH REVIEWED: Yes No    PAST MEDICAL & SURGICAL HISTORY:  Seizure    Depression, unspecified depression type    Diabetes    Hypertension    Hyperlipidemia    Afib    COPD (chronic obstructive pulmonary disease)    Abdominal hernia    H/O carotid endarterectomy        SOCIAL HISTORY:  EtOH    No                              Drugs  No                              nonsmoker                               Admitted from: home  HCP: Becky Davenport  Phone#:[phone] or [phone]    FAMILY HISTORY:  No pertinent family history in first degree relatives  Father:  Mother:   No family history related to admission diagnosis    Allergies  ntolerances    Baseline ADLs (prior to admission):  Independent     Present Symptoms:   Dyspnea:  3   Nausea/Vomiting:  No  Anxiety:  Yes restless and hungry  Depression: Yes   Fatigue: Yes   Loss of appetite: Yes No    Pain:             Character-            Duration-            Effect-            Factors-            Frequency-            Location-            Severity-    Review of Systems: Reviewed                     Negative:                     Positive:  Unable to obtain due to poor mentation   All others negative    MEDICATIONS  (STANDING):  albuterol/ipratropium for Nebulization 3 milliLiter(s) Nebulizer every 6 hours  aspirin  chewable 81 milliGRAM(s) Oral daily  atorvastatin 40 milliGRAM(s) Oral at bedtime  chlorhexidine 2% Cloths 1 Application(s) Topical <User Schedule>  citalopram 40 milliGRAM(s) Oral daily  clopidogrel Tablet 75 milliGRAM(s) Oral daily  dextrose 40% Gel 15 Gram(s) Oral once  dextrose 5%. 1000 milliLiter(s) (50 mL/Hr) IV Continuous <Continuous>  dextrose 5%. 1000 milliLiter(s) (100 mL/Hr) IV Continuous <Continuous>  dextrose 50% Injectable 25 Gram(s) IV Push once  dextrose 50% Injectable 12.5 Gram(s) IV Push once  dextrose 50% Injectable 25 Gram(s) IV Push once  digoxin     Tablet 0.125 milliGRAM(s) Oral daily  diltiazem    Tablet 60 milliGRAM(s) Oral four times a day  famotidine    Tablet 20 milliGRAM(s) Oral two times a day  glucagon  Injectable 1 milliGRAM(s) IntraMuscular once  influenza   Vaccine 0.5 milliLiter(s) IntraMuscular once  insulin glargine Injectable (LANTUS) 30 Unit(s) SubCutaneous at bedtime  insulin lispro (ADMELOG) corrective regimen sliding scale   SubCutaneous Before meals and at bedtime  insulin lispro Injectable (ADMELOG) 22 Unit(s) SubCutaneous three times a day before meals  levETIRAcetam  IVPB 500 milliGRAM(s) IV Intermittent every 12 hours  methylPREDNISolone sodium succinate Injectable 40 milliGRAM(s) IV Push every 12 hours  metoprolol tartrate 25 milliGRAM(s) Oral two times a day  polyethylene glycol 3350 17 Gram(s) Oral daily  saccharomyces boulardii 250 milliGRAM(s) Oral two times a day    MEDICATIONS  (PRN):  aluminum hydroxide/magnesium hydroxide/simethicone Suspension 30 milliLiter(s) Oral every 4 hours PRN Dyspepsia  diltiazem Injectable 10 milliGRAM(s) IV Push every 6 hours PRN for HR > 110; hold for SBP < 100      PHYSICAL EXAM:    Vital Signs Last 24 Hrs  T(C): 36.4 (28 Dec 2020 12:00), Max: 36.8 (27 Dec 2020 23:06)  T(F): 97.5 (28 Dec 2020 12:00), Max: 98.2 (27 Dec 2020 23:06)  HR: 108 (28 Dec 2020 14:15) (86 - 142)  BP: 148/87 (28 Dec 2020 12:00) (119/67 - 175/69)  BP(mean): 102 (28 Dec 2020 12:00) (87 - 106)  RR: 17 (28 Dec 2020 12:00) (14 - 20)  SpO2: 100% (28 Dec 2020 14:15) (52% - 100%)    General: alert  oriented x __3__ anxious hungry     obese verbal      HEENT: normal      Lungs: comfortable SILVEIRA    CV: normal  pAF with RVR    GI: distended  non-tender              constipation  last BM: denies    : normal  incontinent  oliguria/anuria  [place] catheter   drainage bag  MSK:   weakness  edema BLLE's            ambulatory OOB/CH    Skin: ecchymotic numerous skin tears_  no rash R groin swelling and ecchymotic    LABS:                        11.3   16.06 )-----------( 184      ( 28 Dec 2020 07:58 )             36.5     12-28    144  |  95<L>  |  25.0<H>  ----------------------------<  59<L>  4.5   |  45.0<HH>  |  0.45<L>    Ca    8.4<L>      28 Dec 2020 07:58  Phos  2.5     12-28  Mg     1.9     12-28      PTT - ( 28 Dec 2020 07:58 )  PTT:>200.0 sec    I&O's Summary    27 Dec 2020 07:01  -  28 Dec 2020 07:00  --------------------------------------------------------  IN: 1070 mL / OUT: 550 mL / NET: 520 mL    28 Dec 2020 07:01  -  28 Dec 2020 15:46  --------------------------------------------------------  IN: 45 mL / OUT: 110 mL / NET: -65 mL    RADIOLOGY & ADDITIONAL STUDIES:    ADVANCE DIRECTIVES:   DNR NO  Completed on:                     MIQUEL  NO   Completed on:  Living Will   NO   Completed on:

## 2020-12-28 NOTE — PROGRESS NOTE ADULT - SUBJECTIVE AND OBJECTIVE BOX
CC:  Follow up Type 2 DM    Interval HPI/ Overnight Events:  Patient was on continuous BIPAP overnight and this morning, which has now been D/C'ed.  As a results, patient has been NPO.  Still awaiting Cardiac Cath.    Denies any current CP or dyspnea.      MEDICATIONS  (STANDING):  albuterol/ipratropium for Nebulization 3 milliLiter(s) Nebulizer every 6 hours  aspirin  chewable 81 milliGRAM(s) Oral daily  atorvastatin 40 milliGRAM(s) Oral at bedtime  chlorhexidine 2% Cloths 1 Application(s) Topical <User Schedule>  citalopram 40 milliGRAM(s) Oral daily  clopidogrel Tablet 75 milliGRAM(s) Oral daily  digoxin     Tablet 0.125 milliGRAM(s) Oral daily  diltiazem    Tablet 60 milliGRAM(s) Oral four times a day  famotidine    Tablet 20 milliGRAM(s) Oral two times a day  glucagon  Injectable 1 milliGRAM(s) IntraMuscular once  influenza   Vaccine 0.5 milliLiter(s) IntraMuscular once  insulin glargine Injectable (LANTUS) 30 Unit(s) SubCutaneous at bedtime  insulin lispro (ADMELOG) corrective regimen sliding scale   SubCutaneous Before meals and at bedtime  insulin lispro Injectable (ADMELOG) 22 Unit(s) SubCutaneous three times a day before meals  levETIRAcetam  IVPB 500 milliGRAM(s) IV Intermittent every 12 hours  methylPREDNISolone sodium succinate Injectable 40 milliGRAM(s) IV Push every 12 hours  metoprolol tartrate 25 milliGRAM(s) Oral two times a day  polyethylene glycol 3350 17 Gram(s) Oral daily  saccharomyces boulardii 250 milliGRAM(s) Oral two times a day    Vital Signs Last 24 Hrs  T(C): 36.4 (28 Dec 2020 12:00), Max: 36.8 (27 Dec 2020 23:06)  T(F): 97.5 (28 Dec 2020 12:00), Max: 98.2 (27 Dec 2020 23:06)  HR: 125 (28 Dec 2020 12:00) (86 - 142)  BP: 148/87 (28 Dec 2020 12:00) (119/67 - 175/69)  BP(mean): 102 (28 Dec 2020 12:00) (87 - 106)  RR: 17 (28 Dec 2020 12:00) (14 - 20)  SpO2: 99% (28 Dec 2020 12:00) (52% - 100%)    PE:  Gen: AAO, NAD  HEENT:  sclera anicteric, MMM  Neck:  no thyromegaly, no LAD  CV:  nl S1 + S2, RRR, no m/r/g  Resp:  nl respiratory effort,  decreased BS b/l  GI/ Abd: soft, NT/ ND, BS +  Neuro:  No tremor t  MS:  no c/c/e, nl muscle tone  Skin:  no acanthosis  Psych:  affect appropriate    LABS:  12-28    144  |  95<L>  |  25.0<H>  ----------------------------<  59<L>  4.5   |  45.0<HH>  |  0.45<L>    Ca    8.4<L>      28 Dec 2020 07:58  Phos  2.5     12-28  Mg     1.9     12-28                          11.3   16.06 )-----------( 184      ( 28 Dec 2020 07:58 )             36.5       CAPILLARY BLOOD GLUCOSE  POCT Blood Glucose.: 83 mg/dL (28 Dec 2020 12:00)  POCT Blood Glucose.: 76 mg/dL (28 Dec 2020 05:16)  POCT Blood Glucose.: 172 mg/dL (27 Dec 2020 22:20)  POCT Blood Glucose.: 304 mg/dL (27 Dec 2020 17:32)

## 2020-12-28 NOTE — PROVIDER CONTACT NOTE (CRITICAL VALUE NOTIFICATION) - TEST AND RESULT REPORTED:
co2-45
CO2 45
PCO2 > 108  pH 7.1  unable to read chloride due to CO2
lactate  2.5
vanco trough=25.3
CO2 46.2
CO2 47
CO2
ptt greater than 200
Co2 46.0
co2 85, pH 7.38
ptt 121.1

## 2020-12-28 NOTE — PROGRESS NOTE ADULT - SUBJECTIVE AND OBJECTIVE BOX
[facility], THE HEART CENTER                                   [address] [place] [address]                                                      PHONE: [phone]                                                         FAX: [phone]  [email or web address]  ---------------------------------------------------------------------------------------------------------------------------------    Overnight events/patient complaints:     INTERPRETATION OF TELEMETRY (personally reviewed):    ECG: ecg< from: 12 Lead ECG (12.21.20 @ 11:06) > Atrial fibrillation. Low voltage QRS. Nonspecific T wave abnormality    ECHOCARDIOGRAM independently visualized/reviewed and demonstrated :    1. Technically difficult study.   2. Endocardial visualization was enhanced with intravenous echo contrast.   3. Left ventricular ejection fraction, by visual estimation, is 60 to 65%.   4. Normal global left ventricular systolic function.   5. The left ventricular diastolic function could not be assessed in this study.   6. Normal left ventricular internal cavity size.   7. Moderately enlarged right ventricle. Moderately reduced RV systolic function.   8. The left atrium is normal in size.   9. Moderately enlarged right atrium.  10. Mild thickening and calcification of the anterior and posterior mitral valve leaflets.  11. Mild mitral valve regurgitation.  12. Mild tricuspid regurgitation.  13. There is no evidence of pericardial effusion.    CARDIAC CATH:  LM:   --  LM: Normal. LAD:   --  Mid LAD: There was a 80 % stenosis.  CX:   --  Circumflex: Angiography showed minor luminal irregularities with no flow limiting lesions. The vessel arose anomalously from the proximal  RCA:   --  RCA: iFR index of 0.82. Mid RCA: There was a 80 % stenosis. Distal RCA: There was a 90 % stenosis.  DIAGNOSTIC IMPRESSIONS: Severe RCA and LAD disease. The RCA was treated with PTCA and STENT (THI-Resolute) with IVUS. Plavix and coumadin Will need PCI of LAD in future      I&O's Summary    27 Dec 2020 07:01  -  28 Dec 2020 07:00  --------------------------------------------------------  IN: 1070 mL / OUT: 550 mL / NET: 520 mL      PAST MEDICAL & SURGICAL HISTORY:  Seizure  Depression, unspecified depression type  Diabetes  Hypertension  Hyperlipidemia  Afib  COPD (chronic obstructive pulmonary disease)  Abdominal hernia  H/O carotid endarterectomy    No Known Allergies    MEDICATIONS  (STANDING):  albuterol/ipratropium for Nebulization 3 milliLiter(s) Nebulizer every 6 hours  aspirin  chewable 81 milliGRAM(s) Oral daily  atorvastatin 40 milliGRAM(s) Oral at bedtime  chlorhexidine 2% Cloths 1 Application(s) Topical <User Schedule>  citalopram 40 milliGRAM(s) Oral daily  clopidogrel Tablet 75 milliGRAM(s) Oral daily  dextrose 40% Gel 15 Gram(s) Oral once  dextrose 5%. 1000 milliLiter(s) (50 mL/Hr) IV Continuous <Continuous>  dextrose 5%. 1000 milliLiter(s) (100 mL/Hr) IV Continuous <Continuous>  dextrose 50% Injectable 25 Gram(s) IV Push once  dextrose 50% Injectable 12.5 Gram(s) IV Push once  dextrose 50% Injectable 25 Gram(s) IV Push once  digoxin     Tablet 0.125 milliGRAM(s) Oral daily  diltiazem    Tablet 60 milliGRAM(s) Oral four times a day  famotidine    Tablet 20 milliGRAM(s) Oral two times a day  glucagon  Injectable 1 milliGRAM(s) IntraMuscular once  heparin  Infusion.  Unit(s)/Hr (19 mL/Hr) IV Continuous <Continuous>  influenza   Vaccine 0.5 milliLiter(s) IntraMuscular once  insulin glargine Injectable (LANTUS) 30 Unit(s) SubCutaneous at bedtime  insulin lispro (ADMELOG) corrective regimen sliding scale   SubCutaneous Before meals and at bedtime  insulin lispro Injectable (ADMELOG) 22 Unit(s) SubCutaneous three times a day before meals  levETIRAcetam  IVPB 500 milliGRAM(s) IV Intermittent every 12 hours  methylPREDNISolone sodium succinate Injectable 40 milliGRAM(s) IV Push every 12 hours  metoprolol tartrate 25 milliGRAM(s) Oral two times a day  polyethylene glycol 3350 17 Gram(s) Oral daily  saccharomyces boulardii 250 milliGRAM(s) Oral two times a day    MEDICATIONS  (PRN):  aluminum hydroxide/magnesium hydroxide/simethicone Suspension 30 milliLiter(s) Oral every 4 hours PRN Dyspepsia  heparin   Injectable 9000 Unit(s) IV Push every 6 hours PRN For aPTT less than 40  heparin   Injectable 4000 Unit(s) IV Push every 6 hours PRN For aPTT between 40 - 57    Vital Signs Last 24 Hrs  T(C): 36.8 (27 Dec 2020 23:06), Max: 36.8 (27 Dec 2020 23:06)  T(F): 98.2 (27 Dec 2020 23:06), Max: 98.2 (27 Dec 2020 23:06)  HR: 100 (28 Dec 2020 04:00) (86 - 142)  BP: 146/95 (28 Dec 2020 04:00) (110/54 - 175/69)  BP(mean): 106 (28 Dec 2020 04:00) (87 - 106)  RR: 17 (28 Dec 2020 04:00) (14 - 22)  SpO2: 100% (28 Dec 2020 04:00) (52% - 100%)  ICU Vital Signs Last 24 Hrs    PHYSICAL EXAM:  General: Appears well developed, well nourished alert and cooperative.  HEENT: Head; normocephalic, atraumatic.Pupils reactive, cornea wnl. Neck supple, no nodes adenopathy, no JVD  CARDIOVASCULAR: Normal S1 and S2, 1/6 IRINA, no rub, gallop or lift.   LUNGS: No rales, rhonchi or wheeze. Normal breath sounds bilaterally.  ABDOMEN: Soft, nontender without mass or organomegaly. bowel sounds normoactive.  EXTREMITIES: No clubbing, cyanosis or edema.   SKIN: warm and dry with normal turgor.  NEURO: Alert/oriented x 3/normal motor exam.   PSYCH: normal affect.        LABS:                        11.3   16.06 )-----------( 184      ( 28 Dec 2020 07:58 )             36.5     12-28    144  |  95<L>  |  25.0<H>  ----------------------------<  59<L>  4.5   |  x   |  0.45<L>    Ca    8.4<L>      28 Dec 2020 07:58  Phos  2.5     12-28  Mg     1.9     12-28      CLAUDIA MABRY      PTT - ( 28 Dec 2020 07:58 )  PTT:>200.0 sec      RADIOLOGY & ADDITIONAL STUDIES:    ASSESSMENT AND PLAN:  In summary, CLAUDIA MABRY is a 70y Male with past medical history significant for severe COPD, on home O2, DM type 2, HTN, permanent AF, admitted with recurrent hypoxic/hypercapneic respiratory failure and agonal respiration 12/10 s/p emergent intubation followed by PEA arrest, then developed VT with a pulse, s/p DCCV and initiation of amiodarone s/p cath which revealed severe RCA and LAD disease s/p THI to RCA however with agitation during the procedure resulting in R femoral artery pseudoaneurysm s/p thrombin injection RFA by IR, now pending staged intervention to the LAD. Patient now with increased work of breathing requiring escalation of O2 support, now on BiPAP     CAD/pulmonary edema complicated by hypoxic respiratory failure   - continue BiPAP support   - Continue ASA with Plavix  -  intervention on LAD will likely be delayed given his persistent hypoxic respiratory failure requiring continuous BiPAP support  - Mixed acid base disorder, pCO2 and serum bicarb noted with concurrent hypochloremia: no further diuretics no now   - repeat labs   - continue bronchodilator therapy given underlying COPD   - repeat BMP  - continue telemetry monitoring     HTN  - BP controlled    Dyslipidemia  - Continue statin    AF  - Rates controlled with diltiazem and digoxin   - Coumadin held at this time given R femoral artery pseudoaneurysm s/p thrombin injection RFA by IR now on heparin gtt     VT  - On amiodarone 200 mg daily    Thank you for allowing [facility] to participate in the care of this patient.  Please feel free to call with any questions or concerns.                  [facility], [facility]                                   [address] [place] [address]                                                      PHONE: [phone]                                                         FAX: [phone]  [email or web address]  ---------------------------------------------------------------------------------------------------------------------------------    Overnight events/patient complaints: remains on BiPAP     INTERPRETATION OF TELEMETRY (personally reviewed)    ECG: ecg< from: 12 Lead ECG (12.21.20 @ 11:06) > Atrial fibrillation. Low voltage QRS. Nonspecific T wave abnormality    ECHOCARDIOGRAM independently visualized/reviewed and demonstrated :    1. Technically difficult study.   2. Endocardial visualization was enhanced with intravenous echo contrast.   3. Left ventricular ejection fraction, by visual estimation, is 60 to 65%.   4. Normal global left ventricular systolic function.   5. The left ventricular diastolic function could not be assessed in this study.   6. Normal left ventricular internal cavity size.   7. Moderately enlarged right ventricle. Moderately reduced RV systolic function.   8. The left atrium is normal in size.   9. Moderately enlarged right atrium.  10. Mild thickening and calcification of the anterior and posterior mitral valve leaflets.  11. Mild mitral valve regurgitation.  12. Mild tricuspid regurgitation.  13. There is no evidence of pericardial effusion.    CARDIAC CATH:  LM:   --  LM: Normal. LAD:   --  Mid LAD: There was a 80 % stenosis.  CX:   --  Circumflex: Angiography showed minor luminal irregularities with no flow limiting lesions. The vessel arose anomalously from the proximal  RCA:   --  RCA: iFR index of 0.82. Mid RCA: There was a 80 % stenosis. Distal RCA: There was a 90 % stenosis.  DIAGNOSTIC IMPRESSIONS: Severe RCA and LAD disease. The RCA was treated with PTCA and STENT (THI-Resolute) with IVUS. Plavix and coumadin Will need PCI of LAD in future      I&O's Summary    27 Dec 2020 07:01  -  28 Dec 2020 07:00  --------------------------------------------------------  IN: 1070 mL / OUT: 550 mL / NET: 520 mL      PAST MEDICAL & SURGICAL HISTORY:  Seizure  Depression, unspecified depression type  Diabetes  Hypertension  Hyperlipidemia  Afib  COPD (chronic obstructive pulmonary disease)  Abdominal hernia  H/O carotid endarterectomy    No Known Allergies    MEDICATIONS  (STANDING):  albuterol/ipratropium for Nebulization 3 milliLiter(s) Nebulizer every 6 hours  aspirin  chewable 81 milliGRAM(s) Oral daily  atorvastatin 40 milliGRAM(s) Oral at bedtime  chlorhexidine 2% Cloths 1 Application(s) Topical <User Schedule>  citalopram 40 milliGRAM(s) Oral daily  clopidogrel Tablet 75 milliGRAM(s) Oral daily  dextrose 40% Gel 15 Gram(s) Oral once  dextrose 5%. 1000 milliLiter(s) (50 mL/Hr) IV Continuous <Continuous>  dextrose 5%. 1000 milliLiter(s) (100 mL/Hr) IV Continuous <Continuous>  dextrose 50% Injectable 25 Gram(s) IV Push once  dextrose 50% Injectable 12.5 Gram(s) IV Push once  dextrose 50% Injectable 25 Gram(s) IV Push once  digoxin     Tablet 0.125 milliGRAM(s) Oral daily  diltiazem    Tablet 60 milliGRAM(s) Oral four times a day  famotidine    Tablet 20 milliGRAM(s) Oral two times a day  glucagon  Injectable 1 milliGRAM(s) IntraMuscular once  heparin  Infusion.  Unit(s)/Hr (19 mL/Hr) IV Continuous <Continuous>  influenza   Vaccine 0.5 milliLiter(s) IntraMuscular once  insulin glargine Injectable (LANTUS) 30 Unit(s) SubCutaneous at bedtime  insulin lispro (ADMELOG) corrective regimen sliding scale   SubCutaneous Before meals and at bedtime  insulin lispro Injectable (ADMELOG) 22 Unit(s) SubCutaneous three times a day before meals  levETIRAcetam  IVPB 500 milliGRAM(s) IV Intermittent every 12 hours  methylPREDNISolone sodium succinate Injectable 40 milliGRAM(s) IV Push every 12 hours  metoprolol tartrate 25 milliGRAM(s) Oral two times a day  polyethylene glycol 3350 17 Gram(s) Oral daily  saccharomyces boulardii 250 milliGRAM(s) Oral two times a day    MEDICATIONS  (PRN):  aluminum hydroxide/magnesium hydroxide/simethicone Suspension 30 milliLiter(s) Oral every 4 hours PRN Dyspepsia  heparin   Injectable 9000 Unit(s) IV Push every 6 hours PRN For aPTT less than 40  heparin   Injectable 4000 Unit(s) IV Push every 6 hours PRN For aPTT between 40 - 57    Vital Signs Last 24 Hrs  T(C): 36.8 (27 Dec 2020 23:06), Max: 36.8 (27 Dec 2020 23:06)  T(F): 98.2 (27 Dec 2020 23:06), Max: 98.2 (27 Dec 2020 23:06)  HR: 100 (28 Dec 2020 04:00) (86 - 142)  BP: 146/95 (28 Dec 2020 04:00) (110/54 - 175/69)  BP(mean): 106 (28 Dec 2020 04:00) (87 - 106)  RR: 17 (28 Dec 2020 04:00) (14 - 22)  SpO2: 100% (28 Dec 2020 04:00) (52% - 100%)  ICU Vital Signs Last 24 Hrs    PHYSICAL EXAM:  General: chronically ill appearing   HEENT: Head; normocephalic, atraumatic.Pupils reactive, cornea wnl. Neck supple, no nodes adenopathy, no JVD  CARDIOVASCULAR: Normal S1 and S2, 2/6 IRINA, no rub, gallop or lift.   LUNGS: No rales, rhonchi or wheeze. Normal breath sounds bilaterally.  ABDOMEN: Soft, nontender without mass or organomegaly. bowel sounds normoactive.  EXTREMITIES: No clubbing, cyanosis or (+) trivial edema.   SKIN: warm and dry with normal turgor.  NEURO: Alert/oriented x 2/normal motor exam.   PSYCH: lethargic      LABS:                        11.3   16.06 )-----------( 184      ( 28 Dec 2020 07:58 )             36.5     12-28    144  |  95<L>  |  25.0<H>  ----------------------------<  59<L>  4.5   |  x   |  0.45<L>    Ca    8.4<L>      28 Dec 2020 07:58  Phos  2.5     12-28  Mg     1.9     12-28      CLAUDIA MABRY      PTT - ( 28 Dec 2020 07:58 )  PTT:>200.0 sec      RADIOLOGY & ADDITIONAL STUDIES:    ASSESSMENT AND PLAN:  In summary, CLAUDIA MABRY is a 70y Male with past medical history significant for severe COPD, on home O2, DM type 2, HTN, permanent AF, admitted with recurrent hypoxic/hypercapneic respiratory failure and agonal respiration 12/10 s/p emergent intubation followed by PEA arrest, then developed VT with a pulse, s/p DCCV and initiation of amiodarone s/p cath which revealed severe RCA and LAD disease s/p THI to RCA however with agitation during the procedure resulting in R femoral artery pseudoaneurysm s/p thrombin injection RFA by IR, now pending staged intervention to the LAD. Patient now with increased work of breathing requiring escalation of O2 support, now on BiPAP     CAD/pulmonary edema complicated by hypoxic respiratory failure   - continue BiPAP support and wean as tolerated   - Continue ASA with Plavix  -  intervention on LAD will likely be delayed given his persistent hypoxic respiratory failure requiring continuous BiPAP support, will re-evaluate in the am   - Mixed acid base disorder, pCO2 and serum bicarb noted with concurrent hypochloremia: no further diuretics no now   - repeat labs   - continue bronchodilator therapy given underlying COPD   - continue telemetry monitoring     HTN  - BP controlled    Dyslipidemia  - Continue statin    AF  - Rates controlled with diltiazem and digoxin   - Coumadin held at this time given R femoral artery pseudoaneurysm s/p thrombin injection RFA by IR now on heparin gtt     VT  - On amiodarone 200 mg daily    Thank you for allowing [facility] to participate in the care of this patient.  Please feel free to call with any questions or concerns.

## 2020-12-28 NOTE — CONSULT NOTE ADULT - ASSESSMENT
Acute on Chronic Resp failure  improved   COPD with exacerbation O2 dep and Nocturnal BIPAP and prn    Solumedrol q 12  to keep until after cath today then taper    B/L infiltrates  treated with Zosyn (complete)    Nasal canula  keep SATs > 90 %     Paroxysmal AFIB with RVR   Acute on Chronic CHF  echo 12/21/2020  ef 60-65%  mod enlarged RV reduced RV systolic function   VTACH with  cath severe  RCA dx  - stent  and LAD dz 12/15     Obesity-  DASK TLC diet tray called for as soon as procedure reschedued     ELISSA Sleep upright Nocturnal BIPAP    Post Catheterization:   Large right groin hematoma   CT duplex with  pseudoaneurysm  R groin thrombin  injection   Acute Blood Loss Anemias  2units PRBC's on 12/18     Staged PCI to lAD tomorrow    GOC  No limitations on treatment plan  Does not want permanent ET or Tracheostomy tube if unable to wean off VENT post procedure  Remains a Full Code                       Acute on Chronic Resp failure  improved   COPD with exacerbation O2 dep and Nocturnal BIPAP and prn    Solumedrol q 12  to keep until after cath today then taper    B/L infiltrates  treated with Zosyn (complete)    Nasal canula  keep SATs > 90 %     Paroxysmal AFIB with RVR   Acute on Chronic CHF  echo 12/21/2020  ef 60-65%  mod enlarged RV reduced RV systolic function   VTACH with  cath severe  RCA dx  - stent  and LAD dz 12/15     Obesity-  DASK TLC diet tray called for as soon as procedure reschedued     ELISSA Sleep upright Nocturnal BIPAP    Post Catheterization:   Large right groin hematoma   CT duplex with  pseudoaneurysm  R groin thrombin  injection   Acute Blood Loss Anemias  2units PRBC's on 12/18     Staged PCI to lAD tomorrow    GOC  No limitations on treatment plan  Going home after procedure  Does not want permanent ET or Tracheostomy tube if unable to wean off VENT post procedure  Remains a Full Code

## 2020-12-28 NOTE — PROGRESS NOTE ADULT - SUBJECTIVE AND OBJECTIVE BOX
Mr Cardozo  Patient is a 70y old  Male who presents with a chief complaint of Acute hypoxic/hypercapnic respiratory failure, cardiac arrest (28 Dec 2020 13:48)      BRIEF HOSPITAL COURSE: ***    Events last 24 hours: **  currently on nasal canula    sats > 90 %   afebrile    secretions per patient improved - hungry  no cough no sob     PAST MEDICAL & SURGICAL HISTORY:  Seizure    Depression, unspecified depression type    Diabetes    Hypertension    Hyperlipidemia    Afib    COPD (chronic obstructive pulmonary disease)    Abdominal hernia    H/O carotid endarterectomy          Medications:    digoxin     Tablet 0.125 milliGRAM(s) Oral daily  diltiazem    Tablet 60 milliGRAM(s) Oral four times a day  diltiazem Injectable 10 milliGRAM(s) IV Push every 6 hours PRN  metoprolol tartrate 25 milliGRAM(s) Oral two times a day    albuterol/ipratropium for Nebulization 3 milliLiter(s) Nebulizer every 6 hours    citalopram 40 milliGRAM(s) Oral daily  levETIRAcetam  IVPB 500 milliGRAM(s) IV Intermittent every 12 hours      aspirin  chewable 81 milliGRAM(s) Oral daily  clopidogrel Tablet 75 milliGRAM(s) Oral daily    aluminum hydroxide/magnesium hydroxide/simethicone Suspension 30 milliLiter(s) Oral every 4 hours PRN  famotidine    Tablet 20 milliGRAM(s) Oral two times a day  polyethylene glycol 3350 17 Gram(s) Oral daily      atorvastatin 40 milliGRAM(s) Oral at bedtime  dextrose 40% Gel 15 Gram(s) Oral once  dextrose 50% Injectable 25 Gram(s) IV Push once  dextrose 50% Injectable 12.5 Gram(s) IV Push once  dextrose 50% Injectable 25 Gram(s) IV Push once  glucagon  Injectable 1 milliGRAM(s) IntraMuscular once  insulin glargine Injectable (LANTUS) 30 Unit(s) SubCutaneous at bedtime  insulin lispro (ADMELOG) corrective regimen sliding scale   SubCutaneous Before meals and at bedtime  insulin lispro Injectable (ADMELOG) 22 Unit(s) SubCutaneous three times a day before meals  methylPREDNISolone sodium succinate Injectable 40 milliGRAM(s) IV Push every 12 hours    dextrose 5%. 1000 milliLiter(s) IV Continuous <Continuous>  dextrose 5%. 1000 milliLiter(s) IV Continuous <Continuous>    influenza   Vaccine 0.5 milliLiter(s) IntraMuscular once    chlorhexidine 2% Cloths 1 Application(s) Topical <User Schedule>    saccharomyces boulardii 250 milliGRAM(s) Oral two times a day      ROS  -no chest pain no cough     ICU Vital Signs Last 24 Hrs  T(C): 36.4 (28 Dec 2020 12:00), Max: 36.8 (27 Dec 2020 23:06)  T(F): 97.5 (28 Dec 2020 12:00), Max: 98.2 (27 Dec 2020 23:06)  HR: 108 (28 Dec 2020 14:15) (86 - 142)  BP: 148/87 (28 Dec 2020 12:00) (119/67 - 175/69)  BP(mean): 102 (28 Dec 2020 12:00) (87 - 106)  ABP: --  ABP(mean): --  RR: 17 (28 Dec 2020 12:00) (14 - 20)  SpO2: 100% (28 Dec 2020 14:15) (52% - 100%)      ABG - ( 28 Dec 2020 12:30 )  pH, Arterial: 7.45  pH, Blood: x     /  pCO2: 69    /  pO2: 100   / HCO3: 46    / Base Excess: 20.9  /  SaO2: 98                  I&O's Detail    27 Dec 2020 07:01  -  28 Dec 2020 07:00  --------------------------------------------------------  IN:    Heparin Infusion: 250 mL    IV PiggyBack: 100 mL    Oral Fluid: 720 mL  Total IN: 1070 mL    OUT:    Incontinent per Condom Catheter (mL): 550 mL  Total OUT: 550 mL    Total NET: 520 mL      28 Dec 2020 07:01  -  28 Dec 2020 14:18  --------------------------------------------------------  IN:    Heparin Infusion: 45 mL  Total IN: 45 mL    OUT:    Incontinent per Condom Catheter (mL): 110 mL  Total OUT: 110 mL    Total NET: -65 mL            LABS:                        11.3   16.06 )-----------( 184      ( 28 Dec 2020 07:58 )             36.5     12-28    144  |  95<L>  |  25.0<H>  ----------------------------<  59<L>  4.5   |  45.0<HH>  |  0.45<L>    Ca    8.4<L>      28 Dec 2020 07:58  Phos  2.5     12-28  Mg     1.9     12-28            CAPILLARY BLOOD GLUCOSE      POCT Blood Glucose.: 83 mg/dL (28 Dec 2020 12:00)    PTT - ( 28 Dec 2020 07:58 )  PTT:>200.0 sec    CULTURES:      Physical Examination:    General:   obese male  communicative   no distress in bed   HEENT: Pupils equal, reactive to light.  Symmetric. crowded airway no lesions     PULM: Clear to auscultation bilaterally, no  wheezing rales  rhonchi     NECK: Supple, no lymphadenopathy, trachea midline    CVS: Regular rate and rhythm, no murmurs, rubs, or gallops    ABD: Soft, nondistended, nontender, normoactive bowel sounds, no masses    EXT:  b/l  edema  legs    right dark color -  hematoma improved     SKIN: Warm and well perfused, no rashes noted.    NEURO: Alert, oriented, interactive, nonfocal    DEVICES:     RADIOLOGY: ***

## 2020-12-28 NOTE — PROGRESS NOTE ADULT - ASSESSMENT
71y/o male past medical history significant for COPD, HTN, DM, pAF on coumadin, seizure d/o with recent hospitalization for hypercapnic respiratory failure presents with HTN emergency and subsequent PEA/VT arrest on 12/10 this admission.       1- acute on chronic resp failure  improved  2- copd  with exacerbation   3- afib/ acute on chronic chf   echo 12/21/2020  ef 60-65%  mod enlarged RV reduced RV systolic function  4- vtach with  cath severe  RCA dx  - stent  and LAD dz 12/15    5- obesity  adalberto   6- large right groin hematoma ct / duplex with  pseudoaneurysm  s/p  2units prbc   7- s/p 7 days zosyn   8- DM      - on nasal canula  keep sats > 90 %  -bipap  prn and at night   - solumedrol q 12  to keep until after cath today then taper  - rate control   -nebulizers   q  6 hours   -will need  outpt   AVAP   -cath today with  possible intubation  vs AVAP     will follow  please feel free to reach out with any questions or suggestions    RHIANNON

## 2020-12-28 NOTE — CONSULT NOTE ADULT - PROVIDER SPECIALTY LIST ADULT
Vascular Surgery
Palliative Care
Pulmonology
Cardiology
Electrophysiology
Endocrinology
Critical Care
MICU
Infectious Disease

## 2020-12-28 NOTE — CONSULT NOTE ADULT - ATTENDING COMMENTS
COUNSELING:    Face to face meeting to discuss Advanced Care Planning - Time Spent ______ Minutes.  See goals of care note.    More than 50% time spent in counseling and coordinating care. ____45__ Minutes.     I spoke with Patient briefly regarding possibility of being intubated and needing Vent support after procedure.  He does not want to be kept alive indefinitely on a VENT especially if he can't come off or be successfully weaned.   Continues to want aggressive care  FULL CODE      Thank you for the opportunity to assist with the care of this patient.   [facility] Consult Service [phone].
70M w/ PMHx COPD on home O2 admitted on 12/10 w/ hypercapnic respiratory failure and cardiac arrest. Pt was subsequently downgraded on 12/13. Pt had a LHC on 12/15 which revealed severe RCA/ LAD disease and he underwent PCI to RCA. Was scheduled for staged PCI of LAD today. ICU reconsulted after ABG revealed respiratory acidosis after which pt was placed on a BiPAP.  On ICU eval, pt was hemodynamically stable and mentating at baseline. On BiPAP 16/6/35% w/ Vt ~ 350-400 range    Plan   Continue BiPAP for now and repeat ABG. If persisted CO2 retention/ acidosis suggest upgrade to AVAPS  On Duoneb and steroid taper  Pulmonary/ cards f/u   No ICU needs at this time. Please reconsult as needed

## 2020-12-28 NOTE — CONSULT NOTE ADULT - CONSULT REASON
Cardiac Arrest
Hyperactive delirium/ acute on chronic hypoxic respiratory failure
VT
staph epidermidis in blood culture
uncontrolled diabetes
Hypoxic and Hypercapnic Respiratory Failure
Bipap
Right Groin Pseudoaneurysm
69 yo M PMH COPD, HTN, DM, pAF, on Coumadin, Seizures   Recent hospitalization for Hypercapnic Respiratory Failure  Hypertensive Emergency with subsequent PEA, VT on 12/10/20

## 2020-12-28 NOTE — PROVIDER CONTACT NOTE (CRITICAL VALUE NOTIFICATION) - ACTION/TREATMENT ORDERED:
Patient 3LNC. 99%. MD aware. No new orders. will monitor.
NNO. Pt is compliant with bipap and has been wearing for most of night.
MD made aware. No new order given at this time.
follow nomogram

## 2020-12-28 NOTE — CONSULT NOTE ADULT - REASON FOR ADMISSION
Acute hypoxic/hypercapnic respiratory failure, cardiac arrest

## 2020-12-28 NOTE — PROGRESS NOTE ADULT - ASSESSMENT
70 year old male with severe COPD on home O2, Type 2 DM, HTN, Afib, admitted with hypoxic and hypercapneic respiratory failure requiring intubation followed by PEA arrest and VT with cardiac cath showing severe RCA and LAD disease.  He is s/p THI to RCA and is awaiting staged intervention to the LAD.  He has developed steroid induced hyperglycemia this admission.    1.  Type 2 DM-  BG improved in NPO setting today.  Will continue current insulin doses and monitor BG.    2.  CAD-  awaiting cardiac cath for PCI to LAD.  Management per cardiac team  3.  COPD/ hypercapneic resp failure-  continue steroids and resp Rx as per medicine  4.  HLD-  continue statin.

## 2020-12-28 NOTE — PROVIDER CONTACT NOTE (CRITICAL VALUE NOTIFICATION) - PERSON GIVING RESULT:
lab
mai
:  Martinez Paul
[facility]
Moisés Clemens, lab
chemistry/Rose francis
Karen MARCUS
lab [place]
tamanna morales
Arcenio
Miguel, RT
Katerin Stewart

## 2020-12-28 NOTE — PROGRESS NOTE ADULT - SUBJECTIVE AND OBJECTIVE BOX
Patient is a 70y old  Male who presents with a chief complaint of Acute hypoxic/hypercapnic respiratory failure, cardiac arrest (25 Dec 2020 10:42)      SUBJECTIVE / OVERNIGHT EVENTS: Pt was on BiPAP all night & off BiPAP this this am. More alert today.   REVIEW OF SYSTEMS: All systems are reviewed and found to be negative except above    MEDICATIONS  (STANDING):  albuterol/ipratropium for Nebulization 3 milliLiter(s) Nebulizer every 6 hours  aspirin  chewable 81 milliGRAM(s) Oral daily  atorvastatin 40 milliGRAM(s) Oral at bedtime  chlorhexidine 2% Cloths 1 Application(s) Topical <User Schedule>  citalopram 40 milliGRAM(s) Oral daily  clopidogrel Tablet 75 milliGRAM(s) Oral daily  dextrose 40% Gel 15 Gram(s) Oral once  dextrose 5%. 1000 milliLiter(s) (50 mL/Hr) IV Continuous <Continuous>  dextrose 5%. 1000 milliLiter(s) (100 mL/Hr) IV Continuous <Continuous>  dextrose 50% Injectable 25 Gram(s) IV Push once  dextrose 50% Injectable 12.5 Gram(s) IV Push once  dextrose 50% Injectable 25 Gram(s) IV Push once  digoxin     Tablet 0.125 milliGRAM(s) Oral daily  diltiazem    Tablet 60 milliGRAM(s) Oral four times a day  famotidine    Tablet 20 milliGRAM(s) Oral two times a day  glucagon  Injectable 1 milliGRAM(s) IntraMuscular once  influenza   Vaccine 0.5 milliLiter(s) IntraMuscular once  insulin glargine Injectable (LANTUS) 30 Unit(s) SubCutaneous at bedtime  insulin lispro (ADMELOG) corrective regimen sliding scale   SubCutaneous Before meals and at bedtime  insulin lispro Injectable (ADMELOG) 18 Unit(s) SubCutaneous three times a day before meals  levETIRAcetam  IVPB 500 milliGRAM(s) IV Intermittent every 12 hours  methylPREDNISolone sodium succinate Injectable 40 milliGRAM(s) IV Push every 12 hours  metoprolol tartrate 25 milliGRAM(s) Oral two times a day  polyethylene glycol 3350 17 Gram(s) Oral daily  saccharomyces boulardii 250 milliGRAM(s) Oral two times a day    MEDICATIONS  (PRN):  aluminum hydroxide/magnesium hydroxide/simethicone Suspension 30 milliLiter(s) Oral every 4 hours PRN Dyspepsia      CAPILLARY BLOOD GLUCOSE      POCT Blood Glucose.: 205 mg/dL (25 Dec 2020 12:04)  POCT Blood Glucose.: 175 mg/dL (25 Dec 2020 08:06)  POCT Blood Glucose.: 191 mg/dL (24 Dec 2020 23:02)  POCT Blood Glucose.: 263 mg/dL (24 Dec 2020 17:19)    I&O's Summary    24 Dec 2020 07:01  -  25 Dec 2020 07:00  --------------------------------------------------------  IN: 450 mL / OUT: 650 mL / NET: -200 mL    25 Dec 2020 07:01  -  25 Dec 2020 13:17  --------------------------------------------------------  IN: 290 mL / OUT: 0 mL / NET: 290 mL        PHYSICAL EXAM:  Vital Signs Last 24 Hrs  T(C): 36.2 (25 Dec 2020 09:44), Max: 36.8 (24 Dec 2020 16:10)  T(F): 97.2 (25 Dec 2020 09:44), Max: 98.2 (24 Dec 2020 16:10)  HR: 90 (25 Dec 2020 09:44) (72 - 113)  BP: 119/74 (25 Dec 2020 09:44) (105/50 - 153/66)  BP(mean): 57 (24 Dec 2020 23:00) (57 - 86)  RR: 18 (25 Dec 2020 09:44) (18 - 22)  SpO2: 98% (25 Dec 2020 09:44) (89% - 100%)    CONSTITUTIONAL: NAD, morbidly obese  EYES: PERRLA; conjunctiva and sclera clear  ENMT: Moist oral mucosa, ly  RESPIRATORY: decreased BS b/l  CARDIOVASCULAR: Regular rate and rhythm, normal S1 and S2, no murmur   EXTS: + lower extremity edema-Improving; Peripheral pulses are 2+ bilaterally, multiple bruises on b/l UE  ABDOMEN: Nontender to palpation, normoactive bowel sounds, no rebound/guarding;   PSYCH: affect appropriate  NEUROLOGY: lethargic but arousable, AAO x 3 when arousable; CN 2-12 are intact and symmetric; no gross sensory deficits; motor strength 5/5 x 4, sensation intact  R groin hematoma improving       LABS:                        11.9   23.02 )-----------( 244      ( 24 Dec 2020 07:31 )             38.6     12-25    145  |  93<L>  |  30.0<H>  ----------------------------<  164<H>  4.2   |  47.0<HH>  |  0.58    Ca    8.6      25 Dec 2020 06:49                  RADIOLOGY & ADDITIONAL TESTS:  Results Reviewed:   Imaging Personally Reviewed:  Electrocardiogram Personally Reviewed:    COORDINATION OF CARE:  Care Discussed with Consultants/Other Providers [Y/N]:  Prior or Outpatient Records Reviewed [Y/N]:

## 2020-12-29 ENCOUNTER — TRANSCRIPTION ENCOUNTER (OUTPATIENT)
Age: 70
End: 2020-12-29

## 2020-12-29 LAB
ANION GAP SERPL CALC-SCNC: 7 MMOL/L — SIGNIFICANT CHANGE UP (ref 5–17)
APTT BLD: 21.2 SEC — LOW (ref 27.5–35.5)
APTT BLD: 22.2 SEC — LOW (ref 27.5–35.5)
BASOPHILS # BLD AUTO: 0.01 K/UL — SIGNIFICANT CHANGE UP (ref 0–0.2)
BASOPHILS NFR BLD AUTO: 0.1 % — SIGNIFICANT CHANGE UP (ref 0–2)
BUN SERPL-MCNC: 29 MG/DL — HIGH (ref 8–20)
CALCIUM SERPL-MCNC: 8.3 MG/DL — LOW (ref 8.6–10.2)
CHLORIDE SERPL-SCNC: 95 MMOL/L — LOW (ref 98–107)
CO2 SERPL-SCNC: 40 MMOL/L — HIGH (ref 22–29)
CREAT SERPL-MCNC: 0.5 MG/DL — SIGNIFICANT CHANGE UP (ref 0.5–1.3)
EOSINOPHIL # BLD AUTO: 0 K/UL — SIGNIFICANT CHANGE UP (ref 0–0.5)
EOSINOPHIL NFR BLD AUTO: 0 % — SIGNIFICANT CHANGE UP (ref 0–6)
GLUCOSE BLDC GLUCOMTR-MCNC: 202 MG/DL — HIGH (ref 70–99)
GLUCOSE BLDC GLUCOMTR-MCNC: 234 MG/DL — HIGH (ref 70–99)
GLUCOSE BLDC GLUCOMTR-MCNC: 253 MG/DL — HIGH (ref 70–99)
GLUCOSE BLDC GLUCOMTR-MCNC: 295 MG/DL — HIGH (ref 70–99)
GLUCOSE BLDC GLUCOMTR-MCNC: 301 MG/DL — HIGH (ref 70–99)
GLUCOSE SERPL-MCNC: 238 MG/DL — HIGH (ref 70–99)
HCT VFR BLD CALC: 34.7 % — LOW (ref 39–50)
HGB BLD-MCNC: 11 G/DL — LOW (ref 13–17)
IMM GRANULOCYTES NFR BLD AUTO: 0.6 % — SIGNIFICANT CHANGE UP (ref 0–1.5)
INR BLD: 1.01 RATIO — SIGNIFICANT CHANGE UP (ref 0.88–1.16)
LYMPHOCYTES # BLD AUTO: 0.26 K/UL — LOW (ref 1–3.3)
LYMPHOCYTES # BLD AUTO: 1.9 % — LOW (ref 13–44)
MAGNESIUM SERPL-MCNC: 2 MG/DL — SIGNIFICANT CHANGE UP (ref 1.6–2.6)
MCHC RBC-ENTMCNC: 28.9 PG — SIGNIFICANT CHANGE UP (ref 27–34)
MCHC RBC-ENTMCNC: 31.7 GM/DL — LOW (ref 32–36)
MCV RBC AUTO: 91.1 FL — SIGNIFICANT CHANGE UP (ref 80–100)
MONOCYTES # BLD AUTO: 1.08 K/UL — HIGH (ref 0–0.9)
MONOCYTES NFR BLD AUTO: 7.9 % — SIGNIFICANT CHANGE UP (ref 2–14)
NEUTROPHILS # BLD AUTO: 12.21 K/UL — HIGH (ref 1.8–7.4)
NEUTROPHILS NFR BLD AUTO: 89.5 % — HIGH (ref 43–77)
PHOSPHATE SERPL-MCNC: 2.9 MG/DL — SIGNIFICANT CHANGE UP (ref 2.4–4.7)
PLATELET # BLD AUTO: 169 K/UL — SIGNIFICANT CHANGE UP (ref 150–400)
POTASSIUM SERPL-MCNC: 4.9 MMOL/L — SIGNIFICANT CHANGE UP (ref 3.5–5.3)
POTASSIUM SERPL-SCNC: 4.9 MMOL/L — SIGNIFICANT CHANGE UP (ref 3.5–5.3)
PROTHROM AB SERPL-ACNC: 11.7 SEC — SIGNIFICANT CHANGE UP (ref 10.6–13.6)
RBC # BLD: 3.81 M/UL — LOW (ref 4.2–5.8)
RBC # FLD: 15.4 % — HIGH (ref 10.3–14.5)
SODIUM SERPL-SCNC: 141 MMOL/L — SIGNIFICANT CHANGE UP (ref 135–145)
WBC # BLD: 13.64 K/UL — HIGH (ref 3.8–10.5)
WBC # FLD AUTO: 13.64 K/UL — HIGH (ref 3.8–10.5)

## 2020-12-29 PROCEDURE — 99232 SBSQ HOSP IP/OBS MODERATE 35: CPT

## 2020-12-29 PROCEDURE — 93010 ELECTROCARDIOGRAM REPORT: CPT

## 2020-12-29 PROCEDURE — 99231 SBSQ HOSP IP/OBS SF/LOW 25: CPT

## 2020-12-29 RX ADMIN — Medication 25 MILLIGRAM(S): at 19:35

## 2020-12-29 RX ADMIN — FAMOTIDINE 20 MILLIGRAM(S): 10 INJECTION INTRAVENOUS at 06:24

## 2020-12-29 RX ADMIN — LEVETIRACETAM 420 MILLIGRAM(S): 250 TABLET, FILM COATED ORAL at 19:51

## 2020-12-29 RX ADMIN — Medication 40 MILLIGRAM(S): at 19:45

## 2020-12-29 RX ADMIN — Medication 25 MILLIGRAM(S): at 06:24

## 2020-12-29 RX ADMIN — Medication 3 MILLILITER(S): at 15:04

## 2020-12-29 RX ADMIN — CHLORHEXIDINE GLUCONATE 1 APPLICATION(S): 213 SOLUTION TOPICAL at 06:25

## 2020-12-29 RX ADMIN — Medication 8: at 12:09

## 2020-12-29 RX ADMIN — CLOPIDOGREL BISULFATE 75 MILLIGRAM(S): 75 TABLET, FILM COATED ORAL at 11:18

## 2020-12-29 RX ADMIN — Medication 60 MILLIGRAM(S): at 19:52

## 2020-12-29 RX ADMIN — Medication 250 MILLIGRAM(S): at 19:52

## 2020-12-29 RX ADMIN — Medication 4: at 06:25

## 2020-12-29 RX ADMIN — Medication 6: at 21:13

## 2020-12-29 RX ADMIN — Medication 22 UNIT(S): at 19:46

## 2020-12-29 RX ADMIN — LEVETIRACETAM 420 MILLIGRAM(S): 250 TABLET, FILM COATED ORAL at 06:24

## 2020-12-29 RX ADMIN — Medication 40 MILLIGRAM(S): at 06:24

## 2020-12-29 RX ADMIN — Medication 81 MILLIGRAM(S): at 11:18

## 2020-12-29 RX ADMIN — FAMOTIDINE 20 MILLIGRAM(S): 10 INJECTION INTRAVENOUS at 19:43

## 2020-12-29 RX ADMIN — CITALOPRAM 40 MILLIGRAM(S): 10 TABLET, FILM COATED ORAL at 11:18

## 2020-12-29 RX ADMIN — ATORVASTATIN CALCIUM 40 MILLIGRAM(S): 80 TABLET, FILM COATED ORAL at 20:59

## 2020-12-29 RX ADMIN — Medication 60 MILLIGRAM(S): at 11:18

## 2020-12-29 RX ADMIN — Medication 0.12 MILLIGRAM(S): at 06:24

## 2020-12-29 RX ADMIN — Medication 3 MILLILITER(S): at 20:47

## 2020-12-29 RX ADMIN — INSULIN GLARGINE 30 UNIT(S): 100 INJECTION, SOLUTION SUBCUTANEOUS at 21:27

## 2020-12-29 RX ADMIN — Medication 60 MILLIGRAM(S): at 06:24

## 2020-12-29 RX ADMIN — Medication 3 MILLILITER(S): at 08:21

## 2020-12-29 RX ADMIN — Medication 60 MILLIGRAM(S): at 22:57

## 2020-12-29 NOTE — PROGRESS NOTE ADULT - ASSESSMENT
70 year old male with severe COPD on home O2, Type 2 DM, HTN, Afib, admitted with hypoxic and hypercapneic respiratory failure requiring intubation followed by PEA arrest and VT with cardiac cath showing severe RCA and LAD disease.  He is s/p THI to RCA and is awaiting staged intervention to the LAD.  He has developed steroid induced hyperglycemia this admission.    1.  Type 2 DM-   Once cath is complete, resume basal bolus regimen and do not hold prandial insulin.  Based on BG in next 24 hours, will continue to adjust Rx.    2.  CAD-  awaiting cardiac cath for PCI to LAD.  Management per cardiac team  3.  COPD/ hypercapneic resp failure-  continue steroids and resp Rx as per medicine  4.  HLD-  continue statin.

## 2020-12-29 NOTE — PROGRESS NOTE ADULT - SUBJECTIVE AND OBJECTIVE BOX
PULMONARY  Follow UP    Mr Cardozo  -  [id number]    Patient is a 70y old  Male who presents with a chief complaint of Acute hypoxic/hypercapnic respiratory failure, cardiac arrest (29 Dec 2020 09:44)        Events last 24 hours: *  continues to improve on nasal canula  sats > 90 %  more interactive no cough no sob    ROS  constitutional  no fever no chills  heent  no sore throat  RESP   no  cough no  sob  CVS no chest pain   GI  no  vomiting  no diarrhea  MSK  no joint pains  SKIN  no rash     NEUROLOGIC no seizures no   headaches   no  tremors **    PAST MEDICAL & SURGICAL HISTORY:  Seizure    Depression, unspecified depression type    Diabetes    Hypertension    Hyperlipidemia    Afib    COPD (chronic obstructive pulmonary disease)    Abdominal hernia    H/O carotid endarterectomy          Medications:    digoxin     Tablet 0.125 milliGRAM(s) Oral daily  diltiazem    Tablet 60 milliGRAM(s) Oral four times a day  diltiazem Injectable 10 milliGRAM(s) IV Push every 6 hours PRN  metoprolol tartrate 25 milliGRAM(s) Oral two times a day    albuterol/ipratropium for Nebulization 3 milliLiter(s) Nebulizer every 6 hours    citalopram 40 milliGRAM(s) Oral daily  levETIRAcetam  IVPB 500 milliGRAM(s) IV Intermittent every 12 hours      aspirin  chewable 81 milliGRAM(s) Oral daily  clopidogrel Tablet 75 milliGRAM(s) Oral daily    aluminum hydroxide/magnesium hydroxide/simethicone Suspension 30 milliLiter(s) Oral every 4 hours PRN  famotidine    Tablet 20 milliGRAM(s) Oral two times a day  polyethylene glycol 3350 17 Gram(s) Oral daily      atorvastatin 40 milliGRAM(s) Oral at bedtime  dextrose 40% Gel 15 Gram(s) Oral once  dextrose 50% Injectable 25 Gram(s) IV Push once  dextrose 50% Injectable 12.5 Gram(s) IV Push once  dextrose 50% Injectable 25 Gram(s) IV Push once  glucagon  Injectable 1 milliGRAM(s) IntraMuscular once  insulin glargine Injectable (LANTUS) 30 Unit(s) SubCutaneous at bedtime  insulin lispro (ADMELOG) corrective regimen sliding scale   SubCutaneous Before meals and at bedtime  insulin lispro Injectable (ADMELOG) 22 Unit(s) SubCutaneous three times a day before meals  methylPREDNISolone sodium succinate Injectable 40 milliGRAM(s) IV Push every 12 hours    dextrose 5%. 1000 milliLiter(s) IV Continuous <Continuous>  dextrose 5%. 1000 milliLiter(s) IV Continuous <Continuous>    influenza   Vaccine 0.5 milliLiter(s) IntraMuscular once    chlorhexidine 2% Cloths 1 Application(s) Topical <User Schedule>    saccharomyces boulardii 250 milliGRAM(s) Oral two times a day          ICU Vital Signs Last 24 Hrs  T(C): 36.7 (29 Dec 2020 08:00), Max: 37.1 (28 Dec 2020 16:00)  T(F): 98 (29 Dec 2020 08:00), Max: 98.7 (28 Dec 2020 16:00)  HR: 103 (29 Dec 2020 11:17) (74 - 121)  BP: 123/71 (29 Dec 2020 11:17) (120/64 - 152/89)  BP(mean): 72 (29 Dec 2020 08:00) (72 - 97)  ABP: --  ABP(mean): --  RR: 20 (29 Dec 2020 11:17) (18 - 20)  SpO2: 100% (29 Dec 2020 11:17) (95% - 100%)      ABG - ( 28 Dec 2020 12:30 )  pH, Arterial: 7.45  pH, Blood: x     /  pCO2: 69    /  pO2: 100   / HCO3: 46    / Base Excess: 20.9  /  SaO2: 98                  I&O's Detail    28 Dec 2020 07:01  -  29 Dec 2020 07:00  --------------------------------------------------------  IN:    Heparin Infusion: 45 mL    IV PiggyBack: 100 mL    Oral Fluid: 120 mL  Total IN: 265 mL    OUT:    Incontinent per Condom Catheter (mL): 635 mL    Voided (mL): 250 mL  Total OUT: 885 mL    Total NET: -620 mL            LABS:                        11.0   13.64 )-----------( 169      ( 29 Dec 2020 06:56 )             34.7     12-29    141  |  95<L>  |  29.0<H>  ----------------------------<  238<H>  4.9   |  40.0<H>  |  0.50    Ca    8.3<L>      29 Dec 2020 07:00  Phos  2.9     12-29  Mg     2.0     12-29            CAPILLARY BLOOD GLUCOSE      POCT Blood Glucose.: 301 mg/dL (29 Dec 2020 11:25)    PTT - ( 29 Dec 2020 11:02 )  PTT:21.2 sec    CULTURES:      Physical Examination:    General:  obese male no distress in bed  speaking full sentences    HEENT: Pupils equal, reactive to light.  Symmetric.  crowded airway  no lesion no  thrush     PULM: Clear to auscultation bilaterally,  no wheezing rales  rhonchi      NECK: Supple, no lymphadenopathy, trachea midline    CVS: Regular rate and rhythm, no murmurs, rubs, or gallops    ABD: Soft, nondistended, nontender, normoactive bowel sounds, no masses    EXT:   mild edema      SKIN: Warm and well perfused, no rashes noted.  ecchymosis      NEURO: Alert, oriented, interactive, nonfocal        RADIOLOGY: ***

## 2020-12-29 NOTE — DISCHARGE NOTE PROVIDER - DETAILS OF MALNUTRITION DIAGNOSIS/DIAGNOSES
This patient has been assessed with a concern for Malnutrition and was treated during this hospitalization for the following Nutrition diagnosis/diagnoses:     -  12/29/2020: Moderate protein-calorie malnutrition   This patient has been assessed with a concern for Malnutrition and was treated during this hospitalization for the following Nutrition diagnosis/diagnoses:     -  12/29/2020: Moderate protein-calorie malnutrition    This patient has been assessed with a concern for Malnutrition and was treated during this hospitalization for the following Nutrition diagnosis/diagnoses:     -  12/29/2020: Moderate protein-calorie malnutrition   This patient has been assessed with a concern for Malnutrition and was treated during this hospitalization for the following Nutrition diagnosis/diagnoses:     -  12/29/2020: Moderate protein-calorie malnutrition    This patient has been assessed with a concern for Malnutrition and was treated during this hospitalization for the following Nutrition diagnosis/diagnoses:     -  12/29/2020: Moderate protein-calorie malnutrition    This patient has been assessed with a concern for Malnutrition and was treated during this hospitalization for the following Nutrition diagnosis/diagnoses:     -  12/29/2020: Moderate protein-calorie malnutrition   This patient has been assessed with a concern for Malnutrition and was treated during this hospitalization for the following Nutrition diagnosis/diagnoses:     -  12/29/2020: Moderate protein-calorie malnutrition    This patient has been assessed with a concern for Malnutrition and was treated during this hospitalization for the following Nutrition diagnosis/diagnoses:     -  12/29/2020: Moderate protein-calorie malnutrition    This patient has been assessed with a concern for Malnutrition and was treated during this hospitalization for the following Nutrition diagnosis/diagnoses:     -  12/29/2020: Moderate protein-calorie malnutrition    This patient has been assessed with a concern for Malnutrition and was treated during this hospitalization for the following Nutrition diagnosis/diagnoses:     -  12/29/2020: Moderate protein-calorie malnutrition   This patient has been assessed with a concern for Malnutrition and was treated during this hospitalization for the following Nutrition diagnosis/diagnoses:     -  12/29/2020: Moderate protein-calorie malnutrition    This patient has been assessed with a concern for Malnutrition and was treated during this hospitalization for the following Nutrition diagnosis/diagnoses:     -  12/29/2020: Moderate protein-calorie malnutrition    This patient has been assessed with a concern for Malnutrition and was treated during this hospitalization for the following Nutrition diagnosis/diagnoses:     -  12/29/2020: Moderate protein-calorie malnutrition    This patient has been assessed with a concern for Malnutrition and was treated during this hospitalization for the following Nutrition diagnosis/diagnoses:     -  12/29/2020: Moderate protein-calorie malnutrition    This patient has been assessed with a concern for Malnutrition and was treated during this hospitalization for the following Nutrition diagnosis/diagnoses:     -  12/29/2020: Moderate protein-calorie malnutrition

## 2020-12-29 NOTE — PROGRESS NOTE ADULT - SUBJECTIVE AND OBJECTIVE BOX
Cardiology Nurse Practitioner Note  pre procedure    70y Male with past medical history significant for COPD, HTN, DM, pAF on coumadin, seizure d/o with recent hospitalization for hypercapnic respiratory failure presents with HTN emergency and subsequent PEA/VT arrest on 12/10 this admission.  On 12/15/20 s/p PCI with THI to RCA c/b pseudosneurysm.  Pt for staged PCI to LAD.  Resp status has improved over the last few days and cleared for LHC.    VSS  Neuro: A&O X3  Lungs: very diminished and decreased, almost unable to hear  CV; irreg/irreg  Ext. + edema.  Left groin ecchymotic.  Small hematoma, no bleeding.  Unable to palpate pedal pulses.  + doppler        EKG: Afib with  bpm      ASA 3  Mallampati 4  Bleeding risk 1.3%      -Needs AVAP for procedure-resp aware  -Aspirin/plavix given  -Pt/INR pending  -coumadin has been held

## 2020-12-29 NOTE — DISCHARGE NOTE PROVIDER - NSDCMRMEDTOKEN_GEN_ALL_CORE_FT
budesonide-formoterol 160 mcg-4.5 mcg/inh inhalation aerosol: 2 puff(s) inhaled 2 times a day  Cardizem  mg/24 hours oral capsule, extended release: 2 cap(s) orally once a day  CeleXA 40 mg oral tablet: 1 tab(s) orally once a day  digoxin 125 mcg (0.125 mg) oral tablet: 1 tab(s) orally once a day   insulin glargine: 20 unit(s) subcutaneous once a day (at bedtime)  insulin glargine: 25 unit(s) subcutaneous once a day  insulin lispro 100 units/mL injectable solution: 7 unit(s) injectable 3 times a day (with meals)  Keppra 500 mg oral tablet: 1 tab(s) orally 2 times a day  lisinopril 20 mg oral tablet: 1 tab(s) orally once a day   metFORMIN 500 mg oral tablet: 1 tab(s) orally 2 times a day  metoprolol succinate 25 mg oral tablet, extended release: 1 tab(s) orally once a day  pravastatin 80 mg oral tablet: 1 tab(s) orally once a day (at bedtime)  predniSONE 10 mg oral tablet: 3 tab(s) orally once a day x 3 days  2 tab(s) orally once a day x 3 days  1 tab(s) orally once a day x 3 days  warfarin 5 mg oral tablet: 1 tab(s) orally once a day   alcohol swabs : Apply topically to affected area 4 times a day   aspirin 81 mg oral tablet, chewable: 1 tab(s) orally once a day  budesonide-formoterol 160 mcg-4.5 mcg/inh inhalation aerosol: 2 puff(s) inhaled 2 times a day  Cardizem  mg/24 hours oral capsule, extended release: 2 cap(s) orally once a day  CeleXA 40 mg oral tablet: 1 tab(s) orally once a day  digoxin 125 mcg (0.125 mg) oral tablet: 1 tab(s) orally once a day  famotidine 20 mg oral tablet: 1 tab(s) orally 2 times a day  glucometer (per patient&#x27;s insurance): Test blood sugars four times a day. Dispense #1 glucometer.  HumaLOG KwikPen 100 units/mL injectable solution: 22 unit(s) injectable 3 times a day (with meals)   Insulin Pen Needles, 4mm: 1 application subcutaneously 4 times a day. ** Use with insulin pen **   ipratropium-albuterol 0.5 mg-2.5 mg/3 mLinhalation solution: 3 milliliter(s) inhaled every 6 hours, As Needed MDD:12 ml  Keppra 500 mg oral tablet: 1 tab(s) orally 2 times a day  lancets: 1 application subcutaneously 4 times a day   Lantus Solostar Pen 100 units/mL subcutaneous solution: 30 unit(s) subcutaneous once a day (at bedtime)   metFORMIN 500 mg oral tablet: 1 tab(s) orally 2 times a day  metoprolol tartrate 25 mg oral tablet: 1 tab(s) orally 2 times a day  Plavix 75 mg oral tablet: 1 tab(s) orally once a day  pravastatin 80 mg oral tablet: 1 tab(s) orally once a day (at bedtime)  predniSONE 10 mg oral tablet: 4 tabs PO daily  x 3 days  3  tabs PO daily  x 3 days  2  tabs PO daily  x 3 days  1  tabs PO daily  x 3 days  Rolling walker x 1:   test strips (per patient&#x27;s insurance): 1 application subcutaneously 4 times a day. ** Compatible with patient&#x27;s glucometer **  warfarin 5 mg oral tablet: 1 tab(s) orally once a day   alcohol swabs : Apply topically to affected area 4 times a day   aspirin 81 mg oral tablet, chewable: 1 tab(s) orally once a day  budesonide-formoterol 160 mcg-4.5 mcg/inh inhalation aerosol: 2 puff(s) inhaled 2 times a day  Cardizem  mg/24 hours oral capsule, extended release: 2 cap(s) orally once a day  CeleXA 40 mg oral tablet: 1 tab(s) orally once a day  digoxin 125 mcg (0.125 mg) oral tablet: 1 tab(s) orally once a day  famotidine 20 mg oral tablet: 1 tab(s) orally 2 times a day  glucometer (per patient&#x27;s insurance): Test blood sugars four times a day. Dispense #1 glucometer.  HumaLOG KwikPen 100 units/mL injectable solution: 15 unit(s) injectable 3 times a day (with meals)   Insulin Pen Needles, 4mm: 1 application subcutaneously 4 times a day. ** Use with insulin pen **   ipratropium-albuterol 0.5 mg-2.5 mg/3 mLinhalation solution: 3 milliliter(s) inhaled every 6 hours, As Needed MDD:12 ml  Keppra 500 mg oral tablet: 1 tab(s) orally 2 times a day  lancets: 1 application subcutaneously 4 times a day   Lantus Solostar Pen 100 units/mL subcutaneous solution: 30 unit(s) subcutaneous once a day (at bedtime)   metoprolol tartrate 25 mg oral tablet: 1 tab(s) orally 2 times a day  Plavix 75 mg oral tablet: 1 tab(s) orally once a day  pravastatin 80 mg oral tablet: 1 tab(s) orally once a day (at bedtime)  predniSONE 10 mg oral tablet: 4 tabs PO daily  x 3 days  3  tabs PO daily  x 3 days  2  tabs PO daily  x 3 days  1  tabs PO daily  x 3 days  Rolling walker x 1:   test strips (per patient&#x27;s insurance): 1 application subcutaneously 4 times a day. ** Compatible with patient&#x27;s glucometer **  warfarin 5 mg oral tablet: 1 tab(s) orally once a day   alcohol swabs : Apply topically to affected area 4 times a day   aspirin 81 mg oral tablet, chewable: 1 tab(s) orally once a day  budesonide-formoterol 160 mcg-4.5 mcg/inh inhalation aerosol: 2 puff(s) inhaled 2 times a day  Cardizem  mg/24 hours oral capsule, extended release: 2 cap(s) orally once a day  CeleXA 40 mg oral tablet: 1 tab(s) orally once a day  digoxin 125 mcg (0.125 mg) oral tablet: 1 tab(s) orally once a day  famotidine 20 mg oral tablet: 1 tab(s) orally 2 times a day  glucometer (per patient&#x27;s insurance): Test blood sugars four times a day. Dispense #1 glucometer.  HumaLOG KwikPen 100 units/mL injectable solution: 10 unit(s) injectable 3 times a day (with meals)   Insulin Pen Needles, 4mm: 1 application subcutaneously 4 times a day. ** Use with insulin pen **   ipratropium-albuterol 0.5 mg-2.5 mg/3 mLinhalation solution: 3 milliliter(s) inhaled every 6 hours, As Needed MDD:12 ml  Keppra 500 mg oral tablet: 1 tab(s) orally 2 times a day  lancets: 1 application subcutaneously 4 times a day   Lantus Solostar Pen 100 units/mL subcutaneous solution: 15 unit(s) subcutaneous once a day (at bedtime)   metoprolol tartrate 25 mg oral tablet: 1 tab(s) orally 2 times a day  Plavix 75 mg oral tablet: 1 tab(s) orally once a day  pravastatin 80 mg oral tablet: 1 tab(s) orally once a day (at bedtime)  predniSONE 10 mg oral tablet: 4 tabs PO daily  x 3 days  3  tabs PO daily  x 3 days  2  tabs PO daily  x 3 days  1  tabs PO daily  x 3 days  Rolling walker x 1:   test strips (per patient&#x27;s insurance): 1 application subcutaneously 4 times a day. ** Compatible with patient&#x27;s glucometer **  warfarin 5 mg oral tablet: 1 tab(s) orally once a day   alcohol swabs : Apply topically to affected area 4 times a day   aspirin 81 mg oral tablet, chewable: 1 tab(s) orally once a day  budesonide-formoterol 160 mcg-4.5 mcg/inh inhalation aerosol: 2 puff(s) inhaled 2 times a day  Cardizem  mg/24 hours oral capsule, extended release: 1 cap(s) orally once a day   CeleXA 40 mg oral tablet: 1 tab(s) orally once a day  digoxin 125 mcg (0.125 mg) oral tablet: 1 tab(s) orally once a day  famotidine 20 mg oral tablet: 1 tab(s) orally 2 times a day  glucometer (per patient&#x27;s insurance): Test blood sugars four times a day. Dispense #1 glucometer.  HumaLOG KwikPen 100 units/mL injectable solution: 5 unit(s) injectable 3 times a day (with meals)   Insulin Pen Needles, 4mm: 1 application subcutaneously 4 times a day. ** Use with insulin pen **   ipratropium-albuterol 0.5 mg-2.5 mg/3 mLinhalation solution: 3 milliliter(s) inhaled every 6 hours, As Needed MDD:12 ml  Keppra 500 mg oral tablet: 1 tab(s) orally 2 times a day  lancets: 1 application subcutaneously 4 times a day   Lantus Solostar Pen 100 units/mL subcutaneous solution: 10 unit(s) subcutaneous once a day (at bedtime)   metoprolol tartrate 25 mg oral tablet: 1 tab(s) orally 2 times a day  Plavix 75 mg oral tablet: 1 tab(s) orally once a day  pravastatin 80 mg oral tablet: 1 tab(s) orally once a day (at bedtime)  predniSONE 10 mg oral tablet: 4 tabs PO daily  x 3 days  3  tabs PO daily  x 3 days  2  tabs PO daily  x 3 days  1  tabs PO daily  x 3 days  Rolling walker x 1:   test strips (per patient&#x27;s insurance): 1 application subcutaneously 4 times a day. ** Compatible with patient&#x27;s glucometer **  warfarin 5 mg oral tablet: 1 tab(s) orally once a day

## 2020-12-29 NOTE — DISCHARGE NOTE PROVIDER - NSDCCPCAREPLAN_GEN_ALL_CORE_FT
PRINCIPAL DISCHARGE DIAGNOSIS  Diagnosis: Acute on chronic respiratory failure with hypoxia and hypercapnia  Assessment and Plan of Treatment: Follow up with your primary doctor & pulmonologist within 1 week of discharge      SECONDARY DISCHARGE DIAGNOSES  Diagnosis: Ventricular tachycardia  Assessment and Plan of Treatment: Follow up with your primary doctor & cardiology  within 1 week of discharge    Diagnosis: Cardiac arrest  Assessment and Plan of Treatment: Follow up with your primary doctor & cardiology  within 1 week of discharge    Diagnosis: Metabolic encephalopathy  Assessment and Plan of Treatment:     Diagnosis: Atrial fibrillation with rapid ventricular response  Assessment and Plan of Treatment:

## 2020-12-29 NOTE — DISCHARGE NOTE PROVIDER - CARE PROVIDERS DIRECT ADDRESSES
,DirectAddress_Unknown ,DirectAddress_Unknown,[email or web address],DirectAddress_Unknown

## 2020-12-29 NOTE — PROGRESS NOTE ADULT - ASSESSMENT
70y Male with past medical history significant for COPD, HTN, DM, pAF on coumadin, seizure d/o with recent hospitalization for hypercapnic respiratory failure presents with HTN emergency and subsequent PEA/VT arrest on 12/10 this admission.     Acute on chronic hypoxic hypercapnic respiratory failure sec copd exacerbation, ELISSA, Acute of chronic chf  -  Pulm cleared pt for cath but recommends place on AVAPS during cath procedure   ABG with mixed resp acidosis & metabolic alkalosis, now improving. Pt off AVAPS currently on 4L NC  - c/w iv solumetrol  - per pulm Needs AVAPS as out pt to decrease re admissions and improve mortality has failed [facility] ( [facility])  Repeat CXR: has shows right base airspace disease/ pleural effusion    Acute metabolic encephalopathy-improved  liekly from hypercarbia & hypoxia  ABG with mixed resp acidosis & metabolic alkalosis, now improving. Pt off AVAPS currently on NC  B12, folate, TSH, RPR, ammonia wnl  No signs of CVA. Pt is non focal. AAO x 3 now      Right groin pseudoaneurysm s/p thrombin injection   -  US Duplex Arterial Lower Ext Ltd, Right (12.19.20 @ 11:24) >  The larger of the 2 pseudoaneurysms seen on the prior study remains thrombosed and measures 2.5 x 2.8 x 0.9 cm.  The smaller pseudoaneurysm demonstrating residual flow on the prior study adjacent to the common femoral artery is not well seen on the current study, possibly related to technical factors.     - CT RLE and RLE arterial duplex with either a single right groin pseudoaneurysm measuring 1.6 x 1.5 x 1.4 cm with dilatation of a portion of the neck measuring up to 0.9 cm transverse or 2 adjacent pseudoaneurysms with the more proximal pseudoaneurysm measuring 0.9 x 0.7 cm and the more distal pseudoaneurysm measuring 1.6 x 1.5 x 1.4 cm.  - Vascular following.  - s/p thrombin injection by IR on 12/18  - AC on hold. will resume after cath      CAD   - s/p cath with Severe RCA and LAD disease on 12/15. The RCA was treated with PTCA and STENT (THI-Resolute) with IVUS with residual LAD lesion  - Plan for intervention on LAD 12/18, to be done with anesthesia - cancelled secondary to hgb 7.8.   - Large right groin hematoma, and noted hgb drop (8.7-->7.8). Discussed with Cardiology, who is aware of hematoma.  - CT RLE and RLE arterial duplex with either a single right groin pseudoaneurysm measuring 1.6 x 1.5 x 1.4 cm with dilatation of a portion of the neck measuring up to 0.9 cm transverse or 2 adjacent pseudoaneurysms with the more proximal pseudoaneurysm measuring 0.9 x 0.7 cm and the more distal pseudoaneurysm measuring 1.6 x 1.5 x 1.4 cm.  - AC held for now. SCD boots ordered  - Continue ASA with Plavix, BB, Statin   - cath cancelled today due to resp status. Pt's status much improved during the day. Now on NC. Cardio re-caled  Awaits cath today      HTN  - c/w metoprolol and cardizem and digoxin    AFib  - Rate controlled with diltiazem/Metoprolol  - coumadin held at this time    Cardiac arrest from/ VT  - amiodarone decreased to 200 mg daily per Dr Arroyo recommendations   -  EP evaluation, no plan AICD now    Blood loss anemia  - ppx lovenox held due to groin hematoma.   - S/P  transfuse 2units PRBC  - Monitor H/H    Shortness of breath due to COPD exacerbation,  acute on chronic CHF, possible gram positive/gram negative pneumonia  - improved, currently on NC  - LVEDP was elevated on cath.  - Use of diuretics with worsening contraction alkalosis   - Alkalosis improving after holding diuretics.  - Restart lasix post cath pending LVEDP  - F/U RENAL REC. f/u BMP  - Strict I & Os  - Daily weight  - nocturnal NIV  - Iv solumedrol 40mg q12 Taper  - bronchodilators   - f/u pulm rec  -off lasix per cardiology  - completed 7 days of zosyn per ID  - Blood cx NGTD      DM2, uncontrolled-high  -  A1c 9.1  - lantus with increase  meal coverage   - close monitoring  - endocrine following      Morbid obesity-  Diet & weight loss recommended      DVT prophylaxis: scd    DISPO: cath to be rescheduled Monday . monitor H/H. groin hematoma. LOS uncertain as patient is still acute.   CARE OF PLAN DW PT  Long  dw daughter CATRACHITA [phone]. Discussed above care of plan. over all prognosis guarded. Per Daughter -pt wants to be fullcode ,was intubated in past and recovered.  Pt wants to be full code.  care of plan dw pt-agreed  AVAPS DURING CATH

## 2020-12-29 NOTE — DIETITIAN NUTRITION RISK NOTIFICATION - PROVIDER ATTESTATION
Discharged to home via w/c accompanied by APCA in stable condition at 1415.   By signing this assessment you are acknowledging and agree with the diagnosis/diagnoses assigned by the Registered Dietitian

## 2020-12-29 NOTE — PROGRESS NOTE ADULT - SUBJECTIVE AND OBJECTIVE BOX
[facility]                                   [address] [place] [address]                                                      PHONE: [phone]                                                         FAX: [phone]  [email or web address]  ---------------------------------------------------------------------------------------------------------------------------------    Overnight events/patient complaints: now off BIPAP and on room air. Offers no acute concerns and anxious re: proceeding with [facility]     INTERPRETATION OF TELEMETRY (personally reviewed):    ECG: ecg< from: 12 Lead ECG (12.21.20 @ 11:06) > Atrial fibrillation. Low voltage QRS. Nonspecific T wave abnormality    ECHOCARDIOGRAM independently visualized/reviewed and demonstrated :    1. Technically difficult study.   2. Endocardial visualization was enhanced with intravenous echo contrast.   3. Left ventricular ejection fraction, by visual estimation, is 60 to 65%.   4. Normal global left ventricular systolic function.   5. The left ventricular diastolic function could not be assessed in this study.   6. Normal left ventricular internal cavity size.   7. Moderately enlarged right ventricle. Moderately reduced RV systolic function.   8. The left atrium is normal in size.   9. Moderately enlarged right atrium.  10. Mild thickening and calcification of the anterior and posterior mitral valve leaflets.  11. Mild mitral valve regurgitation.  12. Mild tricuspid regurgitation.  13. There is no evidence of pericardial effusion.    CARDIAC CATH:  LM:   --  LM: Normal. LAD:   --  Mid LAD: There was a 80 % stenosis.  CX:   --  Circumflex: Angiography showed minor luminal irregularities with no flow limiting lesions. The vessel arose anomalously from the proximal  RCA:   --  RCA: iFR index of 0.82. Mid RCA: There was a 80 % stenosis. Distal RCA: There was a 90 % stenosis.  DIAGNOSTIC IMPRESSIONS: Severe RCA and LAD disease. The RCA was treated with PTCA and STENT (THI-Resolute) with IVUS. Plavix and coumadin Will need PCI of LAD in future    I&O's Summary    28 Dec 2020 07:01  -  29 Dec 2020 07:00  --------------------------------------------------------  IN: 265 mL / OUT: 885 mL / NET: -620 mL      PAST MEDICAL & SURGICAL HISTORY:  Seizure  Depression, unspecified depression type  Diabetes  Hypertension  Hyperlipidemia  Afib  COPD (chronic obstructive pulmonary disease)  Abdominal hernia  H/O carotid endarterectomy      No Known Allergies    MEDICATIONS  (STANDING):  albuterol/ipratropium for Nebulization 3 milliLiter(s) Nebulizer every 6 hours  aspirin  chewable 81 milliGRAM(s) Oral daily  atorvastatin 40 milliGRAM(s) Oral at bedtime  chlorhexidine 2% Cloths 1 Application(s) Topical <User Schedule>  citalopram 40 milliGRAM(s) Oral daily  clopidogrel Tablet 75 milliGRAM(s) Oral daily  dextrose 40% Gel 15 Gram(s) Oral once  dextrose 5%. 1000 milliLiter(s) (50 mL/Hr) IV Continuous <Continuous>  dextrose 5%. 1000 milliLiter(s) (100 mL/Hr) IV Continuous <Continuous>  dextrose 50% Injectable 25 Gram(s) IV Push once  dextrose 50% Injectable 12.5 Gram(s) IV Push once  dextrose 50% Injectable 25 Gram(s) IV Push once  digoxin     Tablet 0.125 milliGRAM(s) Oral daily  diltiazem    Tablet 60 milliGRAM(s) Oral four times a day  famotidine    Tablet 20 milliGRAM(s) Oral two times a day  glucagon  Injectable 1 milliGRAM(s) IntraMuscular once  influenza   Vaccine 0.5 milliLiter(s) IntraMuscular once  insulin glargine Injectable (LANTUS) 30 Unit(s) SubCutaneous at bedtime  insulin lispro (ADMELOG) corrective regimen sliding scale   SubCutaneous Before meals and at bedtime  insulin lispro Injectable (ADMELOG) 22 Unit(s) SubCutaneous three times a day before meals  levETIRAcetam  IVPB 500 milliGRAM(s) IV Intermittent every 12 hours  methylPREDNISolone sodium succinate Injectable 40 milliGRAM(s) IV Push every 12 hours  metoprolol tartrate 25 milliGRAM(s) Oral two times a day  polyethylene glycol 3350 17 Gram(s) Oral daily  saccharomyces boulardii 250 milliGRAM(s) Oral two times a day    MEDICATIONS  (PRN):  aluminum hydroxide/magnesium hydroxide/simethicone Suspension 30 milliLiter(s) Oral every 4 hours PRN Dyspepsia  diltiazem Injectable 10 milliGRAM(s) IV Push every 6 hours PRN for HR > 110; hold for SBP < 100      Vital Signs Last 24 Hrs  T(C): 36.7 (29 Dec 2020 08:00), Max: 37.1 (28 Dec 2020 16:00)  T(F): 98 (29 Dec 2020 08:00), Max: 98.7 (28 Dec 2020 16:00)  HR: 76 (29 Dec 2020 08:24) (74 - 125)  BP: 132/50 (29 Dec 2020 08:00) (120/64 - 152/89)  BP(mean): 72 (29 Dec 2020 08:00) (72 - 102)  RR: 18 (29 Dec 2020 08:00) (17 - 18)  SpO2: 100% (29 Dec 2020 08:24) (95% - 100%)  ICU Vital Signs Last 24 Hrs    PHYSICAL EXAM:  General: Appears well developed, well nourished alert and cooperative.  HEENT: Head; normocephalic, atraumatic.Pupils reactive, cornea wnl. Neck supple, no nodes adenopathy, no JVD  CARDIOVASCULAR: Normal S1 and S2, 2/6 IRINA, no rub, gallop or lift.   LUNGS: No rales, rhonchi or wheeze. Normal breath sounds bilaterally.  ABDOMEN: Soft, nontender without mass or organomegaly. bowel sounds normoactive.  EXTREMITIES: No clubbing, cyanosis or trivial edema.   SKIN: warm and dry with normal turgor. scattered ecchymoses   NEURO: Alert/oriented x 3/normal motor exam.   PSYCH: normal affect.        LABS:                        11.0   13.64 )-----------( 169      ( 29 Dec 2020 06:56 )             34.7     12-29    141  |  95<L>  |  29.0<H>  ----------------------------<  238<H>  4.9   |  40.0<H>  |  0.50    Ca    8.3<L>      29 Dec 2020 07:00  Phos  2.9     12-29  Mg     2.0     12-29      CLAUDIA MABRY      PTT - ( 28 Dec 2020 07:58 )  PTT:>200.0 sec      RADIOLOGY & ADDITIONAL STUDIES:    ASSESSMENT AND PLAN:  In summary, CLAUDIA MABRY is a 70y Male with past medical history significant for severe COPD, on home O2, DM type 2, HTN, permanent AF, admitted with recurrent hypoxic/hypercapneic respiratory failure and agonal respiration 12/10 s/p emergent intubation followed by PEA arrest, then developed VT with a pulse, s/p DCCV and initiation of amiodarone s/p cath which revealed severe RCA and LAD disease s/p THI to RCA however with agitation during the procedure resulting in R femoral artery pseudoaneurysm s/p thrombin injection RFA by IR, now pending staged intervention to the LAD. Patient now with increased work of breathing requiring escalation of O2 support, now off BiPAP     CAD/pulmonary edema complicated by hypoxic respiratory failure   - O2 requirement has been de-escalated and now on NC and comfortable    - Continue ASA with Plavix  -  intervention on LAD planned for today given marked improvement in respiratory status   - Mixed acid base disorder, pCO2 and serum bicarb noted with concurrent hypochloremia: no further diuretics for now   - continue bronchodilator therapy given underlying COPD   - continue telemetry monitoring   - repeat coags pending  - NPO for [facility] today      VT  - On amiodarone 200 mg daily    HTN  - BP controlled    Dyslipidemia  - Continue statin    AF  - Rates controlled with diltiazem and digoxin   - Coumadin held at this time given R femoral artery pseudoaneurysm s/p thrombin injection RFA by IR       Thank you for allowing [facility] to participate in the care of this patient.  Please feel free to call with any questions or concerns.

## 2020-12-29 NOTE — PROGRESS NOTE ADULT - ASSESSMENT
Assessment and Recommendation:   · Assessment	   Acute on Chronic Resp failure  improved   COPD with exacerbation O2 dep and Nocturnal BIPAP and prn    Solumedrol q 12  to keep until after cath today then taper    B/L infiltrates  treated with Zosyn (complete)    Nasal canula  keep SATs > 90 %     Paroxysmal AFIB with RVR   Acute on Chronic CHF  echo 12/21/2020  ef 60-65%  mod enlarged RV reduced RV systolic function   VTACH with  cath severe  RCA dx  - stent  and LAD dz 12/15     Obesity-  DASH TLC diet tray will be called for as soon as he returns from Procedure     ELISSA Sleep upright Nocturnal BIPAP    Post Catheterization:   Large right groin hematoma   CT duplex with  pseudoaneurysm  R groin thrombin  injection       Staged PCI to lAD tomorrow    GOC  No limitations on treatment plan  Going home after procedure  Does not want permanent ET or Tracheostomy tube if unable to wean off VENT post procedure  Remains a Full Code  Patient should go home with MOLST to complete with Medical Doctor  Cardiology should provide guidance with Prognosis, could be entitled to Hospice support at home  depending on Diagnosis

## 2020-12-29 NOTE — DISCHARGE NOTE PROVIDER - PROVIDER TOKENS
PROVIDER:[TOKEN:[[id number]]] PROVIDER:[TOKEN:[[id number]]],PROVIDER:[TOKEN:[[id number]]],PROVIDER:[TOKEN:[[id number]]]

## 2020-12-29 NOTE — PROGRESS NOTE ADULT - SUBJECTIVE AND OBJECTIVE BOX
INTERVAL HPI/OVERNIGHT EVENTS:HPI: This is a 69 yo M PMH of COPD on Oxygen 4L NC and BIPAP nocturnally at home was found unconscious by daughter, intubated. Extensive PMH including Depression, Seizures DM -Non compliant (High A1C AFIB with RVR, HTN, HLD. Admitted with Acute on Chronic Hypoxia and Hypercapnia  F/U Note:  No new events overnight  C/O hunger waiting for Cardiac Cath procedure later today (NPO)  Looking better today,   Awake, alert oriented x 3 using O2 NC 4L,  R groin swollen and ecchymotic;    He denies CP, non symptomatic Paroxysmal AFIB increases with activity.   NO headache, dizziness, constipation or pain  SLIVEIRA , NO  overt signs of bleeding,  fever or chills    70y old  Male who presents with a chief complaint of Acute hypoxic/hypercapnic respiratory failure, cardiac arrest (29 Dec 2020 16:22)    Present Symptoms:     Dyspnea:  2 3   Nausea/Vomiting:  No  Anxiety:  Yes   Depression: Yes   Fatigue: Yes   Loss of appetite: No    Pain: denies            Character-            Duration-            Effect-            Factors-            Frequency-            Location-            Severity-    Review of Systems: Reviewed                All others negative    MEDICATIONS  (STANDING):  albuterol/ipratropium for Nebulization 3 milliLiter(s) Nebulizer every 6 hours  aspirin  chewable 81 milliGRAM(s) Oral daily  atorvastatin 40 milliGRAM(s) Oral at bedtime  chlorhexidine 2% Cloths 1 Application(s) Topical <User Schedule>  citalopram 40 milliGRAM(s) Oral daily  clopidogrel Tablet 75 milliGRAM(s) Oral daily  dextrose 40% Gel 15 Gram(s) Oral once  dextrose 5%. 1000 milliLiter(s) (50 mL/Hr) IV Continuous <Continuous>  dextrose 5%. 1000 milliLiter(s) (100 mL/Hr) IV Continuous <Continuous>  dextrose 50% Injectable 25 Gram(s) IV Push once  dextrose 50% Injectable 12.5 Gram(s) IV Push once  dextrose 50% Injectable 25 Gram(s) IV Push once  digoxin     Tablet 0.125 milliGRAM(s) Oral daily  diltiazem    Tablet 60 milliGRAM(s) Oral four times a day  famotidine    Tablet 20 milliGRAM(s) Oral two times a day  glucagon  Injectable 1 milliGRAM(s) IntraMuscular once  influenza   Vaccine 0.5 milliLiter(s) IntraMuscular once  insulin glargine Injectable (LANTUS) 30 Unit(s) SubCutaneous at bedtime  insulin lispro (ADMELOG) corrective regimen sliding scale   SubCutaneous Before meals and at bedtime  insulin lispro Injectable (ADMELOG) 22 Unit(s) SubCutaneous three times a day before meals  levETIRAcetam  IVPB 500 milliGRAM(s) IV Intermittent every 12 hours  methylPREDNISolone sodium succinate Injectable 40 milliGRAM(s) IV Push every 12 hours  metoprolol tartrate 25 milliGRAM(s) Oral two times a day  polyethylene glycol 3350 17 Gram(s) Oral daily  saccharomyces boulardii 250 milliGRAM(s) Oral two times a day    MEDICATIONS  (PRN):  aluminum hydroxide/magnesium hydroxide/simethicone Suspension 30 milliLiter(s) Oral every 4 hours PRN Dyspepsia  diltiazem Injectable 10 milliGRAM(s) IV Push every 6 hours PRN for HR > 110; hold for SBP < 100    General: alert  oriented x 3___                obese, conversational    HEENT:  dry mouth      Lungs:  tachypnea/labored breathing 4L O2 NC      CV:   tachycardia    GI: obese  distended                :  incontinent [place] catheter    MSK:   weakness  edema             OOB/CH  bedbound    Skin: BLLE blue colored + pulses some edema_  no rash    LABS:                        11.0   13.64 )-----------( 169      ( 29 Dec 2020 06:56 )             34.7     12-29    141  |  95<L>  |  29.0<H>  ----------------------------<  238<H>  4.9   |  40.0<H>  |  0.50    Ca    8.3<L>      29 Dec 2020 07:00  Phos  2.9     12-29  Mg     2.0     12-29      PTT - ( 29 Dec 2020 11:02 )  PTT:21.2 sec    I&O's Summary    28 Dec 2020 07:01  -  29 Dec 2020 07:00  --------------------------------------------------------  IN: 265 mL / OUT: 885 mL / NET: -620 mL    Vital Signs Last 24 Hrs  T(C): 36.7 (29 Dec 2020 16:28), Max: 36.7 (28 Dec 2020 22:45)  T(F): 98 (29 Dec 2020 16:28), Max: 98 (28 Dec 2020 22:45)  HR: 109 (29 Dec 2020 16:28) (74 - 109)  BP: 174/79 (29 Dec 2020 16:28) (119/73 - 174/79)  BP(mean): 72 (29 Dec 2020 08:00) (72 - 97)  RR: 17 (29 Dec 2020 16:28) (17 - 20)  SpO2: 100% (29 Dec 2020 16:28) (96% - 100%)    RADIOLOGY & ADDITIONAL STUDIES:    ADVANCE DIRECTIVES:   DNR  NO  Completed on:    FULL CODE- would not want to be kept alive on Ventilator indefinitely                 MOLST   NO   Completed on:  Living Will   NO   Completed on:

## 2020-12-29 NOTE — PROGRESS NOTE ADULT - SUBJECTIVE AND OBJECTIVE BOX
Nurse Practitioner Progress note:     INTERVAL HISTORY:   70y Male with past medical history significant for COPD, HTN, DM, pAF on coumadin, seizure d/o with recent hospitalization for hypercapnic respiratory failure presents with HTN emergency and subsequent PEA/VT arrest on 12/10 this admission.  On 12/15/20 s/p PCI with THI to RCA c/b pseudosneurysm.  Pt for staged PCI to LAD.        MEDICATIONS:  digoxin     Tablet 0.125 milliGRAM(s) Oral daily  diltiazem    Tablet 60 milliGRAM(s) Oral four times a day  diltiazem Injectable 10 milliGRAM(s) IV Push every 6 hours PRN  metoprolol tartrate 25 milliGRAM(s) Oral two times a day  albuterol/ipratropium for Nebulization 3 milliLiter(s) Nebulizer every 6 hours  citalopram 40 milliGRAM(s) Oral daily  levETIRAcetam  IVPB 500 milliGRAM(s) IV Intermittent every 12 hours  aluminum hydroxide/magnesium hydroxide/simethicone Suspension 30 milliLiter(s) Oral every 4 hours PRN  famotidine    Tablet 20 milliGRAM(s) Oral two times a day  polyethylene glycol 3350 17 Gram(s) Oral daily  atorvastatin 40 milliGRAM(s) Oral at bedtime  dextrose 40% Gel 15 Gram(s) Oral once  dextrose 50% Injectable 25 Gram(s) IV Push once  dextrose 50% Injectable 12.5 Gram(s) IV Push once  dextrose 50% Injectable 25 Gram(s) IV Push once  glucagon  Injectable 1 milliGRAM(s) IntraMuscular once  insulin glargine Injectable (LANTUS) 30 Unit(s) SubCutaneous at bedtime  insulin lispro (ADMELOG) corrective regimen sliding scale   SubCutaneous Before meals and at bedtime  insulin lispro Injectable (ADMELOG) 22 Unit(s) SubCutaneous three times a day before meals  methylPREDNISolone sodium succinate Injectable 40 milliGRAM(s) IV Push every 12 hours  aspirin  chewable 81 milliGRAM(s) Oral daily  chlorhexidine 2% Cloths 1 Application(s) Topical <User Schedule>  clopidogrel Tablet 75 milliGRAM(s) Oral daily  dextrose 5%. 1000 milliLiter(s) IV Continuous <Continuous>  dextrose 5%. 1000 milliLiter(s) IV Continuous <Continuous>  influenza   Vaccine 0.5 milliLiter(s) IntraMuscular once      TELEMETRY: Afib 97 bpm    T(C): 36.7 (12-29-20 @ 16:28), Max: 36.7 (12-28-20 @ 22:45)  HR: 101 (12-29-20 @ 18:30) (74 - 109)  BP: 160/86 (12-29-20 @ 18:30) (119/73 - 174/79)  RR: 16 (12-29-20 @ 18:30) (16 - 20)  SpO2: 100% (12-29-20 @ 18:30) (96% - 100%)  Wt(kg): --    PHYSICAL EXAM:  Appearance: Normal	  Cardiovascular: Irreg/irreg  Respiratory: Very diminished and decreased  Psychiatry: A & O x 3, Mood & affect appropriate  Gastrointestinal:  Soft, Non-tender, + BS	  Neurologic: Non-focal, A&O X3.  No neuro deficits  Extremities: + edema  Procedure Site: Right radial band in place.  Site benign.  No bleeding/hematoma/ecchymosis.  + palp radial pulse.  Fingers warm and mobile    12 lead EKG:  	Afib 97 bpm.  No acute changes      MEDICATION DURING PROCEDURE:   heparin 68556 u  plavix 600 mg  omnipaque 75 ml    PROCEDURE RESULTS: S/P PCI with THI X2 LAD via right radial.  Christin procedure well    ASSESSMENT/PLAN: 	  -Radial precautions  -D/C radial band in 2 hours  -Resume  meds  -Follow up with [facility]  -Check labs/EKG/site check in AM  -cardiac rehab info provided/referral and communication to cardiac rehab completed

## 2020-12-29 NOTE — DISCHARGE NOTE PROVIDER - HOSPITAL COURSE
70y Male with past medical history significant for COPD, HTN, DM, pAF on coumadin, seizure d/o with recent hospitalization for hypercapnic respiratory failure presents with HTN emergency and subsequent PEA/VT arrest on 12/10 this admission.     Acute on chronic hypoxic hypercapnic respiratory failure sec copd exacerbation, ELISSA, Acute of chronic chf  ABG with mixed resp acidosis & metabolic alkalosis, now improving.  - taper iv solumetrol to prednisone  - per pulm Needs AVAPS & home O2 as out pt to decrease re admissions and improve mortality as has failed BIPAP in the past( [facility])  Repeat CXR: has shows right base airspace disease/ pleural effusion    Acute metabolic encephalopathy-improved  liekly from hypercarbia & hypoxia  ABG with mixed resp acidosis & metabolic alkalosis, now improving. Pt off AVAPS currently on NC  B12, folate, TSH, RPR, ammonia wnl  No signs of CVA. Pt is non focal. AAO x 3 now      Right groin pseudoaneurysm s/p thrombin injection   -  US Duplex Arterial Lower Ext Ltd, Right (12.19.20 @ 11:24) >  The larger of the 2 pseudoaneurysms seen on the prior study remains thrombosed and measures 2.5 x 2.8 x 0.9 cm.  The smaller pseudoaneurysm demonstrating residual flow on the prior study adjacent to the common femoral artery is not well seen on the current study, possibly related to technical factors.     - CT RLE and RLE arterial duplex with either a single right groin pseudoaneurysm measuring 1.6 x 1.5 x 1.4 cm with dilatation of a portion of the neck measuring up to 0.9 cm transverse or 2 adjacent pseudoaneurysms with the more proximal pseudoaneurysm measuring 0.9 x 0.7 cm and the more distal pseudoaneurysm measuring 1.6 x 1.5 x 1.4 cm.  - Vascular following.  - s/p thrombin injection by IR on 12/18  - Resume coumadin without bridging per cardio      CAD   - s/p cath with Severe RCA and LAD disease .  s/p staged PCI to LAD and RCA   - Continue ASA with Plavix, BB, Statin         HTN  - c/w metoprolol and cardizem and digoxin    AFib  - Rate controlled with diltiazem/Metoprolol  - coumadin resumed    Cardiac arrest from/ VT  - amiodarone decreased to 200 mg daily per Dr Arroyo recommendations   -  EP evaluation, no plan AICD now    Blood loss anemia-  - S/P  transfuse 2units PRBC  - Stable H/H    Shortness of breath due to COPD exacerbation,  acute on chronic CHF, possible gram positive/gram negative pneumonia  - improved, currently on NC  - LVEDP was elevated on cath.  - Use of diuretics with worsening contraction alkalosis   - Alkalosis improving after holding diuretics.  Mixed acid base disorder, pCO2 and serum bicarb noted with concurrent hypochloremia: no further diuretics for now as per cardio  - F/U RENAL REC. f/u BMP  - Strict I & Os  - Daily weight  - nocturnal NIV  - Iv solumedrol 40mg q12 Taper to PO pred  - bronchodilators   - f/u pulm rec  -off lasix per cardiology  - completed 7 days of zosyn per ID  - Blood cx NGTD      DM2, uncontrolled-high  -  A1c 9.1  - lantus 30. Premeals 22  - close monitoring  - endocrine following      Morbid obesity-  Diet & weight loss recommended      Long  dw daughter CATRACHITA [phone]. Discussed above care of plan. PT recs IRMA but pt refuses IRMA & wants to go home.       CONSTITUTIONAL: NAD, morbidly obese, on NC  EYES: PERRLA; conjunctiva and sclera clear  ENMT: Moist oral mucosa, ly  RESPIRATORY: decreased BS b/l  CARDIOVASCULAR: Regular rate and rhythm, normal S1 and S2, no murmur   EXTS: + lower extremity edema-Improving; Peripheral pulses are 2+ bilaterally, multiple bruises on b/l UE  ABDOMEN: Nontender to palpation, normoactive bowel sounds, no rebound/guarding;   PSYCH: affect appropriate  NEUROLOGY: AAO x 3, CN 2-12 are intact and symmetric; no gross sensory deficits; motor strength 5/5 x 4, sensation intact  R groin hematoma improving              70y Male with past medical history significant for COPD, HTN, DM, pAF on coumadin, seizure d/o with recent hospitalization for hypercapnic respiratory failure presents with HTN emergency and subsequent PEA/VT arrest on 12/10 this admission.     Acute on chronic hypoxic hypercapnic respiratory failure sec copd exacerbation, ELISSA, Acute of chronic chf  ABG with mixed resp acidosis & metabolic alkalosis, now improving.  - taper iv solumetrol to prednisone  - per pulm Needs AVAPS & home O2 as out pt to decrease re admissions and improve mortality as has failed BIPAP in the past( [facility])  Repeat CXR: has shows right base airspace disease/ pleural effusion    Acute metabolic encephalopathy-improved  liekly from hypercarbia & hypoxia  ABG with mixed resp acidosis & metabolic alkalosis, now improving. Pt off AVAPS currently on NC  B12, folate, TSH, RPR, ammonia wnl  No signs of CVA. Pt is non focal. AAO x 3 now      Right groin pseudoaneurysm s/p thrombin injection   -  US Duplex Arterial Lower Ext Ltd, Right (12.19.20 @ 11:24) >  The larger of the 2 pseudoaneurysms seen on the prior study remains thrombosed and measures 2.5 x 2.8 x 0.9 cm.  The smaller pseudoaneurysm demonstrating residual flow on the prior study adjacent to the common femoral artery is not well seen on the current study, possibly related to technical factors.     - CT RLE and RLE arterial duplex with either a single right groin pseudoaneurysm measuring 1.6 x 1.5 x 1.4 cm with dilatation of a portion of the neck measuring up to 0.9 cm transverse or 2 adjacent pseudoaneurysms with the more proximal pseudoaneurysm measuring 0.9 x 0.7 cm and the more distal pseudoaneurysm measuring 1.6 x 1.5 x 1.4 cm.  - Vascular following.  - s/p thrombin injection by IR on 12/18  - Resume coumadin without bridging per cardio      CAD   - s/p cath with Severe RCA and LAD disease .  s/p staged PCI to LAD and RCA   - Continue ASA with Plavix, BB, Statin         HTN  - c/w metoprolol and cardizem and digoxin    AFib  - Rate controlled with diltiazem/Metoprolol  - coumadin resumed    Cardiac arrest from/ VT  - amiodarone decreased to 200 mg daily per Dr Arroyo recommendations   -  EP evaluation, no plan AICD now    Blood loss anemia-  - S/P  transfuse 2units PRBC  - Stable H/H    Shortness of breath due to COPD exacerbation,  acute on chronic CHF, possible gram positive/gram negative pneumonia  - improved, currently on NC  - LVEDP was elevated on cath.  - Use of diuretics with worsening contraction alkalosis   - Alkalosis improving after holding diuretics.  Mixed acid base disorder, pCO2 and serum bicarb noted with concurrent hypochloremia: no further diuretics for now as per cardio  - F/U RENAL REC. f/u BMP  - Strict I & Os  - Daily weight  - nocturnal NIV  - Iv solumedrol 40mg q12 Taper to PO pred  - bronchodilators   - f/u pulm rec  -off lasix per cardiology  - completed 7 days of zosyn per ID  - Blood cx NGTD      DM2, uncontrolled-high  -  A1c 9.1  - lantus 30.   Since steroid will be tapered further as an outpatient, would preemptively decrease Lispro to 15 units with meals on discharge and continue same dose of Lantus.  Would not resume Metformin in this patient as it is contraindicated due to chronic hypoxia and O2 therapy (due to associated risk of lactic acidosis).  - endocrine following      Morbid obesity-  Diet & weight loss recommended      Long  dw daughter CATRACHITA [phone]. Discussed above care of plan. PT recs IRMA but pt refuses IRMA & wants to go home.       CONSTITUTIONAL: NAD, morbidly obese, on NC  EYES: PERRLA; conjunctiva and sclera clear  ENMT: Moist oral mucosa, ly  RESPIRATORY: decreased BS b/l  CARDIOVASCULAR: Regular rate and rhythm, normal S1 and S2, no murmur   EXTS: + lower extremity edema-Improving; Peripheral pulses are 2+ bilaterally, multiple bruises on b/l UE  ABDOMEN: Nontender to palpation, normoactive bowel sounds, no rebound/guarding;   PSYCH: affect appropriate  NEUROLOGY: AAO x 3, CN 2-12 are intact and symmetric; no gross sensory deficits; motor strength 5/5 x 4, sensation intact  R groin hematoma improving              70y Male with past medical history significant for COPD, HTN, DM, pAF on coumadin, seizure d/o with recent hospitalization for hypercapnic respiratory failure presents with HTN emergency and subsequent PEA/VT arrest on 12/10 this admission.     Acute on chronic hypoxic hypercapnic respiratory failure sec copd exacerbation, ELISSA, Acute of chronic chf  ABG with mixed resp acidosis & metabolic alkalosis, now improving.  - taper iv solumetrol to prednisone  - per pulm Needs AVAPS & home O2 as out pt to decrease re admissions and improve mortality as has failed BIPAP in the past( [facility])  Repeat CXR: has shows right base airspace disease/ pleural effusion    Acute metabolic encephalopathy-improved  liekly from hypercarbia & hypoxia  ABG with mixed resp acidosis & metabolic alkalosis, now improving. Pt off AVAPS currently on NC  B12, folate, TSH, RPR, ammonia wnl  No signs of CVA. Pt is non focal. AAO x 3 now      Right groin pseudoaneurysm s/p thrombin injection   -  US Duplex Arterial Lower Ext Ltd, Right (12.19.20 @ 11:24) >  The larger of the 2 pseudoaneurysms seen on the prior study remains thrombosed and measures 2.5 x 2.8 x 0.9 cm.  The smaller pseudoaneurysm demonstrating residual flow on the prior study adjacent to the common femoral artery is not well seen on the current study, possibly related to technical factors.     - CT RLE and RLE arterial duplex with either a single right groin pseudoaneurysm measuring 1.6 x 1.5 x 1.4 cm with dilatation of a portion of the neck measuring up to 0.9 cm transverse or 2 adjacent pseudoaneurysms with the more proximal pseudoaneurysm measuring 0.9 x 0.7 cm and the more distal pseudoaneurysm measuring 1.6 x 1.5 x 1.4 cm.  - Vascular following.  - s/p thrombin injection by IR on 12/18  - Resume coumadin without bridging per cardio      CAD   - s/p cath with Severe RCA and LAD disease .  s/p staged PCI to LAD and RCA   - Continue ASA with Plavix, BB, Statin         HTN  - c/w metoprolol and cardizem and digoxin    AFib  - Rate controlled with diltiazem/Metoprolol  - coumadin resumed    Cardiac arrest from/ VT  - amiodarone decreased to 200 mg daily per Dr Arroyo recommendations   -  EP evaluation, no plan AICD now    Blood loss anemia-  - S/P  transfuse 2units PRBC  - Stable H/H    Shortness of breath due to COPD exacerbation,  acute on chronic CHF, possible gram positive/gram negative pneumonia  - improved, currently on NC  - LVEDP was elevated on cath.  - Use of diuretics with worsening contraction alkalosis   - Alkalosis improving after holding diuretics.  Mixed acid base disorder, pCO2 and serum bicarb noted with concurrent hypochloremia: no further diuretics for now as per cardio  - F/U RENAL REC. f/u BMP  - Strict I & Os  - Daily weight  - nocturnal NIV  - Iv solumedrol 40mg q12 Taper to PO pred  - bronchodilators   - f/u pulm rec  -off lasix per cardiology  - completed 7 days of zosyn per ID  - Blood cx NGTD      DM2, uncontrolled-high  -  A1c 9.1  - lantus 15   Since steroid will be tapered further as an outpatient, would preemptively decrease Lispro to 10 units with meals on discharge and continue same dose of Lantus.  Would not resume Metformin in this patient as it is contraindicated due to chronic hypoxia and O2 therapy (due to associated risk of lactic acidosis).  - endocrine following      Morbid obesity-  Diet & weight loss recommended      Long  dw daughter CATRACHITA [phone]. Discussed above care of plan. PT recs IRMA but pt refuses IRMA & wants to go home.       CONSTITUTIONAL: NAD, morbidly obese, on NC  EYES: PERRLA; conjunctiva and sclera clear  ENMT: Moist oral mucosa, ly  RESPIRATORY: decreased BS b/l  CARDIOVASCULAR: Regular rate and rhythm, normal S1 and S2, no murmur   EXTS: + lower extremity edema-Improving; Peripheral pulses are 2+ bilaterally, multiple bruises on b/l UE  ABDOMEN: Nontender to palpation, normoactive bowel sounds, no rebound/guarding;   PSYCH: affect appropriate  NEUROLOGY: AAO x 3, CN 2-12 are intact and symmetric; no gross sensory deficits; motor strength 5/5 x 4, sensation intact  R groin hematoma improving

## 2020-12-29 NOTE — PROGRESS NOTE ADULT - ASSESSMENT
71y/o male past medical history significant for COPD, HTN, DM, pAF on coumadin, seizure d/o with recent hospitalization for hypercapnic respiratory failure presents with HTN emergency and subsequent PEA/VT arrest on 12/10 this admission.       1- acute on chronic resp failure  improved  2- copd  with exacerbation   3- afib/ acute on chronic chf   echo 12/21/2020  ef 60-65%  mod enlarged RV reduced RV systolic function  4- vtach with  cath severe  RCA dx  - stent  and LAD dz 12/15    5- obesity  adalberto   6- large right groin hematoma ct / duplex with  pseudoaneurysm  s/p  2units prbc 12/18/2020-  right groin thrombin injection   7- cxr with infiltrates s/p 7 days zosyn   8- DM      - on nasal canula  keep sats > 90 %  -bipap  prn and at night   - solumedrol q 12  to keep until after cath today then taper  - rate control   -nebulizers   q  6 hours     -will need  outpt   AVAP   -cath today with  possible intubation  vs AVAP     will follow  please feel free to reach out with any questions or suggestions    RHIANNON

## 2020-12-29 NOTE — PROGRESS NOTE ADULT - SUBJECTIVE AND OBJECTIVE BOX
Patient is a 70y old  Male who presents with a chief complaint of Acute hypoxic/hypercapnic respiratory failure, cardiac arrest (25 Dec 2020 10:42)      SUBJECTIVE / OVERNIGHT EVENTS: Pt was on BiPAP all night & off BiPAP this this am. More alert today. Awaiting cath  REVIEW OF SYSTEMS: All systems are reviewed and found to be negative except above    MEDICATIONS  (STANDING):  albuterol/ipratropium for Nebulization 3 milliLiter(s) Nebulizer every 6 hours  aspirin  chewable 81 milliGRAM(s) Oral daily  atorvastatin 40 milliGRAM(s) Oral at bedtime  chlorhexidine 2% Cloths 1 Application(s) Topical <User Schedule>  citalopram 40 milliGRAM(s) Oral daily  clopidogrel Tablet 75 milliGRAM(s) Oral daily  dextrose 40% Gel 15 Gram(s) Oral once  dextrose 5%. 1000 milliLiter(s) (50 mL/Hr) IV Continuous <Continuous>  dextrose 5%. 1000 milliLiter(s) (100 mL/Hr) IV Continuous <Continuous>  dextrose 50% Injectable 25 Gram(s) IV Push once  dextrose 50% Injectable 12.5 Gram(s) IV Push once  dextrose 50% Injectable 25 Gram(s) IV Push once  digoxin     Tablet 0.125 milliGRAM(s) Oral daily  diltiazem    Tablet 60 milliGRAM(s) Oral four times a day  famotidine    Tablet 20 milliGRAM(s) Oral two times a day  glucagon  Injectable 1 milliGRAM(s) IntraMuscular once  influenza   Vaccine 0.5 milliLiter(s) IntraMuscular once  insulin glargine Injectable (LANTUS) 30 Unit(s) SubCutaneous at bedtime  insulin lispro (ADMELOG) corrective regimen sliding scale   SubCutaneous Before meals and at bedtime  insulin lispro Injectable (ADMELOG) 18 Unit(s) SubCutaneous three times a day before meals  levETIRAcetam  IVPB 500 milliGRAM(s) IV Intermittent every 12 hours  methylPREDNISolone sodium succinate Injectable 40 milliGRAM(s) IV Push every 12 hours  metoprolol tartrate 25 milliGRAM(s) Oral two times a day  polyethylene glycol 3350 17 Gram(s) Oral daily  saccharomyces boulardii 250 milliGRAM(s) Oral two times a day    MEDICATIONS  (PRN):  aluminum hydroxide/magnesium hydroxide/simethicone Suspension 30 milliLiter(s) Oral every 4 hours PRN Dyspepsia      CAPILLARY BLOOD GLUCOSE      POCT Blood Glucose.: 205 mg/dL (25 Dec 2020 12:04)  POCT Blood Glucose.: 175 mg/dL (25 Dec 2020 08:06)  POCT Blood Glucose.: 191 mg/dL (24 Dec 2020 23:02)  POCT Blood Glucose.: 263 mg/dL (24 Dec 2020 17:19)    I&O's Summary    24 Dec 2020 07:01  -  25 Dec 2020 07:00  --------------------------------------------------------  IN: 450 mL / OUT: 650 mL / NET: -200 mL    25 Dec 2020 07:01  -  25 Dec 2020 13:17  --------------------------------------------------------  IN: 290 mL / OUT: 0 mL / NET: 290 mL        PHYSICAL EXAM:  Vital Signs Last 24 Hrs  T(C): 36.2 (25 Dec 2020 09:44), Max: 36.8 (24 Dec 2020 16:10)  T(F): 97.2 (25 Dec 2020 09:44), Max: 98.2 (24 Dec 2020 16:10)  HR: 90 (25 Dec 2020 09:44) (72 - 113)  BP: 119/74 (25 Dec 2020 09:44) (105/50 - 153/66)  BP(mean): 57 (24 Dec 2020 23:00) (57 - 86)  RR: 18 (25 Dec 2020 09:44) (18 - 22)  SpO2: 98% (25 Dec 2020 09:44) (89% - 100%)    CONSTITUTIONAL: NAD, morbidly obese  EYES: PERRLA; conjunctiva and sclera clear  ENMT: Moist oral mucosa, ly  RESPIRATORY: decreased BS b/l  CARDIOVASCULAR: Regular rate and rhythm, normal S1 and S2, no murmur   EXTS: + lower extremity edema-Improving; Peripheral pulses are 2+ bilaterally, multiple bruises on b/l UE  ABDOMEN: Nontender to palpation, normoactive bowel sounds, no rebound/guarding;   PSYCH: affect appropriate  NEUROLOGY: AAO x 3, CN 2-12 are intact and symmetric; no gross sensory deficits; motor strength 5/5 x 4, sensation intact  R groin hematoma improving       LABS:                        11.9   23.02 )-----------( 244      ( 24 Dec 2020 07:31 )             38.6     12-25    145  |  93<L>  |  30.0<H>  ----------------------------<  164<H>  4.2   |  47.0<HH>  |  0.58    Ca    8.6      25 Dec 2020 06:49                  RADIOLOGY & ADDITIONAL TESTS:  Results Reviewed:   Imaging Personally Reviewed:  Electrocardiogram Personally Reviewed:    COORDINATION OF CARE:  Care Discussed with Consultants/Other Providers [Y/N]:  Prior or Outpatient Records Reviewed [Y/N]:

## 2020-12-29 NOTE — CHART NOTE - NSCHARTNOTEFT_GEN_A_CORE
Source: Patient [ ]  Family [ ]   other [x ] pt sleeping    Current Diet: Diet, NPO after Midnight:      NPO Start Date: 28-Dec-2020,   NPO Start Time: 23:59  Except Medications (12-28-20 @ 21:22)    PO intake:  Pt previously receiving DASH/TLC, cons cho with fair po intake at meals.    Current Weight:   (12/27) 230 lbs  (12/24) 226.1 lbs  (12/22) 241.6 lbs  (12/11) 239.6 lbs    % Weight Change - wt possibly trending down, will continue to monitor.  Aware pt with 3+ moderate B/L leg edema noted.    Pertinent Medications: MEDICATIONS  (STANDING):  albuterol/ipratropium for Nebulization 3 milliLiter(s) Nebulizer every 6 hours  aspirin  chewable 81 milliGRAM(s) Oral daily  atorvastatin 40 milliGRAM(s) Oral at bedtime  chlorhexidine 2% Cloths 1 Application(s) Topical <User Schedule>  citalopram 40 milliGRAM(s) Oral daily  clopidogrel Tablet 75 milliGRAM(s) Oral daily  dextrose 40% Gel 15 Gram(s) Oral once  dextrose 5%. 1000 milliLiter(s) (50 mL/Hr) IV Continuous <Continuous>  dextrose 5%. 1000 milliLiter(s) (100 mL/Hr) IV Continuous <Continuous>  dextrose 50% Injectable 25 Gram(s) IV Push once  dextrose 50% Injectable 12.5 Gram(s) IV Push once  dextrose 50% Injectable 25 Gram(s) IV Push once  digoxin     Tablet 0.125 milliGRAM(s) Oral daily  diltiazem    Tablet 60 milliGRAM(s) Oral four times a day  famotidine    Tablet 20 milliGRAM(s) Oral two times a day  glucagon  Injectable 1 milliGRAM(s) IntraMuscular once  influenza   Vaccine 0.5 milliLiter(s) IntraMuscular once  insulin glargine Injectable (LANTUS) 30 Unit(s) SubCutaneous at bedtime  insulin lispro (ADMELOG) corrective regimen sliding scale   SubCutaneous Before meals and at bedtime  insulin lispro Injectable (ADMELOG) 22 Unit(s) SubCutaneous three times a day before meals  levETIRAcetam  IVPB 500 milliGRAM(s) IV Intermittent every 12 hours  methylPREDNISolone sodium succinate Injectable 40 milliGRAM(s) IV Push every 12 hours  metoprolol tartrate 25 milliGRAM(s) Oral two times a day  polyethylene glycol 3350 17 Gram(s) Oral daily  saccharomyces boulardii 250 milliGRAM(s) Oral two times a day    MEDICATIONS  (PRN):  aluminum hydroxide/magnesium hydroxide/simethicone Suspension 30 milliLiter(s) Oral every 4 hours PRN Dyspepsia  diltiazem Injectable 10 milliGRAM(s) IV Push every 6 hours PRN for HR > 110; hold for SBP < 100    Pertinent Labs: CBC Full  -  ( 29 Dec 2020 06:56 )  WBC Count : 13.64 K/uL  RBC Count : 3.81 M/uL  Hemoglobin : 11.0 g/dL  Hematocrit : 34.7 %  Platelet Count - Automated : 169 K/uL  Mean Cell Volume : 91.1 fl  Mean Cell Hemoglobin : 28.9 pg  Mean Cell Hemoglobin Concentration : 31.7 gm/dL  Auto Neutrophil # : 12.21 K/uL  Auto Lymphocyte # : 0.26 K/uL  Auto Monocyte # : 1.08 K/uL  Auto Eosinophil # : 0.00 K/uL  Auto Basophil # : 0.01 K/uL  Auto Neutrophil % : 89.5 %  Auto Lymphocyte % : 1.9 %  Auto Monocyte % : 7.9 %  Auto Eosinophil % : 0.0 %  Auto Basophil % : 0.1 %  12-29 Na141 mmol/L Glu 238 mg/dL<H> K+ 4.9 mmol/L Cr  0.50 mg/dL BUN 29.0 mg/dL<H> Phos 2.9 mg/dL Alb n/a   PAB n/a       Skin: no skin breakdown noted     Limited nutrition focused physical exam conducted - found signs of malnutrition [ ]absent [x ]present    Subcutaneous fat loss: [x ] Orbital fat pads region, [ ]Buccal fat region, [ ]Triceps region,  [ ]Ribs region    Muscle wasting: [x ]Temples region, [x ]Clavicle region, [x ]Shoulder region, [ ]Scapula region, [ ]Interosseous region,  [ ]thigh region, [ ]Calf region    Current Nutrition Diagnosis: Pt remains at nutrition risk secondary to moderate protein calorie malnutrition (acute) related to inability to consume sufficient protein energy intake with fluctuating po intake at meals in setting of acute on chronic hypoxic hypercapnic respiratory failure sec COPD exacerbation, ELISSA, acute on chronic CHF as evidenced by pt meeting <75% estimated energy intake > 7 days, mild/moderate muscle wasting, mild fat loss and possible unintentional wt loss since admission.  Pt with poor/fair po intake at meals.  Pt NPO for LHC today.    Recommendations:   1. RX: Glucerna TID   2. RX: MVI and Vit C 500mg daily   3. Monitor daily wts     Monitoring and Evaluation:   [x ] PO intake [x ] Tolerance to diet prescription [X] Weights  [X] Follow up per protocol [X] Labs:

## 2020-12-29 NOTE — DISCHARGE NOTE PROVIDER - CARE PROVIDER_API CALL
Anthony Troy)  Cardiovascular Disease; Interventional Cardiology  [address] [place] [address]  Phone: [phone]  Fax: [phone]  Follow Up Time:    Anthony Troy)  Cardiovascular Disease; Interventional Cardiology  [address] [place] [address]  Phone: [phone]  Fax: [phone]  Follow Up Time:     Westley Moore  CRITICAL CARE MEDICINE  [address] [facility], [address]  [place] [address]  Phone: [phone]  Fax: [phone]  Follow Up Time:     Moreno Dean  ENDOCRINOLOGY/METAB/DIABETES  [address] [place] [address]  Phone: [phone]  Fax: [phone]  Follow Up Time:

## 2020-12-29 NOTE — DIETITIAN NUTRITION RISK NOTIFICATION - TREATMENT: THE FOLLOWING DIET HAS BEEN RECOMMENDED
Diet, NPO after Midnight:      NPO Start Date: 28-Dec-2020,   NPO Start Time: 23:59  Except Medications (12-28-20 @ 21:22) [Active]

## 2020-12-29 NOTE — PROGRESS NOTE ADULT - SUBJECTIVE AND OBJECTIVE BOX
CC:  follow up DM    Interval HPI/ Overnight Events:  Awaiting cardiac cath today.  Currently NPO. Denies any CP or dyspnea.    MEDICATIONS  (STANDING):  albuterol/ipratropium for Nebulization 3 milliLiter(s) Nebulizer every 6 hours  aspirin  chewable 81 milliGRAM(s) Oral daily  atorvastatin 40 milliGRAM(s) Oral at bedtime  chlorhexidine 2% Cloths 1 Application(s) Topical <User Schedule>  citalopram 40 milliGRAM(s) Oral daily  clopidogrel Tablet 75 milliGRAM(s) Oral daily  digoxin     Tablet 0.125 milliGRAM(s) Oral daily  diltiazem    Tablet 60 milliGRAM(s) Oral four times a day  famotidine    Tablet 20 milliGRAM(s) Oral two times a day  glucagon  Injectable 1 milliGRAM(s) IntraMuscular once  influenza   Vaccine 0.5 milliLiter(s) IntraMuscular once  insulin glargine Injectable (LANTUS) 30 Unit(s) SubCutaneous at bedtime  insulin lispro (ADMELOG) corrective regimen sliding scale   SubCutaneous Before meals and at bedtime  insulin lispro Injectable (ADMELOG) 22 Unit(s) SubCutaneous three times a day before meals  levETIRAcetam  IVPB 500 milliGRAM(s) IV Intermittent every 12 hours  methylPREDNISolone sodium succinate Injectable 40 milliGRAM(s) IV Push every 12 hours  metoprolol tartrate 25 milliGRAM(s) Oral two times a day  polyethylene glycol 3350 17 Gram(s) Oral daily  saccharomyces boulardii 250 milliGRAM(s) Oral two times a day    Vital Signs Last 24 Hrs  T(C): 36.7 (29 Dec 2020 15:30), Max: 36.7 (28 Dec 2020 22:45)  T(F): 98 (29 Dec 2020 15:30), Max: 98 (28 Dec 2020 22:45)  HR: 95 (29 Dec 2020 16:00) (74 - 108)  BP: 119/73 (29 Dec 2020 16:00) (119/73 - 154/73)  BP(mean): 72 (29 Dec 2020 08:00) (72 - 97)  RR: 20 (29 Dec 2020 16:00) (18 - 20)  SpO2: 100% (29 Dec 2020 16:00) (96% - 100%)    PE:  Gen: Obese male, NAD  HEENT:  sclera anicteric, MMM  Neck:  no thyromegaly, no LAD  CV:  nl S1 + S2, RRR, no m/r/g  Resp:  nl respiratory effort, CTA b/l  GI/ Abd: soft, NT/ ND, BS +  Neuro:  No tremor, sensation intact to light touch on b/l feet  MS:  trace edema b/l, nl muscle tone  Skin:  no acanthosis  psych:  affect appropriate    LABS:  12-29    141  |  95<L>  |  29.0<H>  ----------------------------<  238<H>  4.9   |  40.0<H>  |  0.50    Ca    8.3<L>      29 Dec 2020 07:00  Phos  2.9     12-29  Mg     2.0     12-29                          11.0   13.64 )-----------( 169      ( 29 Dec 2020 06:56 )             34.7     CAPILLARY BLOOD GLUCOSE  POCT Blood Glucose.: 301 mg/dL (29 Dec 2020 11:25)  POCT Blood Glucose.: 234 mg/dL (29 Dec 2020 06:23)  POCT Blood Glucose.: 273 mg/dL (28 Dec 2020 21:04)  POCT Blood Glucose.: 173 mg/dL (28 Dec 2020 16:53)

## 2020-12-30 ENCOUNTER — TRANSCRIPTION ENCOUNTER (OUTPATIENT)
Age: 70
End: 2020-12-30

## 2020-12-30 DIAGNOSIS — E11.9 TYPE 2 DIABETES MELLITUS WITHOUT COMPLICATIONS: ICD-10-CM

## 2020-12-30 LAB
ANION GAP SERPL CALC-SCNC: 7 MMOL/L — SIGNIFICANT CHANGE UP (ref 5–17)
ANISOCYTOSIS BLD QL: SLIGHT — SIGNIFICANT CHANGE UP
APTT BLD: 20.4 SEC — LOW (ref 27.5–35.5)
BASOPHILS # BLD AUTO: 0 K/UL — SIGNIFICANT CHANGE UP (ref 0–0.2)
BASOPHILS NFR BLD AUTO: 0 % — SIGNIFICANT CHANGE UP (ref 0–2)
BLD GP AB SCN SERPL QL: SIGNIFICANT CHANGE UP
BUN SERPL-MCNC: 36 MG/DL — HIGH (ref 8–20)
CALCIUM SERPL-MCNC: 8.7 MG/DL — SIGNIFICANT CHANGE UP (ref 8.6–10.2)
CHLORIDE SERPL-SCNC: 93 MMOL/L — LOW (ref 98–107)
CO2 SERPL-SCNC: 39 MMOL/L — HIGH (ref 22–29)
CREAT SERPL-MCNC: 0.61 MG/DL — SIGNIFICANT CHANGE UP (ref 0.5–1.3)
ELLIPTOCYTES BLD QL SMEAR: SLIGHT — SIGNIFICANT CHANGE UP
EOSINOPHIL # BLD AUTO: 0 K/UL — SIGNIFICANT CHANGE UP (ref 0–0.5)
EOSINOPHIL NFR BLD AUTO: 0 % — SIGNIFICANT CHANGE UP (ref 0–6)
GIANT PLATELETS BLD QL SMEAR: PRESENT — SIGNIFICANT CHANGE UP
GLUCOSE BLDC GLUCOMTR-MCNC: 236 MG/DL — HIGH (ref 70–99)
GLUCOSE BLDC GLUCOMTR-MCNC: 252 MG/DL — HIGH (ref 70–99)
GLUCOSE BLDC GLUCOMTR-MCNC: 306 MG/DL — HIGH (ref 70–99)
GLUCOSE BLDC GLUCOMTR-MCNC: 328 MG/DL — HIGH (ref 70–99)
GLUCOSE SERPL-MCNC: 319 MG/DL — HIGH (ref 70–99)
HCT VFR BLD CALC: 35 % — LOW (ref 39–50)
HGB BLD-MCNC: 10.9 G/DL — LOW (ref 13–17)
HYPOCHROMIA BLD QL: SLIGHT — SIGNIFICANT CHANGE UP
INR BLD: 0.99 RATIO — SIGNIFICANT CHANGE UP (ref 0.88–1.16)
LYMPHOCYTES # BLD AUTO: 0 % — LOW (ref 13–44)
LYMPHOCYTES # BLD AUTO: 0 K/UL — LOW (ref 1–3.3)
MACROCYTES BLD QL: SLIGHT — SIGNIFICANT CHANGE UP
MAGNESIUM SERPL-MCNC: 2 MG/DL — SIGNIFICANT CHANGE UP (ref 1.6–2.6)
MANUAL SMEAR VERIFICATION: SIGNIFICANT CHANGE UP
MCHC RBC-ENTMCNC: 28.8 PG — SIGNIFICANT CHANGE UP (ref 27–34)
MCHC RBC-ENTMCNC: 31.1 GM/DL — LOW (ref 32–36)
MCV RBC AUTO: 92.6 FL — SIGNIFICANT CHANGE UP (ref 80–100)
MICROCYTES BLD QL: SLIGHT — SIGNIFICANT CHANGE UP
MONOCYTES # BLD AUTO: 0.52 K/UL — SIGNIFICANT CHANGE UP (ref 0–0.9)
MONOCYTES NFR BLD AUTO: 3.5 % — SIGNIFICANT CHANGE UP (ref 2–14)
NEUTROPHILS # BLD AUTO: 14.07 K/UL — HIGH (ref 1.8–7.4)
NEUTROPHILS NFR BLD AUTO: 94.7 % — HIGH (ref 43–77)
OVALOCYTES BLD QL SMEAR: SLIGHT — SIGNIFICANT CHANGE UP
PHOSPHATE SERPL-MCNC: 3.2 MG/DL — SIGNIFICANT CHANGE UP (ref 2.4–4.7)
PLAT MORPH BLD: NORMAL — SIGNIFICANT CHANGE UP
PLATELET # BLD AUTO: 165 K/UL — SIGNIFICANT CHANGE UP (ref 150–400)
POIKILOCYTOSIS BLD QL AUTO: SLIGHT — SIGNIFICANT CHANGE UP
POLYCHROMASIA BLD QL SMEAR: SLIGHT — SIGNIFICANT CHANGE UP
POTASSIUM SERPL-MCNC: 4.8 MMOL/L — SIGNIFICANT CHANGE UP (ref 3.5–5.3)
POTASSIUM SERPL-SCNC: 4.8 MMOL/L — SIGNIFICANT CHANGE UP (ref 3.5–5.3)
PROTHROM AB SERPL-ACNC: 11.5 SEC — SIGNIFICANT CHANGE UP (ref 10.6–13.6)
RBC # BLD: 3.78 M/UL — LOW (ref 4.2–5.8)
RBC # FLD: 15.6 % — HIGH (ref 10.3–14.5)
RBC BLD AUTO: ABNORMAL
SARS-COV-2 RNA SPEC QL NAA+PROBE: SIGNIFICANT CHANGE UP
SODIUM SERPL-SCNC: 139 MMOL/L — SIGNIFICANT CHANGE UP (ref 135–145)
VARIANT LYMPHS # BLD: 1.8 % — SIGNIFICANT CHANGE UP (ref 0–6)
WBC # BLD: 14.86 K/UL — HIGH (ref 3.8–10.5)
WBC # FLD AUTO: 14.86 K/UL — HIGH (ref 3.8–10.5)

## 2020-12-30 PROCEDURE — 99232 SBSQ HOSP IP/OBS MODERATE 35: CPT

## 2020-12-30 PROCEDURE — 93010 ELECTROCARDIOGRAM REPORT: CPT

## 2020-12-30 PROCEDURE — 99233 SBSQ HOSP IP/OBS HIGH 50: CPT

## 2020-12-30 RX ORDER — WARFARIN SODIUM 2.5 MG/1
5 TABLET ORAL ONCE
Refills: 0 | Status: COMPLETED | OUTPATIENT
Start: 2020-12-30 | End: 2020-12-30

## 2020-12-30 RX ORDER — INSULIN LISPRO 100/ML
22 VIAL (ML) SUBCUTANEOUS
Qty: 1 | Refills: 0
Start: 2020-12-30 | End: 2021-01-28

## 2020-12-30 RX ORDER — ASPIRIN/CALCIUM CARB/MAGNESIUM 324 MG
1 TABLET ORAL
Qty: 30 | Refills: 0
Start: 2020-12-30 | End: 2021-01-28

## 2020-12-30 RX ORDER — ISOPROPYL ALCOHOL, BENZOCAINE .7; .06 ML/ML; ML/ML
1 SWAB TOPICAL
Qty: 100 | Refills: 1
Start: 2020-12-30 | End: 2021-02-17

## 2020-12-30 RX ORDER — METOPROLOL TARTRATE 50 MG
1 TABLET ORAL
Qty: 60 | Refills: 0
Start: 2020-12-30 | End: 2021-01-28

## 2020-12-30 RX ORDER — DIGOXIN 250 MCG
1 TABLET ORAL
Qty: 0 | Refills: 0 | DISCHARGE
Start: 2020-12-30

## 2020-12-30 RX ORDER — FAMOTIDINE 10 MG/ML
1 INJECTION INTRAVENOUS
Qty: 60 | Refills: 0
Start: 2020-12-30 | End: 2021-01-28

## 2020-12-30 RX ORDER — IPRATROPIUM/ALBUTEROL SULFATE 18-103MCG
3 AEROSOL WITH ADAPTER (GRAM) INHALATION
Qty: 360 | Refills: 0
Start: 2020-12-30 | End: 2021-01-28

## 2020-12-30 RX ORDER — INSULIN LISPRO 100/ML
15 VIAL (ML) SUBCUTANEOUS
Qty: 1 | Refills: 0
Start: 2020-12-30

## 2020-12-30 RX ORDER — ENOXAPARIN SODIUM 100 MG/ML
30 INJECTION SUBCUTANEOUS
Qty: 1 | Refills: 0
Start: 2020-12-30 | End: 2021-01-28

## 2020-12-30 RX ORDER — CLOPIDOGREL BISULFATE 75 MG/1
1 TABLET, FILM COATED ORAL
Qty: 30 | Refills: 0
Start: 2020-12-30 | End: 2021-01-28

## 2020-12-30 RX ORDER — LEVETIRACETAM 250 MG/1
500 TABLET, FILM COATED ORAL
Refills: 0 | Status: DISCONTINUED | OUTPATIENT
Start: 2020-12-30 | End: 2021-01-01

## 2020-12-30 RX ADMIN — FAMOTIDINE 20 MILLIGRAM(S): 10 INJECTION INTRAVENOUS at 05:08

## 2020-12-30 RX ADMIN — Medication 250 MILLIGRAM(S): at 05:08

## 2020-12-30 RX ADMIN — Medication 22 UNIT(S): at 12:53

## 2020-12-30 RX ADMIN — Medication 60 MILLIGRAM(S): at 05:08

## 2020-12-30 RX ADMIN — Medication 25 MILLIGRAM(S): at 17:03

## 2020-12-30 RX ADMIN — Medication 3 MILLILITER(S): at 04:13

## 2020-12-30 RX ADMIN — LEVETIRACETAM 420 MILLIGRAM(S): 250 TABLET, FILM COATED ORAL at 05:08

## 2020-12-30 RX ADMIN — WARFARIN SODIUM 5 MILLIGRAM(S): 2.5 TABLET ORAL at 21:36

## 2020-12-30 RX ADMIN — Medication 4: at 21:38

## 2020-12-30 RX ADMIN — Medication 6: at 17:03

## 2020-12-30 RX ADMIN — CITALOPRAM 40 MILLIGRAM(S): 10 TABLET, FILM COATED ORAL at 11:15

## 2020-12-30 RX ADMIN — Medication 60 MILLIGRAM(S): at 17:02

## 2020-12-30 RX ADMIN — Medication 40 MILLIGRAM(S): at 11:15

## 2020-12-30 RX ADMIN — LEVETIRACETAM 500 MILLIGRAM(S): 250 TABLET, FILM COATED ORAL at 17:53

## 2020-12-30 RX ADMIN — Medication 3 MILLILITER(S): at 08:23

## 2020-12-30 RX ADMIN — Medication 22 UNIT(S): at 17:03

## 2020-12-30 RX ADMIN — Medication 81 MILLIGRAM(S): at 11:15

## 2020-12-30 RX ADMIN — CLOPIDOGREL BISULFATE 75 MILLIGRAM(S): 75 TABLET, FILM COATED ORAL at 11:15

## 2020-12-30 RX ADMIN — Medication 22 UNIT(S): at 08:40

## 2020-12-30 RX ADMIN — CHLORHEXIDINE GLUCONATE 1 APPLICATION(S): 213 SOLUTION TOPICAL at 05:07

## 2020-12-30 RX ADMIN — LEVETIRACETAM 420 MILLIGRAM(S): 250 TABLET, FILM COATED ORAL at 17:09

## 2020-12-30 RX ADMIN — Medication 8: at 12:54

## 2020-12-30 RX ADMIN — FAMOTIDINE 20 MILLIGRAM(S): 10 INJECTION INTRAVENOUS at 17:03

## 2020-12-30 RX ADMIN — POLYETHYLENE GLYCOL 3350 17 GRAM(S): 17 POWDER, FOR SOLUTION ORAL at 11:15

## 2020-12-30 RX ADMIN — Medication 25 MILLIGRAM(S): at 05:08

## 2020-12-30 RX ADMIN — Medication 40 MILLIGRAM(S): at 05:08

## 2020-12-30 RX ADMIN — Medication 3 MILLILITER(S): at 15:07

## 2020-12-30 RX ADMIN — Medication 60 MILLIGRAM(S): at 22:23

## 2020-12-30 RX ADMIN — Medication 0.12 MILLIGRAM(S): at 05:08

## 2020-12-30 RX ADMIN — INSULIN GLARGINE 30 UNIT(S): 100 INJECTION, SOLUTION SUBCUTANEOUS at 21:36

## 2020-12-30 RX ADMIN — Medication 8: at 08:40

## 2020-12-30 RX ADMIN — ATORVASTATIN CALCIUM 40 MILLIGRAM(S): 80 TABLET, FILM COATED ORAL at 21:36

## 2020-12-30 RX ADMIN — Medication 3 MILLILITER(S): at 20:21

## 2020-12-30 RX ADMIN — Medication 60 MILLIGRAM(S): at 11:15

## 2020-12-30 NOTE — PROGRESS NOTE ADULT - ASSESSMENT
Sever COPD, Cor Pulmonale, post arrest   Chronic hypercapnic , hypoxemic respiratory failure secondary to COPD  CAD,PAF, post recent ICS to LAD with good flow  Currently resting comfortably, oxygenating well on 3 L, use 4 at home  Needs AVAPS to prevent readmission and mortality, remains hypercapnic despite AVAPS,   BIPAP ST has been considered and excluded secondary to degree of hypercapnia and post arrest  Duo neb3-4 x daily, prednisone taper, has home 02  [facility]  prognosis guarded, but clinically much improved Sever COPD, Cor Pulmonale, post arrest   Chronic hypercapnic , hypoxemic respiratory failure secondary to COPD  CAD,PAF, post recent ICS to LAD with good flow  Currently resting comfortably, oxygenating well on 3 L, use 4 at home  Needs AVAPS to prevent readmission and mortality, remains hypercapnic despite AVAPS,   BIPAP ST has been considered and excluded secondary to degree of hypercapnia and post arrest  Duo neb3-4 x daily, prednisone taper, has home 02, also uses Symbicort with spacer  [facility]  prognosis guarded, but clinically much improved Sever COPD, Cor Pulmonale, post arrest   Chronic hypercapnic , hypoxemic respiratory failure secondary to COPD  CAD,PAF, post recent ICS to LAD with good flow  Currently resting comfortably, oxygenating well on 3 L, use 4 at home  Needs AVAPS AE to prevent readmission and mortality, remains hypercapnic despite AVAPS,   BIPAP ST has been considered and excluded secondary to degree of hypercapnia and post arrest  Duo neb3-4 x daily, prednisone taper, has home 02, also uses Symbicort with spacer  [facility]  prognosis guarded, but clinically much improved

## 2020-12-30 NOTE — PROGRESS NOTE ADULT - ASSESSMENT
70y Male with past medical history significant for COPD, HTN, DM, pAF on coumadin, seizure d/o with recent hospitalization for hypercapnic respiratory failure presents with HTN emergency and subsequent PEA/VT arrest on 12/10 this admission.     Acute on chronic hypoxic hypercapnic respiratory failure sec copd exacerbation, ELISSA, Acute of chronic chf  ABG with mixed resp acidosis & metabolic alkalosis, now improving.  - taper iv solumetrol to prednisone  - per pulm Needs AVAPS & home O2 as out pt to decrease re admissions and improve mortality as has failed BIPAP in the past( [facility])  Repeat CXR: has shows right base airspace disease/ pleural effusion    Acute metabolic encephalopathy-improved  liekly from hypercarbia & hypoxia  ABG with mixed resp acidosis & metabolic alkalosis, now improving. Pt off AVAPS currently on NC  B12, folate, TSH, RPR, ammonia wnl  No signs of CVA. Pt is non focal. AAO x 3 now      Right groin pseudoaneurysm s/p thrombin injection   -  US Duplex Arterial Lower Ext Ltd, Right (12.19.20 @ 11:24) >  The larger of the 2 pseudoaneurysms seen on the prior study remains thrombosed and measures 2.5 x 2.8 x 0.9 cm.  The smaller pseudoaneurysm demonstrating residual flow on the prior study adjacent to the common femoral artery is not well seen on the current study, possibly related to technical factors.     - CT RLE and RLE arterial duplex with either a single right groin pseudoaneurysm measuring 1.6 x 1.5 x 1.4 cm with dilatation of a portion of the neck measuring up to 0.9 cm transverse or 2 adjacent pseudoaneurysms with the more proximal pseudoaneurysm measuring 0.9 x 0.7 cm and the more distal pseudoaneurysm measuring 1.6 x 1.5 x 1.4 cm.  - Vascular following.  - s/p thrombin injection by IR on 12/18  - Resume coumadin without bridging per cardio      CAD   - s/p cath with Severe RCA and LAD disease .  s/p staged PCI to LAD and RCA   - Continue ASA with Plavix, BB, Statin         HTN  - c/w metoprolol and cardizem and digoxin    AFib  - Rate controlled with diltiazem/Metoprolol  - coumadin resumed    Cardiac arrest from/ VT  - amiodarone decreased to 200 mg daily per Dr Arroyo recommendations   -  EP evaluation, no plan AICD now    Blood loss anemia-  - S/P  transfuse 2units PRBC  - Stable H/H    Shortness of breath due to COPD exacerbation,  acute on chronic CHF, possible gram positive/gram negative pneumonia  - improved, currently on NC  - LVEDP was elevated on cath.  - Use of diuretics with worsening contraction alkalosis   - Alkalosis improving after holding diuretics.  Mixed acid base disorder, pCO2 and serum bicarb noted with concurrent hypochloremia: no further diuretics for now as per cardio  - F/U RENAL REC. f/u BMP  - Strict I & Os  - Daily weight  - nocturnal NIV  - Iv solumedrol 40mg q12 Taper to PO pred  - bronchodilators   - f/u pulm rec  -off lasix per cardiology  - completed 7 days of zosyn per ID  - Blood cx NGTD      DM2, uncontrolled-high  -  A1c 9.1  - lantus 30. Premeals 22  - close monitoring  - endocrine following      Morbid obesity-  Diet & weight loss recommended      Long  dw daughter CATRACHITA [phone]. Discussed above care of plan. PT recs IRMA but pt refuses IRMA & wants to go home. AVAPS being set up

## 2020-12-30 NOTE — CHART NOTE - NSCHARTNOTEFT_GEN_A_CORE
Chart reviewed. GOC established and pt/family wishes to continue all medical interventions and remain full code. HCP is daughter Becky. MOLST to be provided on discharge for pt to complete with community medical physician. Dispo per CCC. No further recommendations from pallitive. Ongoing medical management per primary team. Will sign off. Please reconsult PRN.

## 2020-12-30 NOTE — PROGRESS NOTE ADULT - ATTENDING COMMENTS
greater than 50% of time spent reviewing labs, notes, orders and radiographs, coordinating care
greater than 50% of time spent reviewing labs, notes, orders and radiographs, coordinating care
COUNSELING:    Face to face meeting to discuss Advanced Care Planning - Time Spent ______ Minutes.  See goals of care note.    More than 50% time spent in counseling and coordinating care. ____30__ Minutes.     Thank you for the opportunity to assist with the care of this patient.   [facility] [phone].
cc time spent adjusting vent settings, sedatives, and other life sustaining therapies
I have personally seen and examined patient.  Above note reviewed and discussed with NP, modified where appropriate. Hg noted. 2PRBCs with lasix in between. IR called for thrombin injection. vascular appreciated
Patient seen and examined with MICU PA.  He is quite comfortable while on NIV, however he apparently desaturates quickly after being placed on nasal cannula.  He likely has severe obstructive lung disease given his physical exam and extensive smoking hx.  Agree with above, would focus on diuresing as much as tolerated, optimizing COPD drugs, and placing on high flow whenever he is taken off NIV.
greater than 50% of time spent reviewing labs, notes, orders and radiographs, coordinating care
I have personally seen and examined patient.  Above note reviewed and discussed with PA, modified where appropriate. Pt's jodi hematoma significant in size. Will repeat H/H. Disccussed with cardio. Hold off on AC. c/w ASA & plavix. CT on R leg noted. Arterial duplex showed pseudoaneurysm. Vascular sx called.

## 2020-12-30 NOTE — PROGRESS NOTE ADULT - ASSESSMENT
70 year old male with severe COPD on home O2, Type 2 DM, HTN, Afib, admitted with hypoxic and hypercapnic respiratory failure requiring intubation followed by PEA arrest and VT with cardiac cath showing severe RCA and LAD disease.  He is s/p THI to RCA and LAD.  He has developed steroid induced hyperglycemia this admission.    1.  Type 2 DM-   Would expect improvement in hyperglycemia now that he is transitioning to prednisone taper.  Would preemptively decrease Lispro to 15 units with meals and continue same dose of Lantus.  Would not resume Metformin in this patient as it is contraindicated in a patient with hypoxia on O2 therapy (due to associated risk of lactic acidosis).  2.  CAD-  S/p PCI Management per cardiac team  3.  COPD/ hypercapnic resp failure-   improved,  continue resp Rx and Steroid taper as per pulmonary.  4.  HLD-  continue statin.     Case discussed with Dr. Corley.

## 2020-12-30 NOTE — PROGRESS NOTE ADULT - SUBJECTIVE AND OBJECTIVE BOX
[facility], THE [facility]                                   [address] [place] [address]                                                      PHONE: [phone]                                                         FAX: [phone]  [email or web address]  ---------------------------------------------------------------------------------------------------------------------------------    Overnight events/patient complaints:    INTERPRETATION OF TELEMETRY (personally reviewed):    ECG:    ECHO: e< from: TTE Echo Complete w/ Contrast w/ Doppler (12.14.20 @ 14:42) >   1. Technically difficult study.   2. Endocardial visualization was enhanced with intravenous echo contrast.   3. Left ventricular ejection fraction, by visual estimation, is 60 to 65%.   4. Normal global left ventricular systolic function.   5. The left ventricular diastolic function could not be assessed in this study.   6. Normal left ventricular internal cavity size.   7. Moderately enlarged right ventricle. Moderately reduced RV systolic function.   8. The left atrium is normal in size.   9. Moderately enlarged right atrium.  10. Mild thickening and calcification of the anterior and posterior mitral valve leaflets.  11. Mild mitral valve regurgitation.  12. Mild tricuspid regurgitation.  13. There is no evidence of pericardial effusion.    CARDIAC CATHETERIZATION:  ca< from: Cardiac Cath Lab - Adult (12.29.20 @ 17:10) >  INTERVENTIONAL IMPRESSIONS: ICS x 2 to LAD w/ 0% residual stenosis and FELIPA III flow maintained pre and post PCI.  Patent RCA stents.  Severe COPD/PCI performed on BiPAP from R radial approach.    No Known Allergies    MEDICATIONS  (STANDING):  albuterol/ipratropium for Nebulization 3 milliLiter(s) Nebulizer every 6 hours  aspirin  chewable 81 milliGRAM(s) Oral daily  atorvastatin 40 milliGRAM(s) Oral at bedtime  chlorhexidine 2% Cloths 1 Application(s) Topical <User Schedule>  citalopram 40 milliGRAM(s) Oral daily  clopidogrel Tablet 75 milliGRAM(s) Oral daily  dextrose 40% Gel 15 Gram(s) Oral once  dextrose 5%. 1000 milliLiter(s) (50 mL/Hr) IV Continuous <Continuous>  dextrose 5%. 1000 milliLiter(s) (100 mL/Hr) IV Continuous <Continuous>  dextrose 50% Injectable 25 Gram(s) IV Push once  dextrose 50% Injectable 12.5 Gram(s) IV Push once  dextrose 50% Injectable 25 Gram(s) IV Push once  digoxin     Tablet 0.125 milliGRAM(s) Oral daily  diltiazem    Tablet 60 milliGRAM(s) Oral four times a day  famotidine    Tablet 20 milliGRAM(s) Oral two times a day  glucagon  Injectable 1 milliGRAM(s) IntraMuscular once  influenza   Vaccine 0.5 milliLiter(s) IntraMuscular once  insulin glargine Injectable (LANTUS) 30 Unit(s) SubCutaneous at bedtime  insulin lispro (ADMELOG) corrective regimen sliding scale   SubCutaneous Before meals and at bedtime  insulin lispro Injectable (ADMELOG) 22 Unit(s) SubCutaneous three times a day before meals  levETIRAcetam  IVPB 500 milliGRAM(s) IV Intermittent every 12 hours  metoprolol tartrate 25 milliGRAM(s) Oral two times a day  polyethylene glycol 3350 17 Gram(s) Oral daily  predniSONE   Tablet 40 milliGRAM(s) Oral daily  saccharomyces boulardii 250 milliGRAM(s) Oral two times a day    MEDICATIONS  (PRN):  aluminum hydroxide/magnesium hydroxide/simethicone Suspension 30 milliLiter(s) Oral every 4 hours PRN Dyspepsia  diltiazem Injectable 10 milliGRAM(s) IV Push every 6 hours PRN for HR > 110; hold for SBP < 100      Vital Signs Last 24 Hrs  T(C): 36.6 (30 Dec 2020 08:09), Max: 37.3 (29 Dec 2020 20:20)  T(F): 97.8 (30 Dec 2020 08:09), Max: 99.1 (29 Dec 2020 20:20)  HR: 69 (30 Dec 2020 08:09) (69 - 122)  BP: 113/46 (30 Dec 2020 08:09) (113/46 - 174/79)  BP(mean): 62 (30 Dec 2020 08:09) (62 - 97)  RR: 18 (30 Dec 2020 08:09) (15 - 20)  SpO2: 100% (30 Dec 2020 08:09) (95% - 100%)  ICU Vital Signs Last 24 Hrs    PHYSICAL EXAM:  General: Appears well developed, well nourished alert and cooperative.  HEENT: Head; normocephalic, atraumatic.Pupils reactive, cornea wnl. Neck supple, no nodes adenopathy, no JVD  CARDIOVASCULAR: Normal S1 and S2, 1/6 IRINA, no rub, gallop or lift.   LUNGS: No rales, rhonchi or wheeze. Normal breath sounds bilaterally.  ABDOMEN: Soft, nontender without mass or organomegaly. bowel sounds normoactive.  EXTREMITIES: No clubbing, cyanosis or edema.   SKIN: warm and dry with normal turgor.  NEURO: Alert/oriented x 3/normal motor exam.   PSYCH: normal affect.        LABS:                        10.9   14.86 )-----------( 165      ( 30 Dec 2020 07:14 )             35.0     12-30    139  |  93<L>  |  36.0<H>  ----------------------------<  319<H>  4.8   |  39.0<H>  |  0.61    Ca    8.7      30 Dec 2020 07:14  Phos  3.2     12-30  Mg     2.0     12-30      CLAUDIA MABRY      PT/INR - ( 29 Dec 2020 17:20 )   PT: 11.7 sec;   INR: 1.01 ratio         PTT - ( 29 Dec 2020 17:20 )  PTT:22.2 sec      RADIOLOGY & ADDITIONAL STUDIES:    ASSESSMENT AND PLAN:  In summary, CLAUDIA MABRY is a 70y Male with past medical history significant for     Thank you for allowing [facility] to participate in the care of this patient.  Please feel free to call with any questions or concerns.                  [facility], [facility]                                   [address] [place] [address]                                                      PHONE: [phone]                                                         FAX: [phone]  [email or web address]  ---------------------------------------------------------------------------------------------------------------------------------    Overnight events/patient complaints: s/p coronary intervention. Denies chest pain and presently on NC     INTERPRETATION OF TELEMETRY (personally reviewed): AF    ECG: ecg< from: 12 Lead ECG (12.21.20 @ 11:06) >  Atrial fibrillation. Low voltage QRS  Nonspecific T wave abnormality    ECHO: < from: TTE Echo Complete w/ Contrast w/ Doppler (12.14.20 @ 14:42) >   1. Technically difficult study.   2. Endocardial visualization was enhanced with intravenous echo contrast.   3. Left ventricular ejection fraction, by visual estimation, is 60 to 65%.   4. Normal global left ventricular systolic function.   5. The left ventricular diastolic function could not be assessed in this study.   6. Normal left ventricular internal cavity size.   7. Moderately enlarged right ventricle. Moderately reduced RV systolic function.   8. The left atrium is normal in size.   9. Moderately enlarged right atrium.  10. Mild thickening and calcification of the anterior and posterior mitral valve leaflets.  11. Mild mitral valve regurgitation.  12. Mild tricuspid regurgitation.  13. There is no evidence of pericardial effusion.    CARDIAC CATHETERIZATION:  ca< from: Cardiac Cath Lab - Adult (12.29.20 @ 17:10) >  INTERVENTIONAL IMPRESSIONS: ICS x 2 to LAD w/ 0% residual stenosis and FELIPA III flow maintained pre and post PCI.  Patent RCA stents.  Severe COPD/PCI performed on BiPAP from R radial approach.    No Known Allergies    MEDICATIONS  (STANDING):  albuterol/ipratropium for Nebulization 3 milliLiter(s) Nebulizer every 6 hours  aspirin  chewable 81 milliGRAM(s) Oral daily  atorvastatin 40 milliGRAM(s) Oral at bedtime  chlorhexidine 2% Cloths 1 Application(s) Topical <User Schedule>  citalopram 40 milliGRAM(s) Oral daily  clopidogrel Tablet 75 milliGRAM(s) Oral daily  dextrose 40% Gel 15 Gram(s) Oral once  dextrose 5%. 1000 milliLiter(s) (50 mL/Hr) IV Continuous <Continuous>  dextrose 5%. 1000 milliLiter(s) (100 mL/Hr) IV Continuous <Continuous>  dextrose 50% Injectable 25 Gram(s) IV Push once  dextrose 50% Injectable 12.5 Gram(s) IV Push once  dextrose 50% Injectable 25 Gram(s) IV Push once  digoxin     Tablet 0.125 milliGRAM(s) Oral daily  diltiazem    Tablet 60 milliGRAM(s) Oral four times a day  famotidine    Tablet 20 milliGRAM(s) Oral two times a day  glucagon  Injectable 1 milliGRAM(s) IntraMuscular once  influenza   Vaccine 0.5 milliLiter(s) IntraMuscular once  insulin glargine Injectable (LANTUS) 30 Unit(s) SubCutaneous at bedtime  insulin lispro (ADMELOG) corrective regimen sliding scale   SubCutaneous Before meals and at bedtime  insulin lispro Injectable (ADMELOG) 22 Unit(s) SubCutaneous three times a day before meals  levETIRAcetam  IVPB 500 milliGRAM(s) IV Intermittent every 12 hours  metoprolol tartrate 25 milliGRAM(s) Oral two times a day  polyethylene glycol 3350 17 Gram(s) Oral daily  predniSONE   Tablet 40 milliGRAM(s) Oral daily  saccharomyces boulardii 250 milliGRAM(s) Oral two times a day    MEDICATIONS  (PRN):  aluminum hydroxide/magnesium hydroxide/simethicone Suspension 30 milliLiter(s) Oral every 4 hours PRN Dyspepsia  diltiazem Injectable 10 milliGRAM(s) IV Push every 6 hours PRN for HR > 110; hold for SBP < 100      Vital Signs Last 24 Hrs  T(C): 36.6 (30 Dec 2020 08:09), Max: 37.3 (29 Dec 2020 20:20)  T(F): 97.8 (30 Dec 2020 08:09), Max: 99.1 (29 Dec 2020 20:20)  HR: 69 (30 Dec 2020 08:09) (69 - 122)  BP: 113/46 (30 Dec 2020 08:09) (113/46 - 174/79)  BP(mean): 62 (30 Dec 2020 08:09) (62 - 97)  RR: 18 (30 Dec 2020 08:09) (15 - 20)  SpO2: 100% (30 Dec 2020 08:09) (95% - 100%)  ICU Vital Signs Last 24 Hrs    PHYSICAL EXAM:  General: Appears well developed, well nourished alert and cooperative.  HEENT: Head; normocephalic, atraumatic.Pupils reactive, cornea wnl. Neck supple, no nodes adenopathy, no JVD  CARDIOVASCULAR: Normal S1 and S2, 1/6 IRINA, no rub, gallop or lift.   LUNGS: No rales, rhonchi or wheeze. Normal breath sounds bilaterally.  ABDOMEN: Soft, nontender without mass or organomegaly. bowel sounds normoactive.  EXTREMITIES: No clubbing, cyanosis or edema.   SKIN: warm and dry with normal turgor. scattered ecchymoses   NEURO: Alert/oriented x 3/normal motor exam.   PSYCH: normal affect.        LABS:                        10.9   14.86 )-----------( 165      ( 30 Dec 2020 07:14 )             35.0     12-30    139  |  93<L>  |  36.0<H>  ----------------------------<  319<H>  4.8   |  39.0<H>  |  0.61    Ca    8.7      30 Dec 2020 07:14  Phos  3.2     12-30  Mg     2.0     12-30      CLAUDIA MABRY      PT/INR - ( 29 Dec 2020 17:20 )   PT: 11.7 sec;   INR: 1.01 ratio         PTT - ( 29 Dec 2020 17:20 )  PTT:22.2 sec      RADIOLOGY & ADDITIONAL STUDIES:    ASSESSMENT AND PLAN:  In summary, CLAUDIA MABRY is a 70y Male with past medical history significant for severe COPD, on home O2, DM type 2, HTN, permanent AF, admitted with recurrent hypoxic/hypercapneic respiratory failure and agonal respiration 12/10 s/p emergent intubation followed by PEA arrest, then developed VT with a pulse, s/p DCCV and initiation of amiodarone s/p cath which revealed severe RCA and LAD disease s/p THI to RCA however with agitation during the procedure resulting in R femoral artery pseudoaneurysm s/p thrombin injection RFA by IR, now pending staged intervention to the LAD. Patient now with increased work of breathing requiring escalation of O2 support, now off BiPAP     CAD/pulmonary edema complicated by hypoxic respiratory failure: s/p staged PCI to LAD and RCA   - O2 requirement has been de-escalated and now on NC and comfortable    - Continue ASA with Plavix  - Mixed acid base disorder, pCO2 and serum bicarb noted with concurrent hypochloremia: no further diuretics for now   - continue bronchodilator therapy given underlying COPD   - continue telemetry monitoring     VT  - On amiodarone 200 mg daily    HTN  - BP controlled    Dyslipidemia  - Continue statin    AF  - Rates controlled with diltiazem and digoxin   - resume Coumadin, no bridging    Discharge planning. Will facilitate close outpatient cardiology follow-up     Thank you for allowing [facility] to participate in the care of this patient.  Please feel free to call with any questions or concerns.

## 2020-12-30 NOTE — PROGRESS NOTE ADULT - SUBJECTIVE AND OBJECTIVE BOX
POST CATH CHECK    S/P CATH VIA rra              MEDICATIONS    albuterol/ipratropium for Nebulization 3 milliLiter(s) Nebulizer every 6 hours  aluminum hydroxide/magnesium hydroxide/simethicone Suspension 30 milliLiter(s) Oral every 4 hours PRN  aspirin  chewable 81 milliGRAM(s) Oral daily  atorvastatin 40 milliGRAM(s) Oral at bedtime  chlorhexidine 2% Cloths 1 Application(s) Topical <User Schedule>  citalopram 40 milliGRAM(s) Oral daily  clopidogrel Tablet 75 milliGRAM(s) Oral daily  digoxin     Tablet 0.125 milliGRAM(s) Oral daily  diltiazem    Tablet 60 milliGRAM(s) Oral four times a day  diltiazem Injectable 10 milliGRAM(s) IV Push every 6 hours PRN  famotidine    Tablet 20 milliGRAM(s) Oral two times a day  glucagon  Injectable 1 milliGRAM(s) IntraMuscular once  influenza   Vaccine 0.5 milliLiter(s) IntraMuscular once  insulin glargine Injectable (LANTUS) 30 Unit(s) SubCutaneous at bedtime  insulin lispro (ADMELOG) corrective regimen sliding scale   SubCutaneous Before meals and at bedtime  insulin lispro Injectable (ADMELOG) 22 Unit(s) SubCutaneous three times a day before meals  levETIRAcetam  IVPB 500 milliGRAM(s) IV Intermittent every 12 hours  methylPREDNISolone sodium succinate Injectable 40 milliGRAM(s) IV Push every 12 hours  metoprolol tartrate 25 milliGRAM(s) Oral two times a day  polyethylene glycol 3350 17 Gram(s) Oral daily  saccharomyces boulardii 250 milliGRAM(s) Oral two times a day        12-30    139  |  93<L>  |  36.0<H>  ----------------------------<  319<H>  4.8   |  39.0<H>  |  0.61    Ca    8.7      30 Dec 2020 07:14  Phos  3.2     12-30  Mg     2.0     12-30                            10.9   14.86 )-----------( 165      ( 30 Dec 2020 07:14 )             35.0       T(C): 36.5 (12-30-20 @ 05:00), Max: 37.3 (12-29-20 @ 20:20)  HR: 69 (12-30-20 @ 08:09) (69 - 122)  BP: 113/46 (12-30-20 @ 08:09) (113/46 - 174/79)  RR: 18 (12-30-20 @ 08:09) (15 - 20)  SpO2: 100% (12-30-20 @ 08:09) (95% - 100%)      PE  Chest  Clear lung fields  Heart s1&s2 regular  Abd  soft active bowel sounds  EXT  No c/c/e  CATH SITE  Neuro  Alert oriented Non focal exam    A/P    ASHD s/p stent  Advised importance of taking antiplatelet agent on a regular basis  Low saturated fat diet discussed  Advised to follow up with Cardiologist within 2 weeks  Groin instructions given     POST CATH CHECK    S/P CATH VIA rra  s/p THI to LAD  CM  Atrial fib with overall controlled ventricular respnonse  EKG  Pending  MEDICATIONS    albuterol/ipratropium for Nebulization 3 milliLiter(s) Nebulizer every 6 hours  aluminum hydroxide/magnesium hydroxide/simethicone Suspension 30 milliLiter(s) Oral every 4 hours PRN  aspirin  chewable 81 milliGRAM(s) Oral daily  atorvastatin 40 milliGRAM(s) Oral at bedtime  chlorhexidine 2% Cloths 1 Application(s) Topical <User Schedule>  citalopram 40 milliGRAM(s) Oral daily  clopidogrel Tablet 75 milliGRAM(s) Oral daily  digoxin     Tablet 0.125 milliGRAM(s) Oral daily  diltiazem    Tablet 60 milliGRAM(s) Oral four times a day  diltiazem Injectable 10 milliGRAM(s) IV Push every 6 hours PRN  famotidine    Tablet 20 milliGRAM(s) Oral two times a day  glucagon  Injectable 1 milliGRAM(s) IntraMuscular once  influenza   Vaccine 0.5 milliLiter(s) IntraMuscular once  insulin glargine Injectable (LANTUS) 30 Unit(s) SubCutaneous at bedtime  insulin lispro (ADMELOG) corrective regimen sliding scale   SubCutaneous Before meals and at bedtime  insulin lispro Injectable (ADMELOG) 22 Unit(s) SubCutaneous three times a day before meals  levETIRAcetam  IVPB 500 milliGRAM(s) IV Intermittent every 12 hours  methylPREDNISolone sodium succinate Injectable 40 milliGRAM(s) IV Push every 12 hours  metoprolol tartrate 25 milliGRAM(s) Oral two times a day  polyethylene glycol 3350 17 Gram(s) Oral daily  saccharomyces boulardii 250 milliGRAM(s) Oral two times a day        12-30    139  |  93<L>  |  36.0<H>  ----------------------------<  319<H>  4.8   |  39.0<H>  |  0.61    Ca    8.7      30 Dec 2020 07:14  Phos  3.2     12-30  Mg     2.0     12-30                            10.9   14.86 )-----------( 165      ( 30 Dec 2020 07:14 )             35.0       T(C): 36.5 (12-30-20 @ 05:00), Max: 37.3 (12-29-20 @ 20:20)  HR: 69 (12-30-20 @ 08:09) (69 - 122)  BP: 113/46 (12-30-20 @ 08:09) (113/46 - 174/79)  RR: 18 (12-30-20 @ 08:09) (15 - 20)  SpO2: 100% (12-30-20 @ 08:09) (95% - 100%)      PE  Chest  Clear lung fields  Heart s1&s2 regular  Abd  soft active bowel sounds  EXT  No c/c/e  CATH SITE  Right wrist with good pulse +ecchymosis fingers warm with good capillary refill  Neuro  Alert oriented Non focal exam    A/P    ASHD s/p stent to LAD  Advised importance of taking antiplatelet agent on a regular basis  Low saturated fat diet discussed  Wrist instructions   Advised to follow up with Cardiologist within 2 weeks after d/c     POST CATH CHECK    S/P CATH VIA rra  s/p THI to LAD  CM  Atrial fib with overall controlled ventricular respnonse  EKG  Pending  MEDICATIONS    albuterol/ipratropium for Nebulization 3 milliLiter(s) Nebulizer every 6 hours  aluminum hydroxide/magnesium hydroxide/simethicone Suspension 30 milliLiter(s) Oral every 4 hours PRN  aspirin  chewable 81 milliGRAM(s) Oral daily  atorvastatin 40 milliGRAM(s) Oral at bedtime  chlorhexidine 2% Cloths 1 Application(s) Topical <User Schedule>  citalopram 40 milliGRAM(s) Oral daily  clopidogrel Tablet 75 milliGRAM(s) Oral daily  digoxin     Tablet 0.125 milliGRAM(s) Oral daily  diltiazem    Tablet 60 milliGRAM(s) Oral four times a day  diltiazem Injectable 10 milliGRAM(s) IV Push every 6 hours PRN  famotidine    Tablet 20 milliGRAM(s) Oral two times a day  glucagon  Injectable 1 milliGRAM(s) IntraMuscular once  influenza   Vaccine 0.5 milliLiter(s) IntraMuscular once  insulin glargine Injectable (LANTUS) 30 Unit(s) SubCutaneous at bedtime  insulin lispro (ADMELOG) corrective regimen sliding scale   SubCutaneous Before meals and at bedtime  insulin lispro Injectable (ADMELOG) 22 Unit(s) SubCutaneous three times a day before meals  levETIRAcetam  IVPB 500 milliGRAM(s) IV Intermittent every 12 hours  methylPREDNISolone sodium succinate Injectable 40 milliGRAM(s) IV Push every 12 hours  metoprolol tartrate 25 milliGRAM(s) Oral two times a day  polyethylene glycol 3350 17 Gram(s) Oral daily  saccharomyces boulardii 250 milliGRAM(s) Oral two times a day        12-30    139  |  93<L>  |  36.0<H>  ----------------------------<  319<H>  4.8   |  39.0<H>  |  0.61    Ca    8.7      30 Dec 2020 07:14  Phos  3.2     12-30  Mg     2.0     12-30                            10.9   14.86 )-----------( 165      ( 30 Dec 2020 07:14 )             35.0       T(C): 36.5 (12-30-20 @ 05:00), Max: 37.3 (12-29-20 @ 20:20)  HR: 69 (12-30-20 @ 08:09) (69 - 122)  BP: 113/46 (12-30-20 @ 08:09) (113/46 - 174/79)  RR: 18 (12-30-20 @ 08:09) (15 - 20)  SpO2: 100% (12-30-20 @ 08:09) (95% - 100%)      PE  Chest  Clear lung fields  Heart s1&s2 regular  Abd  soft active bowel sounds  EXT  No c/c/e  CATH SITE  Right wrist with good pulse +ecchymosis fingers warm with good capillary refill  Neuro  Alert oriented Non focal exam    A/P    ASHD s/p stent to LAD  Advised importance of taking antiplatelet agent on a regular basis  Low saturated fat diet discussed  Wrist instructions given  Cardiac rehab discussed  Advised to follow up with Cardiologist within 2 weeks after d/c

## 2020-12-30 NOTE — PROGRESS NOTE ADULT - SUBJECTIVE AND OBJECTIVE BOX
Patient is a 70y old  Male who presents with a chief complaint of Acute hypoxic/hypercapnic respiratory failure, cardiac arrest (25 Dec 2020 10:42)      SUBJECTIVE / OVERNIGHT EVENTS: Pt was on BiPAP all night & off BiPAP this this am. More alert today.   REVIEW OF SYSTEMS: All systems are reviewed and found to be negative except above    MEDICATIONS  (STANDING):  albuterol/ipratropium for Nebulization 3 milliLiter(s) Nebulizer every 6 hours  aspirin  chewable 81 milliGRAM(s) Oral daily  atorvastatin 40 milliGRAM(s) Oral at bedtime  chlorhexidine 2% Cloths 1 Application(s) Topical <User Schedule>  citalopram 40 milliGRAM(s) Oral daily  clopidogrel Tablet 75 milliGRAM(s) Oral daily  dextrose 40% Gel 15 Gram(s) Oral once  dextrose 5%. 1000 milliLiter(s) (50 mL/Hr) IV Continuous <Continuous>  dextrose 5%. 1000 milliLiter(s) (100 mL/Hr) IV Continuous <Continuous>  dextrose 50% Injectable 25 Gram(s) IV Push once  dextrose 50% Injectable 12.5 Gram(s) IV Push once  dextrose 50% Injectable 25 Gram(s) IV Push once  digoxin     Tablet 0.125 milliGRAM(s) Oral daily  diltiazem    Tablet 60 milliGRAM(s) Oral four times a day  famotidine    Tablet 20 milliGRAM(s) Oral two times a day  glucagon  Injectable 1 milliGRAM(s) IntraMuscular once  influenza   Vaccine 0.5 milliLiter(s) IntraMuscular once  insulin glargine Injectable (LANTUS) 30 Unit(s) SubCutaneous at bedtime  insulin lispro (ADMELOG) corrective regimen sliding scale   SubCutaneous Before meals and at bedtime  insulin lispro Injectable (ADMELOG) 18 Unit(s) SubCutaneous three times a day before meals  levETIRAcetam  IVPB 500 milliGRAM(s) IV Intermittent every 12 hours  methylPREDNISolone sodium succinate Injectable 40 milliGRAM(s) IV Push every 12 hours  metoprolol tartrate 25 milliGRAM(s) Oral two times a day  polyethylene glycol 3350 17 Gram(s) Oral daily  saccharomyces boulardii 250 milliGRAM(s) Oral two times a day    MEDICATIONS  (PRN):  aluminum hydroxide/magnesium hydroxide/simethicone Suspension 30 milliLiter(s) Oral every 4 hours PRN Dyspepsia      CAPILLARY BLOOD GLUCOSE      POCT Blood Glucose.: 205 mg/dL (25 Dec 2020 12:04)  POCT Blood Glucose.: 175 mg/dL (25 Dec 2020 08:06)  POCT Blood Glucose.: 191 mg/dL (24 Dec 2020 23:02)  POCT Blood Glucose.: 263 mg/dL (24 Dec 2020 17:19)    I&O's Summary    24 Dec 2020 07:01  -  25 Dec 2020 07:00  --------------------------------------------------------  IN: 450 mL / OUT: 650 mL / NET: -200 mL    25 Dec 2020 07:01  -  25 Dec 2020 13:17  --------------------------------------------------------  IN: 290 mL / OUT: 0 mL / NET: 290 mL        PHYSICAL EXAM:  Vital Signs Last 24 Hrs  T(C): 36.2 (25 Dec 2020 09:44), Max: 36.8 (24 Dec 2020 16:10)  T(F): 97.2 (25 Dec 2020 09:44), Max: 98.2 (24 Dec 2020 16:10)  HR: 90 (25 Dec 2020 09:44) (72 - 113)  BP: 119/74 (25 Dec 2020 09:44) (105/50 - 153/66)  BP(mean): 57 (24 Dec 2020 23:00) (57 - 86)  RR: 18 (25 Dec 2020 09:44) (18 - 22)  SpO2: 98% (25 Dec 2020 09:44) (89% - 100%)    CONSTITUTIONAL: NAD, morbidly obese  EYES: PERRLA; conjunctiva and sclera clear  ENMT: Moist oral mucosa, ly  RESPIRATORY: decreased BS b/l  CARDIOVASCULAR: Regular rate and rhythm, normal S1 and S2, no murmur   EXTS: + lower extremity edema-Improving; Peripheral pulses are 2+ bilaterally, multiple bruises on b/l UE  ABDOMEN: Nontender to palpation, normoactive bowel sounds, no rebound/guarding;   PSYCH: affect appropriate  NEUROLOGY: AAO x 3, CN 2-12 are intact and symmetric; no gross sensory deficits; motor strength 5/5 x 4, sensation intact  R groin hematoma improving       LABS:                        11.9   23.02 )-----------( 244      ( 24 Dec 2020 07:31 )             38.6     12-25    145  |  93<L>  |  30.0<H>  ----------------------------<  164<H>  4.2   |  47.0<HH>  |  0.58    Ca    8.6      25 Dec 2020 06:49                  RADIOLOGY & ADDITIONAL TESTS:  Results Reviewed:   Imaging Personally Reviewed:  Electrocardiogram Personally Reviewed:    COORDINATION OF CARE:  Care Discussed with Consultants/Other Providers [Y/N]:  Prior or Outpatient Records Reviewed [Y/N]:

## 2020-12-30 NOTE — DISCHARGE NOTE NURSING/CASE MANAGEMENT/SOCIAL WORK - PATIENT PORTAL LINK FT
You can access the FollowMyHealth Patient Portal offered by [facility] by registering at the following website: [email or web address]. By joining [employer]’s FollowMyHealth portal, you will also be able to view your health information using other applications (apps) compatible with our system.

## 2020-12-30 NOTE — DISCHARGE NOTE NURSING/CASE MANAGEMENT/SOCIAL WORK - NSDCPEPTCOWADIET_GEN_ALL_CORE
Keep your intake of vitamin K regular. The highest amount of vitamin K is found in green and leafy vegetables like broccoli, lettuces, cabbage, and spinach. You can eat these foods but keep the portion size the same. Changes in the amount you eat can affect your PT/INR blood test. Contact your doctor before making any major changes in your diet. Limit your alcohol intake. car

## 2020-12-30 NOTE — PROGRESS NOTE ADULT - SUBJECTIVE AND OBJECTIVE BOX
PULMONARY PROGRESS NOTE      CLAUDIA [id number]    Patient is a 70y old  Male who presents with a chief complaint of Acute hypoxic/hypercapnic respiratory failure, cardiac arrest (30 Dec 2020 08:54)      INTERVAL HPI/OVERNIGHT EVENTS:  doing sig better  no CP or SOB  ambulating  anxious to go home, [facility]  MEDICATIONS  (STANDING):  albuterol/ipratropium for Nebulization 3 milliLiter(s) Nebulizer every 6 hours  aspirin  chewable 81 milliGRAM(s) Oral daily  atorvastatin 40 milliGRAM(s) Oral at bedtime  chlorhexidine 2% Cloths 1 Application(s) Topical <User Schedule>  citalopram 40 milliGRAM(s) Oral daily  clopidogrel Tablet 75 milliGRAM(s) Oral daily  dextrose 40% Gel 15 Gram(s) Oral once  dextrose 5%. 1000 milliLiter(s) (50 mL/Hr) IV Continuous <Continuous>  dextrose 5%. 1000 milliLiter(s) (100 mL/Hr) IV Continuous <Continuous>  dextrose 50% Injectable 25 Gram(s) IV Push once  dextrose 50% Injectable 12.5 Gram(s) IV Push once  dextrose 50% Injectable 25 Gram(s) IV Push once  digoxin     Tablet 0.125 milliGRAM(s) Oral daily  diltiazem    Tablet 60 milliGRAM(s) Oral four times a day  famotidine    Tablet 20 milliGRAM(s) Oral two times a day  glucagon  Injectable 1 milliGRAM(s) IntraMuscular once  influenza   Vaccine 0.5 milliLiter(s) IntraMuscular once  insulin glargine Injectable (LANTUS) 30 Unit(s) SubCutaneous at bedtime  insulin lispro (ADMELOG) corrective regimen sliding scale   SubCutaneous Before meals and at bedtime  insulin lispro Injectable (ADMELOG) 22 Unit(s) SubCutaneous three times a day before meals  levETIRAcetam  IVPB 500 milliGRAM(s) IV Intermittent every 12 hours  metoprolol tartrate 25 milliGRAM(s) Oral two times a day  polyethylene glycol 3350 17 Gram(s) Oral daily  predniSONE   Tablet 40 milliGRAM(s) Oral daily  warfarin 5 milliGRAM(s) Oral once      MEDICATIONS  (PRN):  aluminum hydroxide/magnesium hydroxide/simethicone Suspension 30 milliLiter(s) Oral every 4 hours PRN Dyspepsia  diltiazem Injectable 10 milliGRAM(s) IV Push every 6 hours PRN for HR > 110; hold for SBP < 100      Allergies    No Known Allergies    Intolerances        PAST MEDICAL & SURGICAL HISTORY:  Seizure    Depression, unspecified depression type    Diabetes    Hypertension    Hyperlipidemia    Afib    COPD (chronic obstructive pulmonary disease)    Abdominal hernia    H/O carotid endarterectomy        SOCIAL HISTORY  Smoking History:       REVIEW OF SYSTEMS:    CONSTITUTIONAL:  No distress    HEENT:  Eyes:  No diplopia or blurred vision. ENT:  No earache, sore throat or runny nose.    CARDIOVASCULAR:  No pressure, squeezing, tightness, heaviness or aching about the chest; no palpitations.    RESPIRATORY:  see HPI  GASTROINTESTINAL:  No nausea, vomiting or diarrhea.    GENITOURINARY:  No dysuria, frequency or urgency.    NEUROLOGIC:  No paresthesias, fasciculations, seizures or weakness.    PSYCHIATRIC:  No disorder of thought or mood.    Vital Signs Last 24 Hrs  T(C): 36.9 (30 Dec 2020 15:43), Max: 37.3 (29 Dec 2020 20:20)  T(F): 98.5 (30 Dec 2020 15:43), Max: 99.1 (29 Dec 2020 20:20)  HR: 115 (30 Dec 2020 15:43) (69 - 122)  BP: 146/81 (30 Dec 2020 15:43) (113/46 - 174/79)  BP(mean): 62 (30 Dec 2020 08:09) (62 - 97)  RR: 20 (30 Dec 2020 15:43) (15 - 20)  SpO2: 97% (30 Dec 2020 15:43) (95% - 100%)    PHYSICAL EXAMINATION:    GENERAL: The patient is awake and alert in no apparent distress.     HEENT: Head is normocephalic and atraumatic. Extraocular muscles are intact. Mucous membranes are moist.    NECK: Supple.    LUNGS: diminished air entry bilat  without wheezing, rales or rhonchi; respirations unlabored    HEART: Regular rate and rhythm without murmur.    ABDOMEN: Soft, nontender, and nondistended.      EXTREMITIES: With1 plus  edema.    NEUROLOGIC: Grossly intact.    LABS:                        10.9   14.86 )-----------( 165      ( 30 Dec 2020 07:14 )             35.0     12-30    139  |  93<L>  |  36.0<H>  ----------------------------<  319<H>  4.8   |  39.0<H>  |  0.61    Ca    8.7      30 Dec 2020 07:14  Phos  3.2     12-30  Mg     2.0     12-30      PT/INR - ( 29 Dec 2020 17:20 )   PT: 11.7 sec;   INR: 1.01 ratio         PTT - ( 29 Dec 2020 17:20 )  PTT:22.2 sec            Serum Pro-Brain Natriuretic Peptide: 1094 pg/mL (12-27-20 @ 17:13)          MICROBIOLOGY:    RADIOLOGY & ADDITIONAL STUDIES:  CXrs reviewed

## 2020-12-30 NOTE — PROGRESS NOTE ADULT - SUBJECTIVE AND OBJECTIVE BOX
CC:  follow up DM    Interval HPI/ overnight Events:  Had PCI to LAD yesterday.  Going home today.   Denies any CP.    Being transitioned to oral steroid taper.      MEDICATIONS  (STANDING):  albuterol/ipratropium for Nebulization 3 milliLiter(s) Nebulizer every 6 hours  aspirin  chewable 81 milliGRAM(s) Oral daily  atorvastatin 40 milliGRAM(s) Oral at bedtime  chlorhexidine 2% Cloths 1 Application(s) Topical <User Schedule>  citalopram 40 milliGRAM(s) Oral daily  clopidogrel Tablet 75 milliGRAM(s) Oral daily  digoxin     Tablet 0.125 milliGRAM(s) Oral daily  diltiazem    Tablet 60 milliGRAM(s) Oral four times a day  famotidine    Tablet 20 milliGRAM(s) Oral two times a day  glucagon  Injectable 1 milliGRAM(s) IntraMuscular once  influenza   Vaccine 0.5 milliLiter(s) IntraMuscular once  insulin glargine Injectable (LANTUS) 30 Unit(s) SubCutaneous at bedtime  insulin lispro (ADMELOG) corrective regimen sliding scale   SubCutaneous Before meals and at bedtime  insulin lispro Injectable (ADMELOG) 22 Unit(s) SubCutaneous three times a day before meals  levETIRAcetam  IVPB 500 milliGRAM(s) IV Intermittent every 12 hours  metoprolol tartrate 25 milliGRAM(s) Oral two times a day  polyethylene glycol 3350 17 Gram(s) Oral daily  predniSONE   Tablet 40 milliGRAM(s) Oral daily  warfarin 5 milliGRAM(s) Oral once    Vital Signs Last 24 Hrs  T(C): 36.9 (30 Dec 2020 15:43), Max: 37.3 (29 Dec 2020 20:20)  T(F): 98.5 (30 Dec 2020 15:43), Max: 99.1 (29 Dec 2020 20:20)  HR: 115 (30 Dec 2020 15:43) (69 - 122)  BP: 146/81 (30 Dec 2020 15:43) (113/46 - 174/79)  BP(mean): 62 (30 Dec 2020 08:09) (62 - 97)  RR: 20 (30 Dec 2020 15:43) (15 - 20)  SpO2: 97% (30 Dec 2020 15:43) (95% - 100%)    PE:  Gen: Obese male, NAD  HEENT:  sclera anicteric,  MMM  CV:  nl S1 + S2, RRR, no m/r/g  Resp:  nl respiratory effort,  decreased BS b/l  GI/ Abd: soft, NT/ ND, BS +  Neuro:  No tremor   MS:  1 + b/l LE edema, nl muscle tone  Skin:  no acanthosis  Psych: affect appropriate    LABS:  12-30    139  |  93<L>  |  36.0<H>  ----------------------------<  319<H>  4.8   |  39.0<H>  |  0.61    Ca    8.7      30 Dec 2020 07:14  Phos  3.2     12-30  Mg     2.0     12-30                          10.9   14.86 )-----------( 165      ( 30 Dec 2020 07:14 )             35.0     CAPILLARY BLOOD GLUCOSE  POCT Blood Glucose.: 328 mg/dL (30 Dec 2020 12:26)  POCT Blood Glucose.: 306 mg/dL (30 Dec 2020 08:15)  POCT Blood Glucose.: 295 mg/dL (29 Dec 2020 20:57)  POCT Blood Glucose.: 253 mg/dL (29 Dec 2020 19:42)  POCT Blood Glucose.: 202 mg/dL (29 Dec 2020 16:41)

## 2020-12-31 LAB
ANION GAP SERPL CALC-SCNC: 6 MMOL/L — SIGNIFICANT CHANGE UP (ref 5–17)
APTT BLD: 23.4 SEC — LOW (ref 27.5–35.5)
BASOPHILS # BLD AUTO: 0.02 K/UL — SIGNIFICANT CHANGE UP (ref 0–0.2)
BASOPHILS NFR BLD AUTO: 0.1 % — SIGNIFICANT CHANGE UP (ref 0–2)
BUN SERPL-MCNC: 27 MG/DL — HIGH (ref 8–20)
CALCIUM SERPL-MCNC: 8.4 MG/DL — LOW (ref 8.6–10.2)
CHLORIDE SERPL-SCNC: 94 MMOL/L — LOW (ref 98–107)
CO2 SERPL-SCNC: 43 MMOL/L — HIGH (ref 22–29)
CREAT SERPL-MCNC: 0.56 MG/DL — SIGNIFICANT CHANGE UP (ref 0.5–1.3)
EOSINOPHIL # BLD AUTO: 0.02 K/UL — SIGNIFICANT CHANGE UP (ref 0–0.5)
EOSINOPHIL NFR BLD AUTO: 0.1 % — SIGNIFICANT CHANGE UP (ref 0–6)
GLUCOSE BLDC GLUCOMTR-MCNC: 102 MG/DL — HIGH (ref 70–99)
GLUCOSE BLDC GLUCOMTR-MCNC: 109 MG/DL — HIGH (ref 70–99)
GLUCOSE BLDC GLUCOMTR-MCNC: 118 MG/DL — HIGH (ref 70–99)
GLUCOSE BLDC GLUCOMTR-MCNC: 147 MG/DL — HIGH (ref 70–99)
GLUCOSE BLDC GLUCOMTR-MCNC: 226 MG/DL — HIGH (ref 70–99)
GLUCOSE BLDC GLUCOMTR-MCNC: 40 MG/DL — CRITICAL LOW (ref 70–99)
GLUCOSE BLDC GLUCOMTR-MCNC: 42 MG/DL — CRITICAL LOW (ref 70–99)
GLUCOSE BLDC GLUCOMTR-MCNC: 65 MG/DL — LOW (ref 70–99)
GLUCOSE BLDC GLUCOMTR-MCNC: 88 MG/DL — SIGNIFICANT CHANGE UP (ref 70–99)
GLUCOSE SERPL-MCNC: 209 MG/DL — HIGH (ref 70–99)
HCT VFR BLD CALC: 32.6 % — LOW (ref 39–50)
HGB BLD-MCNC: 10.2 G/DL — LOW (ref 13–17)
IMM GRANULOCYTES NFR BLD AUTO: 0.7 % — SIGNIFICANT CHANGE UP (ref 0–1.5)
INR BLD: 1 RATIO — SIGNIFICANT CHANGE UP (ref 0.88–1.16)
LYMPHOCYTES # BLD AUTO: 0.38 K/UL — LOW (ref 1–3.3)
LYMPHOCYTES # BLD AUTO: 2.8 % — LOW (ref 13–44)
MAGNESIUM SERPL-MCNC: 2.1 MG/DL — SIGNIFICANT CHANGE UP (ref 1.6–2.6)
MCHC RBC-ENTMCNC: 28.8 PG — SIGNIFICANT CHANGE UP (ref 27–34)
MCHC RBC-ENTMCNC: 31.3 GM/DL — LOW (ref 32–36)
MCV RBC AUTO: 92.1 FL — SIGNIFICANT CHANGE UP (ref 80–100)
MONOCYTES # BLD AUTO: 1.41 K/UL — HIGH (ref 0–0.9)
MONOCYTES NFR BLD AUTO: 10.3 % — SIGNIFICANT CHANGE UP (ref 2–14)
NEUTROPHILS # BLD AUTO: 11.83 K/UL — HIGH (ref 1.8–7.4)
NEUTROPHILS NFR BLD AUTO: 86 % — HIGH (ref 43–77)
PHOSPHATE SERPL-MCNC: 2.4 MG/DL — SIGNIFICANT CHANGE UP (ref 2.4–4.7)
PLATELET # BLD AUTO: 127 K/UL — LOW (ref 150–400)
POTASSIUM SERPL-MCNC: 4.9 MMOL/L — SIGNIFICANT CHANGE UP (ref 3.5–5.3)
POTASSIUM SERPL-SCNC: 4.9 MMOL/L — SIGNIFICANT CHANGE UP (ref 3.5–5.3)
PROTHROM AB SERPL-ACNC: 11.6 SEC — SIGNIFICANT CHANGE UP (ref 10.6–13.6)
RBC # BLD: 3.54 M/UL — LOW (ref 4.2–5.8)
RBC # FLD: 15.9 % — HIGH (ref 10.3–14.5)
SODIUM SERPL-SCNC: 143 MMOL/L — SIGNIFICANT CHANGE UP (ref 135–145)
WBC # BLD: 13.75 K/UL — HIGH (ref 3.8–10.5)
WBC # FLD AUTO: 13.75 K/UL — HIGH (ref 3.8–10.5)

## 2020-12-31 PROCEDURE — 99232 SBSQ HOSP IP/OBS MODERATE 35: CPT

## 2020-12-31 RX ORDER — INSULIN LISPRO 100/ML
10 VIAL (ML) SUBCUTANEOUS
Qty: 1 | Refills: 0
Start: 2020-12-31 | End: 2021-01-29

## 2020-12-31 RX ORDER — DEXTROSE 50 % IN WATER 50 %
15 SYRINGE (ML) INTRAVENOUS ONCE
Refills: 0 | Status: COMPLETED | OUTPATIENT
Start: 2020-12-31 | End: 2020-12-31

## 2020-12-31 RX ORDER — INSULIN LISPRO 100/ML
12 VIAL (ML) SUBCUTANEOUS
Refills: 0 | Status: DISCONTINUED | OUTPATIENT
Start: 2020-12-31 | End: 2020-12-31

## 2020-12-31 RX ORDER — WARFARIN SODIUM 2.5 MG/1
5 TABLET ORAL ONCE
Refills: 0 | Status: COMPLETED | OUTPATIENT
Start: 2020-12-31 | End: 2020-12-31

## 2020-12-31 RX ORDER — INSULIN LISPRO 100/ML
15 VIAL (ML) SUBCUTANEOUS
Qty: 1 | Refills: 0
Start: 2020-12-31 | End: 2021-01-29

## 2020-12-31 RX ORDER — INSULIN LISPRO 100/ML
10 VIAL (ML) SUBCUTANEOUS
Refills: 0 | Status: DISCONTINUED | OUTPATIENT
Start: 2020-12-31 | End: 2021-01-01

## 2020-12-31 RX ORDER — INSULIN GLARGINE 100 [IU]/ML
15 INJECTION, SOLUTION SUBCUTANEOUS AT BEDTIME
Refills: 0 | Status: DISCONTINUED | OUTPATIENT
Start: 2020-12-31 | End: 2021-01-01

## 2020-12-31 RX ORDER — ENOXAPARIN SODIUM 100 MG/ML
15 INJECTION SUBCUTANEOUS
Qty: 1 | Refills: 0
Start: 2020-12-31 | End: 2021-01-29

## 2020-12-31 RX ADMIN — Medication 25 MILLIGRAM(S): at 05:59

## 2020-12-31 RX ADMIN — Medication 3 MILLILITER(S): at 21:15

## 2020-12-31 RX ADMIN — Medication 60 MILLIGRAM(S): at 22:59

## 2020-12-31 RX ADMIN — Medication 60 MILLIGRAM(S): at 16:28

## 2020-12-31 RX ADMIN — Medication 30 MILLILITER(S): at 16:39

## 2020-12-31 RX ADMIN — Medication 3 MILLILITER(S): at 17:49

## 2020-12-31 RX ADMIN — Medication 22 UNIT(S): at 09:06

## 2020-12-31 RX ADMIN — Medication 25 MILLIGRAM(S): at 16:28

## 2020-12-31 RX ADMIN — Medication 3 MILLILITER(S): at 04:30

## 2020-12-31 RX ADMIN — LEVETIRACETAM 500 MILLIGRAM(S): 250 TABLET, FILM COATED ORAL at 16:28

## 2020-12-31 RX ADMIN — Medication 4: at 09:07

## 2020-12-31 RX ADMIN — FAMOTIDINE 20 MILLIGRAM(S): 10 INJECTION INTRAVENOUS at 05:58

## 2020-12-31 RX ADMIN — Medication 60 MILLIGRAM(S): at 05:58

## 2020-12-31 RX ADMIN — Medication 0.12 MILLIGRAM(S): at 05:58

## 2020-12-31 RX ADMIN — FAMOTIDINE 20 MILLIGRAM(S): 10 INJECTION INTRAVENOUS at 16:28

## 2020-12-31 RX ADMIN — Medication 15 GRAM(S): at 17:36

## 2020-12-31 RX ADMIN — Medication 40 MILLIGRAM(S): at 05:58

## 2020-12-31 RX ADMIN — Medication 15 GRAM(S): at 17:35

## 2020-12-31 RX ADMIN — LEVETIRACETAM 500 MILLIGRAM(S): 250 TABLET, FILM COATED ORAL at 05:58

## 2020-12-31 RX ADMIN — ATORVASTATIN CALCIUM 40 MILLIGRAM(S): 80 TABLET, FILM COATED ORAL at 21:26

## 2020-12-31 RX ADMIN — WARFARIN SODIUM 5 MILLIGRAM(S): 2.5 TABLET ORAL at 21:26

## 2020-12-31 RX ADMIN — CHLORHEXIDINE GLUCONATE 1 APPLICATION(S): 213 SOLUTION TOPICAL at 05:59

## 2020-12-31 RX ADMIN — INSULIN GLARGINE 15 UNIT(S): 100 INJECTION, SOLUTION SUBCUTANEOUS at 21:26

## 2020-12-31 RX ADMIN — CITALOPRAM 40 MILLIGRAM(S): 10 TABLET, FILM COATED ORAL at 12:47

## 2020-12-31 RX ADMIN — POLYETHYLENE GLYCOL 3350 17 GRAM(S): 17 POWDER, FOR SOLUTION ORAL at 12:48

## 2020-12-31 RX ADMIN — CLOPIDOGREL BISULFATE 75 MILLIGRAM(S): 75 TABLET, FILM COATED ORAL at 12:51

## 2020-12-31 RX ADMIN — Medication 60 MILLIGRAM(S): at 12:51

## 2020-12-31 RX ADMIN — Medication 81 MILLIGRAM(S): at 12:51

## 2020-12-31 NOTE — PROGRESS NOTE ADULT - ASSESSMENT
70y Male with past medical history significant for COPD, HTN, DM, pAF on coumadin, seizure d/o with recent hospitalization for hypercapnic respiratory failure presents with HTN emergency and subsequent PEA/VT arrest on 12/10 this admission.     Acute on chronic hypoxic hypercapnic respiratory failure sec copd exacerbation, ELISSA, Acute of chronic chf  ABG with mixed resp acidosis & metabolic alkalosis, now improving.  - taper iv solumetrol to prednisone  - per pulm Needs AVAPS & home O2 as out pt to decrease re admissions and improve mortality as has failed BIPAP in the past( [facility])  Repeat CXR: has shows right base airspace disease/ pleural effusion    Acute metabolic encephalopathy-improved  liekly from hypercarbia & hypoxia  ABG with mixed resp acidosis & metabolic alkalosis, now improving. Pt off AVAPS currently on NC  B12, folate, TSH, RPR, ammonia wnl  No signs of CVA. Pt is non focal. AAO x 3 now      Right groin pseudoaneurysm s/p thrombin injection   -  US Duplex Arterial Lower Ext Ltd, Right (12.19.20 @ 11:24) >  The larger of the 2 pseudoaneurysms seen on the prior study remains thrombosed and measures 2.5 x 2.8 x 0.9 cm.  The smaller pseudoaneurysm demonstrating residual flow on the prior study adjacent to the common femoral artery is not well seen on the current study, possibly related to technical factors.     - CT RLE and RLE arterial duplex with either a single right groin pseudoaneurysm measuring 1.6 x 1.5 x 1.4 cm with dilatation of a portion of the neck measuring up to 0.9 cm transverse or 2 adjacent pseudoaneurysms with the more proximal pseudoaneurysm measuring 0.9 x 0.7 cm and the more distal pseudoaneurysm measuring 1.6 x 1.5 x 1.4 cm.  - Vascular following.  - s/p thrombin injection by IR on 12/18  - Resume coumadin without bridging per cardio      CAD   - s/p cath with Severe RCA and LAD disease .  s/p staged PCI to LAD and RCA   - Continue ASA with Plavix, BB, Statin         HTN  - c/w metoprolol and cardizem and digoxin    AFib  - Rate controlled with diltiazem/Metoprolol  - coumadin resumed    Cardiac arrest from/ VT  - amiodarone decreased to 200 mg daily per Dr Arroyo recommendations   -  EP evaluation, no plan AICD now    Blood loss anemia-  - S/P  transfuse 2units PRBC  - Stable H/H    Shortness of breath due to COPD exacerbation,  acute on chronic CHF, possible gram positive/gram negative pneumonia  - improved, currently on NC  - LVEDP was elevated on cath.  - Use of diuretics with worsening contraction alkalosis   - Alkalosis improving after holding diuretics.  Mixed acid base disorder, pCO2 and serum bicarb noted with concurrent hypochloremia: no further diuretics for now as per cardio  - F/U RENAL REC. f/u BMP  - Strict I & Os  - Daily weight  - nocturnal NIV  - Iv solumedrol 40mg q12 Taper to PO pred  - bronchodilators   - f/u pulm rec  -off lasix per cardiology  - completed 7 days of zosyn per ID  - Blood cx NGTD      DM2, uncontrolled-high  -  A1c 9.1  - lantus 30.   Since steroid will be tapered further as an outpatient, would preemptively decrease Lispro to 15 units with meals on discharge and continue same dose of Lantus.  Would not resume Metformin in this patient as it is contraindicated due to chronic hypoxia and O2 therapy (due to associated risk of lactic acidosis).  - close monitoring  - endocrine following      Morbid obesity-  Diet & weight loss recommended      Long  dw daughter CATRACHITA [phone]. Discussed above care of plan. PT recs IRMA but pt refuses IRMA & wants to go home. AVAPS being set up

## 2020-12-31 NOTE — CHART NOTE - NSCHARTNOTEFT_GEN_A_CORE
Patient became hypoglycemic this afternoon with tapering of steroid dose.  Will reduce prandial Lispro dose by 10 units (now use 12 units with meals).   May need further reduction as prednisone is tapered.

## 2020-12-31 NOTE — PROVIDER CONTACT NOTE (OTHER) - RECOMMENDATIONS
Glucose Gel given x2, apple juice x2, dinner at bedside, then held r/t sp02, per NP pt to go back on bipap, notified resp therapy, treatment given, back on 3L02 NC and sp02 100, did not need bipap,

## 2020-12-31 NOTE — PROGRESS NOTE ADULT - NSHPATTENDINGPLANDISCUSS_GEN_ALL_CORE
team
pt, med team, cardiology
patient, RN Maurice Jara and Cardiology [facility]
pt, PMD
team
pt, rn
pt, rn, PA< cardio
pt, rn, daughter, CM
pt, rn, daughter, CM
pt, rn. cardio
pt, cardiology
pt, rn, NP, cardio, vascular sx

## 2020-12-31 NOTE — PROGRESS NOTE ADULT - SUBJECTIVE AND OBJECTIVE BOX
CC:  follow up DM    Interval HPI/ Overnight Events:  Discharge yesterday was postponed as he needs to get respiratory equipment at home first.  Denies any current CP or dyspnea.  Has been transitioned from IV solumedrol to PO prednisone.     MEDICATIONS  (STANDING):  albuterol/ipratropium for Nebulization 3 milliLiter(s) Nebulizer every 6 hours  aspirin  chewable 81 milliGRAM(s) Oral daily  atorvastatin 40 milliGRAM(s) Oral at bedtime  chlorhexidine 2% Cloths 1 Application(s) Topical <User Schedule>  citalopram 40 milliGRAM(s) Oral daily  clopidogrel Tablet 75 milliGRAM(s) Oral daily  digoxin     Tablet 0.125 milliGRAM(s) Oral daily  diltiazem    Tablet 60 milliGRAM(s) Oral four times a day  famotidine    Tablet 20 milliGRAM(s) Oral two times a day  glucagon  Injectable 1 milliGRAM(s) IntraMuscular once  influenza   Vaccine 0.5 milliLiter(s) IntraMuscular once  insulin glargine Injectable (LANTUS) 30 Unit(s) SubCutaneous at bedtime  insulin lispro (ADMELOG) corrective regimen sliding scale   SubCutaneous Before meals and at bedtime  insulin lispro Injectable (ADMELOG) 22 Unit(s) SubCutaneous three times a day before meals  levETIRAcetam 500 milliGRAM(s) Oral two times a day  metoprolol tartrate 25 milliGRAM(s) Oral two times a day  polyethylene glycol 3350 17 Gram(s) Oral daily  predniSONE   Tablet 40 milliGRAM(s) Oral daily    Vital Signs Last 24 Hrs  T(C): 36.5 (31 Dec 2020 07:57), Max: 37.3 (30 Dec 2020 20:20)  T(F): 97.7 (31 Dec 2020 07:57), Max: 99.1 (30 Dec 2020 20:20)  HR: 98 (31 Dec 2020 07:57) (88 - 139)  BP: 119/67 (31 Dec 2020 07:57) (119/67 - 149/69)  BP(mean): 81 (31 Dec 2020 07:57) (71 - 102)  RR: 20 (31 Dec 2020 07:57) (20 - 20)  SpO2: 100% (31 Dec 2020 07:57) (97% - 100%)    PE:  Gen: Obese male, NAD  HEENT:  sclera anicteric, MMM  Neck:  no thyromegaly, no LAD  CV:  nl S1 + S2, RRR, no m/r/g  Resp:  nl respiratory effort, decreased BS b/l  GI/ Abd: soft, NT/ ND, BS +  Neuro:  No tremor  MS:  no c/c/e, nl muscle tone  Skin:  no acanthosis  psych: affect appropriate    LABS:  12-31    143  |  94<L>  |  27.0<H>  ----------------------------<  209<H>  4.9   |  43.0<H>  |  0.56    Ca    8.4<L>      31 Dec 2020 06:30  Phos  2.4     12-31  Mg     2.1     12-31                          10.2   13.75 )-----------( 127      ( 31 Dec 2020 06:34 )             32.6     CAPILLARY BLOOD GLUCOSE  POCT Blood Glucose.: 226 mg/dL (31 Dec 2020 08:36)  POCT Blood Glucose.: 236 mg/dL (30 Dec 2020 21:13)  POCT Blood Glucose.: 252 mg/dL (30 Dec 2020 16:48)  POCT Blood Glucose.: 328 mg/dL (30 Dec 2020 12:26)

## 2020-12-31 NOTE — PROVIDER CONTACT NOTE (OTHER) - SITUATION
's-140's, gave cardizem and metoprolol early, no relief   dinner BG 42, recheck 40, pt responsive  sp02 84% sustaining

## 2020-12-31 NOTE — PROGRESS NOTE ADULT - SUBJECTIVE AND OBJECTIVE BOX
Patient is a 70y old  Male who presents with a chief complaint of Acute hypoxic/hypercapnic respiratory failure, cardiac arrest (25 Dec 2020 10:42)      SUBJECTIVE / OVERNIGHT EVENTS: Pt appears comfortable on NC. Voices no complaints. No chest pain, palpitations, sob, light headedness/dizziness, difficulty breathing/cough, fevers/chills, abdominal pain, n/v, diarrhea/constipation, dysuria or increased urinary frequency.,   REVIEW OF SYSTEMS: All systems are reviewed and found to be negative except above    MEDICATIONS  (STANDING):  albuterol/ipratropium for Nebulization 3 milliLiter(s) Nebulizer every 6 hours  aspirin  chewable 81 milliGRAM(s) Oral daily    atorvastatin 40 milliGRAM(s) Oral at bedtime  chlorhexidine 2% Cloths 1 Application(s) Topical <User Schedule>  citalopram 40 milliGRAM(s) Oral daily  clopidogrel Tablet 75 milliGRAM(s) Oral daily  dextrose 40% Gel 15 Gram(s) Oral once  dextrose 5%. 1000 milliLiter(s) (50 mL/Hr) IV Continuous <Continuous>  dextrose 5%. 1000 milliLiter(s) (100 mL/Hr) IV Continuous <Continuous>  dextrose 50% Injectable 25 Gram(s) IV Push once  dextrose 50% Injectable 12.5 Gram(s) IV Push once  dextrose 50% Injectable 25 Gram(s) IV Push once  digoxin     Tablet 0.125 milliGRAM(s) Oral daily  diltiazem    Tablet 60 milliGRAM(s) Oral four times a day  famotidine    Tablet 20 milliGRAM(s) Oral two times a day  glucagon  Injectable 1 milliGRAM(s) IntraMuscular once  influenza   Vaccine 0.5 milliLiter(s) IntraMuscular once  insulin glargine Injectable (LANTUS) 30 Unit(s) SubCutaneous at bedtime  insulin lispro (ADMELOG) corrective regimen sliding scale   SubCutaneous Before meals and at bedtime  insulin lispro Injectable (ADMELOG) 18 Unit(s) SubCutaneous three times a day before meals  levETIRAcetam  IVPB 500 milliGRAM(s) IV Intermittent every 12 hours  methylPREDNISolone sodium succinate Injectable 40 milliGRAM(s) IV Push every 12 hours  metoprolol tartrate 25 milliGRAM(s) Oral two times a day  polyethylene glycol 3350 17 Gram(s) Oral daily  saccharomyces boulardii 250 milliGRAM(s) Oral two times a day    MEDICATIONS  (PRN):  aluminum hydroxide/magnesium hydroxide/simethicone Suspension 30 milliLiter(s) Oral every 4 hours PRN Dyspepsia      CAPILLARY BLOOD GLUCOSE      POCT Blood Glucose.: 205 mg/dL (25 Dec 2020 12:04)  POCT Blood Glucose.: 175 mg/dL (25 Dec 2020 08:06)  POCT Blood Glucose.: 191 mg/dL (24 Dec 2020 23:02)  POCT Blood Glucose.: 263 mg/dL (24 Dec 2020 17:19)    I&O's Summary    24 Dec 2020 07:01  -  25 Dec 2020 07:00  --------------------------------------------------------  IN: 450 mL / OUT: 650 mL / NET: -200 mL    25 Dec 2020 07:01  -  25 Dec 2020 13:17  --------------------------------------------------------  IN: 290 mL / OUT: 0 mL / NET: 290 mL        PHYSICAL EXAM:  Vital Signs Last 24 Hrs  T(C): 36.2 (25 Dec 2020 09:44), Max: 36.8 (24 Dec 2020 16:10)  T(F): 97.2 (25 Dec 2020 09:44), Max: 98.2 (24 Dec 2020 16:10)  HR: 90 (25 Dec 2020 09:44) (72 - 113)  BP: 119/74 (25 Dec 2020 09:44) (105/50 - 153/66)  BP(mean): 57 (24 Dec 2020 23:00) (57 - 86)  RR: 18 (25 Dec 2020 09:44) (18 - 22)  SpO2: 98% (25 Dec 2020 09:44) (89% - 100%)    CONSTITUTIONAL: NAD, morbidly obese  EYES: PERRLA; conjunctiva and sclera clear  ENMT: Moist oral mucosa, ly  RESPIRATORY: decreased BS b/l  CARDIOVASCULAR: Regular rate and rhythm, normal S1 and S2, no murmur   EXTS: + lower extremity edema-Improving; Peripheral pulses are 2+ bilaterally, multiple bruises on b/l UE  ABDOMEN: Nontender to palpation, normoactive bowel sounds, no rebound/guarding;   PSYCH: affect appropriate  NEUROLOGY: AAO x 3, CN 2-12 are intact and symmetric; no gross sensory deficits; motor strength 5/5 x 4, sensation intact  R groin hematoma improving       LABS:                        11.9   23.02 )-----------( 244      ( 24 Dec 2020 07:31 )             38.6     12-25    145  |  93<L>  |  30.0<H>  ----------------------------<  164<H>  4.2   |  47.0<HH>  |  0.58    Ca    8.6      25 Dec 2020 06:49                  RADIOLOGY & ADDITIONAL TESTS:  Results Reviewed:   Imaging Personally Reviewed:  Electrocardiogram Personally Reviewed:    COORDINATION OF CARE:  Care Discussed with Consultants/Other Providers [Y/N]:  Prior or Outpatient Records Reviewed [Y/N]:

## 2020-12-31 NOTE — PROGRESS NOTE ADULT - ASSESSMENT
70 year old male with severe COPD on home O2, Type 2 DM, HTN, Afib, admitted with hypoxic and hypercapnic respiratory failure requiring intubation followed by PEA arrest and VT with cardiac cath showing severe RCA and LAD disease.  He is s/p THI to RCA and LAD.    His glycemic control is improving with tapering of glucocorticoid doses.      1.  Type 2 DM-    Since steroid will be tapered further as an outpatient, would preemptively decrease Lispro to 15 units with meals on discharge and continue same dose of Lantus.  Would not resume Metformin in this patient as it is contraindicated due to chronic hypoxia and O2 therapy (due to associated risk of lactic acidosis).  2.  CAD-  S/p PCI Management per cardiac team  3.  COPD/ hypercapnic resp failure-   improved,  continue resp Rx and Steroid taper as per pulmonary.  4.  HLD-  continue statin.

## 2020-12-31 NOTE — PROVIDER CONTACT NOTE (OTHER) - ACTION/TREATMENT ORDERED:
HR, sp02 stabilized, requested NP to place IV access incase becomes hypoglycemic again.  BG levels checked q15 until reached >100.

## 2020-12-31 NOTE — CHART NOTE - NSCHARTNOTESELECT_GEN_ALL_CORE
Event Note
Nutrition Services
Palliative/Event Note
Respiratory Care/Event Note
Site Check/Event Note
Vascular Surgery NP/Event Note

## 2021-01-01 VITALS
SYSTOLIC BLOOD PRESSURE: 124 MMHG | HEART RATE: 107 BPM | DIASTOLIC BLOOD PRESSURE: 75 MMHG | OXYGEN SATURATION: 100 % | TEMPERATURE: 98 F

## 2021-01-01 LAB
ANION GAP SERPL CALC-SCNC: 5 MMOL/L — SIGNIFICANT CHANGE UP (ref 5–17)
ANISOCYTOSIS BLD QL: SLIGHT — SIGNIFICANT CHANGE UP
APTT BLD: 21.7 SEC — LOW (ref 27.5–35.5)
BASOPHILS # BLD AUTO: 0.01 K/UL — SIGNIFICANT CHANGE UP (ref 0–0.2)
BASOPHILS NFR BLD AUTO: 0.1 % — SIGNIFICANT CHANGE UP (ref 0–2)
BUN SERPL-MCNC: 22 MG/DL — HIGH (ref 8–20)
CALCIUM SERPL-MCNC: 8.4 MG/DL — LOW (ref 8.6–10.2)
CHLORIDE SERPL-SCNC: 94 MMOL/L — LOW (ref 98–107)
CO2 SERPL-SCNC: 44 MMOL/L — HIGH (ref 22–29)
CREAT SERPL-MCNC: 0.49 MG/DL — LOW (ref 0.5–1.3)
ELLIPTOCYTES BLD QL SMEAR: SLIGHT — SIGNIFICANT CHANGE UP
EOSINOPHIL # BLD AUTO: 0.27 K/UL — SIGNIFICANT CHANGE UP (ref 0–0.5)
EOSINOPHIL NFR BLD AUTO: 2 % — SIGNIFICANT CHANGE UP (ref 0–6)
GLUCOSE BLDC GLUCOMTR-MCNC: 137 MG/DL — HIGH (ref 70–99)
GLUCOSE BLDC GLUCOMTR-MCNC: 208 MG/DL — HIGH (ref 70–99)
GLUCOSE BLDC GLUCOMTR-MCNC: 53 MG/DL — CRITICAL LOW (ref 70–99)
GLUCOSE BLDC GLUCOMTR-MCNC: 74 MG/DL — SIGNIFICANT CHANGE UP (ref 70–99)
GLUCOSE BLDC GLUCOMTR-MCNC: 80 MG/DL — SIGNIFICANT CHANGE UP (ref 70–99)
GLUCOSE BLDC GLUCOMTR-MCNC: 87 MG/DL — SIGNIFICANT CHANGE UP (ref 70–99)
GLUCOSE SERPL-MCNC: 53 MG/DL — CRITICAL LOW (ref 70–99)
HCT VFR BLD CALC: 34.5 % — LOW (ref 39–50)
HGB BLD-MCNC: 10.8 G/DL — LOW (ref 13–17)
IMM GRANULOCYTES NFR BLD AUTO: 0.6 % — SIGNIFICANT CHANGE UP (ref 0–1.5)
INR BLD: 1.04 RATIO — SIGNIFICANT CHANGE UP (ref 0.88–1.16)
LYMPHOCYTES # BLD AUTO: 1.16 K/UL — SIGNIFICANT CHANGE UP (ref 1–3.3)
LYMPHOCYTES # BLD AUTO: 8.5 % — LOW (ref 13–44)
MAGNESIUM SERPL-MCNC: 2 MG/DL — SIGNIFICANT CHANGE UP (ref 1.6–2.6)
MANUAL SMEAR VERIFICATION: SIGNIFICANT CHANGE UP
MCHC RBC-ENTMCNC: 29.3 PG — SIGNIFICANT CHANGE UP (ref 27–34)
MCHC RBC-ENTMCNC: 31.3 GM/DL — LOW (ref 32–36)
MCV RBC AUTO: 93.5 FL — SIGNIFICANT CHANGE UP (ref 80–100)
MONOCYTES # BLD AUTO: 1.35 K/UL — HIGH (ref 0–0.9)
MONOCYTES NFR BLD AUTO: 9.9 % — SIGNIFICANT CHANGE UP (ref 2–14)
NEUTROPHILS # BLD AUTO: 10.73 K/UL — HIGH (ref 1.8–7.4)
NEUTROPHILS NFR BLD AUTO: 78.9 % — HIGH (ref 43–77)
OVALOCYTES BLD QL SMEAR: SLIGHT — SIGNIFICANT CHANGE UP
PHOSPHATE SERPL-MCNC: 2.5 MG/DL — SIGNIFICANT CHANGE UP (ref 2.4–4.7)
PLAT MORPH BLD: NORMAL — SIGNIFICANT CHANGE UP
PLATELET # BLD AUTO: 120 K/UL — LOW (ref 150–400)
PLATELET COUNT - ESTIMATE: ABNORMAL
POLYCHROMASIA BLD QL SMEAR: SLIGHT — SIGNIFICANT CHANGE UP
POTASSIUM SERPL-MCNC: 4.6 MMOL/L — SIGNIFICANT CHANGE UP (ref 3.5–5.3)
POTASSIUM SERPL-SCNC: 4.6 MMOL/L — SIGNIFICANT CHANGE UP (ref 3.5–5.3)
PROTHROM AB SERPL-ACNC: 12 SEC — SIGNIFICANT CHANGE UP (ref 10.6–13.6)
RBC # BLD: 3.69 M/UL — LOW (ref 4.2–5.8)
RBC # FLD: 15.9 % — HIGH (ref 10.3–14.5)
RBC BLD AUTO: ABNORMAL
SODIUM SERPL-SCNC: 143 MMOL/L — SIGNIFICANT CHANGE UP (ref 135–145)
WBC # BLD: 13.6 K/UL — HIGH (ref 3.8–10.5)
WBC # FLD AUTO: 13.6 K/UL — HIGH (ref 3.8–10.5)

## 2021-01-01 PROCEDURE — 99239 HOSP IP/OBS DSCHRG MGMT >30: CPT

## 2021-01-01 RX ORDER — INSULIN GLARGINE 100 [IU]/ML
10 INJECTION, SOLUTION SUBCUTANEOUS AT BEDTIME
Refills: 0 | Status: DISCONTINUED | OUTPATIENT
Start: 2021-01-01 | End: 2021-01-01

## 2021-01-01 RX ORDER — DILTIAZEM HCL 120 MG
1 CAPSULE, EXT RELEASE 24 HR ORAL
Qty: 30 | Refills: 0
Start: 2021-01-01 | End: 2021-01-30

## 2021-01-01 RX ORDER — CLOPIDOGREL BISULFATE 75 MG/1
1 TABLET, FILM COATED ORAL
Qty: 30 | Refills: 0
Start: 2021-01-01 | End: 2021-01-30

## 2021-01-01 RX ORDER — DIGOXIN 250 MCG
1 TABLET ORAL
Qty: 30 | Refills: 0
Start: 2021-01-01 | End: 2021-01-30

## 2021-01-01 RX ORDER — METOPROLOL TARTRATE 50 MG
1 TABLET ORAL
Qty: 60 | Refills: 0
Start: 2021-01-01 | End: 2021-01-30

## 2021-01-01 RX ORDER — WARFARIN SODIUM 2.5 MG/1
5 TABLET ORAL ONCE
Refills: 0 | Status: DISCONTINUED | OUTPATIENT
Start: 2021-01-01 | End: 2021-01-01

## 2021-01-01 RX ORDER — INSULIN LISPRO 100/ML
5 VIAL (ML) SUBCUTANEOUS
Refills: 0 | Status: DISCONTINUED | OUTPATIENT
Start: 2021-01-01 | End: 2021-01-01

## 2021-01-01 RX ORDER — INSULIN LISPRO 100/ML
5 VIAL (ML) SUBCUTANEOUS
Qty: 1 | Refills: 0
Start: 2021-01-01 | End: 2021-01-30

## 2021-01-01 RX ORDER — ASPIRIN/CALCIUM CARB/MAGNESIUM 324 MG
1 TABLET ORAL
Qty: 30 | Refills: 0
Start: 2021-01-01 | End: 2021-01-30

## 2021-01-01 RX ORDER — ENOXAPARIN SODIUM 100 MG/ML
10 INJECTION SUBCUTANEOUS
Qty: 1 | Refills: 0
Start: 2021-01-01 | End: 2021-01-30

## 2021-01-01 RX ORDER — FAMOTIDINE 10 MG/ML
1 INJECTION INTRAVENOUS
Qty: 60 | Refills: 0
Start: 2021-01-01 | End: 2021-01-30

## 2021-01-01 RX ADMIN — Medication 81 MILLIGRAM(S): at 12:57

## 2021-01-01 RX ADMIN — Medication 4: at 12:58

## 2021-01-01 RX ADMIN — Medication 60 MILLIGRAM(S): at 12:58

## 2021-01-01 RX ADMIN — LEVETIRACETAM 500 MILLIGRAM(S): 250 TABLET, FILM COATED ORAL at 05:37

## 2021-01-01 RX ADMIN — Medication 3 MILLILITER(S): at 08:57

## 2021-01-01 RX ADMIN — Medication 25 MILLIGRAM(S): at 05:37

## 2021-01-01 RX ADMIN — Medication 60 MILLIGRAM(S): at 05:37

## 2021-01-01 RX ADMIN — CHLORHEXIDINE GLUCONATE 1 APPLICATION(S): 213 SOLUTION TOPICAL at 05:41

## 2021-01-01 RX ADMIN — CITALOPRAM 40 MILLIGRAM(S): 10 TABLET, FILM COATED ORAL at 12:58

## 2021-01-01 RX ADMIN — FAMOTIDINE 20 MILLIGRAM(S): 10 INJECTION INTRAVENOUS at 06:21

## 2021-01-01 RX ADMIN — Medication 3 MILLILITER(S): at 14:56

## 2021-01-01 RX ADMIN — Medication 5 UNIT(S): at 12:58

## 2021-01-01 RX ADMIN — Medication 0.12 MILLIGRAM(S): at 05:37

## 2021-01-01 RX ADMIN — Medication 3 MILLILITER(S): at 04:26

## 2021-01-01 RX ADMIN — CLOPIDOGREL BISULFATE 75 MILLIGRAM(S): 75 TABLET, FILM COATED ORAL at 12:58

## 2021-01-01 RX ADMIN — Medication 40 MILLIGRAM(S): at 05:37

## 2021-01-01 NOTE — PROGRESS NOTE ADULT - NUTRITIONAL ASSESSMENT
This patient has been assessed with a concern for Malnutrition and has been determined to have a diagnosis/diagnoses of Moderate protein-calorie malnutrition.    This patient is being managed with:   Diet Consistent Carbohydrate/No Snacks-  DASH/TLC {Sodium & Cholesterol Restricted} (DASH)  Low Sodium  Entered: Dec 30 2020  8:25AM

## 2021-01-01 NOTE — PROGRESS NOTE ADULT - SUBJECTIVE AND OBJECTIVE BOX
Called by RN that patient tried to get to the bathroom and tripped on bedside cables and fell to the ground. RN Camille Lyons ran to patient's aid and helped him up. Patient states that he did not have an LOC and has no acute compkaints of HA, facial, neck or back pain and denies any LACs or joint discomfort.  Remembers incident fully and stood up with help of aid and walked back to bed.  VSS  General: NAD  HEENT: EOMI, uvula midline  Neck: full ROM  Chest: CTA b/l, no wheezing  CV: S1S2, RRR  Abd: +BS soft and obese  Extremities: full ROM in Upper and Lower extr.   Vasc: Radial and DP pulses intact,  Neuro: A&O x3, Motor and sensory grossly intact, normal gait  Skin: no visible abrasions or LACs  A/P: 69yo M with accidental trip and fall to ground, no acute findings on exam  - patient to be monitored closely overnight for acute findings  - neuro exam Q4 hours  - bed alarm and side rails placed up, Tele monitor  - labs in AM  - No acute intervention indicated currently

## 2021-01-01 NOTE — PROGRESS NOTE ADULT - REASON FOR ADMISSION
Acute hypoxic/hypercapnic respiratory failure, cardiac arrest

## 2021-01-01 NOTE — PROGRESS NOTE ADULT - PROVIDER SPECIALTY LIST ADULT
Cardiology
Critical Care
Endocrinology
Hospitalist
Infectious Disease
Intervent Radiology
MICU
Palliative Care
Pulmonology
Pulmonology
Vascular Surgery
Cardiology
Endocrinology
Endocrinology
Hospitalist
Infectious Disease
Pulmonology
Cardiology
Endocrinology
Hospitalist
Pulmonology
Cardiology
Endocrinology
Endocrinology
Hospitalist
Pulmonology
Pulmonology
Vascular Surgery
Pulmonology

## 2021-01-21 PROCEDURE — C1894: CPT

## 2021-01-21 PROCEDURE — 83880 ASSAY OF NATRIURETIC PEPTIDE: CPT

## 2021-01-21 PROCEDURE — 82947 ASSAY GLUCOSE BLOOD QUANT: CPT

## 2021-01-21 PROCEDURE — 99291 CRITICAL CARE FIRST HOUR: CPT | Mod: 25

## 2021-01-21 PROCEDURE — C9600: CPT | Mod: RC

## 2021-01-21 PROCEDURE — 73700 CT LOWER EXTREMITY W/O DYE: CPT

## 2021-01-21 PROCEDURE — 94640 AIRWAY INHALATION TREATMENT: CPT

## 2021-01-21 PROCEDURE — 86780 TREPONEMA PALLIDUM: CPT

## 2021-01-21 PROCEDURE — C1887: CPT

## 2021-01-21 PROCEDURE — 76942 ECHO GUIDE FOR BIOPSY: CPT

## 2021-01-21 PROCEDURE — C1753: CPT

## 2021-01-21 PROCEDURE — 80162 ASSAY OF DIGOXIN TOTAL: CPT

## 2021-01-21 PROCEDURE — 92978 ENDOLUMINL IVUS OCT C 1ST: CPT | Mod: RC

## 2021-01-21 PROCEDURE — 36430 TRANSFUSION BLD/BLD COMPNT: CPT

## 2021-01-21 PROCEDURE — 82803 BLOOD GASES ANY COMBINATION: CPT

## 2021-01-21 PROCEDURE — C1725: CPT

## 2021-01-21 PROCEDURE — 84443 ASSAY THYROID STIM HORMONE: CPT

## 2021-01-21 PROCEDURE — C9606: CPT | Mod: LD

## 2021-01-21 PROCEDURE — 86900 BLOOD TYPING SEROLOGIC ABO: CPT

## 2021-01-21 PROCEDURE — 82962 GLUCOSE BLOOD TEST: CPT

## 2021-01-21 PROCEDURE — 87070 CULTURE OTHR SPECIMN AEROBIC: CPT

## 2021-01-21 PROCEDURE — 85610 PROTHROMBIN TIME: CPT

## 2021-01-21 PROCEDURE — C1751: CPT

## 2021-01-21 PROCEDURE — 99152 MOD SED SAME PHYS/QHP 5/>YRS: CPT

## 2021-01-21 PROCEDURE — 36556 INSERT NON-TUNNEL CV CATH: CPT

## 2021-01-21 PROCEDURE — 83735 ASSAY OF MAGNESIUM: CPT

## 2021-01-21 PROCEDURE — 82747 ASSAY OF FOLIC ACID RBC: CPT

## 2021-01-21 PROCEDURE — 86850 RBC ANTIBODY SCREEN: CPT

## 2021-01-21 PROCEDURE — 82140 ASSAY OF AMMONIA: CPT

## 2021-01-21 PROCEDURE — 80053 COMPREHEN METABOLIC PANEL: CPT

## 2021-01-21 PROCEDURE — 97530 THERAPEUTIC ACTIVITIES: CPT

## 2021-01-21 PROCEDURE — 84145 PROCALCITONIN (PCT): CPT

## 2021-01-21 PROCEDURE — 97110 THERAPEUTIC EXERCISES: CPT

## 2021-01-21 PROCEDURE — 93005 ELECTROCARDIOGRAM TRACING: CPT

## 2021-01-21 PROCEDURE — 87640 STAPH A DNA AMP PROBE: CPT

## 2021-01-21 PROCEDURE — 87150 DNA/RNA AMPLIFIED PROBE: CPT

## 2021-01-21 PROCEDURE — 36415 COLL VENOUS BLD VENIPUNCTURE: CPT

## 2021-01-21 PROCEDURE — 82330 ASSAY OF CALCIUM: CPT

## 2021-01-21 PROCEDURE — 82607 VITAMIN B-12: CPT

## 2021-01-21 PROCEDURE — 86923 COMPATIBILITY TEST ELECTRIC: CPT

## 2021-01-21 PROCEDURE — 80307 DRUG TEST PRSMV CHEM ANLYZR: CPT

## 2021-01-21 PROCEDURE — 36600 WITHDRAWAL OF ARTERIAL BLOOD: CPT

## 2021-01-21 PROCEDURE — 83605 ASSAY OF LACTIC ACID: CPT

## 2021-01-21 PROCEDURE — 93460 R&L HRT ART/VENTRICLE ANGIO: CPT | Mod: XU

## 2021-01-21 PROCEDURE — 87040 BLOOD CULTURE FOR BACTERIA: CPT

## 2021-01-21 PROCEDURE — 51702 INSERT TEMP BLADDER CATH: CPT | Mod: XU

## 2021-01-21 PROCEDURE — 96374 THER/PROPH/DIAG INJ IV PUSH: CPT | Mod: XU

## 2021-01-21 PROCEDURE — 85027 COMPLETE CBC AUTOMATED: CPT

## 2021-01-21 PROCEDURE — U0003: CPT

## 2021-01-21 PROCEDURE — 85018 HEMOGLOBIN: CPT

## 2021-01-21 PROCEDURE — 82435 ASSAY OF BLOOD CHLORIDE: CPT

## 2021-01-21 PROCEDURE — 94660 CPAP INITIATION&MGMT: CPT

## 2021-01-21 PROCEDURE — P9016: CPT

## 2021-01-21 PROCEDURE — 84295 ASSAY OF SERUM SODIUM: CPT

## 2021-01-21 PROCEDURE — 87086 URINE CULTURE/COLONY COUNT: CPT

## 2021-01-21 PROCEDURE — 70450 CT HEAD/BRAIN W/O DYE: CPT

## 2021-01-21 PROCEDURE — C9460: CPT

## 2021-01-21 PROCEDURE — 93926 LOWER EXTREMITY STUDY: CPT

## 2021-01-21 PROCEDURE — C1769: CPT

## 2021-01-21 PROCEDURE — 80202 ASSAY OF VANCOMYCIN: CPT

## 2021-01-21 PROCEDURE — 84100 ASSAY OF PHOSPHORUS: CPT

## 2021-01-21 PROCEDURE — 90715 TDAP VACCINE 7 YRS/> IM: CPT

## 2021-01-21 PROCEDURE — 71045 X-RAY EXAM CHEST 1 VIEW: CPT

## 2021-01-21 PROCEDURE — 94760 N-INVAS EAR/PLS OXIMETRY 1: CPT

## 2021-01-21 PROCEDURE — 85014 HEMATOCRIT: CPT

## 2021-01-21 PROCEDURE — 84132 ASSAY OF SERUM POTASSIUM: CPT

## 2021-01-21 PROCEDURE — 99292 CRITICAL CARE ADDL 30 MIN: CPT | Mod: 25

## 2021-01-21 PROCEDURE — 31500 INSERT EMERGENCY AIRWAY: CPT

## 2021-01-21 PROCEDURE — 86901 BLOOD TYPING SEROLOGIC RH(D): CPT

## 2021-01-21 PROCEDURE — 94002 VENT MGMT INPAT INIT DAY: CPT

## 2021-01-21 PROCEDURE — C1760: CPT

## 2021-01-21 PROCEDURE — 96375 TX/PRO/DX INJ NEW DRUG ADDON: CPT | Mod: XU

## 2021-01-21 PROCEDURE — 97116 GAIT TRAINING THERAPY: CPT

## 2021-01-21 PROCEDURE — 87641 MR-STAPH DNA AMP PROBE: CPT

## 2021-01-21 PROCEDURE — 90471 IMMUNIZATION ADMIN: CPT

## 2021-01-21 PROCEDURE — 86769 SARS-COV-2 COVID-19 ANTIBODY: CPT

## 2021-01-21 PROCEDURE — 94003 VENT MGMT INPAT SUBQ DAY: CPT

## 2021-01-21 PROCEDURE — 0225U NFCT DS DNA&RNA 21 SARSCOV2: CPT

## 2021-01-21 PROCEDURE — 80048 BASIC METABOLIC PNL TOTAL CA: CPT

## 2021-01-21 PROCEDURE — 85025 COMPLETE CBC W/AUTO DIFF WBC: CPT

## 2021-01-21 PROCEDURE — 93571 IV DOP VEL&/PRESS C FLO 1ST: CPT | Mod: RC

## 2021-01-21 PROCEDURE — 85730 THROMBOPLASTIN TIME PARTIAL: CPT

## 2021-01-21 PROCEDURE — 81001 URINALYSIS AUTO W/SCOPE: CPT

## 2021-01-21 PROCEDURE — C8929: CPT

## 2021-01-21 PROCEDURE — 96372 THER/PROPH/DIAG INJ SC/IM: CPT | Mod: XU

## 2021-01-21 PROCEDURE — C1874: CPT

## 2021-01-21 PROCEDURE — 99153 MOD SED SAME PHYS/QHP EA: CPT

## 2021-01-21 PROCEDURE — C1889: CPT

## 2021-01-21 PROCEDURE — 97163 PT EVAL HIGH COMPLEX 45 MIN: CPT

## 2021-01-21 PROCEDURE — 84484 ASSAY OF TROPONIN QUANT: CPT

## 2021-01-21 PROCEDURE — 83036 HEMOGLOBIN GLYCOSYLATED A1C: CPT

## 2021-07-26 NOTE — CONSULT NOTE ADULT - CONSULT REQUESTED BY NAME
Progress Note - Surgical Oncology  Camilla Benitez 46 y o  male MRN: [id number]  Unit/Bed#: [facility] 914-01 Encounter: [id number]    Assessment:  49-year-old male with imaging findings concerning for metastatic disease  Bilateral cervical adenopathy    Plan:  - L cervical lymph node biopsy with lymphoma protocol today  - follow punch biopsy results    Subjective/Objective     Subjective:   No acute events overnight  Pain in left abdomen  NPO since midnight  -N  -V      Objective:     Blood pressure 125/78, pulse 101, temperature 98 6 °F (37 °C), resp  rate 18, height 5' 9" (1 753 m), weight 51 kg (112 lb 7 oz), SpO2 96 %  ,Body mass index is 16 6 kg/m²        Intake/Output Summary (Last 24 hours) at 7/26/2021 0518  Last data filed at 7/25/2021 4966  Gross per 24 hour   Intake 240 ml   Output --   Net 240 ml       Invasive Devices     Peripheral Intravenous Line            Peripheral IV 07/22/21 Left;Upper;Ventral (anterior) Arm 3 days    Peripheral IV 07/24/21 Left;Ventral (anterior) Forearm 1 day                Physical Exam:   Gen: NAD, Comfortable  Neuro: A&O, No focal deficits  Head: Normal Cephalic, Atraumatic  Eye: EOMI, PERRLA, No scleral icterus  Neck: Supple, No JVD, Midline trachea  Lymphatics: Bilateral cervical adenopathy  CV: RRR, Cap refill <2 sec  Pulm: Normal work of breathing, no respiratory distress  Abd: Soft, Non-Distended, Non-Tender  Ext: No edema, Non-tender  Skin: warm, dry, intact [place]

## 2023-06-14 NOTE — DIETITIAN INITIAL EVALUATION ADULT. - CHIEF COMPLAINT
Start Vigamox three times daily.  Tylenol and Motrin up to three times daily for pain.  Norco for more severe pain in place of Tylenol as needed.  Please follow up with Opthalmology in the next 1-2 days. Monica may call you this morning to help arrange this.   
The patient is a 70y Male complaining of difficulty breathing.

## 2023-09-26 NOTE — PROGRESS NOTE ADULT - SUBJECTIVE AND OBJECTIVE BOX
Follow up: T2DM  Interval Hx/Events: O2 desat overnight and now on BiPAP    MEDICATIONS  (STANDING):  albuterol/ipratropium for Nebulization 3 milliLiter(s) Nebulizer every 6 hours  aspirin  chewable 81 milliGRAM(s) Oral daily  atorvastatin 40 milliGRAM(s) Oral at bedtime  chlorhexidine 2% Cloths 1 Application(s) Topical <User Schedule>  citalopram 40 milliGRAM(s) Oral daily  clopidogrel Tablet 75 milliGRAM(s) Oral daily  dextrose 40% Gel 15 Gram(s) Oral once  dextrose 5%. 1000 milliLiter(s) (50 mL/Hr) IV Continuous <Continuous>  dextrose 5%. 1000 milliLiter(s) (100 mL/Hr) IV Continuous <Continuous>  dextrose 50% Injectable 25 Gram(s) IV Push once  dextrose 50% Injectable 12.5 Gram(s) IV Push once  dextrose 50% Injectable 25 Gram(s) IV Push once  digoxin     Tablet 0.125 milliGRAM(s) Oral daily  diltiazem    Tablet 60 milliGRAM(s) Oral four times a day  famotidine    Tablet 20 milliGRAM(s) Oral two times a day  glucagon  Injectable 1 milliGRAM(s) IntraMuscular once  influenza   Vaccine 0.5 milliLiter(s) IntraMuscular once  insulin lispro (ADMELOG) corrective regimen sliding scale   SubCutaneous Before meals and at bedtime  insulin lispro Injectable (ADMELOG) 12 Unit(s) SubCutaneous three times a day before meals  levETIRAcetam  IVPB 500 milliGRAM(s) IV Intermittent every 12 hours  methylPREDNISolone sodium succinate Injectable 40 milliGRAM(s) IV Push every 8 hours  metoprolol tartrate 25 milliGRAM(s) Oral two times a day  polyethylene glycol 3350 17 Gram(s) Oral daily  saccharomyces boulardii 250 milliGRAM(s) Oral two times a day    MEDICATIONS  (PRN):  aluminum hydroxide/magnesium hydroxide/simethicone Suspension 30 milliLiter(s) Oral every 4 hours PRN Dyspepsia    ALLERGIES: No Known Allergies    REVIEW OF SYSTEMS: unable to assess, pt on BiPAP    Vital Signs Last 24 Hrs  T(C): 36.6 (21 Dec 2020 10:44), Max: 37.2 (20 Dec 2020 22:00)  T(F): 97.8 (21 Dec 2020 10:44), Max: 98.9 (20 Dec 2020 22:00)  HR: 74 (21 Dec 2020 10:50) (72 - 83)  BP: 145/75 (21 Dec 2020 10:44) (108/66 - 145/75)  BP(mean): --  RR: 16 (21 Dec 2020 10:44) (16 - 18)  SpO2: 95% (21 Dec 2020 10:50) (93% - 99%)    Physical Exam: pt on BiPAP  General appearance: Obese male  Eyes: Pupils equal. EOMI  Lungs: . Lungs clear no w/r/r  CV: Normal S1S2, regular.   Abdomen: Soft, nontender  Musculoskeletal: 1+ pitting edema bilateral LE  Skin: Warm and moist.   Psych: Normal affect      LABS:                        11.0   20.40 )-----------( 229      ( 21 Dec 2020 08:57 )             36.9     12-21    144  |  94<L>  |  34.0<H>  ----------------------------<  56<L>  4.7   |  44.0<H>  |  0.76    Ca    8.9      21 Dec 2020 08:57    TPro  5.8<L>  /  Alb  3.3  /  TBili  0.6  /  DBili  x   /  AST  30  /  ALT  44<H>  /  AlkPhos  93  12-21    LIVER FUNCTIONS - ( 21 Dec 2020 08:57 )  Alb: 3.3 g/dL / Pro: 5.8 g/dL / ALK PHOS: 93 U/L / ALT: 44 U/L / AST: 30 U/L / GGT: x             A1C with Estimated Average Glucose Result: 9.1 % (12-11-20 @ 05:05)  A1C with Estimated Average Glucose Result: 8.7 % (12-02-20 @ 04:55)      CAPILLARY BLOOD GLUCOSE  POCT Blood Glucose.: 82 mg/dL (21 Dec 2020 12:20)  POCT Blood Glucose.: 97 mg/dL (21 Dec 2020 09:05)  POCT Blood Glucose.: 69 mg/dL (21 Dec 2020 08:21)  POCT Blood Glucose.: 96 mg/dL (20 Dec 2020 21:23)  POCT Blood Glucose.: 145 mg/dL (20 Dec 2020 17:29)  POCT Blood Glucose.: 221 mg/dL (20 Dec 2020 12:48)  POCT Blood Glucose.: 204 mg/dL (20 Dec 2020 09:36)  POCT Blood Glucose.: 264 mg/dL (19 Dec 2020 20:48)  POCT Blood Glucose.: 233 mg/dL (19 Dec 2020 17:45)      Thyroid Stimulating Hormone, Serum: 0.38 uIU/mL [0.27 - 4.20] (12-02-20)               Solaraze Counseling:  I discussed with the patient the risks of Solaraze including but not limited to erythema, scaling, itching, weeping, crusting, and pain.

## 2023-11-27 NOTE — H&P ADULT - NSICDXPASTSURGICALHX_GEN_ALL_CORE_FT
PAST SURGICAL HISTORY:  Abdominal hernia     H/O carotid endarterectomy      details… soft/nontender/nondistended/normal active bowel sounds

## 2024-09-08 NOTE — PROVIDER CONTACT NOTE (CRITICAL VALUE NOTIFICATION) - SITUATION
pt on heparin infusion
CO2 - 50.0 as per lab  Patient asymptomatic
MD aware of value, similar value previous day
3 or 4

## 2025-02-25 NOTE — PATIENT PROFILE ADULT - NSPROMEDSBROUGHTTOHOSP_GEN_A_NUR
----- Message from Ryder Herrera MD sent at 2/24/2025  3:57 PM CST -----  Due for checkup if can come some time in March  
Called pt to schedule annual, no answer lvm   
no